# Patient Record
Sex: FEMALE | Race: WHITE | NOT HISPANIC OR LATINO | Employment: OTHER | URBAN - NONMETROPOLITAN AREA
[De-identification: names, ages, dates, MRNs, and addresses within clinical notes are randomized per-mention and may not be internally consistent; named-entity substitution may affect disease eponyms.]

---

## 2017-04-28 ENCOUNTER — TELEPHONE (OUTPATIENT)
Dept: FAMILY MEDICINE | Facility: OTHER | Age: 67
End: 2017-04-28

## 2017-04-28 NOTE — TELEPHONE ENCOUNTER
9:25 AM    Reason for Call: Phone Call    Description: Mony would like the nurse to call her. She has a Medicare question.    Was an appointment offered for this call?  No    Preferred method for responding to this message: [phone]    If we cannot reach you directly, may we leave a detailed response at the number you provided?  Yes        Mony Barneyking

## 2017-05-13 ENCOUNTER — HOSPITAL ENCOUNTER (EMERGENCY)
Facility: HOSPITAL | Age: 67
Discharge: HOME | End: 2017-05-13
Attending: FAMILY MEDICINE | Admitting: FAMILY MEDICINE
Payer: MEDICARE

## 2017-05-13 VITALS
RESPIRATION RATE: 16 BRPM | WEIGHT: 145 LBS | SYSTOLIC BLOOD PRESSURE: 139 MMHG | DIASTOLIC BLOOD PRESSURE: 87 MMHG | BODY MASS INDEX: 25.08 KG/M2 | TEMPERATURE: 97.7 F | OXYGEN SATURATION: 97 % | HEART RATE: 68 BPM

## 2017-05-13 DIAGNOSIS — E86.0 DEHYDRATION: ICD-10-CM

## 2017-05-13 DIAGNOSIS — R55 VASOVAGAL SYNCOPE: ICD-10-CM

## 2017-05-13 LAB
ALBUMIN SERPL-MCNC: 3.9 G/DL (ref 3.4–5)
ALP SERPL-CCNC: 89 U/L (ref 40–150)
ALT SERPL W P-5'-P-CCNC: 25 U/L (ref 0–50)
ANION GAP SERPL CALCULATED.3IONS-SCNC: 14 MMOL/L (ref 3–14)
AST SERPL W P-5'-P-CCNC: 26 U/L (ref 0–45)
BASOPHILS # BLD AUTO: 0 10E9/L (ref 0–0.2)
BASOPHILS NFR BLD AUTO: 0.5 %
BILIRUB SERPL-MCNC: 0.4 MG/DL (ref 0.2–1.3)
BUN SERPL-MCNC: 15 MG/DL (ref 7–30)
CALCIUM SERPL-MCNC: 8.9 MG/DL (ref 8.5–10.1)
CHLORIDE SERPL-SCNC: 111 MMOL/L (ref 94–109)
CO2 SERPL-SCNC: 20 MMOL/L (ref 20–32)
CREAT SERPL-MCNC: 0.7 MG/DL (ref 0.52–1.04)
CRP SERPL-MCNC: <2.9 MG/L (ref 0–8)
DIFFERENTIAL METHOD BLD: NORMAL
EOSINOPHIL # BLD AUTO: 0.1 10E9/L (ref 0–0.7)
EOSINOPHIL NFR BLD AUTO: 1.4 %
ERYTHROCYTE [DISTWIDTH] IN BLOOD BY AUTOMATED COUNT: 11.9 % (ref 10–15)
GFR SERPL CREATININE-BSD FRML MDRD: 85 ML/MIN/1.7M2
GLUCOSE SERPL-MCNC: 83 MG/DL (ref 70–99)
HCT VFR BLD AUTO: 42.3 % (ref 35–47)
HGB BLD-MCNC: 13.9 G/DL (ref 11.7–15.7)
IMM GRANULOCYTES # BLD: 0 10E9/L (ref 0–0.4)
IMM GRANULOCYTES NFR BLD: 0.5 %
LYMPHOCYTES # BLD AUTO: 1.4 10E9/L (ref 0.8–5.3)
LYMPHOCYTES NFR BLD AUTO: 16.5 %
MAGNESIUM SERPL-MCNC: 2.4 MG/DL (ref 1.6–2.3)
MCH RBC QN AUTO: 30.6 PG (ref 26.5–33)
MCHC RBC AUTO-ENTMCNC: 32.9 G/DL (ref 31.5–36.5)
MCV RBC AUTO: 93 FL (ref 78–100)
MONOCYTES # BLD AUTO: 0.6 10E9/L (ref 0–1.3)
MONOCYTES NFR BLD AUTO: 6.4 %
NEUTROPHILS # BLD AUTO: 6.4 10E9/L (ref 1.6–8.3)
NEUTROPHILS NFR BLD AUTO: 74.7 %
NRBC # BLD AUTO: 0 10*3/UL
NRBC BLD AUTO-RTO: 0 /100
PLATELET # BLD AUTO: 251 10E9/L (ref 150–450)
POTASSIUM SERPL-SCNC: 4 MMOL/L (ref 3.4–5.3)
PROT SERPL-MCNC: 7.7 G/DL (ref 6.8–8.8)
RBC # BLD AUTO: 4.54 10E12/L (ref 3.8–5.2)
SODIUM SERPL-SCNC: 145 MMOL/L (ref 133–144)
WBC # BLD AUTO: 8.6 10E9/L (ref 4–11)

## 2017-05-13 PROCEDURE — 83735 ASSAY OF MAGNESIUM: CPT | Performed by: FAMILY MEDICINE

## 2017-05-13 PROCEDURE — 80053 COMPREHEN METABOLIC PANEL: CPT | Performed by: FAMILY MEDICINE

## 2017-05-13 PROCEDURE — 85025 COMPLETE CBC W/AUTO DIFF WBC: CPT | Performed by: FAMILY MEDICINE

## 2017-05-13 PROCEDURE — 99284 EMERGENCY DEPT VISIT MOD MDM: CPT | Performed by: FAMILY MEDICINE

## 2017-05-13 PROCEDURE — 25000128 H RX IP 250 OP 636: Performed by: FAMILY MEDICINE

## 2017-05-13 PROCEDURE — 36416 COLLJ CAPILLARY BLOOD SPEC: CPT | Performed by: FAMILY MEDICINE

## 2017-05-13 PROCEDURE — 96360 HYDRATION IV INFUSION INIT: CPT

## 2017-05-13 PROCEDURE — 99284 EMERGENCY DEPT VISIT MOD MDM: CPT | Mod: 25

## 2017-05-13 PROCEDURE — 86140 C-REACTIVE PROTEIN: CPT | Performed by: FAMILY MEDICINE

## 2017-05-13 RX ORDER — ONDANSETRON 2 MG/ML
4 INJECTION INTRAMUSCULAR; INTRAVENOUS
Status: DISCONTINUED | OUTPATIENT
Start: 2017-05-13 | End: 2017-05-13 | Stop reason: HOSPADM

## 2017-05-13 RX ORDER — SODIUM CHLORIDE 9 MG/ML
1000 INJECTION, SOLUTION INTRAVENOUS CONTINUOUS
Status: DISCONTINUED | OUTPATIENT
Start: 2017-05-13 | End: 2017-05-13 | Stop reason: HOSPADM

## 2017-05-13 RX ORDER — ONDANSETRON 4 MG/1
4 TABLET, ORALLY DISINTEGRATING ORAL EVERY 8 HOURS PRN
Qty: 10 TABLET | Refills: 0 | Status: SHIPPED | OUTPATIENT
Start: 2017-05-13 | End: 2017-05-15

## 2017-05-13 RX ADMIN — SODIUM CHLORIDE 1000 ML: 9 INJECTION, SOLUTION INTRAVENOUS at 09:27

## 2017-05-13 RX ADMIN — SODIUM CHLORIDE 1000 ML: 9 INJECTION, SOLUTION INTRAVENOUS at 09:28

## 2017-05-13 ASSESSMENT — ENCOUNTER SYMPTOMS
CONSTIPATION: 0
NERVOUS/ANXIOUS: 1
DIARRHEA: 1
APPETITE CHANGE: 1
FEVER: 0
LIGHT-HEADEDNESS: 1
VOMITING: 1
CHILLS: 1
NAUSEA: 1
SHORTNESS OF BREATH: 0
ACTIVITY CHANGE: 1
WEAKNESS: 1
MUSCULOSKELETAL NEGATIVE: 1
ABDOMINAL PAIN: 0

## 2017-05-13 NOTE — ED AVS SNAPSHOT
[facility]    [address] [place] [address]    [place] [address]    Phone:  [phone]                                       Mony Szymanski   MRN: [id number]    Department:  [facility]   Date of Visit:  5/13/2017           Patient Information     Date Of Birth          1950        Your diagnoses for this visit were:     Dehydration     Vasovagal syncope        You were seen by Marnie Patton MD.      Follow-up Information     Follow up with Kobe Das MD In 3 days.    Specialty:  Family Practice    Why:  As needed    Contact information:    [facility]  [address] [facility] [place] [address]  [phone]          Discharge Instructions         Dehydration (Adult)  Dehydration occurs when your body loses too much fluid. This may be the result of prolonged vomiting or diarrhea, excessive sweating, or a high fever. It may also happen if you don t drink enough fluid when you re sick or out in the heat. Misuse of diuretics (water pills) can also be a cause.  Symptoms include thirst and decreased urine output. You may also feel dizzy, weak, fatigued, or very drowsy. The diet described below is usually enough to treat dehydration. In some cases, you may need medicine.  Home care    Drink at least 12 8-ounce glasses of fluid every day to resolve the dehydration. Fluid may include water; orange juice; lemonade; apple, grape, or cranberry juice; clear fruit drinks; electrolyte replacement and sports drinks; and teas and coffee without caffeine. If you have been diagnosed with a kidney disease, ask your doctor how much and what types of fluids you should drink to prevent dehydration. If you have kidney disease, fluid can build up in the body. This can be dangerous to your health.     If you have a fever, muscle aches, or a headache as a result of a cold or flu, you may take acetaminophen or ibuprofen, unless another medicine was prescribed. If you have chronic liver or  kidney disease, or have ever had a stomach ulcer or gastrointestinal bleeding, talk with your health care provider before using these medicines. Don't take aspirin if you are younger than 18 and have a fever. Aspirin raises the chance for severe liver injury.  Follow-up care  Follow up with your health care provider, or as advised.  When to seek medical advice  Call your health care provider right away if any of these occur:    Continued vomiting    Frequent diarrhea (more than 5 times a day); blood (red or black color) or mucus in diarrhea    Blood in vomit or stool    Swollen abdomen or increasing abdominal pain    Weakness, dizziness, or fainting    Unusual drowsiness or confusion    Reduced urine output or extreme thirst    Fever of 100.4 F (34 C) or higher     7545-9585 [employer]. [address] [place] [address]. All rights reserved. This information is not intended as a substitute for professional medical care. Always follow your healthcare professional's instructions.          Future Appointments        Provider Department Dept Phone Center    5/15/2017 2:00 PM Kobe Das MD [facility] [phone] [facility]         Review of your medicines      START taking        Dose / Directions Last dose taken    ondansetron 4 MG ODT tab   Commonly known as:  ZOFRAN ODT   Dose:  4 mg   Quantity:  10 tablet        Take 1 tablet (4 mg) by mouth every 8 hours as needed for nausea   Refills:  0          Our records show that you are taking the medicines listed below. If these are incorrect, please call your family doctor or clinic.        Dose / Directions Last dose taken    5-HTP 50 MG Caps        Take by mouth daily.   Refills:  0        fish oil-omega-3 fatty acids 1000 MG capsule   Dose:  2 capsule        Take 2 capsules by mouth daily.   Refills:  0        * fluticasone 50 MCG/ACT spray   Commonly known as:  FLONASE   Quantity:  3 g        INSTILL 2 SPRAYS IN EACH  NOSTRIL EVERY DAY   Refills:  3        * fluticasone 50 MCG/ACT spray   Commonly known as:  FLONASE   Quantity:  48 mL        USE 2 SPRAYS IN EACH NOSTRIL DAILY   Refills:  11        glucosamine 500 MG Caps        Take by mouth daily   Refills:  0        levothyroxine 50 MCG tablet   Commonly known as:  SYNTHROID/LEVOTHROID   Dose:  50 mcg   Quantity:  90 tablet        Take 1 tablet (50 mcg) by mouth daily   Refills:  3        Multiple vitamin Tabs        Take 1 tablet by oral route every day with food   Refills:  0        oxazepam 15 MG capsule   Commonly known as:  SERAX   Quantity:  270 capsule        TAKE ONE CAPSULE BY MOUTH THREE TIMES DAILY   Refills:  3        traZODone 50 MG tablet   Commonly known as:  DESYREL   Quantity:  270 tablet        TAKE 1 TO 3 TABLETS BY MOUTH AT BEDTIME   Refills:  3        TUMS 500 OR        Take by mouth 2 times daily   Refills:  0        venlafaxine 75 MG 24 hr capsule   Commonly known as:  EFFEXOR-XR   Quantity:  90 capsule        TAKE 1 CAPSULE BY MOUTH DAILY WITH FOOD   Refills:  3        vitamin D 1000 UNITS capsule   Dose:  1 capsule        Take 1 capsule by mouth daily.   Refills:  0        * Notice:  This list has 2 medication(s) that are the same as other medications prescribed for you. Read the directions carefully, and ask your doctor or other care provider to review them with you.            Prescriptions were sent or printed at these locations (1 Prescription)                   [facility] [address] - [place], [place] - [address] AT [facility] [address]   [address], [place] [address]    Telephone:  [phone]   Fax:  [phone]   Hours:                  E-Prescribed (1 of 1)         ondansetron (ZOFRAN ODT) 4 MG ODT tab                Procedures and tests performed during your visit     CBC with platelets differential    CRP inflammation    Cardiac Continuous Monitoring    Comprehensive metabolic panel    Magnesium    Orthostatic blood pressure  and pulse    Vital signs      Orders Needing Specimen Collection     Ordered          05/13/17 0924  UA reflex to Microscopic and Culture - STAT, [facility]: STAT, Needs to be Collected     Scheduled Task Status   05/13/17 0924 Collect UA reflex to Microscopic and Culture Open   Order Class:  PCU Collect                  Pending Results     No orders found from 5/11/2017 to 5/14/2017.            Pending Culture Results     No orders found from 5/11/2017 to 5/14/2017.            Thank you for choosing [facility]       Thank you for choosing [facility] for your care. Our goal is always to provide you with excellent care. Hearing back from our patients is one way we can continue to improve our services. Please take a few minutes to complete the written survey that you may receive in the mail after you visit with us. Thank you!        [employer] Information     [employer] gives you secure access to your electronic health record. If you see a primary care provider, you can also send messages to your care team and make appointments. If you have questions, please call your primary care clinic.  If you do not have a primary care provider, please call [phone] and they will assist you.        Care EveryWhere ID     This is your Care EveryWhere ID. This could be used by other organizations to access your [facility] medical records  [id number]        After Visit Summary       This is your record. Keep this with you and show to your community pharmacist(s) and doctor(s) at your next visit.

## 2017-05-13 NOTE — ED NOTES
"Pt arrives to ED via ambulance following an unresponsive episode.  Pt woke this am and was feeling \"normal.\"  Ate breakfast.  Pt has depression and anxiety.  Started using \"Alpha-Stim AID,\" first, yesterday for 20 minutes and then this am for one hour.   and pt report that almost immediately after using the device pt became unresponsive, eyes were open, unable to answer  at all for about 2-3 minutes, and then began vomiting and was incontinent of stool.  Pt is awake, oriented but feeling very weak and dizzy.   states he read on device instructions that prolonged use of Alpha-Stim AID at higher than necessary currents can cause dizziness and nausea that can last for hours to days.   "

## 2017-05-13 NOTE — DISCHARGE INSTRUCTIONS
Dehydration (Adult)  Dehydration occurs when your body loses too much fluid. This may be the result of prolonged vomiting or diarrhea, excessive sweating, or a high fever. It may also happen if you don t drink enough fluid when you re sick or out in the heat. Misuse of diuretics (water pills) can also be a cause.  Symptoms include thirst and decreased urine output. You may also feel dizzy, weak, fatigued, or very drowsy. The diet described below is usually enough to treat dehydration. In some cases, you may need medicine.  Home care    Drink at least 12 8-ounce glasses of fluid every day to resolve the dehydration. Fluid may include water; orange juice; lemonade; apple, grape, or cranberry juice; clear fruit drinks; electrolyte replacement and sports drinks; and teas and coffee without caffeine. If you have been diagnosed with a kidney disease, ask your doctor how much and what types of fluids you should drink to prevent dehydration. If you have kidney disease, fluid can build up in the body. This can be dangerous to your health.     If you have a fever, muscle aches, or a headache as a result of a cold or flu, you may take acetaminophen or ibuprofen, unless another medicine was prescribed. If you have chronic liver or kidney disease, or have ever had a stomach ulcer or gastrointestinal bleeding, talk with your health care provider before using these medicines. Don't take aspirin if you are younger than 18 and have a fever. Aspirin raises the chance for severe liver injury.  Follow-up care  Follow up with your health care provider, or as advised.  When to seek medical advice  Call your health care provider right away if any of these occur:    Continued vomiting    Frequent diarrhea (more than 5 times a day); blood (red or black color) or mucus in diarrhea    Blood in vomit or stool    Swollen abdomen or increasing abdominal pain    Weakness, dizziness, or fainting    Unusual drowsiness or confusion    Reduced  urine output or extreme thirst    Fever of 100.4 F (34 C) or higher     [id number] [employer]. [address] [place] [address], [place] [address]. All rights reserved. This information is not intended as a substitute for professional medical care. Always follow your healthcare professional's instructions.

## 2017-05-13 NOTE — ED NOTES
Assisted pt to sitting, pt felt dizzy and disoriented.  Will wait to do orthostatic blood pressure readings.  Pt does not feel she could stand for them.  IV fluids infusing.  Lab to come draw.  Pt unable to void at this time.

## 2017-05-13 NOTE — ED NOTES
Orthostatic BP readings completed.  Pt was moving slow but states her nausea, dizziness and weakness has improved.  Pt was able to stand with standby assist.

## 2017-05-13 NOTE — ED NOTES
Pt states she feels so much better and feels as though she can go home.  Pt sitting upright in bed, watching tv.   at bedside.

## 2017-05-13 NOTE — ED AVS SNAPSHOT
[facility]    [address] [address]    Phone:  [phone]                                       Mony Szymanski   MRN: [id number]    Department:  [facility]   Date of Visit:  5/13/2017           After Visit Summary Signature Page     I have received my discharge instructions, and my questions have been answered. I have discussed any challenges I see with this plan with the nurse or doctor.    ..........................................................................................................................................  Patient/Patient Representative Signature      ..........................................................................................................................................  Patient Representative Print Name and Relationship to Patient    ..................................................               ................................................  Date                                            Time    ..........................................................................................................................................  Reviewed by Signature/Title    ...................................................              ..............................................  Date                                                            Time

## 2017-05-13 NOTE — ED PROVIDER NOTES
History     Chief Complaint   Patient presents with     Generalized Weakness     Loss of Consciousness     HPI  Mony Szymanski is a 66 year old female who was using an Alpha Stimulator and had just removed it after an hour when she became unresponsive with her eyes wide open and then had vomiting and bowel incontinence.  She just started using the stimulator yesterday to help with anxiety yesterday, used it for 20 minutes then, an hour this morning.  She states she feels very weak and nauseated, like she wants to pass out.  Her blood pressure is normal, she has a normal heart rate and oxygen saturation.  She is extremely chilled at this time, and has difficulty sitting up unassisted.      I have reviewed the Medications, Allergies, Past Medical and Surgical History, and Social History in the Epic system.    Review of Systems   Constitutional: Positive for activity change, appetite change and chills. Negative for fever.   HENT: Negative.    Respiratory: Negative for shortness of breath.    Cardiovascular: Negative for chest pain.   Gastrointestinal: Positive for diarrhea, nausea and vomiting. Negative for abdominal pain and constipation.   Genitourinary: Negative.    Musculoskeletal: Negative.    Neurological: Positive for weakness and light-headedness.   Psychiatric/Behavioral: The patient is nervous/anxious.        Physical Exam   BP: 118/69  Pulse: 68  Resp: 16  Weight: 65.8 kg (145 lb)  SpO2: 100 %  Physical Exam   Constitutional: She is oriented to person, place, and time. She appears well-developed and well-nourished. No distress.   HENT:   Head: Normocephalic and atraumatic.   Neck: Normal range of motion. Neck supple.   Cardiovascular: Normal rate, regular rhythm and normal heart sounds.    No murmur heard.  Pulmonary/Chest: Effort normal and breath sounds normal. No respiratory distress.   Abdominal: Soft. Bowel sounds are normal. She exhibits no distension. There is no tenderness.   Musculoskeletal: Normal  range of motion. She exhibits no edema.   Neurological: She is alert and oriented to person, place, and time.   Skin: Skin is warm and dry.   Psychiatric: Judgment normal. Her mood appears anxious.   Nursing note and vitals reviewed.      ED Course     ED Course     Procedures        Labs Ordered and Resulted from Time of ED Arrival Up to the Time of Departure from the ED   COMPREHENSIVE METABOLIC PANEL - Abnormal; Notable for the following:        Result Value    Sodium 145 (*)     Chloride 111 (*)     All other components within normal limits   MAGNESIUM - Abnormal; Notable for the following:     Magnesium 2.4 (*)     All other components within normal limits   CBC WITH PLATELETS DIFFERENTIAL   CRP INFLAMMATION   UA MACROSCOPIC WITH REFLEX TO MICRO AND CULTURE   VITAL SIGNS   CARDIAC CONTINUOUS MONITORING   ORTHOSTATIC BLOOD PRESSURE AND PULSE       Assessments & Plan (with Medical Decision Making)   Patient much improved after 2 liters of NS, having no further nausea after Zofran.  She has normal labs excepting a minimally high sodium and magnesium.  Advised her to back off to 20 minutes on the Alpha Stimulator, she is inclined to discontinue use altogether.  Follow up with Dr. Das in 3 days if any further issues.  Will send home with Zofran in case nausea recurs.    I have reviewed the nursing notes.    I have reviewed the findings, diagnosis, plan and need for follow up with the patient.    New Prescriptions    ONDANSETRON (ZOFRAN ODT) 4 MG ODT TAB    Take 1 tablet (4 mg) by mouth every 8 hours as needed for nausea       Final diagnoses:   Dehydration   Vasovagal syncope       5/13/2017   [facility]     Marnie Patton MD  05/13/17 0422

## 2017-05-15 ENCOUNTER — OFFICE VISIT (OUTPATIENT)
Dept: FAMILY MEDICINE | Facility: OTHER | Age: 67
End: 2017-05-15
Attending: FAMILY MEDICINE
Payer: COMMERCIAL

## 2017-05-15 VITALS
WEIGHT: 154 LBS | HEART RATE: 66 BPM | TEMPERATURE: 98.1 F | BODY MASS INDEX: 26.29 KG/M2 | SYSTOLIC BLOOD PRESSURE: 110 MMHG | HEIGHT: 64 IN | DIASTOLIC BLOOD PRESSURE: 78 MMHG

## 2017-05-15 DIAGNOSIS — F33.41 MAJOR DEPRESSIVE DISORDER, RECURRENT EPISODE, IN PARTIAL REMISSION (H): ICD-10-CM

## 2017-05-15 DIAGNOSIS — G93.32 CHRONIC FATIGUE SYNDROME: ICD-10-CM

## 2017-05-15 DIAGNOSIS — J30.1 SEASONAL ALLERGIC RHINITIS DUE TO POLLEN: ICD-10-CM

## 2017-05-15 DIAGNOSIS — E03.9 ACQUIRED HYPOTHYROIDISM: ICD-10-CM

## 2017-05-15 DIAGNOSIS — F41.1 GAD (GENERALIZED ANXIETY DISORDER): ICD-10-CM

## 2017-05-15 DIAGNOSIS — Z00.00 ROUTINE GENERAL MEDICAL EXAMINATION AT A HEALTH CARE FACILITY: Primary | ICD-10-CM

## 2017-05-15 DIAGNOSIS — E78.2 MIXED HYPERLIPIDEMIA: ICD-10-CM

## 2017-05-15 DIAGNOSIS — R55 VASO VAGAL EPISODE: ICD-10-CM

## 2017-05-15 LAB — TSH SERPL DL<=0.05 MIU/L-ACNC: 2 MU/L (ref 0.4–4)

## 2017-05-15 PROCEDURE — 99397 PER PM REEVAL EST PAT 65+ YR: CPT | Performed by: FAMILY MEDICINE

## 2017-05-15 PROCEDURE — 84443 ASSAY THYROID STIM HORMONE: CPT | Mod: ZL | Performed by: FAMILY MEDICINE

## 2017-05-15 PROCEDURE — 99213 OFFICE O/P EST LOW 20 MIN: CPT

## 2017-05-15 PROCEDURE — 36415 COLL VENOUS BLD VENIPUNCTURE: CPT | Mod: ZL | Performed by: FAMILY MEDICINE

## 2017-05-15 RX ORDER — VENLAFAXINE HYDROCHLORIDE 75 MG/1
CAPSULE, EXTENDED RELEASE ORAL
Qty: 90 CAPSULE | Refills: 3 | Status: SHIPPED | OUTPATIENT
Start: 2017-05-15 | End: 2018-05-16

## 2017-05-15 RX ORDER — LEVOTHYROXINE SODIUM 50 UG/1
50 TABLET ORAL DAILY
Qty: 90 TABLET | Refills: 3 | Status: SHIPPED | OUTPATIENT
Start: 2017-05-15 | End: 2018-05-16

## 2017-05-15 RX ORDER — FLUTICASONE PROPIONATE 50 MCG
SPRAY, SUSPENSION (ML) NASAL
Qty: 3 G | Refills: 3 | Status: SHIPPED | OUTPATIENT
Start: 2017-05-15 | End: 2018-05-16

## 2017-05-15 RX ORDER — OXAZEPAM 15 MG/1
CAPSULE ORAL
Qty: 270 CAPSULE | Refills: 3 | Status: SHIPPED | OUTPATIENT
Start: 2017-05-15 | End: 2018-05-16

## 2017-05-15 RX ORDER — TRAZODONE HYDROCHLORIDE 50 MG/1
TABLET, FILM COATED ORAL
Qty: 270 TABLET | Refills: 3 | Status: SHIPPED | OUTPATIENT
Start: 2017-05-15 | End: 2018-05-16

## 2017-05-15 ASSESSMENT — ANXIETY QUESTIONNAIRES
5. BEING SO RESTLESS THAT IT IS HARD TO SIT STILL: NEARLY EVERY DAY
6. BECOMING EASILY ANNOYED OR IRRITABLE: SEVERAL DAYS
IF YOU CHECKED OFF ANY PROBLEMS ON THIS QUESTIONNAIRE, HOW DIFFICULT HAVE THESE PROBLEMS MADE IT FOR YOU TO DO YOUR WORK, TAKE CARE OF THINGS AT HOME, OR GET ALONG WITH OTHER PEOPLE: SOMEWHAT DIFFICULT
GAD7 TOTAL SCORE: 9
7. FEELING AFRAID AS IF SOMETHING AWFUL MIGHT HAPPEN: NOT AT ALL
3. WORRYING TOO MUCH ABOUT DIFFERENT THINGS: NOT AT ALL
2. NOT BEING ABLE TO STOP OR CONTROL WORRYING: SEVERAL DAYS
1. FEELING NERVOUS, ANXIOUS, OR ON EDGE: NEARLY EVERY DAY

## 2017-05-15 ASSESSMENT — PATIENT HEALTH QUESTIONNAIRE - PHQ9: 5. POOR APPETITE OR OVEREATING: SEVERAL DAYS

## 2017-05-15 ASSESSMENT — PAIN SCALES - GENERAL: PAINLEVEL: NO PAIN (0)

## 2017-05-15 NOTE — MR AVS SNAPSHOT
After Visit Summary   5/15/2017    Mony Szymanski    MRN: [id number]           Patient Information     Date Of Birth          1950        Visit Information        Provider Department      5/15/2017 2:00 PM Kobe Das MD [facility] [place]        Today's Diagnoses     Routine general medical examination at a health care facility    -  1    Acquired hypothyroidism        Mixed hyperlipidemia        Major depressive disorder, recurrent episode, in partial remission (H)        Chronic fatigue syndrome        Generalized anxiety disorder        ED (generalized anxiety disorder)        Seasonal allergic rhinitis due to pollen        Vaso vagal episode          Care Instructions      Preventive Health Recommendations  Female Ages 65 +    Yearly exam:     See your health care provider every year in order to  o Review health changes.   o Discuss preventive care.    o Review your medicines if your doctor has prescribed any.      You no longer need a yearly Pap test unless you've had an abnormal Pap test in the past 10 years. If you have vaginal symptoms, such as bleeding or discharge, be sure to talk with your provider about a Pap test.      Every 1 to 2 years, have a mammogram.  If you are over 69, talk with your health care provider about whether or not you want to continue having screening mammograms.      Every 10 years, have a colonoscopy. Or, have a yearly FIT test (stool test). These exams will check for colon cancer.       Have a cholesterol test every 5 years, or more often if your doctor advises it.       Have a diabetes test (fasting glucose) every three years. If you are at risk for diabetes, you should have this test more often.       At age 65, have a bone density scan (DEXA) to check for osteoporosis (brittle bone disease).    Shots:    Get a flu shot each year.    Get a tetanus shot every 10 years.    Talk to your doctor about your pneumonia vaccines. There are now two you should  receive - Pneumovax (PPSV 23) and Prevnar (PCV 13).    Talk to your doctor about the shingles vaccine.    Talk to your doctor about the hepatitis B vaccine.    Nutrition:     Eat at least 5 servings of fruits and vegetables each day.      Eat whole-grain bread, whole-wheat pasta and brown rice instead of white grains and rice.      Talk to your provider about Calcium and Vitamin D.     Lifestyle    Exercise at least 150 minutes a week (30 minutes a day, 5 days a week). This will help you control your weight and prevent disease.      Limit alcohol to one drink per day.      No smoking.       Wear sunscreen to prevent skin cancer.       See your dentist twice a year for an exam and cleaning.      See your eye doctor every 1 to 2 years to screen for conditions such as glaucoma, macular degeneration, cataracts, etc     Will call you with thyroid level.             Follow-ups after your visit        Who to contact     If you have questions or need follow up information about today's clinic visit or your schedule please contact [facility] ([facility] directly at [phone].  Normal or non-critical lab and imaging results will be communicated to you by [employer], letter or phone within 4 business days after the clinic has received the results. If you do not hear from us within 7 days, please contact the clinic through [employer] or phone. If you have a critical or abnormal lab result, we will notify you by phone as soon as possible.  Submit refill requests through [employer] or call your pharmacy and they will forward the refill request to us. Please allow 3 business days for your refill to be completed.          Additional Information About Your Visit        [employer] Information     [employer] gives you secure access to your electronic health record. If you see a primary care provider, you can also send messages to your care team and make appointments. If you have questions, please call your primary care clinic.  If you do not  "have a primary care provider, please call [phone] and they will assist you.        Care EveryWhere ID     This is your Care EveryWhere ID. This could be used by other organizations to access your [place] medical records  [id number]        Your Vitals Were     Pulse Temperature Height BMI (Body Mass Index)          66 98.1  F (36.7  C) 5' 4\" (1.626 m) 26.43 kg/m2         Blood Pressure from Last 3 Encounters:   05/15/17 110/78   05/13/17 139/87   05/11/16 100/68    Weight from Last 3 Encounters:   05/15/17 154 lb (69.9 kg)   05/13/17 145 lb (65.8 kg)   05/11/16 145 lb (65.8 kg)              We Performed the Following     TSH          Today's Medication Changes          These changes are accurate as of: 5/15/17  2:42 PM.  If you have any questions, ask your nurse or doctor.               These medicines have changed or have updated prescriptions.        Dose/Directions    fluticasone 50 MCG/ACT spray   Commonly known as:  FLONASE   This may have changed:  Another medication with the same name was removed. Continue taking this medication, and follow the directions you see here.   Used for:  Seasonal allergic rhinitis due to pollen   Changed by:  Kobe Das MD        INSTILL 2 SPRAYS IN EACH NOSTRIL EVERY DAY   Quantity:  3 g   Refills:  3         Stop taking these medicines if you haven't already. Please contact your care team if you have questions.     ondansetron 4 MG ODT tab   Commonly known as:  ZOFRAN ODT   Stopped by:  Kobe Das MD                Where to get your medicines      These medications were sent to [facility], [place] - [address] AT [facility] [address], [place] [address]     Phone:  [phone]     fluticasone 50 MCG/ACT spray    traZODone 50 MG tablet    venlafaxine 75 MG 24 hr capsule         Some of these will need a paper prescription and others can be bought over the counter.  Ask your nurse if you have questions.     Bring a " paper prescription for each of these medications     oxazepam 15 MG capsule                Primary Care Provider Office Phone # Fax #    Kobe Das [phone]       [facility] [address]  [place] [address]        Thank you!     Thank you for choosing [facility]  for your care. Our goal is always to provide you with excellent care. Hearing back from our patients is one way we can continue to improve our services. Please take a few minutes to complete the written survey that you may receive in the mail after your visit with us. Thank you!             Your Updated Medication List - Protect others around you: Learn how to safely use, store and throw away your medicines at www.disposemymeds.org.          This list is accurate as of: 5/15/17  2:42 PM.  Always use your most recent med list.                   Brand Name Dispense Instructions for use    5-HTP 50 MG Caps      Take by mouth daily.       fish oil-omega-3 fatty acids 1000 MG capsule      Take 2 capsules by mouth daily.       fluticasone 50 MCG/ACT spray    FLONASE    3 g    INSTILL 2 SPRAYS IN EACH NOSTRIL EVERY DAY       glucosamine 500 MG Caps      Take by mouth daily       levothyroxine 50 MCG tablet    SYNTHROID/LEVOTHROID    90 tablet    Take 1 tablet (50 mcg) by mouth daily       Multiple vitamin Tabs      Take 1 tablet by oral route every day with food       oxazepam 15 MG capsule    SERAX    270 capsule    TAKE ONE CAPSULE BY MOUTH THREE TIMES DAILY       traZODone 50 MG tablet    DESYREL    270 tablet    TAKE 1 TO 3 TABLETS BY MOUTH AT BEDTIME       TUMS 500 OR      Take by mouth 2 times daily       venlafaxine 75 MG 24 hr capsule    EFFEXOR-XR    90 capsule    TAKE 1 CAPSULE BY MOUTH DAILY WITH FOOD       vitamin D 1000 UNITS capsule      Take 1 capsule by mouth daily.

## 2017-05-15 NOTE — NURSING NOTE
"Chief Complaint   Patient presents with     Physical       Initial /78  Pulse 66  Temp 98.1  F (36.7  C)  Ht 5' 4\" (1.626 m)  Wt 154 lb (69.9 kg)  BMI 26.43 kg/m2 Estimated body mass index is 26.43 kg/(m^2) as calculated from the following:    Height as of this encounter: 5' 4\" (1.626 m).    Weight as of this encounter: 154 lb (69.9 kg).  Medication Reconciliation: complete     Braeden Grajeda      "

## 2017-05-15 NOTE — PROGRESS NOTES
SUBJECTIVE:                                                            Mony Szymanski is a 67 year old female who presents for Preventive Visit.      Are you in the first 12 months of your Medicare Part B coverage?  Yes,  Visual Acuity:  Right Eye: 20   Left Eye: 20  Both Eyes: 20/15    Healthy Habits:    Do you get at least three servings of calcium containing foods daily (dairy, green leafy vegetables, etc.)? yes and no, taking calcium and/or vitamin D supplement: yes - calcium and vit d    Amount of exercise or daily activities, outside of work: 6-7 day(s) per week    Problems taking medications regularly No    Medication side effects: No    Have you had an eye exam in the past two years? yes    Do you see a dentist twice per year? yes    Do you have sleep apnea, excessive snoring or daytime drowsiness?no    COGNITIVE SCREEN  1) Repeat 3 items (Banana, Sunrise, Chair)    2) Clock draw:   3) 3 item recall: Recalls 3 objects  Results: 3 items recalled: COGNITIVE IMPAIRMENT LESS LIKELY    Mini-CogTM Copyright S Óscar. Licensed by the author for use in [facility]; reprinted with permission (reggie@[facility]). All rights reserved.      Here f/u ER visit- reviewed- had vasovagal episode??  Had episode with severe n/v afterwards  No vertigo  No confused afterwards  Did have stool incontinence but was in process of going to [place] at time of this episode. No urinary incontinence  No h/o sz d/o  H/o fainting - multiple times.           Depression and Anxiety Follow-Up    Status since last visit: No change    Other associated symptoms:None    Complicating factors:     Significant life event: No     Current substance abuse: None    PHQ-9 SCORE 8/12/2015 5/11/2016 5/15/2017   Total Score 7 - -   Total Score - 5 5     ED-7 SCORE 5/15/2017   Total Score 9        PHQ-9  English      PHQ-9   Any Language     GAD7       Reviewed and updated as needed this visit by clinical staff  Tobacco  Allergies  Meds  Med Hx  Surg Hx   Fam Hx  Soc Hx        Reviewed and updated as needed this visit by Provider        Social History   Substance Use Topics     Smoking status: Never Smoker     Smokeless tobacco: Not on file     Alcohol use Yes      Comment: Beer & Wine, socially       The patient does not drink >3 drinks per day nor >7 drinks per week.    Today's PHQ-2 Score:   PHQ-2 ( 1999 [employer]) 1/10/2014   Q1: Little interest or pleasure in doing things 1   Q2: Feeling down, depressed or hopeless 1   PHQ-2 Score 2     PHQ-9 SCORE 8/12/2015 5/11/2016 5/15/2017   Total Score 7 - -   Total Score - 5 5     Do you feel safe in your environment - Yes    Do you have a Health Care Directive?: Yes: Advance Directive has been received and scanned.    Current providers sharing in care for this patient include:   Patient Care Team:  Kobe Das MD as PCP - General      Hearing impairment: No    Ability to successfully perform activities of daily living: Yes, no assistance needed     Fall risk:  Fallen 2 or more times in the past year?: No  Any fall with injury in the past year?: No    Home safety:  none identified      The following health maintenance items are reviewed in Epic and correct as of today:  Health Maintenance   Topic Date Due     HEPATITIS C SCREENING  05/15/1970     DEPRESSION SCREENING  02/12/2016     FALL RISK ASSESSMENT  05/15/2017     DEXA SCAN SCREENING (SYSTEM ASSIGNED)  05/15/2017     PNEUMOCOCCAL (1 of 2 - PCV13) 05/15/2017     INFLUENZA VACCINE (SYSTEM ASSIGNED)  09/01/2017     MAMMO Q1 YR NO INBASKET MESSAGE  09/12/2017     PAP SCREENING Q3 YR (SYSTEM ASSIGNED)  05/08/2018     ADVANCE DIRECTIVE PLANNING Q5 YRS (NO INBASKET)  06/30/2020     COLONOSCOPY Q5 YR INBASKET MESSAGE  07/20/2020     LIPID SCREEN Q5 YR FEMALE (SYSTEM ASSIGNED)  05/12/2021     TETANUS IMMUNIZATION (SYSTEM ASSIGNED)  12/27/2021         Mammogram Screening: Patient over age 50, mutual decision to screen reflected in health maintenance.     ROS:  C: NEGATIVE  "for fever, chills, change in weight  I: NEGATIVE for worrisome rashes, moles or lesions  E: NEGATIVE for vision changes or irritation  E/M: NEGATIVE for ear, mouth and throat problems  R: NEGATIVE for significant cough or SOB  B: NEGATIVE for masses, tenderness or discharge  CV: NEGATIVE for chest pain, palpitations or peripheral edema  GI: NEGATIVE for nausea, abdominal pain, heartburn, or change in bowel habits  : NEGATIVE for frequency, dysuria, or hematuria  M: NEGATIVE for significant arthralgias or myalgia  N: NEGATIVE for weakness, dizziness or paresthesias  E: NEGATIVE for temperature intolerance, skin/hair changes  H: NEGATIVE for bleeding problems  P: NEGATIVE for changes in mood or affect    Problem list, Medication list, Allergies, and Medical/Social/Surgical histories reviewed in [facility] and updated as appropriate.  OBJECTIVE:                                                            /78  Pulse 66  Temp 98.1  F (36.7  C)  Ht 5' 4\" (1.626 m)  Wt 154 lb (69.9 kg)  BMI 26.43 kg/m2 Estimated body mass index is 26.43 kg/(m^2) as calculated from the following:    Height as of this encounter: 5' 4\" (1.626 m).    Weight as of this encounter: 154 lb (69.9 kg).  EXAM:   GENERAL: healthy, alert and no distress  EYES: Eyes grossly normal to inspection, PERRL and conjunctivae and sclerae normal  HENT: ear canals and TM's normal, nose and mouth without ulcers or lesions  NECK: no adenopathy, no asymmetry, masses, or scars and thyroid normal to palpation  RESP: lungs clear to auscultation - no rales, rhonchi or wheezes  BREAST: normal without masses, tenderness or nipple discharge and no palpable axillary masses or adenopathy  CV: regular rate and rhythm, normal S1 S2, no S3 or S4, no murmur, click or rub, no peripheral edema and peripheral pulses strong  ABDOMEN: soft, nontender, no hepatosplenomegaly, no masses and bowel sounds normal   (female): normal female external genitalia, normal urethral meatus, " vaginal mucosa pink, moist, well rugated, and normal cervix/adnexa/uterus without masses or discharge  MS: no gross musculoskeletal defects noted, no edema  SKIN: no suspicious lesions or rashes  NEURO: Normal strength and tone, mentation intact and speech normal  PSYCH: mentation appears normal, affect normal/bright  LYMPH: no cervical, supraclavicular, axillary, or inguinal adenopathy    Results for orders placed or performed in visit on 05/15/17 (from the past 24 hour(s))   TSH   Result Value Ref Range    TSH 2.00 0.40 - 4.00 mU/L       ASSESSMENT / PLAN:                                                            1. Routine general medical examination at a health care facility  Overall doing well.     2. Acquired hypothyroidism  TSh ok- RF done   - TSH; Future  - TSH    3. Mixed hyperlipidemia  Mild in past. Will not check this year     4. Major depressive disorder, recurrent episode, in partial remission (H)  Overall stable - wax and weans.  Continue current medications and behavioral changes.     5. Chronic fatigue syndrome  Stable - wax and wean course.  Continue current medications and behavioral changes.     7. ED (generalized anxiety disorder)  Stable on meds- Continue current medications and behavioral changes.   - oxazepam (SERAX) 15 MG capsule; TAKE ONE CAPSULE BY MOUTH THREE TIMES DAILY  Dispense: 270 capsule; Refill: 3  - traZODone (DESYREL) 50 MG tablet; TAKE 1 TO 3 TABLETS BY MOUTH AT BEDTIME  Dispense: 270 tablet; Refill: 3  - venlafaxine (EFFEXOR-XR) 75 MG 24 hr capsule; TAKE 1 CAPSULE BY MOUTH DAILY WITH FOOD  Dispense: 90 capsule; Refill: 3    8. Seasonal allergic rhinitis due to pollen  Stable RF needed   - fluticasone (FLONASE) 50 MCG/ACT spray; INSTILL 2 SPRAYS IN EACH NOSTRIL EVERY DAY  Dispense: 3 g; Refill: 3    9. Vaso vagal episode  Discussed. Probable vasovagal with using Alpha stim/ not sleeping well night before / urge for BM.  Discussed rare possiblity for sz-- offered or discussed  "getting EEG. She deferred for now. No furthre alpha stimulator use discussed and recommended no further use by the pre scriber of this.       End of Life Planning:  Patient currently has an advanced directive: No.  I have verified the patient's ablity to prepare an advanced directive/make health care decisions.  Literature was provided to assist patient in preparing an advanced directive.    COUNSELING:  Reviewed preventive health counseling, as reflected in patient instructions       Regular exercise       Healthy diet/nutrition       Colon cancer screening       Advanced Planning         Estimated body mass index is 26.43 kg/(m^2) as calculated from the following:    Height as of this encounter: 5' 4\" (1.626 m).    Weight as of this encounter: 154 lb (69.9 kg).     reports that she has never smoked. She does not have any smokeless tobacco history on file.      Appropriate preventive services were discussed with this patient, including applicable screening as appropriate for cardiovascular disease, diabetes, osteopenia/osteoporosis, and glaucoma.  As appropriate for age/gender, discussed screening for colorectal cancer, prostate cancer, breast cancer, and cervical cancer. Checklist reviewing preventive services available has been given to the patient.    Reviewed patients plan of care and provided an AVS. The Basic Care Plan (routine screening as documented in Health Maintenance) for Mony meets the Care Plan requirement. This Care Plan has been established and reviewed with the Patient.    Counseling Resources:  ATP IV Guidelines  Pooled Cohorts Equation Calculator  Breast Cancer Risk Calculator  FRAX Risk Assessment  ICSI Preventive Guidelines  Dietary Guidelines for Americans, 2010  USDA's MyPlate  ASA Prophylaxis  Lung CA Screening    Kobe Das MD  [facility] [place]  "

## 2017-05-15 NOTE — PATIENT INSTRUCTIONS

## 2017-05-16 ASSESSMENT — PATIENT HEALTH QUESTIONNAIRE - PHQ9: SUM OF ALL RESPONSES TO PHQ QUESTIONS 1-9: 5

## 2017-05-16 ASSESSMENT — ANXIETY QUESTIONNAIRES: GAD7 TOTAL SCORE: 9

## 2017-07-17 DIAGNOSIS — E03.9 ACQUIRED HYPOTHYROIDISM: ICD-10-CM

## 2017-07-18 RX ORDER — LEVOTHYROXINE SODIUM 50 UG/1
TABLET ORAL
Qty: 90 TABLET | Refills: 2 | Status: SHIPPED | OUTPATIENT
Start: 2017-07-18 | End: 2018-05-16

## 2017-08-23 DIAGNOSIS — Z12.31 VISIT FOR SCREENING MAMMOGRAM: Primary | ICD-10-CM

## 2017-09-18 PROCEDURE — 77063 BREAST TOMOSYNTHESIS BI: CPT | Mod: TC

## 2017-09-18 PROCEDURE — G0202 SCR MAMMO BI INCL CAD: HCPCS | Mod: TC

## 2017-09-26 ENCOUNTER — APPOINTMENT (OUTPATIENT)
Dept: OCCUPATIONAL MEDICINE | Facility: OTHER | Age: 67
End: 2017-09-26

## 2018-05-16 ENCOUNTER — OFFICE VISIT (OUTPATIENT)
Dept: FAMILY MEDICINE | Facility: OTHER | Age: 68
End: 2018-05-16
Attending: FAMILY MEDICINE
Payer: MEDICARE

## 2018-05-16 VITALS
BODY MASS INDEX: 25.61 KG/M2 | HEART RATE: 72 BPM | TEMPERATURE: 97.8 F | HEIGHT: 64 IN | WEIGHT: 150 LBS | SYSTOLIC BLOOD PRESSURE: 120 MMHG | DIASTOLIC BLOOD PRESSURE: 72 MMHG | OXYGEN SATURATION: 99 %

## 2018-05-16 DIAGNOSIS — E78.2 MIXED HYPERLIPIDEMIA: ICD-10-CM

## 2018-05-16 DIAGNOSIS — E03.9 ACQUIRED HYPOTHYROIDISM: ICD-10-CM

## 2018-05-16 DIAGNOSIS — F41.1 GAD (GENERALIZED ANXIETY DISORDER): ICD-10-CM

## 2018-05-16 DIAGNOSIS — F42.9 OBSESSIVE-COMPULSIVE DISORDER, UNSPECIFIED TYPE: ICD-10-CM

## 2018-05-16 DIAGNOSIS — F41.1 GENERALIZED ANXIETY DISORDER: ICD-10-CM

## 2018-05-16 DIAGNOSIS — Z23 NEED FOR VACCINATION: ICD-10-CM

## 2018-05-16 DIAGNOSIS — Z78.0 ASYMPTOMATIC MENOPAUSAL STATE: ICD-10-CM

## 2018-05-16 DIAGNOSIS — J30.1 CHRONIC SEASONAL ALLERGIC RHINITIS DUE TO POLLEN: ICD-10-CM

## 2018-05-16 DIAGNOSIS — F33.41 MAJOR DEPRESSIVE DISORDER, RECURRENT EPISODE, IN PARTIAL REMISSION (H): ICD-10-CM

## 2018-05-16 DIAGNOSIS — Z80.0 FH: COLON CANCER: ICD-10-CM

## 2018-05-16 DIAGNOSIS — Z00.00 ROUTINE GENERAL MEDICAL EXAMINATION AT A HEALTH CARE FACILITY: Primary | ICD-10-CM

## 2018-05-16 LAB
ALBUMIN SERPL-MCNC: 4 G/DL (ref 3.4–5)
ALP SERPL-CCNC: 119 U/L (ref 40–150)
ALT SERPL W P-5'-P-CCNC: 41 U/L (ref 0–50)
ANION GAP SERPL CALCULATED.3IONS-SCNC: 6 MMOL/L (ref 3–14)
AST SERPL W P-5'-P-CCNC: 26 U/L (ref 0–45)
BASOPHILS # BLD AUTO: 0.1 10E9/L (ref 0–0.2)
BASOPHILS NFR BLD AUTO: 1 %
BILIRUB SERPL-MCNC: 0.1 MG/DL (ref 0.2–1.3)
BUN SERPL-MCNC: 16 MG/DL (ref 7–30)
CALCIUM SERPL-MCNC: 8.9 MG/DL (ref 8.5–10.1)
CHLORIDE SERPL-SCNC: 108 MMOL/L (ref 94–109)
CO2 SERPL-SCNC: 28 MMOL/L (ref 20–32)
CREAT SERPL-MCNC: 0.7 MG/DL (ref 0.52–1.04)
DIFFERENTIAL METHOD BLD: NORMAL
EOSINOPHIL # BLD AUTO: 0.3 10E9/L (ref 0–0.7)
EOSINOPHIL NFR BLD AUTO: 5.4 %
ERYTHROCYTE [DISTWIDTH] IN BLOOD BY AUTOMATED COUNT: 12.4 % (ref 10–15)
GFR SERPL CREATININE-BSD FRML MDRD: 84 ML/MIN/1.7M2
GLUCOSE SERPL-MCNC: 93 MG/DL (ref 70–99)
HCT VFR BLD AUTO: 38.2 % (ref 35–47)
HGB BLD-MCNC: 13.1 G/DL (ref 11.7–15.7)
IMM GRANULOCYTES # BLD: 0 10E9/L (ref 0–0.4)
IMM GRANULOCYTES NFR BLD: 0.2 %
LYMPHOCYTES # BLD AUTO: 1.9 10E9/L (ref 0.8–5.3)
LYMPHOCYTES NFR BLD AUTO: 37.3 %
MCH RBC QN AUTO: 31 PG (ref 26.5–33)
MCHC RBC AUTO-ENTMCNC: 34.3 G/DL (ref 31.5–36.5)
MCV RBC AUTO: 91 FL (ref 78–100)
MONOCYTES # BLD AUTO: 0.4 10E9/L (ref 0–1.3)
MONOCYTES NFR BLD AUTO: 7.9 %
NEUTROPHILS # BLD AUTO: 2.5 10E9/L (ref 1.6–8.3)
NEUTROPHILS NFR BLD AUTO: 48.2 %
NRBC # BLD AUTO: 0 10*3/UL
NRBC BLD AUTO-RTO: 0 /100
PLATELET # BLD AUTO: 276 10E9/L (ref 150–450)
POTASSIUM SERPL-SCNC: 3.7 MMOL/L (ref 3.4–5.3)
PROT SERPL-MCNC: 8.1 G/DL (ref 6.8–8.8)
RBC # BLD AUTO: 4.22 10E12/L (ref 3.8–5.2)
SODIUM SERPL-SCNC: 142 MMOL/L (ref 133–144)
TSH SERPL DL<=0.005 MIU/L-ACNC: 0.89 MU/L (ref 0.4–4)
WBC # BLD AUTO: 5.2 10E9/L (ref 4–11)

## 2018-05-16 PROCEDURE — G0009 ADMIN PNEUMOCOCCAL VACCINE: HCPCS | Performed by: FAMILY MEDICINE

## 2018-05-16 PROCEDURE — 90670 PCV13 VACCINE IM: CPT | Performed by: FAMILY MEDICINE

## 2018-05-16 PROCEDURE — 80053 COMPREHEN METABOLIC PANEL: CPT | Mod: ZL | Performed by: FAMILY MEDICINE

## 2018-05-16 PROCEDURE — 36415 COLL VENOUS BLD VENIPUNCTURE: CPT | Mod: ZL | Performed by: FAMILY MEDICINE

## 2018-05-16 PROCEDURE — G0463 HOSPITAL OUTPT CLINIC VISIT: HCPCS | Mod: 25

## 2018-05-16 PROCEDURE — 84443 ASSAY THYROID STIM HORMONE: CPT | Mod: ZL | Performed by: FAMILY MEDICINE

## 2018-05-16 PROCEDURE — 99212 OFFICE O/P EST SF 10 MIN: CPT | Mod: 25 | Performed by: FAMILY MEDICINE

## 2018-05-16 PROCEDURE — 85025 COMPLETE CBC W/AUTO DIFF WBC: CPT | Mod: ZL | Performed by: FAMILY MEDICINE

## 2018-05-16 PROCEDURE — 99397 PER PM REEVAL EST PAT 65+ YR: CPT | Performed by: FAMILY MEDICINE

## 2018-05-16 RX ORDER — VENLAFAXINE HYDROCHLORIDE 75 MG/1
CAPSULE, EXTENDED RELEASE ORAL
Qty: 90 CAPSULE | Refills: 3 | Status: SHIPPED | OUTPATIENT
Start: 2018-05-16 | End: 2019-05-20

## 2018-05-16 RX ORDER — FLUTICASONE PROPIONATE 50 MCG
SPRAY, SUSPENSION (ML) NASAL
Qty: 3 G | Refills: 3 | Status: SHIPPED | OUTPATIENT
Start: 2018-05-16 | End: 2019-05-20

## 2018-05-16 RX ORDER — TRAZODONE HYDROCHLORIDE 50 MG/1
TABLET, FILM COATED ORAL
Qty: 270 TABLET | Refills: 3 | Status: SHIPPED | OUTPATIENT
Start: 2018-05-16 | End: 2019-05-20

## 2018-05-16 RX ORDER — OXAZEPAM 15 MG/1
CAPSULE ORAL
Qty: 270 CAPSULE | Refills: 3 | Status: SHIPPED | OUTPATIENT
Start: 2018-05-16 | End: 2018-12-17

## 2018-05-16 RX ORDER — LEVOTHYROXINE SODIUM 50 UG/1
50 TABLET ORAL DAILY
Qty: 90 TABLET | Refills: 3 | Status: SHIPPED | OUTPATIENT
Start: 2018-05-16 | End: 2019-05-21

## 2018-05-16 ASSESSMENT — ANXIETY QUESTIONNAIRES
3. WORRYING TOO MUCH ABOUT DIFFERENT THINGS: SEVERAL DAYS
5. BEING SO RESTLESS THAT IT IS HARD TO SIT STILL: MORE THAN HALF THE DAYS
6. BECOMING EASILY ANNOYED OR IRRITABLE: MORE THAN HALF THE DAYS
4. TROUBLE RELAXING: NEARLY EVERY DAY
7. FEELING AFRAID AS IF SOMETHING AWFUL MIGHT HAPPEN: MORE THAN HALF THE DAYS
2. NOT BEING ABLE TO STOP OR CONTROL WORRYING: SEVERAL DAYS
1. FEELING NERVOUS, ANXIOUS, OR ON EDGE: NEARLY EVERY DAY
IF YOU CHECKED OFF ANY PROBLEMS ON THIS QUESTIONNAIRE, HOW DIFFICULT HAVE THESE PROBLEMS MADE IT FOR YOU TO DO YOUR WORK, TAKE CARE OF THINGS AT HOME, OR GET ALONG WITH OTHER PEOPLE: SOMEWHAT DIFFICULT
GAD7 TOTAL SCORE: 14

## 2018-05-16 ASSESSMENT — PAIN SCALES - GENERAL: PAINLEVEL: NO PAIN (0)

## 2018-05-16 NOTE — PROGRESS NOTES
SUBJECTIVE:   Mony Szymanski is a 68 year old female who presents for Preventive Visit.      Are you in the first 12 months of your Medicare Part B coverage?  No    Healthy Habits:    Do you get at least three servings of calcium containing foods daily (dairy, green leafy vegetables, etc.)? yes    Amount of exercise or daily activities, outside of work: 6-7 day(s) per week    Problems taking medications regularly No    Medication side effects: No    Have you had an eye exam in the past two years? yes    Do you see a dentist twice per year? yes    Do you have sleep apnea, excessive snoring or daytime drowsiness?no      Ability to successfully perform activities of daily living: Yes, no assistance needed    Home safety:  none identified     Hearing impairment: No    Fall risk:           COGNITIVE SCREEN  1) Repeat 3 items (Banana, Sunrise, Chair)    2) Clock draw: NORMAL  3) 3 item recall: Recalls 3 objects  Results: 3 items recalled: COGNITIVE IMPAIRMENT LESS LIKELY    Mini-CogTM Copyright S Óscar. Licensed by the author for use in [facility]; reprinted with permission (reggie@[email or web address]). All rights reserved.            Anxiety Follow-Up    Status since last visit: better    Other associated symptoms:None    Complicating factors:   Significant life event: No   Current substance abuse: None  Depression symptoms: yes  ED-7 SCORE 5/15/2017   Total Score 9     PHQ-9 SCORE 5/11/2016 5/15/2017 5/16/2018   Total Score - - -   Total Score 5 5 11     ED-7 SCORE 5/15/2017 5/16/2018   Total Score 9 14         ED-7  Hypothyroidism Follow-up      Since last visit, patient describes the following symptoms: Weight stable, no hair loss, no skin changes, no constipation, no loose stools      Reviewed and updated as needed this visit by clinical staff  Tobacco  Allergies  Meds  Med Hx  Surg Hx  Fam Hx  Soc Hx        Reviewed and updated as needed this visit by Provider        Social History   Substance Use Topics      "Smoking status: Never Smoker     Smokeless tobacco: Never Used     Alcohol use Yes      Comment: Beer & Wine, socially       If you drink alcohol do you typically have >3 drinks per day or >7 drinks per week? No                        Today's PHQ-2 Score:   PHQ-2 ( 1999 Pf[employer]) 1/10/2014   Q1: Little interest or pleasure in doing things 1   Q2: Feeling down, depressed or hopeless 1   PHQ-2 Score 2       Do you feel safe in your environment - Yes    Do you have a Health Care Directive?: Yes: Patient states has Advance Directive and will bring in a copy to clinic.    Current providers sharing in care for this patient include:   Patient Care Team:  Kobe Das MD as PCP - General    The following health maintenance items are reviewed in Epic and correct as of today:  Health Maintenance   Topic Date Due     HEPATITIS C SCREENING  05/15/1970     DEPRESSION SCREENING  02/12/2016     DEXA SCAN SCREENING (SYSTEM ASSIGNED)  05/15/2017     PNEUMOCOCCAL (1 of 2 - PCV13) 05/15/2017     FALL RISK ASSESSMENT  05/15/2018     INFLUENZA VACCINE (Season Ended) 09/01/2018     MAMMO SCREEN Q2 YR (SYSTEM ASSIGNED)  09/18/2019     ADVANCE DIRECTIVE PLANNING Q5 YRS  06/30/2020     COLONOSCOPY Q5 YR  07/20/2020     LIPID SCREEN Q5 YR FEMALE (SYSTEM ASSIGNED)  05/12/2021     TETANUS IMMUNIZATION (SYSTEM ASSIGNED)  12/27/2021     Labs reviewed in EPIC    Pneumonia Vaccine:given today  Mammogram Screening: Patient over age 50, mutual decision to screen reflected in health maintenance.    ROS:  Constitutional, HEENT, cardiovascular, pulmonary, GI, , musculoskeletal, neuro, skin, endocrine and psych systems are negative, except as otherwise noted.    OBJECTIVE:   /72  Pulse 72  Temp 97.8  F (36.6  C)  Ht 5' 3.5\" (1.613 m)  Wt 150 lb (68 kg)  SpO2 99%  BMI 26.15 kg/m2 Estimated body mass index is 26.15 kg/(m^2) as calculated from the following:    Height as of this encounter: 5' 3.5\" (1.613 m).    Weight as of this encounter: " 150 lb (68 kg).  EXAM:   GENERAL APPEARANCE: healthy, alert and no distress  EYES: Eyes grossly normal to inspection, PERRL and conjunctivae and sclerae normal  HENT: ear canals and TM's normal, nose and mouth without ulcers or lesions, oropharynx clear and oral mucous membranes moist  NECK: no adenopathy, no asymmetry, masses, or scars and thyroid normal to palpation  RESP: lungs clear to auscultation - no rales, rhonchi or wheezes  BREAST: normal without masses, tenderness or nipple discharge and no palpable axillary masses or adenopathy  CV: regular rate and rhythm, normal S1 S2, no S3 or S4, no murmur, click or rub, no peripheral edema and peripheral pulses strong  ABDOMEN: soft, nontender, no hepatosplenomegaly, no masses and bowel sounds normal   (female): normal female external genitalia, normal urethral meatus, vaginal mucosal atrophy noted, normal cervix, adnexae, and uterus without masses or abnormal discharge  MS: no musculoskeletal defects are noted and gait is age appropriate without ataxia  SKIN: no suspicious lesions or rashes  NEURO: Normal strength and tone, sensory exam grossly normal, mentation intact and speech normal  PSYCH: mentation appears normal and affect normal/bright  Results for orders placed or performed in visit on 05/16/18 (from the past 24 hour(s))   Comprehensive metabolic panel   Result Value Ref Range    Sodium 142 133 - 144 mmol/L    Potassium 3.7 3.4 - 5.3 mmol/L    Chloride 108 94 - 109 mmol/L    Carbon Dioxide 28 20 - 32 mmol/L    Anion Gap 6 3 - 14 mmol/L    Glucose 93 70 - 99 mg/dL    Urea Nitrogen 16 7 - 30 mg/dL    Creatinine 0.70 0.52 - 1.04 mg/dL    GFR Estimate 84 >60 mL/min/1.7m2    GFR Estimate If Black >90 >60 mL/min/1.7m2    Calcium 8.9 8.5 - 10.1 mg/dL    Bilirubin Total 0.1 (L) 0.2 - 1.3 mg/dL    Albumin 4.0 3.4 - 5.0 g/dL    Protein Total 8.1 6.8 - 8.8 g/dL    Alkaline Phosphatase 119 40 - 150 U/L    ALT 41 0 - 50 U/L    AST 26 0 - 45 U/L   CBC with platelets  differential   Result Value Ref Range    WBC 5.2 4.0 - 11.0 10e9/L    RBC Count 4.22 3.8 - 5.2 10e12/L    Hemoglobin 13.1 11.7 - 15.7 g/dL    Hematocrit 38.2 35.0 - 47.0 %    MCV 91 78 - 100 fl    MCH 31.0 26.5 - 33.0 pg    MCHC 34.3 31.5 - 36.5 g/dL    RDW 12.4 10.0 - 15.0 %    Platelet Count 276 150 - 450 10e9/L    Diff Method Automated Method     % Neutrophils 48.2 %    % Lymphocytes 37.3 %    % Monocytes 7.9 %    % Eosinophils 5.4 %    % Basophils 1.0 %    % Immature Granulocytes 0.2 %    Nucleated RBCs 0 0 /100    Absolute Neutrophil 2.5 1.6 - 8.3 10e9/L    Absolute Lymphocytes 1.9 0.8 - 5.3 10e9/L    Absolute Monocytes 0.4 0.0 - 1.3 10e9/L    Absolute Eosinophils 0.3 0.0 - 0.7 10e9/L    Absolute Basophils 0.1 0.0 - 0.2 10e9/L    Abs Immature Granulocytes 0.0 0 - 0.4 10e9/L    Absolute Nucleated RBC 0.0    TSH   Result Value Ref Range    TSH 0.89 0.40 - 4.00 mU/L       ASSESSMENT / PLAN:   1. Routine general medical examination at a health care facility  Doing great. Very active and mental/physically healthy  - Comprehensive metabolic panel; Future  - CBC with platelets differential; Future  - TSH; Future  - Comprehensive metabolic panel  - CBC with platelets differential  - TSH    2. Mixed hyperlipidemia  Mild in past - deferred checking today   - Comprehensive metabolic panel; Future  - CBC with platelets differential; Future  - TSH; Future  - Comprehensive metabolic panel  - CBC with platelets differential  - TSH    3. Acquired hypothyroidism  Stable on meds. Continue current medications and behavioral changes.   - Comprehensive metabolic panel; Future  - CBC with platelets differential; Future  - TSH; Future  - Comprehensive metabolic panel  - CBC with platelets differential  - TSH  - levothyroxine (SYNTHROID/LEVOTHROID) 50 MCG tablet; Take 1 tablet (50 mcg) by mouth daily  Dispense: 90 tablet; Refill: 3    4. FH: colon cancer  UTD on colonoscopy   - Comprehensive metabolic panel; Future  - CBC with  platelets differential; Future  - TSH; Future  - Comprehensive metabolic panel  - CBC with platelets differential  - TSH    5. Generalized anxiety disorder  Doing great. Continue current medications and behavioral changes. 'needs long term meds   - Comprehensive metabolic panel; Future  - CBC with platelets differential; Future  - TSH; Future  - Comprehensive metabolic panel  - CBC with platelets differential  - TSH    6. Obsessive-compulsive disorder, unspecified type  Stable - Continue current medications and behavioral changes.   Needs long term meds   - Comprehensive metabolic panel; Future  - CBC with platelets differential; Future  - TSH; Future  - Comprehensive metabolic panel  - CBC with platelets differential  - TSH    7. Major depressive disorder, recurrent episode, in partial remission (H)  Stable - needs long term meds   - Comprehensive metabolic panel; Future  - CBC with platelets differential; Future  - TSH; Future  - Comprehensive metabolic panel  - CBC with platelets differential  - TSH    8. Asymptomatic menopausal state   DEXA ordered   - DX Hip/Pelvis/Spine; Future    9. Chronic seasonal allergic rhinitis due to pollen  RF done. Stable overall.   - fluticasone (FLONASE) 50 MCG/ACT spray; INSTILL 2 SPRAYS IN EACH NOSTRIL EVERY DAY  Dispense: 3 g; Refill: 3    10. ED (generalized anxiety disorder)  As above. RF done.   - oxazepam (SERAX) 15 MG capsule; TAKE ONE CAPSULE BY MOUTH THREE TIMES DAILY  Dispense: 270 capsule; Refill: 3  - traZODone (DESYREL) 50 MG tablet; TAKE 1 TO 3 TABLETS BY MOUTH AT BEDTIME  Dispense: 270 tablet; Refill: 3  - venlafaxine (EFFEXOR-XR) 75 MG 24 hr capsule; TAKE 1 CAPSULE BY MOUTH DAILY WITH FOOD  Dispense: 90 capsule; Refill: 3    End of Life Planning:  Patient currently has an advanced directive: No.  I have verified the patient's ablity to prepare an advanced directive/make health care decisions.  Literature was provided to assist patient in preparing an advanced  "directive.    COUNSELING:  Reviewed preventive health counseling, as reflected in patient instructions       Regular exercise       Healthy diet/nutrition        Estimated body mass index is 26.15 kg/(m^2) as calculated from the following:    Height as of this encounter: 5' 3.5\" (1.613 m).    Weight as of this encounter: 150 lb (68 kg).       reports that she has never smoked. She has never used smokeless tobacco.      Appropriate preventive services were discussed with this patient, including applicable screening as appropriate for cardiovascular disease, diabetes, osteopenia/osteoporosis, and glaucoma.  As appropriate for age/gender, discussed screening for colorectal cancer, prostate cancer, breast cancer, and cervical cancer. Checklist reviewing preventive services available has been given to the patient.    Reviewed patients plan of care and provided an AVS. The Basic Care Plan (routine screening as documented in Health Maintenance) for Mony meets the Care Plan requirement. This Care Plan has been established and reviewed with the Patient.    Counseling Resources:  ATP IV Guidelines  Pooled Cohorts Equation Calculator  Breast Cancer Risk Calculator  FRAX Risk Assessment  ICSI Preventive Guidelines  Dietary Guidelines for Americans, 2010  USDA's MyPlate  ASA Prophylaxis  Lung CA Screening    Kobe Das MD  [facility] ([facility] [place]  "

## 2018-05-16 NOTE — PATIENT INSTRUCTIONS
Preventive Health Recommendations  Female Ages 65 +    Yearly exam:     See your health care provider every year in order to  o Review health changes.   o Discuss preventive care.    o Review your medicines if your doctor has prescribed any.      You no longer need a yearly Pap test unless you've had an abnormal Pap test in the past 10 years. If you have vaginal symptoms, such as bleeding or discharge, be sure to talk with your provider about a Pap test.      Every 1 to 2 years, have a mammogram.  If you are over 69, talk with your health care provider about whether or not you want to continue having screening mammograms.      Every 10 years, have a colonoscopy. Or, have a yearly FIT test (stool test). These exams will check for colon cancer.       Have a cholesterol test every 5 years, or more often if your doctor advises it.       Have a diabetes test (fasting glucose) every three years. If you are at risk for diabetes, you should have this test more often.       At age 65, have a bone density scan (DEXA) to check for osteoporosis (brittle bone disease).    Shots:    Get a flu shot each year.    Get a tetanus shot every 10 years.    Talk to your doctor about your pneumonia vaccines. There are now two you should receive - Pneumovax (PPSV 23) and Prevnar (PCV 13).    Talk to your doctor about the shingles vaccine.    Talk to your doctor about the hepatitis B vaccine.    Nutrition:     Eat at least 5 servings of fruits and vegetables each day.      Eat whole-grain bread, whole-wheat pasta and brown rice instead of white grains and rice.      Talk to your provider about Calcium and Vitamin D.     Lifestyle    Exercise at least 150 minutes a week (30 minutes a day, 5 days a week). This will help you control your weight and prevent disease.      Limit alcohol to one drink per day.      No smoking.       Wear sunscreen to prevent skin cancer.       See your dentist twice a year for an exam and cleaning.      See your  eye doctor every 1 to 2 years to screen for conditions such as glaucoma, macular degeneration, cataracts, etc   Results for orders placed or performed in visit on 05/16/18 (from the past 24 hour(s))   Comprehensive metabolic panel   Result Value Ref Range    Sodium 142 133 - 144 mmol/L    Potassium 3.7 3.4 - 5.3 mmol/L    Chloride 108 94 - 109 mmol/L    Carbon Dioxide 28 20 - 32 mmol/L    Anion Gap 6 3 - 14 mmol/L    Glucose 93 70 - 99 mg/dL    Urea Nitrogen 16 7 - 30 mg/dL    Creatinine 0.70 0.52 - 1.04 mg/dL    GFR Estimate 84 >60 mL/min/1.7m2    GFR Estimate If Black >90 >60 mL/min/1.7m2    Calcium 8.9 8.5 - 10.1 mg/dL    Bilirubin Total 0.1 (L) 0.2 - 1.3 mg/dL    Albumin 4.0 3.4 - 5.0 g/dL    Protein Total 8.1 6.8 - 8.8 g/dL    Alkaline Phosphatase 119 40 - 150 U/L    ALT 41 0 - 50 U/L    AST 26 0 - 45 U/L   CBC with platelets differential   Result Value Ref Range    WBC 5.2 4.0 - 11.0 10e9/L    RBC Count 4.22 3.8 - 5.2 10e12/L    Hemoglobin 13.1 11.7 - 15.7 g/dL    Hematocrit 38.2 35.0 - 47.0 %    MCV 91 78 - 100 fl    MCH 31.0 26.5 - 33.0 pg    MCHC 34.3 31.5 - 36.5 g/dL    RDW 12.4 10.0 - 15.0 %    Platelet Count 276 150 - 450 10e9/L    Diff Method Automated Method     % Neutrophils 48.2 %    % Lymphocytes 37.3 %    % Monocytes 7.9 %    % Eosinophils 5.4 %    % Basophils 1.0 %    % Immature Granulocytes 0.2 %    Nucleated RBCs 0 0 /100    Absolute Neutrophil 2.5 1.6 - 8.3 10e9/L    Absolute Lymphocytes 1.9 0.8 - 5.3 10e9/L    Absolute Monocytes 0.4 0.0 - 1.3 10e9/L    Absolute Eosinophils 0.3 0.0 - 0.7 10e9/L    Absolute Basophils 0.1 0.0 - 0.2 10e9/L    Abs Immature Granulocytes 0.0 0 - 0.4 10e9/L    Absolute Nucleated RBC 0.0    TSH   Result Value Ref Range    TSH 0.89 0.40 - 4.00 mU/L

## 2018-05-16 NOTE — MR AVS SNAPSHOT
After Visit Summary   5/16/2018    Mony Szymanski    MRN: [id number]           Patient Information     Date Of Birth          1950        Visit Information        Provider Department      5/16/2018 2:20 PM Kobe Das MD [facility] [place]        Today's Diagnoses     Routine general medical examination at a health care facility    -  1    Mixed hyperlipidemia        Acquired hypothyroidism        FH: colon cancer        Generalized anxiety disorder        Obsessive-compulsive disorder, unspecified type        Major depressive disorder, recurrent episode, in partial remission (H)        Asymptomatic menopausal state         Chronic seasonal allergic rhinitis due to pollen        ED (generalized anxiety disorder)          Care Instructions      Preventive Health Recommendations  Female Ages 65 +    Yearly exam:     See your health care provider every year in order to  o Review health changes.   o Discuss preventive care.    o Review your medicines if your doctor has prescribed any.      You no longer need a yearly Pap test unless you've had an abnormal Pap test in the past 10 years. If you have vaginal symptoms, such as bleeding or discharge, be sure to talk with your provider about a Pap test.      Every 1 to 2 years, have a mammogram.  If you are over 69, talk with your health care provider about whether or not you want to continue having screening mammograms.      Every 10 years, have a colonoscopy. Or, have a yearly FIT test (stool test). These exams will check for colon cancer.       Have a cholesterol test every 5 years, or more often if your doctor advises it.       Have a diabetes test (fasting glucose) every three years. If you are at risk for diabetes, you should have this test more often.       At age 65, have a bone density scan (DEXA) to check for osteoporosis (brittle bone disease).    Shots:    Get a flu shot each year.    Get a tetanus shot every 10 years.    Talk to your  doctor about your pneumonia vaccines. There are now two you should receive - Pneumovax (PPSV 23) and Prevnar (PCV 13).    Talk to your doctor about the shingles vaccine.    Talk to your doctor about the hepatitis B vaccine.    Nutrition:     Eat at least 5 servings of fruits and vegetables each day.      Eat whole-grain bread, whole-wheat pasta and brown rice instead of white grains and rice.      Talk to your provider about Calcium and Vitamin D.     Lifestyle    Exercise at least 150 minutes a week (30 minutes a day, 5 days a week). This will help you control your weight and prevent disease.      Limit alcohol to one drink per day.      No smoking.       Wear sunscreen to prevent skin cancer.       See your dentist twice a year for an exam and cleaning.      See your eye doctor every 1 to 2 years to screen for conditions such as glaucoma, macular degeneration, cataracts, etc   Results for orders placed or performed in visit on 05/16/18 (from the past 24 hour(s))   Comprehensive metabolic panel   Result Value Ref Range    Sodium 142 133 - 144 mmol/L    Potassium 3.7 3.4 - 5.3 mmol/L    Chloride 108 94 - 109 mmol/L    Carbon Dioxide 28 20 - 32 mmol/L    Anion Gap 6 3 - 14 mmol/L    Glucose 93 70 - 99 mg/dL    Urea Nitrogen 16 7 - 30 mg/dL    Creatinine 0.70 0.52 - 1.04 mg/dL    GFR Estimate 84 >60 mL/min/1.7m2    GFR Estimate If Black >90 >60 mL/min/1.7m2    Calcium 8.9 8.5 - 10.1 mg/dL    Bilirubin Total 0.1 (L) 0.2 - 1.3 mg/dL    Albumin 4.0 3.4 - 5.0 g/dL    Protein Total 8.1 6.8 - 8.8 g/dL    Alkaline Phosphatase 119 40 - 150 U/L    ALT 41 0 - 50 U/L    AST 26 0 - 45 U/L   CBC with platelets differential   Result Value Ref Range    WBC 5.2 4.0 - 11.0 10e9/L    RBC Count 4.22 3.8 - 5.2 10e12/L    Hemoglobin 13.1 11.7 - 15.7 g/dL    Hematocrit 38.2 35.0 - 47.0 %    MCV 91 78 - 100 fl    MCH 31.0 26.5 - 33.0 pg    MCHC 34.3 31.5 - 36.5 g/dL    RDW 12.4 10.0 - 15.0 %    Platelet Count 276 150 - 450 10e9/L    Diff  Method Automated Method     % Neutrophils 48.2 %    % Lymphocytes 37.3 %    % Monocytes 7.9 %    % Eosinophils 5.4 %    % Basophils 1.0 %    % Immature Granulocytes 0.2 %    Nucleated RBCs 0 0 /100    Absolute Neutrophil 2.5 1.6 - 8.3 10e9/L    Absolute Lymphocytes 1.9 0.8 - 5.3 10e9/L    Absolute Monocytes 0.4 0.0 - 1.3 10e9/L    Absolute Eosinophils 0.3 0.0 - 0.7 10e9/L    Absolute Basophils 0.1 0.0 - 0.2 10e9/L    Abs Immature Granulocytes 0.0 0 - 0.4 10e9/L    Absolute Nucleated RBC 0.0    TSH   Result Value Ref Range    TSH 0.89 0.40 - 4.00 mU/L                   Follow-ups after your visit        Future tests that were ordered for you today     Open Future Orders        Priority Expected Expires Ordered    DX Hip/Pelvis/Spine Routine 6/15/2018 10/16/2018 5/16/2018            Who to contact     If you have questions or need follow up information about today's clinic visit or your schedule please contact [facility] directly at [phone].  Normal or non-critical lab and imaging results will be communicated to you by [employer], letter or phone within 4 business days after the clinic has received the results. If you do not hear from us within 7 days, please contact the clinic through [employer] or phone. If you have a critical or abnormal lab result, we will notify you by phone as soon as possible.  Submit refill requests through [employer] or call your pharmacy and they will forward the refill request to us. Please allow 3 business days for your refill to be completed.          Additional Information About Your Visit        [employer] Information     [employer] gives you secure access to your electronic health record. If you see a primary care provider, you can also send messages to your care team and make appointments. If you have questions, please call your primary care clinic.  If you do not have a primary care provider, please call [phone] and they will assist you.        Care EveryWhere ID     This is  "your Care EveryWhere ID. This could be used by other organizations to access your [place] medical records  [id number]        Your Vitals Were     Pulse Temperature Height Pulse Oximetry BMI (Body Mass Index)       72 97.8  F (36.6  C) 5' 3.5\" (1.613 m) 99% 26.15 kg/m2        Blood Pressure from Last 3 Encounters:   05/16/18 120/72   05/15/17 110/78   05/13/17 139/87    Weight from Last 3 Encounters:   05/16/18 150 lb (68 kg)   05/15/17 154 lb (69.9 kg)   05/13/17 145 lb (65.8 kg)              We Performed the Following     CBC with platelets differential     Comprehensive metabolic panel     TSH          Today's Medication Changes          These changes are accurate as of 5/16/18  3:15 PM.  If you have any questions, ask your nurse or doctor.               These medicines have changed or have updated prescriptions.        Dose/Directions    levothyroxine 50 MCG tablet   Commonly known as:  SYNTHROID/LEVOTHROID   This may have changed:  Another medication with the same name was removed. Continue taking this medication, and follow the directions you see here.   Used for:  Acquired hypothyroidism   Changed by:  Kobe Das MD        Dose:  50 mcg   Take 1 tablet (50 mcg) by mouth daily   Quantity:  90 tablet   Refills:  3            Where to get your medicines      These medications were sent to [facility] [id number], [place] [address]     Phone:  [phone]     fluticasone 50 MCG/ACT spray    levothyroxine 50 MCG tablet    traZODone 50 MG tablet    venlafaxine 75 MG 24 hr capsule         Some of these will need a paper prescription and others can be bought over the counter.  Ask your nurse if you have questions.     Bring a paper prescription for each of these medications     oxazepam 15 MG capsule                Primary Care Provider Office Phone # Fax #    Kobe Das [phone] [address]  [place] [address]        Equal Access " to Services     ALBERTO HANNA : Lars Meeks, walachelle che, qaguille orozco. So [facility] [phone].    ATENCIÓN: Si habla jose miguel, tiene a martin disposición servicios gratuitos de asistencia lingüística. Llame al [phone].    We comply with applicable federal civil rights laws and [place] laws. We do not discriminate on the basis of race, color, national origin, age, disability, sex, sexual orientation, or gender identity.            Thank you!     Thank you for choosing [facility]  for your care. Our goal is always to provide you with excellent care. Hearing back from our patients is one way we can continue to improve our services. Please take a few minutes to complete the written survey that you may receive in the mail after your visit with us. Thank you!             Your Updated Medication List - Protect others around you: Learn how to safely use, store and throw away your medicines at www.disposemymeds.org.          This list is accurate as of 5/16/18  3:15 PM.  Always use your most recent med list.                   Brand Name Dispense Instructions for use Diagnosis    5-HTP 50 MG Caps      Take by mouth daily.        fish oil-omega-3 fatty acids 1000 MG capsule      Take 2 capsules by mouth daily.        fluticasone 50 MCG/ACT spray    FLONASE    3 g    INSTILL 2 SPRAYS IN EACH NOSTRIL EVERY DAY    Chronic seasonal allergic rhinitis due to pollen       glucosamine 500 MG Caps      Take by mouth daily        levothyroxine 50 MCG tablet    SYNTHROID/LEVOTHROID    90 tablet    Take 1 tablet (50 mcg) by mouth daily    Acquired hypothyroidism       MAGNESIUM OXIDE PO      Take 200 mg by mouth daily        Multiple vitamin Tabs      Take 1 tablet by oral route every day with food        oxazepam 15 MG capsule    SERAX    270 capsule    TAKE ONE CAPSULE BY MOUTH THREE TIMES DAILY    ED (generalized anxiety disorder)        traZODone 50 MG tablet    DESYREL    270 tablet    TAKE 1 TO 3 TABLETS BY MOUTH AT BEDTIME    ED (generalized anxiety disorder)       TUMS 500 OR      Take by mouth 2 times daily        venlafaxine 75 MG 24 hr capsule    EFFEXOR-XR    90 capsule    TAKE 1 CAPSULE BY MOUTH DAILY WITH FOOD    ED (generalized anxiety disorder)       vitamin D 1000 units capsule      Take 1 capsule by mouth daily.

## 2018-05-16 NOTE — NURSING NOTE
"Chief Complaint   Patient presents with     Physical       Initial /72  Pulse 72  Temp 97.8  F (36.6  C)  Ht 5' 3.5\" (1.613 m)  Wt 150 lb (68 kg)  SpO2 99%  BMI 26.15 kg/m2 Estimated body mass index is 26.15 kg/(m^2) as calculated from the following:    Height as of this encounter: 5' 3.5\" (1.613 m).    Weight as of this encounter: 150 lb (68 kg).  Medication Reconciliation: complete    Braeden Grajeda LPN    "

## 2018-05-17 ASSESSMENT — ANXIETY QUESTIONNAIRES: GAD7 TOTAL SCORE: 14

## 2018-05-17 ASSESSMENT — PATIENT HEALTH QUESTIONNAIRE - PHQ9: SUM OF ALL RESPONSES TO PHQ QUESTIONS 1-9: 11

## 2018-05-18 ENCOUNTER — HOSPITAL ENCOUNTER (OUTPATIENT)
Dept: BONE DENSITY | Facility: HOSPITAL | Age: 68
Discharge: HOME | End: 2018-05-18
Attending: FAMILY MEDICINE | Admitting: FAMILY MEDICINE
Payer: MEDICARE

## 2018-05-18 DIAGNOSIS — Z78.0 ASYMPTOMATIC MENOPAUSAL STATE: ICD-10-CM

## 2018-05-18 PROCEDURE — 77080 DXA BONE DENSITY AXIAL: CPT | Mod: TC

## 2018-05-21 ENCOUNTER — TELEPHONE (OUTPATIENT)
Dept: FAMILY MEDICINE | Facility: OTHER | Age: 68
End: 2018-05-21

## 2018-05-21 NOTE — TELEPHONE ENCOUNTER
APPROVAL - 05/21/2018 - Received PA request from [facility] for oxazepam.  Called [facility] at [phone] and completed PA over telephone with Micheline.  Approval dates:  02/20/2018 thru 05/21/2019.  Case ID#:  [id number].  Pharmacy advised.  Documentation will be scanned to Epic.  Ioana Becerra, [facility] Specialist.

## 2018-05-21 NOTE — TELEPHONE ENCOUNTER
8:08 AM    Reason for Call: Phone Call    Description: Pt called and and states that the pharmacy told her that she needs to get a PA for her medication oxazepam (SERAX) 15 MG capsule sent to the pharmacy [facility] pharmacy for her medicare insurence.     Was an appointment offered for this call? No  If yes : Appointment type              Date    Preferred method for responding to this message: Telephone Call  What is your phone number ?[phone]    If we cannot reach you directly, may we leave a detailed response at the number you provided? Yes    Can this message wait until your PCP/provider returns, if available today? Not applicable, PCP is in     Shilpa Stanley

## 2018-08-21 ENCOUNTER — APPOINTMENT (OUTPATIENT)
Dept: CT IMAGING | Facility: HOSPITAL | Age: 68
DRG: 390 | End: 2018-08-21
Attending: EMERGENCY MEDICINE
Payer: MEDICARE

## 2018-08-21 ENCOUNTER — APPOINTMENT (OUTPATIENT)
Dept: ULTRASOUND IMAGING | Facility: HOSPITAL | Age: 68
DRG: 390 | End: 2018-08-21
Attending: EMERGENCY MEDICINE
Payer: MEDICARE

## 2018-08-21 ENCOUNTER — HOSPITAL ENCOUNTER (INPATIENT)
Facility: HOSPITAL | Age: 68
LOS: 3 days | Discharge: HOME | DRG: 390 | End: 2018-08-24
Attending: EMERGENCY MEDICINE | Admitting: SURGERY
Payer: MEDICARE

## 2018-08-21 DIAGNOSIS — R10.84 ABDOMINAL PAIN, GENERALIZED: ICD-10-CM

## 2018-08-21 DIAGNOSIS — K56.609 SMALL BOWEL OBSTRUCTION (H): ICD-10-CM

## 2018-08-21 PROBLEM — K56.50 SMALL BOWEL OBSTRUCTION DUE TO ADHESIONS (H): Status: ACTIVE | Noted: 2018-08-21

## 2018-08-21 LAB
ABO + RH BLD: NORMAL
ABO + RH BLD: NORMAL
ALBUMIN SERPL-MCNC: 4.6 G/DL (ref 3.4–5)
ALBUMIN UR-MCNC: 10 MG/DL
ALP SERPL-CCNC: 85 U/L (ref 40–150)
ALT SERPL W P-5'-P-CCNC: 32 U/L (ref 0–50)
AMORPH CRY #/AREA URNS HPF: ABNORMAL /HPF
AMYLASE SERPL-CCNC: 53 U/L (ref 30–110)
ANION GAP SERPL CALCULATED.3IONS-SCNC: 10 MMOL/L (ref 3–14)
APPEARANCE UR: ABNORMAL
AST SERPL W P-5'-P-CCNC: 19 U/L (ref 0–45)
BACTERIA #/AREA URNS HPF: ABNORMAL /HPF
BASOPHILS # BLD AUTO: 0 10E9/L (ref 0–0.2)
BASOPHILS NFR BLD AUTO: 0.3 %
BILIRUB SERPL-MCNC: 0.3 MG/DL (ref 0.2–1.3)
BILIRUB UR QL STRIP: NEGATIVE
BLD GP AB SCN SERPL QL: NORMAL
BLOOD BANK CMNT PATIENT-IMP: NORMAL
BLOOD BANK CMNT PATIENT-IMP: NORMAL
BUN SERPL-MCNC: 17 MG/DL (ref 7–30)
CALCIUM SERPL-MCNC: 9.4 MG/DL (ref 8.5–10.1)
CHLORIDE SERPL-SCNC: 105 MMOL/L (ref 94–109)
CO2 SERPL-SCNC: 24 MMOL/L (ref 20–32)
COLOR UR AUTO: YELLOW
CREAT SERPL-MCNC: 0.7 MG/DL (ref 0.52–1.04)
CRP SERPL-MCNC: <2.9 MG/L (ref 0–8)
DIFFERENTIAL METHOD BLD: ABNORMAL
EOSINOPHIL # BLD AUTO: 0 10E9/L (ref 0–0.7)
EOSINOPHIL NFR BLD AUTO: 0.1 %
ERYTHROCYTE [DISTWIDTH] IN BLOOD BY AUTOMATED COUNT: 12.3 % (ref 10–15)
ERYTHROCYTE [SEDIMENTATION RATE] IN BLOOD BY WESTERGREN METHOD: 28 MM/H (ref 0–30)
GFR SERPL CREATININE-BSD FRML MDRD: 84 ML/MIN/1.7M2
GLUCOSE SERPL-MCNC: 129 MG/DL (ref 70–99)
GLUCOSE UR STRIP-MCNC: NEGATIVE MG/DL
HCT VFR BLD AUTO: 40 % (ref 35–47)
HGB BLD-MCNC: 14.1 G/DL (ref 11.7–15.7)
HGB UR QL STRIP: NEGATIVE
IMM GRANULOCYTES # BLD: 0 10E9/L (ref 0–0.4)
IMM GRANULOCYTES NFR BLD: 0.3 %
KETONES UR STRIP-MCNC: 40 MG/DL
LACTATE SERPL-SCNC: 2.5 MMOL/L (ref 0.4–2)
LEUKOCYTE ESTERASE UR QL STRIP: NEGATIVE
LIPASE SERPL-CCNC: 116 U/L (ref 73–393)
LYMPHOCYTES # BLD AUTO: 0.8 10E9/L (ref 0.8–5.3)
LYMPHOCYTES NFR BLD AUTO: 6.8 %
MAGNESIUM SERPL-MCNC: 2.4 MG/DL (ref 1.6–2.3)
MCH RBC QN AUTO: 31.1 PG (ref 26.5–33)
MCHC RBC AUTO-ENTMCNC: 35.3 G/DL (ref 31.5–36.5)
MCV RBC AUTO: 88 FL (ref 78–100)
MONOCYTES # BLD AUTO: 0.2 10E9/L (ref 0–1.3)
MONOCYTES NFR BLD AUTO: 1.7 %
MUCOUS THREADS #/AREA URNS LPF: PRESENT /LPF
NEUTROPHILS # BLD AUTO: 10.2 10E9/L (ref 1.6–8.3)
NEUTROPHILS NFR BLD AUTO: 90.8 %
NITRATE UR QL: NEGATIVE
NRBC # BLD AUTO: 0 10*3/UL
NRBC BLD AUTO-RTO: 0 /100
PH UR STRIP: 7.5 PH (ref 4.7–8)
PLATELET # BLD AUTO: 294 10E9/L (ref 150–450)
POTASSIUM SERPL-SCNC: 4 MMOL/L (ref 3.4–5.3)
PROT SERPL-MCNC: 8.8 G/DL (ref 6.8–8.8)
RBC # BLD AUTO: 4.53 10E12/L (ref 3.8–5.2)
RBC #/AREA URNS AUTO: 0 /HPF (ref 0–2)
SODIUM SERPL-SCNC: 139 MMOL/L (ref 133–144)
SOURCE: ABNORMAL
SP GR UR STRIP: 1.01 (ref 1–1.03)
SPECIMEN EXP DATE BLD: NORMAL
TROPONIN I SERPL-MCNC: <0.015 UG/L (ref 0–0.04)
UROBILINOGEN UR STRIP-MCNC: NORMAL MG/DL (ref 0–2)
WBC # BLD AUTO: 11.3 10E9/L (ref 4–11)
WBC #/AREA URNS AUTO: 0 /HPF (ref 0–5)

## 2018-08-21 PROCEDURE — 25000128 H RX IP 250 OP 636: Performed by: RADIOLOGY

## 2018-08-21 PROCEDURE — 96374 THER/PROPH/DIAG INJ IV PUSH: CPT

## 2018-08-21 PROCEDURE — 83735 ASSAY OF MAGNESIUM: CPT | Performed by: EMERGENCY MEDICINE

## 2018-08-21 PROCEDURE — 25000128 H RX IP 250 OP 636

## 2018-08-21 PROCEDURE — 99285 EMERGENCY DEPT VISIT HI MDM: CPT | Mod: 25

## 2018-08-21 PROCEDURE — 12000000 ZZH R&B MED SURG/OB

## 2018-08-21 PROCEDURE — 25000128 H RX IP 250 OP 636: Performed by: SURGERY

## 2018-08-21 PROCEDURE — 96361 HYDRATE IV INFUSION ADD-ON: CPT

## 2018-08-21 PROCEDURE — 40000786 ZZHCL STATISTIC ACTIVE MRSA SURVEILLANCE CULTURE: Performed by: SURGERY

## 2018-08-21 PROCEDURE — 85025 COMPLETE CBC W/AUTO DIFF WBC: CPT | Performed by: EMERGENCY MEDICINE

## 2018-08-21 PROCEDURE — 99222 1ST HOSP IP/OBS MODERATE 55: CPT | Mod: AI | Performed by: SURGERY

## 2018-08-21 PROCEDURE — 25000128 H RX IP 250 OP 636: Performed by: EMERGENCY MEDICINE

## 2018-08-21 PROCEDURE — 25000125 ZZHC RX 250: Performed by: SURGERY

## 2018-08-21 PROCEDURE — 74177 CT ABD & PELVIS W/CONTRAST: CPT | Mod: TC

## 2018-08-21 PROCEDURE — 25000125 ZZHC RX 250: Performed by: EMERGENCY MEDICINE

## 2018-08-21 PROCEDURE — 76705 ECHO EXAM OF ABDOMEN: CPT | Mod: TC

## 2018-08-21 PROCEDURE — 83690 ASSAY OF LIPASE: CPT | Performed by: EMERGENCY MEDICINE

## 2018-08-21 PROCEDURE — 86140 C-REACTIVE PROTEIN: CPT | Performed by: EMERGENCY MEDICINE

## 2018-08-21 PROCEDURE — 82150 ASSAY OF AMYLASE: CPT | Performed by: EMERGENCY MEDICINE

## 2018-08-21 PROCEDURE — 96376 TX/PRO/DX INJ SAME DRUG ADON: CPT

## 2018-08-21 PROCEDURE — 86850 RBC ANTIBODY SCREEN: CPT | Performed by: EMERGENCY MEDICINE

## 2018-08-21 PROCEDURE — 86900 BLOOD TYPING SEROLOGIC ABO: CPT | Performed by: EMERGENCY MEDICINE

## 2018-08-21 PROCEDURE — 25000132 ZZH RX MED GY IP 250 OP 250 PS 637: Mod: GY | Performed by: SURGERY

## 2018-08-21 PROCEDURE — 85652 RBC SED RATE AUTOMATED: CPT | Performed by: EMERGENCY MEDICINE

## 2018-08-21 PROCEDURE — 81001 URINALYSIS AUTO W/SCOPE: CPT | Performed by: EMERGENCY MEDICINE

## 2018-08-21 PROCEDURE — 83605 ASSAY OF LACTIC ACID: CPT | Performed by: EMERGENCY MEDICINE

## 2018-08-21 PROCEDURE — A9270 NON-COVERED ITEM OR SERVICE: HCPCS | Mod: GY | Performed by: SURGERY

## 2018-08-21 PROCEDURE — 86901 BLOOD TYPING SEROLOGIC RH(D): CPT | Performed by: EMERGENCY MEDICINE

## 2018-08-21 PROCEDURE — 96375 TX/PRO/DX INJ NEW DRUG ADDON: CPT

## 2018-08-21 PROCEDURE — 80053 COMPREHEN METABOLIC PANEL: CPT | Performed by: EMERGENCY MEDICINE

## 2018-08-21 PROCEDURE — 99285 EMERGENCY DEPT VISIT HI MDM: CPT | Performed by: EMERGENCY MEDICINE

## 2018-08-21 PROCEDURE — 84484 ASSAY OF TROPONIN QUANT: CPT | Performed by: EMERGENCY MEDICINE

## 2018-08-21 RX ORDER — OXYCODONE HYDROCHLORIDE 5 MG/1
5 TABLET ORAL EVERY 6 HOURS PRN
Status: DISCONTINUED | OUTPATIENT
Start: 2018-08-21 | End: 2018-08-24 | Stop reason: HOSPADM

## 2018-08-21 RX ORDER — SODIUM CHLORIDE 9 MG/ML
1000 INJECTION, SOLUTION INTRAVENOUS CONTINUOUS
Status: DISCONTINUED | OUTPATIENT
Start: 2018-08-21 | End: 2018-08-22

## 2018-08-21 RX ORDER — ACETAMINOPHEN 325 MG/1
650 TABLET ORAL EVERY 4 HOURS PRN
Status: DISCONTINUED | OUTPATIENT
Start: 2018-08-21 | End: 2018-08-24 | Stop reason: HOSPADM

## 2018-08-21 RX ORDER — KETOROLAC TROMETHAMINE 15 MG/ML
15 INJECTION, SOLUTION INTRAMUSCULAR; INTRAVENOUS ONCE
Status: COMPLETED | OUTPATIENT
Start: 2018-08-21 | End: 2018-08-21

## 2018-08-21 RX ORDER — LORAZEPAM 1 MG/1
1 TABLET ORAL 3 TIMES DAILY PRN
Status: DISCONTINUED | OUTPATIENT
Start: 2018-08-21 | End: 2018-08-24 | Stop reason: HOSPADM

## 2018-08-21 RX ORDER — HYDROMORPHONE HYDROCHLORIDE 1 MG/ML
0.5 INJECTION, SOLUTION INTRAMUSCULAR; INTRAVENOUS; SUBCUTANEOUS ONCE
Status: COMPLETED | OUTPATIENT
Start: 2018-08-21 | End: 2018-08-21

## 2018-08-21 RX ORDER — METOCLOPRAMIDE HYDROCHLORIDE 5 MG/ML
5 INJECTION INTRAMUSCULAR; INTRAVENOUS ONCE
Status: COMPLETED | OUTPATIENT
Start: 2018-08-21 | End: 2018-08-21

## 2018-08-21 RX ORDER — ONDANSETRON 2 MG/ML
4 INJECTION INTRAMUSCULAR; INTRAVENOUS EVERY 6 HOURS PRN
Status: DISCONTINUED | OUTPATIENT
Start: 2018-08-21 | End: 2018-08-24 | Stop reason: HOSPADM

## 2018-08-21 RX ORDER — LIDOCAINE 40 MG/G
CREAM TOPICAL
Status: DISCONTINUED | OUTPATIENT
Start: 2018-08-21 | End: 2018-08-21

## 2018-08-21 RX ORDER — NALOXONE HYDROCHLORIDE 0.4 MG/ML
.1-.4 INJECTION, SOLUTION INTRAMUSCULAR; INTRAVENOUS; SUBCUTANEOUS
Status: DISCONTINUED | OUTPATIENT
Start: 2018-08-21 | End: 2018-08-24 | Stop reason: HOSPADM

## 2018-08-21 RX ORDER — LEVOTHYROXINE SODIUM 50 UG/1
50 TABLET ORAL DAILY
Status: DISCONTINUED | OUTPATIENT
Start: 2018-08-21 | End: 2018-08-24 | Stop reason: HOSPADM

## 2018-08-21 RX ORDER — HYDROMORPHONE HYDROCHLORIDE 1 MG/ML
0.5 INJECTION, SOLUTION INTRAMUSCULAR; INTRAVENOUS; SUBCUTANEOUS
Status: COMPLETED | OUTPATIENT
Start: 2018-08-21 | End: 2018-08-21

## 2018-08-21 RX ORDER — VENLAFAXINE HYDROCHLORIDE 75 MG/1
75 CAPSULE, EXTENDED RELEASE ORAL
Status: DISCONTINUED | OUTPATIENT
Start: 2018-08-22 | End: 2018-08-24 | Stop reason: HOSPADM

## 2018-08-21 RX ORDER — IOPAMIDOL 612 MG/ML
100 INJECTION, SOLUTION INTRAVASCULAR ONCE
Status: COMPLETED | OUTPATIENT
Start: 2018-08-21 | End: 2018-08-21

## 2018-08-21 RX ORDER — MELATONIN 3 MG
TABLET ORAL DAILY
Status: DISCONTINUED | OUTPATIENT
Start: 2018-08-21 | End: 2018-08-22 | Stop reason: CLARIF

## 2018-08-21 RX ORDER — DIPHENHYDRAMINE HYDROCHLORIDE 50 MG/ML
25 INJECTION INTRAMUSCULAR; INTRAVENOUS ONCE
Status: COMPLETED | OUTPATIENT
Start: 2018-08-21 | End: 2018-08-21

## 2018-08-21 RX ORDER — TRAZODONE HYDROCHLORIDE 50 MG/1
50 TABLET, FILM COATED ORAL AT BEDTIME
Status: DISCONTINUED | OUTPATIENT
Start: 2018-08-21 | End: 2018-08-22

## 2018-08-21 RX ORDER — FLUTICASONE PROPIONATE 50 MCG
1 SPRAY, SUSPENSION (ML) NASAL DAILY
Status: DISCONTINUED | OUTPATIENT
Start: 2018-08-21 | End: 2018-08-24 | Stop reason: HOSPADM

## 2018-08-21 RX ORDER — ONDANSETRON 4 MG/1
4 TABLET, ORALLY DISINTEGRATING ORAL EVERY 6 HOURS PRN
Status: DISCONTINUED | OUTPATIENT
Start: 2018-08-21 | End: 2018-08-24 | Stop reason: HOSPADM

## 2018-08-21 RX ADMIN — SODIUM CHLORIDE 1000 ML: 9 INJECTION, SOLUTION INTRAVENOUS at 10:40

## 2018-08-21 RX ADMIN — HYDROMORPHONE HYDROCHLORIDE 0.5 MG: 1 INJECTION, SOLUTION INTRAMUSCULAR; INTRAVENOUS; SUBCUTANEOUS at 17:08

## 2018-08-21 RX ADMIN — TRAZODONE HYDROCHLORIDE 50 MG: 50 TABLET ORAL at 21:54

## 2018-08-21 RX ADMIN — OXYCODONE HYDROCHLORIDE 5 MG: 5 TABLET ORAL at 21:55

## 2018-08-21 RX ADMIN — DIPHENHYDRAMINE HYDROCHLORIDE 25 MG: 50 INJECTION, SOLUTION INTRAMUSCULAR; INTRAVENOUS at 10:41

## 2018-08-21 RX ADMIN — HYDROMORPHONE HYDROCHLORIDE 0.5 MG: 1 INJECTION, SOLUTION INTRAMUSCULAR; INTRAVENOUS; SUBCUTANEOUS at 14:48

## 2018-08-21 RX ADMIN — SODIUM CHLORIDE 1000 ML: 9 INJECTION, SOLUTION INTRAVENOUS at 23:38

## 2018-08-21 RX ADMIN — DIATRIZOATE MEGLUMINE AND DIATRIZOATE SODIUM 30 ML: 660; 100 SOLUTION ORAL; RECTAL at 12:52

## 2018-08-21 RX ADMIN — FAMOTIDINE 20 MG: 10 INJECTION, SOLUTION INTRAVENOUS at 10:45

## 2018-08-21 RX ADMIN — METOCLOPRAMIDE 5 MG: 5 INJECTION, SOLUTION INTRAMUSCULAR; INTRAVENOUS at 10:43

## 2018-08-21 RX ADMIN — SODIUM CHLORIDE 1000 ML: 9 INJECTION, SOLUTION INTRAVENOUS at 17:02

## 2018-08-21 RX ADMIN — HYDROMORPHONE HYDROCHLORIDE 0.5 MG: 1 INJECTION, SOLUTION INTRAMUSCULAR; INTRAVENOUS; SUBCUTANEOUS at 11:22

## 2018-08-21 RX ADMIN — HYDROMORPHONE HYDROCHLORIDE 0.5 MG: 1 INJECTION, SOLUTION INTRAMUSCULAR; INTRAVENOUS; SUBCUTANEOUS at 19:11

## 2018-08-21 RX ADMIN — LEVOTHYROXINE SODIUM 50 MCG: 50 TABLET ORAL at 16:03

## 2018-08-21 RX ADMIN — SODIUM CHLORIDE 1000 ML: 9 INJECTION, SOLUTION INTRAVENOUS at 11:22

## 2018-08-21 RX ADMIN — LORAZEPAM 1 MG: 1 TABLET ORAL at 19:12

## 2018-08-21 RX ADMIN — KETOROLAC TROMETHAMINE 15 MG: 15 INJECTION, SOLUTION INTRAMUSCULAR; INTRAVENOUS at 10:42

## 2018-08-21 RX ADMIN — FLUTICASONE PROPIONATE 1 SPRAY: 50 SPRAY, METERED NASAL at 17:08

## 2018-08-21 RX ADMIN — ACETAMINOPHEN 650 MG: 325 TABLET, FILM COATED ORAL at 21:54

## 2018-08-21 RX ADMIN — Medication 1 MG: at 21:54

## 2018-08-21 RX ADMIN — HYDROMORPHONE HYDROCHLORIDE 0.5 MG: 1 INJECTION, SOLUTION INTRAMUSCULAR; INTRAVENOUS; SUBCUTANEOUS at 18:11

## 2018-08-21 RX ADMIN — IOPAMIDOL 100 ML: 612 INJECTION, SOLUTION INTRAVENOUS at 12:54

## 2018-08-21 RX ADMIN — ONDANSETRON 4 MG: 4 TABLET, ORALLY DISINTEGRATING ORAL at 17:08

## 2018-08-21 ASSESSMENT — ACTIVITIES OF DAILY LIVING (ADL)
TOILETING: 0-->INDEPENDENT
ADLS_ACUITY_SCORE: 10
DRESS: 0-->INDEPENDENT
RETIRED_EATING: 0-->INDEPENDENT
SWALLOWING: 0-->SWALLOWS FOODS/LIQUIDS WITHOUT DIFFICULTY
BATHING: 0-->INDEPENDENT
COGNITION: 0 - NO COGNITION ISSUES REPORTED
AMBULATION: 0-->INDEPENDENT
RETIRED_COMMUNICATION: 0-->UNDERSTANDS/COMMUNICATES WITHOUT DIFFICULTY
FALL_HISTORY_WITHIN_LAST_SIX_MONTHS: NO
TRANSFERRING: 0-->INDEPENDENT
ADLS_ACUITY_SCORE: 10

## 2018-08-21 ASSESSMENT — ENCOUNTER SYMPTOMS
NERVOUS/ANXIOUS: 1
NAUSEA: 1
ABDOMINAL PAIN: 1
WEAKNESS: 1
VOMITING: 1
APPETITE CHANGE: 1
MYALGIAS: 1
LIGHT-HEADEDNESS: 1
ACTIVITY CHANGE: 1

## 2018-08-21 ASSESSMENT — PAIN DESCRIPTION - DESCRIPTORS
DESCRIPTORS: PRESSURE

## 2018-08-21 NOTE — ED NOTES
Presents with 8/10 mid abd pain, starting this am approx 0300, has vomited on and off since abd pain started. Accompanied by . Call light within reach.

## 2018-08-21 NOTE — IP AVS SNAPSHOT
MRN:[id number]                      After Visit Summary   8/21/2018    Mony Szymanski    MRN: [id number]           Thank you!     Thank you for choosing [facility] for your care. Our goal is always to provide you with excellent care. Hearing back from our patients is one way we can continue to improve our services. Please take a few minutes to complete the written survey that you may receive in the mail after you visit with us. Thank you!        Patient Information     Date Of Birth          1950        Designated Caregiver       Most Recent Value    Caregiver    Will someone help with your care after discharge? yes    Name of designated caregiver thai    Phone number of caregiver     Caregiver address [place]      About your hospital stay     You were admitted on:  August 21, 2018 You last received care in the:  [facility]    You were discharged on:  August 24, 2018        Reason for your hospital stay       You had a small bowel obstruction                  Who to Call     For medical emergencies, please call 911.  For non-urgent questions about your medical care, please call your primary care provider or clinic, [phone]          Attending Provider     Provider Specialty    West Sherman MD Emergency Medicine    [facility]       Primary Care Provider Office Phone # Fax #    Kobe Das [phone]       When to contact your care team       Intractable nausea or vomiting                  After Care Instructions     Activity       Your activity upon discharge: activity as tolerated            Diet       Follow this diet upon discharge: Orders Placed This Encounter      Regular Diet Adult                  Follow-up Appointments     Follow-up and recommended labs and tests        Follow up with primary care provider, Kobe Das, within 7 days for hospital follow- up.  No follow up labs or test are needed.                  Your  next 10 appointments already scheduled     Aug 30, 2018 10:45 AM CDT   (Arrive by 10:30 AM)   SHORT with Kobe Das MD   [facility] ([facility] )    [address] [place] [address]   [phone]              Further instructions from your care team       What to expect when you have contrast    During your exam, we will inject  contrast  into your vein or artery. (Contrast is a clear liquid with iodine in it. It shows up on X-rays.)    You may feel warm or hot. You may have a metal taste in your mouth and a slight upset stomach. You may also feel pressure near the kidneys and bladder. These effects will last about 1 to 3 minutes.    Please tell us if you have:    Sneezing     Itching    Hives     Swelling in the face    A hoarse voice    Breathing problems    Other new symptoms    Serious problems are rare.  They may include:    Irregular heartbeat     Seizures    Kidney failure              Tissue damage    Shock      Death    If you have any problems during the exam, we  will treat them right away.    When you get home    Call your hospital if you have any new symptoms in the next 2 days, like hives or swelling. (Phone numbers are at the bottom of this page.) Or call your family doctor.     If you have wheezing or trouble breathing, call 911.    Self-care  -Drink at least 4 extra glasses of water today.   This reduces the stress on your kidneys.  -Keep taking your regular medicines.    The contrast will pass out of your body in your  Urine(pee). This will happen in the next 24 hours. You  will not feel this. Your urine will not  change color.    If you have kidney problems or take metformin    Drink 4 to 8 large glasses of water for the next  2 days, if you are not on a fluid restriction.    ?If you take metformin (Glucophage or Glucovance) for diabetes, keep taking it.      ?Your kidney function tests are abnormal.  If you take Metformin, do not take it for 48 hours.  "Please go to your clinic for a blood test within 3 days after your exam before the restarting this medicine.     (Note to provider:please give patient prescription for lab tests.)    ?Special instructions: -    I have read and understand the above information.    Patient Sign Here:______________________________________Date:________Time:______    Staff Sign Here:________________________________________Date:_______Time:______      Radiology Departments:     ?[facility]: [phone] ?[facility]: [phone]     ?[facility]: [phone] ?[facility]:[phone]      ?[facility]: [phone]  ?[facility]: [phone]  ?[facility]:[phone]    ?[facility]:[phone]  ?[facility]:[phone]    Pending Results     No orders found from 8/19/2018 to 8/22/2018.            Statement of Approval     Ordered          08/24/18 1506  I have reviewed and agree with all the recommendations and orders detailed in this document.  EFFECTIVE NOW     Approved and electronically signed by:  Rakan Robles MD             Admission Information     Date & Time Provider Department Dept. Phone    8/21/2018 Ryan Colon MD [facility] [phone]      Your Vitals Were     Blood Pressure Pulse Temperature Respirations Height Weight    146/84 (BP Location: Right arm) 70 98.3  F (36.8  C) (Tympanic) 18 1.626 m (5' 4\") 69.9 kg (154 lb 1.6 oz)    Pulse Oximetry BMI (Body Mass Index)                98% 26.45 kg/m2          MyChart Information     "[employer] gives you secure access to your electronic health record. If you see a primary care provider, you can also send messages to your care team and make appointments. If you have questions, please call your primary care clinic.  If you do not have a primary care provider, please call [phone] and they will assist you.        Care EveryWhere ID     This is your Care EveryWhere ID. This could be used by other organizations to access your Central medical records  [id number]      "   Equal Access to Services     [facility]: Hadii vel Meeks, waaxda luqadaha, qaybta kaalmadamion scnheider, guille thompson. So [facility] [phone].    ATENCIÓN: Si habla español, tiene a martin disposición servicios gratuitos de asistencia lingüística. Llame al [phone].    We comply with applicable federal civil rights laws and [place] laws. We do not discriminate on the basis of race, color, national origin, age, disability, sex, sexual orientation, or gender identity.               Review of your medicines      CONTINUE these medicines which have NOT CHANGED        Dose / Directions    5-HTP 50 MG Caps        Take by mouth daily.   Refills:  0       fish oil-omega-3 fatty acids 1000 MG capsule        Dose:  2 capsule   Take 2 capsules by mouth daily.   Refills:  0       fluticasone 50 MCG/ACT spray   Commonly known as:  FLONASE   Used for:  Chronic seasonal allergic rhinitis due to pollen        INSTILL 2 SPRAYS IN EACH NOSTRIL EVERY DAY   Quantity:  3 g   Refills:  3       glucosamine 500 MG Caps        Take by mouth daily   Refills:  0       levothyroxine 50 MCG tablet   Commonly known as:  SYNTHROID/LEVOTHROID   Used for:  Acquired hypothyroidism        Dose:  50 mcg   Take 1 tablet (50 mcg) by mouth daily   Quantity:  90 tablet   Refills:  3       MAGNESIUM OXIDE PO        Dose:  200 mg   Take 200 mg by mouth daily   Refills:  0       Multiple vitamin Tabs        Take 1 tablet by oral route every day with food   Refills:  0       oxazepam 15 MG capsule   Commonly known as:  SERAX   Used for:  ED (generalized anxiety disorder)        TAKE ONE CAPSULE BY MOUTH THREE TIMES DAILY   Quantity:  270 capsule   Refills:  3       traZODone 50 MG tablet   Commonly known as:  DESYREL   Used for:  ED (generalized anxiety disorder)        TAKE 1 TO 3 TABLETS BY MOUTH AT BEDTIME   Quantity:  270 tablet   Refills:  3       TUMS 500 OR        Take by mouth 2 times daily   Refills:  0        venlafaxine 75 MG 24 hr capsule   Commonly known as:  EFFEXOR-XR   Used for:  ED (generalized anxiety disorder)        TAKE 1 CAPSULE BY MOUTH DAILY WITH FOOD   Quantity:  90 capsule   Refills:  3       vitamin D 1000 units capsule        Dose:  1 capsule   Take 1 capsule by mouth daily.   Refills:  0                Protect others around you: Learn how to safely use, store and throw away your medicines at www.disposemymeds.org.             Medication List: This is a list of all your medications and when to take them. Check marks below indicate your daily home schedule. Keep this list as a reference.      Medications           Morning Afternoon Evening Bedtime As Needed    5-HTP 50 MG Caps   Take by mouth daily.                                fish oil-omega-3 fatty acids 1000 MG capsule   Take 2 capsules by mouth daily.                                fluticasone 50 MCG/ACT spray   Commonly known as:  FLONASE   INSTILL 2 SPRAYS IN EACH NOSTRIL EVERY DAY   Last time this was given:  1 spray on 8/24/2018  8:17 AM                                glucosamine 500 MG Caps   Take by mouth daily                                levothyroxine 50 MCG tablet   Commonly known as:  SYNTHROID/LEVOTHROID   Take 1 tablet (50 mcg) by mouth daily   Last time this was given:  50 mcg on 8/24/2018  8:15 AM                                MAGNESIUM OXIDE PO   Take 200 mg by mouth daily                                Multiple vitamin Tabs   Take 1 tablet by oral route every day with food                                oxazepam 15 MG capsule   Commonly known as:  SERAX   TAKE ONE CAPSULE BY MOUTH THREE TIMES DAILY                                traZODone 50 MG tablet   Commonly known as:  DESYREL   TAKE 1 TO 3 TABLETS BY MOUTH AT BEDTIME   Last time this was given:  100 mg on 8/23/2018  9:56 PM                                TUMS 500 OR   Take by mouth 2 times daily                                venlafaxine 75 MG 24 hr capsule   Commonly  known as:  EFFEXOR-XR   TAKE 1 CAPSULE BY MOUTH DAILY WITH FOOD   Last time this was given:  75 mg on 8/24/2018  8:15 AM                                vitamin D 1000 units capsule   Take 1 capsule by mouth daily.                                          More Information        Small Bowel Obstruction     Small bowel obstruction can lead to tissue damage and even tissue death.   A small bowel obstruction occurs when part or all of the small intestine (bowel) is blocked. As a result, digestive contents can t move through the bowel properly and out of the body. Treatment is needed right away to remove the blockage. This can ease painful symptoms. It can also prevent serious problems, such as tissue death or bursting (rupture) of the small bowel. Without treatment, a small bowel obstruction can be fatal.  Causes of small bowel obstruction  A small bowel obstruction can be caused by:    Scar tissue (adhesions). These may form after belly (abdominal) surgery or an infection.    Hernia. A hernia is when an organ pushes through a weak spot or tear in the abdomen wall. Part of the small bowel can push out and be seen as a bulge under the belly. Hernias can also occur internally.    Certain health problems. These include when part of the bowel slides inside another part (intussusception). Other causes include irritable bowel disease such as Crohn s disease, and inflammation and sores in the intestine (ulcerative colitis).    Abnormal tissue growths (tumors). These can form on the inside or outside of the small bowel. They are usually due to cancer.  Symptoms of small bowel obstruction  Common symptoms include:    Belly cramping and pain    Belly swelling and bloating    Upset stomach (nausea) and vomiting    Can't  pass gas    Can't pass stool (constipation)    Diarrhea  Diagnosing small bowel obstruction  Your provider will ask about your symptoms and health history. You ll also have a physical exam. Tests may also be done  to confirm the problem. These can include:    Imaging tests. These provide pictures of the small bowel. Common tests include X-rays and a CT scan.    Blood tests. These check for infection and other problems, such as excess fluid loss (dehydration).    Upper GI (gastrointestinal) series with a small bowel follow-through. This test takes X-rays of the upper digestive tract from the mouth through the small bowel. An X-ray dye (contrast fluid) is used. The dye coats the inside of your upper digestive tract so it will show up clearly on X-rays.  Treating small bowel obstruction  Treatment takes place in a hospital. As part of your care, the following may be done:    No food or drink is given by mouth. This allows your bowels to rest.    An IV (intravenous) line is placed in a vein in your arm or hand. The IV line is used to give fluids. It may also be used to give medicines. These may be needed to ease pain, nausea, and other symptoms. They may also be needed to treat or prevent infections.    A soft, thin, flexible tube (nasogastric tube) is inserted through your nose and into your stomach. The tube is used to remove extra gas and fluid in your stomach and bowels. This helps to ease symptoms such as pain and swelling.    In severe cases, surgery is done. This may be needed if the small bowel is almost or totally blocked, or there is a hole in the bowel (bowel perforation). During surgery, the blockage is removed. Parts of the bowel may also be removed if there is tissue death. Other repair may be done as well, depending on what caused the blockage. Your healthcare provider will give you more information about surgery, if needed.    You ll be watched closely in the hospital until your symptoms improve. Your provider will tell you when you can go home.  Long-term concerns   After treatment, most people recover with no lasting effects. If a long part of the bowel is removed, there is a greater chance for lifelong  digestive problems. Bowel movements may become irregular. Work with your provider to learn the best ways to manage any symptoms you may have, and to protect your health.  When to call your healthcare provider  Call your provider right away if you have any of the following:    Severe pain (call 911)    Belly swelling or cramping that won t go away    Can t pass stool or gas    Nausea or vomiting (especially if the vomit looks or smells like stool)   Date Last Reviewed: 7/1/2016 2000-2017 [employer]). [address] [place] [address]. All rights reserved. This information is not intended as a substitute for professional medical care. Always follow your healthcare professional's instructions.                Enoxaparin injection  Brand Name: Lovenox  What is this medicine?  ENOXAPARIN (ee nox a PA rin) is used after knee, hip, or abdominal surgeries to prevent blood clotting. It is also used to treat existing blood clots in the lungs or in the veins.  How should I use this medicine?  This medicine is for injection under the skin. It is usually given by a health-care professional. You or a family member may be trained on how to give the injections. If you are to give yourself injections, make sure you understand how to use the syringe, measure the dose if necessary, and give the injection. To avoid bruising, do not rub the site where this medicine has been injected. Do not take your medicine more often than directed. Do not stop taking except on the advice of your doctor or health care professional.  Make sure you receive a puncture-resistant container to dispose of the needles and syringes once you have finished with them. Do not reuse these items. Return the container to your doctor or health care professional for proper disposal.  Talk to your pediatrician regarding the use of this medicine in children. Special care may be needed.  What side effects may I notice from receiving this  medicine?  Side effects that you should report to your doctor or health care professional as soon as possible:    allergic reactions like skin rash, itching or hives, swelling of the face, lips, or tongue    feeling faint or lightheaded, falls    signs and symptoms of bleeding such as bloody or black, tarry stools; red or dark-brown urine; spitting up blood or brown material that looks like coffee grounds; red spots on the skin; unusual bruising or bleeding from the eye, gums, or nose  Side effects that usually do not require medical attention (report to your doctor or health care professional if they continue or are bothersome):    pain, redness, or irritation at site where injected  What may interact with this medicine?      aspirin and aspirin-like medicines    certain medicines that treat or prevent blood clots    dipyridamole    NSAIDs, medicines for pain and inflammation, like ibuprofen or naproxen  What if I miss a dose?  If you miss a dose, take it as soon as you can. If it is almost time for your next dose, take only that dose. Do not take double or extra doses.  Where should I keep my medicine?  Keep out of the reach of children.  Store at room temperature between 15 and 30 degrees C (59 and 86 degrees F). Do not freeze. If your injections have been specially prepared, you may need to store them in the refrigerator. Ask your pharmacist. Throw away any unused medicine after the expiration date.  What should I tell my health care provider before I take this medicine?  They need to know if you have any of these conditions:    bleeding disorders, hemorrhage, or hemophilia    infection of the heart or heart valves    kidney or liver disease    previous stroke    prosthetic heart valve    recent surgery or delivery of a baby    ulcer in the stomach or intestine, diverticulitis, or other bowel disease    an unusual or allergic reaction to enoxaparin, heparin, pork or pork products, other medicines, foods, dyes,  or preservatives    pregnant or trying to get pregnant    breast-feeding  What should I watch for while using this medicine?  Visit your doctor or health care professional for regular checks on your progress. Your condition will be monitored carefully while you are receiving this medicine.  Notify your doctor or health care professional and seek emergency treatment if you develop breathing problems; changes in vision; chest pain; severe, sudden headache; pain, swelling, warmth in the leg; trouble speaking; sudden numbness or weakness of the face, arm, or leg. These can be signs that your condition has gotten worse.  If you are going to have surgery, tell your doctor or health care professional that you are taking this medicine.  Do not stop taking this medicine without first talking to your doctor. Be sure to refill your prescription before you run out of medicine.  Avoid sports and activities that might cause injury while you are using this medicine. Severe falls or injuries can cause unseen bleeding. Be careful when using sharp tools or knives. Consider using an electric razor. Take special care brushing or flossing your teeth. Report any injuries, bruising, or red spots on the skin to your doctor or health care professional.  NOTE:This sheet is a summary. It may not cover all possible information. If you have questions about this medicine, talk to your doctor, pharmacist, or health care provider. Copyright  2018 [employer]

## 2018-08-21 NOTE — DISCHARGE INSTRUCTIONS
What to expect when you have contrast    During your exam, we will inject  contrast  into your vein or artery. (Contrast is a clear liquid with iodine in it. It shows up on X-rays.)    You may feel warm or hot. You may have a metal taste in your mouth and a slight upset stomach. You may also feel pressure near the kidneys and bladder. These effects will last about 1 to 3 minutes.    Please tell us if you have:    Sneezing     Itching    Hives     Swelling in the face    A hoarse voice    Breathing problems    Other new symptoms    Serious problems are rare.  They may include:    Irregular heartbeat     Seizures    Kidney failure              Tissue damage    Shock      Death    If you have any problems during the exam, we  will treat them right away.    When you get home    Call your hospital if you have any new symptoms in the next 2 days, like hives or swelling. (Phone numbers are at the bottom of this page.) Or call your family doctor.     If you have wheezing or trouble breathing, call 911.    Self-care  -Drink at least 4 extra glasses of water today.   This reduces the stress on your kidneys.  -Keep taking your regular medicines.    The contrast will pass out of your body in your  Urine(pee). This will happen in the next 24 hours. You  will not feel this. Your urine will not  change color.    If you have kidney problems or take metformin    Drink 4 to 8 large glasses of water for the next  2 days, if you are not on a fluid restriction.    ?If you take metformin (Glucophage or Glucovance) for diabetes, keep taking it.      ?Your kidney function tests are abnormal.  If you take Metformin, do not take it for 48 hours. Please go to your clinic for a blood test within 3 days after your exam before the restarting this medicine.     (Note to provider:please give patient prescription for lab tests.)    ?Special instructions: -    I have read and understand the above information.    Patient Sign  Here:______________________________________Date:________Time:______    Staff Sign Here:________________________________________Date:_______Time:______      Radiology Departments:     ?[facility]: [phone] ?[facility]: [phone]     ?[facility]: [phone] ?[facility]:[phone]      ?[facility]: [phone]  ?[facility]: [phone]  ?[place]:[phone]    ?[facility]:[phone]  ?[facility]:[phone]

## 2018-08-21 NOTE — PLAN OF CARE
[facility] Inpatient Admission Note:    Patient admitted to 3214/3214-1 at approximately 1530 via cart accompanied by spouse from emergency room . Report received from keyla crespo  in SBAR format at 1515 via telephone. Patient ambulated to bed via self.. Patient is alert and oriented X 3, denies pain; rates at 0 on 0-10 scale.  Patient oriented to room, unit, hourly rounding, and plan of care. Explained admission packet and patient handbook with patient bill of rights brochure. Will continue to monitor and document as needed.     Inpatient Nursing criteria listed below was met:    Health care directives status obtained and documented: Yes    Care Everywhere authorization obtained No    MRSA swab completed for patient 65 years and older: Yes    Patient identifies a surrogate decision maker: Yes If yes, who:  Contact Information:see facesheet       Core Measure diagnosis present:No. If yes, state diagnosis:    If initial lactic acid >2.0, repeat lactic acid drawn within one hour of arrival to unit: No. If no, state reason: forgot     Vaccination assessment and education completed: Yes   Vaccinations received prior to admission: Pneumovax yes  Influenza(seasonal)  N/A   Vaccination(s) ordered: up to date     Clergy visit ordered if patient requests: N/A    Skin issues/needs documented: No and N/A    Isolation Patient: no Education given, correct sign in place and documentation row added to PCS:  No    Fall Prevention Yes: Care plan updated, education given and documented, sticker and magnet in place: Yes    Care Plan initiated: Yes    Education Documented (including assessment): Yes    Patient has discharge needs : no  If yes, please explain

## 2018-08-21 NOTE — IP AVS SNAPSHOT
[facility]    [address] [place] [id number]    Phone:  [phone]    Fax:  [phone]                                       After Visit Summary   8/21/2018    Mony Szymanski    MRN: [id number]           After Visit Summary Signature Page     I have received my discharge instructions, and my questions have been answered. I have discussed any challenges I see with this plan with the nurse or doctor.    ..........................................................................................................................................  Patient/Patient Representative Signature      ..........................................................................................................................................  Patient Representative Print Name and Relationship to Patient    ..................................................               ................................................  Date                                            Time    ..........................................................................................................................................  Reviewed by Signature/Title    ...................................................              ..............................................  Date                                                            Time          22EPIC Rev 08/18

## 2018-08-21 NOTE — PLAN OF CARE
"Problem: Patient Care Overview  Goal: Plan of Care/Patient Progress Review  Outcome: No Change   08/21/18 1722   OTHER   Plan Of Care Reviewed With patient   Plan of Care Review   Progress progress toward functional goals is gradual   Pt admitted with SBO. States she did have H/O x 1 and it resolved on her own. Current BS x 4 active.stool sample yet to be collected  Pt has been pleasant since arrival. Alert, orientated x 4, sba, Alarms on.   Wanted to walk in halls and did-felt good walking but states nausea and increasing pain shortly after walk. Nausea   \"mostly\" subsides with rest but encouraged PRNs for pain and nausea control to \"keep on top\".  Using dilaudid and zofran Does get relief with PRNs.   Ice chips/meds only.  IV       Problem: Bowel Obstruction (Adult)  Goal: Signs and Symptoms of Listed Potential Problems Will be Absent, Minimized or Managed (Bowel Obstruction)  Signs and symptoms of listed potential problems will be absent, minimized or managed by discharge/transition of care (reference Bowel Obstruction (Adult) CPG).   Outcome: No Change   08/21/18 1722   Bowel Obstruction   Problems Assessed (Bowel Obstruction) all   Problems Present (Bowel Obstruction) nausea and vomiting;pain         "

## 2018-08-21 NOTE — CONSULTS
Consult Date:  08/21/2018      CONSULTATION AND ADMISSION HISTORY AND PHYSICAL      HISTORY OF PRESENT ILLNESS:  Dr. Sherman in the emergency room asked me to see Ms. Szymanski, who is a pleasant 68-year-old female who presented to the emergency room complaining of abdominal pain.  She states she had a normal day yesterday.  Ate normally and was fine when she went to bed.  At approximately 3:00 in the morning, she was awakened by crampy abdominal pain.  This was across her upper abdomen.  She felt as if she needed to pass gas.  The pain was quite intense.  She did end up becoming nauseated, vomited a number of times and also had a number of small bowel movements.  As the pain was persisting, she did present to the emergency room.  In the emergency room, she has been given pain medication and has been feeling somewhat better, but still complained of some mild abdominal pain.  She has not had a bowel movement or passed gas since approximately 10:00 this morning.  She has not done any further vomiting.  She has not had symptoms similar to this in the past.  She has felt somewhat bloated.      Normally, she has a good appetite.  Her weight has been stable.  She tends to have 1 bowel movement a day.  There has been no blood with her bowel movements and no black bowel movements.  The patient does have a family history of colon cancer.  She does have regular colonoscopies.  Her last colonoscopy was 3 or 4 years ago.  She thinks she did have a polyp removed.      She is otherwise healthy.  She does have difficulties with anxiety, which has been a lifetime issue, and appears to have it under reasonable control with medication.  She denies any heart troubles.  She has never had a heart attack or irregular heartbeat.  He is not a cigarette smoker.  She denies shortness of breath or asthma.  Not diabetic.  She denies any chronic renal troubles.  There has never had a stroke or a threatened stroke.  She does have a history of  hypothyroidism.  There is no history of leg swelling or deep venous thrombosis.      REVIEW OF SYSTEMS:  Otherwise normal.      HOME MEDICATIONS:  Include 5-hydroxytryptophan, calcium carbonate, vitamin D, omega-3 fatty acids, Flonase, glucosamine, levothyroxine, magnesium oxide, multivitamins, Serax, Desyrel and Effexor.      ALLERGIES:  SHE DOES HAVE INTOLERANCE TO CODEINE, WHICH CAUSES NAUSEA AND VOMITING.      PAST SURGICAL HISTORY:  She has had a previous appendectomy, which was done as an infant.  She has also had wisdom teeth extracted, as well as sinus surgery.      PHYSICAL EXAMINATION:   GENERAL:  Today, she is a pleasant 68-year-old female who did not appear to be in significant distress.   VITAL SIGNS:  Afebrile.  Blood pressure is 145/88 with a heart rate of 72.  O2 sats are 99% on room air.   HEAD AND NECK:  Unremarkable.  I could not feel any masses.  There is no thyroid enlargement.  There is no scleral icterus.   CHEST:  She has good air entry bilaterally.  There are no wheezes or creps.   HEART:  Sounds are normal with normal S1 and S2.  There are no murmurs.  She is in sinus rhythm.   ABDOMEN:  She has the scar from her appendectomy.  She does appear a bit bloated.  The abdomen was soft.  She had some minimal tenderness in the right lower quadrant and up into the right upper quadrant.  The rest of the abdomen was soft and nontender.  There were no palpable masses.  There is no hepatosplenomegaly.  There is no costovertebral angle tenderness.  I could not feel any groin hernias.  She has strong femoral pulses bilaterally.   EXTREMITIES:  There is no peripheral edema.  She is moving all limbs well.      LABORATORY DATA:  Her blood work reveals a hemoglobin of 14.1 with a white count of 11.3.  Her electrolytes are normal, as are her BUN and creatinine.  Lactic acid was slightly elevated at 2.5.      She did have a CT scan of the abdomen and pelvis done, which I did review.  This is suggestive of a  small bowel obstruction with a transition point in the right lower quadrant.  No masses were seen.  Gas was seen in the colon.      ASSESSMENT:  I explained to Ms. Tyler that she does have what appears to be a small bowel obstruction.  I suspect this is due to adhesions from her appendectomy done a number of years ago.  At the moment, I do not see anything to suggest that the bowel is getting into trouble as far as its blood supply is concerned.  I did explain that the treatment is to admit her into hospital with IV fluids and nothing by mouth.  I explained that the vast majority of these will settle without surgical intervention, but some patients do go on to require surgery if the obstruction does not settle or if she is getting sicker, suggesting the bowel is getting into trouble.  I did answer all of her questions.  I stated I was uncertain how long she would be in hospital and it would depend on how quickly her bowels open up.  We will keep her on her antianxiety meds. We will make certain that she has something for pain.  We will repeat her blood work and plain x-rays tomorrow.         MARGARET LAMB MD             D: 2018   T: 2018   MT: LEXA      Name:     HARIKA TYLER   MRN:      [id number]        Account:       [id number]   :      1950           Consult Date:  2018      Document: [id number]       cc: West Sherman MD

## 2018-08-22 ENCOUNTER — APPOINTMENT (OUTPATIENT)
Dept: GENERAL RADIOLOGY | Facility: HOSPITAL | Age: 68
DRG: 390 | End: 2018-08-22
Attending: SURGERY
Payer: MEDICARE

## 2018-08-22 LAB
ANION GAP SERPL CALCULATED.3IONS-SCNC: 8 MMOL/L (ref 3–14)
BUN SERPL-MCNC: 18 MG/DL (ref 7–30)
CALCIUM SERPL-MCNC: 7.9 MG/DL (ref 8.5–10.1)
CHLORIDE SERPL-SCNC: 110 MMOL/L (ref 94–109)
CO2 SERPL-SCNC: 22 MMOL/L (ref 20–32)
CREAT SERPL-MCNC: 0.63 MG/DL (ref 0.52–1.04)
ERYTHROCYTE [DISTWIDTH] IN BLOOD BY AUTOMATED COUNT: 12.7 % (ref 10–15)
GFR SERPL CREATININE-BSD FRML MDRD: >90 ML/MIN/1.7M2
GLUCOSE SERPL-MCNC: 115 MG/DL (ref 70–99)
HCT VFR BLD AUTO: 37.5 % (ref 35–47)
HGB BLD-MCNC: 12.9 G/DL (ref 11.7–15.7)
MCH RBC QN AUTO: 30.6 PG (ref 26.5–33)
MCHC RBC AUTO-ENTMCNC: 34.4 G/DL (ref 31.5–36.5)
MCV RBC AUTO: 89 FL (ref 78–100)
PLATELET # BLD AUTO: 256 10E9/L (ref 150–450)
POTASSIUM SERPL-SCNC: 3.9 MMOL/L (ref 3.4–5.3)
RBC # BLD AUTO: 4.22 10E12/L (ref 3.8–5.2)
SODIUM SERPL-SCNC: 140 MMOL/L (ref 133–144)
WBC # BLD AUTO: 10.1 10E9/L (ref 4–11)

## 2018-08-22 PROCEDURE — 25000128 H RX IP 250 OP 636: Performed by: SURGERY

## 2018-08-22 PROCEDURE — 85027 COMPLETE CBC AUTOMATED: CPT | Performed by: SURGERY

## 2018-08-22 PROCEDURE — A9270 NON-COVERED ITEM OR SERVICE: HCPCS | Mod: GY | Performed by: SURGERY

## 2018-08-22 PROCEDURE — 74019 RADEX ABDOMEN 2 VIEWS: CPT | Mod: TC

## 2018-08-22 PROCEDURE — 25000128 H RX IP 250 OP 636: Performed by: EMERGENCY MEDICINE

## 2018-08-22 PROCEDURE — 80048 BASIC METABOLIC PNL TOTAL CA: CPT | Performed by: SURGERY

## 2018-08-22 PROCEDURE — 25000132 ZZH RX MED GY IP 250 OP 250 PS 637: Mod: GY | Performed by: SURGERY

## 2018-08-22 PROCEDURE — 12000000 ZZH R&B MED SURG/OB

## 2018-08-22 PROCEDURE — 99231 SBSQ HOSP IP/OBS SF/LOW 25: CPT | Performed by: SURGERY

## 2018-08-22 PROCEDURE — 36415 COLL VENOUS BLD VENIPUNCTURE: CPT | Performed by: SURGERY

## 2018-08-22 RX ORDER — SODIUM CHLORIDE 9 MG/ML
1000 INJECTION, SOLUTION INTRAVENOUS CONTINUOUS
Status: DISCONTINUED | OUTPATIENT
Start: 2018-08-22 | End: 2018-08-23

## 2018-08-22 RX ORDER — TRAZODONE HYDROCHLORIDE 50 MG/1
50-150 TABLET, FILM COATED ORAL AT BEDTIME
Status: DISCONTINUED | OUTPATIENT
Start: 2018-08-22 | End: 2018-08-24 | Stop reason: HOSPADM

## 2018-08-22 RX ADMIN — VENLAFAXINE HYDROCHLORIDE 75 MG: 75 CAPSULE, EXTENDED RELEASE ORAL at 08:14

## 2018-08-22 RX ADMIN — FLUTICASONE PROPIONATE 1 SPRAY: 50 SPRAY, METERED NASAL at 08:14

## 2018-08-22 RX ADMIN — HYDROMORPHONE HYDROCHLORIDE 0.5 MG: 1 INJECTION, SOLUTION INTRAMUSCULAR; INTRAVENOUS; SUBCUTANEOUS at 00:01

## 2018-08-22 RX ADMIN — LEVOTHYROXINE SODIUM 50 MCG: 50 TABLET ORAL at 08:14

## 2018-08-22 RX ADMIN — ONDANSETRON 4 MG: 2 INJECTION, SOLUTION INTRAMUSCULAR; INTRAVENOUS at 03:14

## 2018-08-22 RX ADMIN — SODIUM CHLORIDE 1000 ML: 9 INJECTION, SOLUTION INTRAVENOUS at 14:08

## 2018-08-22 RX ADMIN — SODIUM CHLORIDE 1000 ML: 9 INJECTION, SOLUTION INTRAVENOUS at 16:38

## 2018-08-22 RX ADMIN — SODIUM CHLORIDE 1000 ML: 9 INJECTION, SOLUTION INTRAVENOUS at 07:29

## 2018-08-22 RX ADMIN — SODIUM CHLORIDE 1000 ML: 9 INJECTION, SOLUTION INTRAVENOUS at 23:51

## 2018-08-22 RX ADMIN — TRAZODONE HYDROCHLORIDE 100 MG: 50 TABLET ORAL at 22:06

## 2018-08-22 RX ADMIN — HYDROMORPHONE HYDROCHLORIDE 0.5 MG: 1 INJECTION, SOLUTION INTRAMUSCULAR; INTRAVENOUS; SUBCUTANEOUS at 03:10

## 2018-08-22 ASSESSMENT — ACTIVITIES OF DAILY LIVING (ADL)
ADLS_ACUITY_SCORE: 10

## 2018-08-22 NOTE — PLAN OF CARE
Face to face report given with opportunity to observe patient.    Report given to Alessio Billy   8/21/2018  7:12 PM

## 2018-08-22 NOTE — ED PROVIDER NOTES
History     Chief Complaint   Patient presents with     Abdominal Pain     abd pain, N/V sudden onset at 0300     HPI  Mony Szymanski is a 68 year old female with the above symptoms.  Presents after several episodes of emesis but no diarrhea.  Only previous abdominal surgery was childhood appx.  She has never had this kind of sudden episode.  Denies fever, chills, hematemesis, or exposure to bad food or drink.  Her medical problems are noted below.     Problem List:    Patient Active Problem List    Diagnosis Date Noted     Small bowel obstruction due to adhesions 08/21/2018     Priority: Medium     Vaso vagal episode 05/15/2017     Priority: Medium     Mixed hyperlipidemia 05/11/2016     Priority: Medium     Acquired hypothyroidism 05/11/2016     Priority: Medium     Major depressive disorder, recurrent episode, in partial remission (H) 05/11/2016     Priority: Medium     ACP (advance care planning) 06/30/2015     Priority: Medium     Advance Care Planning 6/30/2015: Receipt of ACP document:  Received: Health Care Directive which was witnessed or notarized on 5-14-15.  Document previously scanned on 6-9-15.  Validation form completed and sent to be scanned.  Code Status needs to be updated to reflect choices in most recent ACP document. Confirmed/documented designated decision maker(s).  Added by Yamilet Neal RN Advance Care Planning Liaison with [employer]           FH: colon cancer      Priority: Medium     Generalized anxiety disorder 12/22/2010     Priority: Medium     Chronic fatigue syndrome 12/22/2010     Priority: Medium     Attention deficit disorder 12/22/2010     Priority: Medium     Problem list name updated by automated process. Provider to review       Obsessive-compulsive disorder 12/22/2010     Priority: Medium     Problem list name updated by automated process. Provider to review          Past Medical History:    Past Medical History:   Diagnosis Date     Attention deficit disorder of  childhood without me 12/22/2010     Chronic fatigue syndrome 12/22/2010     FH: colon cancer      Generalized anxiety disorder 12/22/2010     Hyperlipidemia 12/27/2011     Hypothyroidism 12/27/2011     Major depressive disorder, recurrent episode, unspecified 4/27/2011     Obsessive-compulsive disorders 12/22/2010       Past Surgical History:    Past Surgical History:   Procedure Laterality Date     Appendicitis  1963    appendectomy     breast calcification      biopsy; RT     colonoscopy  2004    repeat 2014     COLONOSCOPY  6-3-2010    Repeat 2014-15     COLONOSCOPY N/A 7/20/2015    repeat in 2020//Procedure: COLONOSCOPY;  Surgeon: Cassie Snell MD;  Location: [facility]     lazy eye  1956    surgically repaired; LT     NASAL/SINUS POLYPECTOMY       wisdom teeth extracted  1988       Family History:    Family History   Problem Relation Age of Onset     Cancer Maternal Grandmother      Colon     Cancer Paternal Grandmother      Lung     Diabetes Paternal Grandfather      Type 2     Other - See Comments Father      TIA's       Social History:  Marital Status:   [2]  Social History   Substance Use Topics     Smoking status: Never Smoker     Smokeless tobacco: Never Used     Alcohol use Yes      Comment: Beer & Wine, socially        Medications:      No current outpatient prescriptions on file.      Review of Systems   Constitutional: Positive for activity change and appetite change.   Gastrointestinal: Positive for abdominal pain, nausea and vomiting.        Deep epigastric and upper quadrant tenderness without rebound or guarding.    Genitourinary: Positive for decreased urine volume.   Musculoskeletal: Positive for myalgias.   Neurological: Positive for weakness and light-headedness.   Psychiatric/Behavioral: The patient is nervous/anxious.    All other systems reviewed and are negative.      Physical Exam   BP: 126/69  Pulse: 70  Heart Rate: 69  Temp: 97.6  F (36.4  C)  Resp: 18  Height: 165.1 cm (5'  "5\")  Weight: 65.8 kg (145 lb) (stated)  SpO2: 100 %    Physical Exam   Constitutional: She is oriented to person, place, and time. She appears well-developed and well-nourished. She appears distressed.   Acutely distressed and frustrated female appearing of stated age.    HENT:   Head: Normocephalic and atraumatic.   Right Ear: External ear normal.   Left Ear: External ear normal.   Eyes: Conjunctivae and EOM are normal. Pupils are equal, round, and reactive to light.   Neck: Normal range of motion. Neck supple. No JVD present. No tracheal deviation present. No thyromegaly present.   Cardiovascular: Normal rate, regular rhythm and intact distal pulses.    No murmur heard.  Pulmonary/Chest: Effort normal and breath sounds normal. No respiratory distress. She has no wheezes. She has no rales.   Abdominal: Soft. Bowel sounds are normal. She exhibits distension. She exhibits no mass. There is tenderness. There is guarding. There is no rebound.   Deep tenderness with 1+ distention in the abdomen.  Guarding but no obvious rebound.   Musculoskeletal: Normal range of motion. She exhibits no edema, tenderness or deformity.   Neurological: She is alert and oriented to person, place, and time. No cranial nerve deficit. Coordination normal.   Skin: Skin is warm and dry. She is not diaphoretic.     ED Course     ED Course     Procedures  Critical Care time:  none      Results for orders placed or performed during the hospital encounter of 08/21/18 (from the past 24 hour(s))   CBC with platelets differential   Result Value Ref Range    WBC 11.3 (H) 4.0 - 11.0 10e9/L    RBC Count 4.53 3.8 - 5.2 10e12/L    Hemoglobin 14.1 11.7 - 15.7 g/dL    Hematocrit 40.0 35.0 - 47.0 %    MCV 88 78 - 100 fl    MCH 31.1 26.5 - 33.0 pg    MCHC 35.3 31.5 - 36.5 g/dL    RDW 12.3 10.0 - 15.0 %    Platelet Count 294 150 - 450 10e9/L    Diff Method Automated Method     % Neutrophils 90.8 %    % Lymphocytes 6.8 %    % Monocytes 1.7 %    % Eosinophils 0.1 " %    % Basophils 0.3 %    % Immature Granulocytes 0.3 %    Nucleated RBCs 0 0 /100    Absolute Neutrophil 10.2 (H) 1.6 - 8.3 10e9/L    Absolute Lymphocytes 0.8 0.8 - 5.3 10e9/L    Absolute Monocytes 0.2 0.0 - 1.3 10e9/L    Absolute Eosinophils 0.0 0.0 - 0.7 10e9/L    Absolute Basophils 0.0 0.0 - 0.2 10e9/L    Abs Immature Granulocytes 0.0 0 - 0.4 10e9/L    Absolute Nucleated RBC 0.0    Erythrocyte sedimentation rate auto   Result Value Ref Range    Sed Rate 28 0 - 30 mm/h   CRP inflammation   Result Value Ref Range    CRP Inflammation <2.9 0.0 - 8.0 mg/L   Comprehensive metabolic panel   Result Value Ref Range    Sodium 139 133 - 144 mmol/L    Potassium 4.0 3.4 - 5.3 mmol/L    Chloride 105 94 - 109 mmol/L    Carbon Dioxide 24 20 - 32 mmol/L    Anion Gap 10 3 - 14 mmol/L    Glucose 129 (H) 70 - 99 mg/dL    Urea Nitrogen 17 7 - 30 mg/dL    Creatinine 0.70 0.52 - 1.04 mg/dL    GFR Estimate 84 >60 mL/min/1.7m2    GFR Estimate If Black >90 >60 mL/min/1.7m2    Calcium 9.4 8.5 - 10.1 mg/dL    Bilirubin Total 0.3 0.2 - 1.3 mg/dL    Albumin 4.6 3.4 - 5.0 g/dL    Protein Total 8.8 6.8 - 8.8 g/dL    Alkaline Phosphatase 85 40 - 150 U/L    ALT 32 0 - 50 U/L    AST 19 0 - 45 U/L   Magnesium   Result Value Ref Range    Magnesium 2.4 (H) 1.6 - 2.3 mg/dL   Amylase   Result Value Ref Range    Amylase 53 30 - 110 U/L   Lipase   Result Value Ref Range    Lipase 116 73 - 393 U/L   Lactic acid   Result Value Ref Range    Lactic Acid 2.5 (H) 0.4 - 2.0 mmol/L   Troponin I   Result Value Ref Range    Troponin I ES <0.015 0.000 - 0.045 ug/L   ABO/Rh type and screen   Result Value Ref Range    ABO O     RH(D) Pos     Antibody Screen Neg     Test Valid Only At [facility]        Specimen Expires 08/24/2018     Blood Bank Comment       Patient ID verified using a positive patient identification system or by two individuals   at time of collection.     Abdomen US, limited (RUQ only)    Narrative    PROCEDURES: US ABDOMEN  LIMITED    HISTORY: acute epigastric pain, ruq and epigasgtric pain;     TECHNIQUE: Grayscale ultrasound of the upper abdomen was performed.    COMPARISON: none     FINDINGS:    MEASUREMENTS:   Liver length:  12.5 cm.   Common duct diameter:  0.5 cm.    LIVER: The liver is free of masses or biliary ductal enlargement    GALLBLADDER: No gallstones are seen.    ABDOMINAL AORTA AND IVC: The abdominal aorta is normally tapering the  inferior vena cava is patent    PANCREAS: Pancreas was partially obscured by bowel gas. The visualized  portions of the head and body appear normal.    Right KIDNEY: The right kidney is normal in size and is free of masses  or hydronephrosis.        Impression    IMPRESSION:     No gallstone seen    MAGDI KEN MD   UA with Microscopic reflex to Culture   Result Value Ref Range    Color Urine Yellow     Appearance Urine Cloudy     Glucose Urine Negative NEG^Negative mg/dL    Bilirubin Urine Negative NEG^Negative    Ketones Urine 40 (A) NEG^Negative mg/dL    Specific Gravity Urine 1.015 1.003 - 1.035    Blood Urine Negative NEG^Negative    pH Urine 7.5 4.7 - 8.0 pH    Protein Albumin Urine 10 (A) NEG^Negative mg/dL    Urobilinogen mg/dL Normal 0.0 - 2.0 mg/dL    Nitrite Urine Negative NEG^Negative    Leukocyte Esterase Urine Negative NEG^Negative    Source Midstream Urine     WBC Urine 0 0 - 5 /HPF    RBC Urine 0 0 - 2 /HPF    Bacteria Urine None (A) NEG^Negative /HPF    Mucous Urine Present (A) NEG^Negative /LPF    Amorphous Crystals Moderate (A) NEG^Negative /HPF   CT Abdomen Pelvis w Contrast    Narrative    EXAMINATION: CT ABDOMEN PELVIS W CONTRAST, 8/21/2018 1:05 PM    TECHNIQUE:  Helical CT images from the lung bases through the  symphysis pubis were obtained  with IV contrast. Contrast dose: ISOVUE  300  100ML    COMPARISON: none    HISTORY: acute mid abdominal pain with vomiting;     FINDINGS:    There is dependent atelectasis at the lung bases.    The liver is free of masses or  biliary ductal enlargement. No  calcified gallstones are seen.    The the spleen and pancreas appear normal.    The adrenal glands are normal.    The right and left kidneys are free of masses or hydronephrosis.    The periaortic lymph nodes are normal in caliber.    The sixth proximal jejunum is normal in caliber. There are markedly  dilated loops of ileum in the right side of the abdomen consistent  with a small bowel obstruction. Gas is seen in nondilated colonic  loops.    In the pelvis the bladder and rectum appear normal. Is free fluid seen  in the pelvic cul-de-sac    Degenerative changes are present in the lumbar spine      Impression    IMPRESSION: Small bowel obstruction in the right side of the abdomen  involving the ileum     MAGDI KEN MD       Assessments & Plan (with Medical Decision Making)   Mony had sudden onset of abd pain with associated vomiting ~6hours prior to coming in.  IV was placed for 2L NS bolus to compensate for vomiting/dehydration.  Toradol 15mg IV + regllan/benadryl for nausea, pepcid for acid control, and ultimately dilaudid 0.5mg IV x 2 for pain.  WBC of 11,300, unremarkable urinalysis.  Elevated lactate of 2.5 noted but does not appear septic.  US and double contrast abdpelvic CT were obtained and as noted above. Consulted with Catarino Colon, surgeon on call, who accepts for admission.    I have reviewed the nursing notes.    I have reviewed the findings, diagnosis, plan and need for follow up with the patient.    Final diagnoses:   Small bowel obstruction   Abdominal pain, generalized       8/21/2018   [facility]     West Sherman MD  08/21/18 [id number]

## 2018-08-22 NOTE — PROGRESS NOTES
Assessment completed see flowsheet.    LOC: alert ,conversational   Others present: Patient     Dx: SBO    Lives with: spouse Tarik  Living at:  Home  Transportation: YES  Mony and Tarik both drive.     Equipment used: None  Support System: Description of Support System: Supportive, Involved  Homecare//County Services:   None  : NO      [facility] Referral line called: NA    Primary PCP:Thiago Freire  Health Care Directive: YES on file in epic   Primary agent Tarik, second daughter Kizzy, third daughter Natasha    Pharmacy: [facility]  Meds and appointments management: YES No concerns with meds    Adequate Resources for needs (housing, utilities, food/med): YES  Household chores: Independent  Work/community/social activity: NO Mony stated she has never worked much. Independent with her social activities.    ADLs: Independent  Ambulation:Independent  Falls: No    Mental health: On medication for depression/anxiety . Mony indicates stable at this time.   Substance abuse: Non smoker, rarely uses alcohol, no illicit drug use.   Stressors: Denies    Able to Return to Prior Living Arrangements: YES      Goals: Return home    Barriers: None assessed    DOV: Low    Medicare IM letter reviewed with Mony on 8/22    Met with Mony in her room. Indicated she plans to discharge home. No needs expressed or assessed. Family to transport  on discharge. CTS will remain available if needed.

## 2018-08-22 NOTE — PROGRESS NOTES
States she had a good night, very few mild cramps, passed small amount of flatus, no stool, able to get some sleep  Temp 37.7, HR 75, /68  Good U/O  Abdomen - soft, minimally distended, mild tenderness RLQ, no masses  Lab - Hgb 12.9, WBC 10.1, lytes OK  X-ray - small amount of contrast in colon, still dilated loops of small bowel with a lot of contrast in stomach  A - small bowel obstruction with minimal improvement        Nothing to suggest that the bowel is in trouble  P - continue NPO and IV fluid        Hopefully will improve without surgery        Blood work and x-rays tomorrow    1559 Hrs  Has started having diarrhea, 3 to 4 episodes, no cramps, still feels a little bloated  Abdomen - soft, non-tender  P - start clear liquids

## 2018-08-22 NOTE — PLAN OF CARE
Problem: Bowel Obstruction (Adult)  Goal: Signs and Symptoms of Listed Potential Problems Will be Absent, Minimized or Managed (Bowel Obstruction)  Signs and symptoms of listed potential problems will be absent, minimized or managed by discharge/transition of care (reference Bowel Obstruction (Adult) CPG).   Outcome: No Change   08/22/18 0439   Bowel Obstruction   Problems Assessed (Bowel Obstruction) fluid volume deficit/hypovolemia;nausea and vomiting;situational response     Patient alert/oriented, c/o abdominal pain, prn IV dilaudid along with oral oxy/tylenol given with effect. T max 99.9 this shift. Having nausea prn IV zofran given with effect. IFV infusing. PRN ativan given for anxiety. Makes needs known pleasant and cooperative. Bowel sounds active passing flatus.

## 2018-08-22 NOTE — PLAN OF CARE
Face to face report given with opportunity to observe patient.  Report given to PREETI Chapman.    Alessio Smith  8/22/2018, 7:11 AM

## 2018-08-22 NOTE — PLAN OF CARE
"Problem: Patient Care Overview  Goal: Plan of Care/Patient Progress Review  Outcome: No Change  Vitals: VSS. /68 (BP Location: Right arm)  Pulse 70  Temp 99.9  F (37.7  C) (Tympanic)  Resp 14  Ht 1.626 m (5' 4\")  Wt 69.9 kg (154 lb 1.6 oz)  SpO2 97%  BMI 26.45 kg/m2    Pain: Denied    Orientation: A&O.    Cardiac: Reg    Lungs: Clear    ABD: LUQ active. Other 3 quadrants hypoactive. Has been having gas this shift. One watery stool. Denies N/V.      Urinating: WNL    Skin: WNL    IV fluids: NS at 125.     Antibiotics: None.     Mobility: Independent in room, steady on feet.    Safety: Call light in reach. Rounding done.    Comment:  in room. 2 walks this shift.     Face to face report given with opportunity to observe patient.    Report given to Socrates Fry   8/22/2018  3:55 PM                  "

## 2018-08-23 ENCOUNTER — APPOINTMENT (OUTPATIENT)
Dept: GENERAL RADIOLOGY | Facility: HOSPITAL | Age: 68
DRG: 390 | End: 2018-08-23
Attending: SURGERY
Payer: MEDICARE

## 2018-08-23 LAB
ANION GAP SERPL CALCULATED.3IONS-SCNC: 9 MMOL/L (ref 3–14)
BACTERIA SPEC CULT: NORMAL
BUN SERPL-MCNC: 10 MG/DL (ref 7–30)
CALCIUM SERPL-MCNC: 7.8 MG/DL (ref 8.5–10.1)
CHLORIDE SERPL-SCNC: 106 MMOL/L (ref 94–109)
CO2 SERPL-SCNC: 25 MMOL/L (ref 20–32)
CREAT SERPL-MCNC: 0.56 MG/DL (ref 0.52–1.04)
ERYTHROCYTE [DISTWIDTH] IN BLOOD BY AUTOMATED COUNT: 12.6 % (ref 10–15)
GFR SERPL CREATININE-BSD FRML MDRD: >90 ML/MIN/1.7M2
GLUCOSE SERPL-MCNC: 99 MG/DL (ref 70–99)
HCT VFR BLD AUTO: 34.9 % (ref 35–47)
HGB BLD-MCNC: 12.5 G/DL (ref 11.7–15.7)
MCH RBC QN AUTO: 31.6 PG (ref 26.5–33)
MCHC RBC AUTO-ENTMCNC: 35.8 G/DL (ref 31.5–36.5)
MCV RBC AUTO: 88 FL (ref 78–100)
PLATELET # BLD AUTO: 248 10E9/L (ref 150–450)
POTASSIUM SERPL-SCNC: 3.4 MMOL/L (ref 3.4–5.3)
RBC # BLD AUTO: 3.95 10E12/L (ref 3.8–5.2)
SODIUM SERPL-SCNC: 140 MMOL/L (ref 133–144)
SPECIMEN SOURCE: NORMAL
WBC # BLD AUTO: 10.6 10E9/L (ref 4–11)

## 2018-08-23 PROCEDURE — 36415 COLL VENOUS BLD VENIPUNCTURE: CPT | Performed by: SURGERY

## 2018-08-23 PROCEDURE — 12000000 ZZH R&B MED SURG/OB

## 2018-08-23 PROCEDURE — 74019 RADEX ABDOMEN 2 VIEWS: CPT | Mod: TC

## 2018-08-23 PROCEDURE — 80048 BASIC METABOLIC PNL TOTAL CA: CPT | Performed by: SURGERY

## 2018-08-23 PROCEDURE — 85027 COMPLETE CBC AUTOMATED: CPT | Performed by: SURGERY

## 2018-08-23 PROCEDURE — 25000128 H RX IP 250 OP 636: Performed by: SURGERY

## 2018-08-23 PROCEDURE — A9270 NON-COVERED ITEM OR SERVICE: HCPCS | Mod: GY | Performed by: SURGERY

## 2018-08-23 PROCEDURE — 99231 SBSQ HOSP IP/OBS SF/LOW 25: CPT | Performed by: SURGERY

## 2018-08-23 PROCEDURE — 25000132 ZZH RX MED GY IP 250 OP 250 PS 637: Mod: GY | Performed by: SURGERY

## 2018-08-23 RX ORDER — SODIUM CHLORIDE 9 MG/ML
1000 INJECTION, SOLUTION INTRAVENOUS CONTINUOUS
Status: DISCONTINUED | OUTPATIENT
Start: 2018-08-23 | End: 2018-08-24 | Stop reason: HOSPADM

## 2018-08-23 RX ADMIN — LEVOTHYROXINE SODIUM 50 MCG: 50 TABLET ORAL at 07:53

## 2018-08-23 RX ADMIN — SODIUM CHLORIDE, PRESERVATIVE FREE 1000 ML: 5 INJECTION INTRAVENOUS at 19:29

## 2018-08-23 RX ADMIN — FLUTICASONE PROPIONATE 1 SPRAY: 50 SPRAY, METERED NASAL at 07:53

## 2018-08-23 RX ADMIN — TRAZODONE HYDROCHLORIDE 100 MG: 50 TABLET ORAL at 21:56

## 2018-08-23 RX ADMIN — VENLAFAXINE HYDROCHLORIDE 75 MG: 75 CAPSULE, EXTENDED RELEASE ORAL at 07:53

## 2018-08-23 ASSESSMENT — ACTIVITIES OF DAILY LIVING (ADL)
ADLS_ACUITY_SCORE: 10

## 2018-08-23 ASSESSMENT — PAIN DESCRIPTION - DESCRIPTORS: DESCRIPTORS: PRESSURE

## 2018-08-23 NOTE — PLAN OF CARE
Problem: Patient Care Overview  Goal: Plan of Care/Patient Progress Review  Outcome: Improving  A&O. VSS T 99.5  RA 97% Denies pain/nausea. Advanced to clear liquids, tolerates well but taking sparingly. 1 small stool partially loose but with some soft mushy content. Ambulates liberally with spouse.     Face to face report given with opportunity to observe patient.    Report given to Kiera Reynolds   8/22/2018  11:21 PM

## 2018-08-23 NOTE — PLAN OF CARE
Problem: Bowel Obstruction (Adult)  Goal: Signs and Symptoms of Listed Potential Problems Will be Absent, Minimized or Managed (Bowel Obstruction)  Signs and symptoms of listed potential problems will be absent, minimized or managed by discharge/transition of care (reference Bowel Obstruction (Adult) CPG).   Outcome: No Change  Alert and oriented. VSS. Call light use appropriate. Small formed, soft brown stools initially on this shift. Around 1000- stool has become more soft, occasionally watery and maroon in color. From [id number] pt had 2 incontinent stool and 3 medium stool into the commode. /67 HR 79. Denies nausea. C/o abdominal discomfort. Denies pain medications. IV- NS at 75ml/hr. Call light use appropriate. Independent in room with use of commode and bathroom.     Face to face report given with opportunity to observe patient.    Report given to PREETI Messina   8/23/2018  3:21 PM

## 2018-08-23 NOTE — PROGRESS NOTES
Checked in with Mony.  Denies questions or concerns at this time.  CTS will continue to remain available for support and resources.

## 2018-08-23 NOTE — PROGRESS NOTES
Continues to have BM's, now more formed, still feels bloated, no nausea but does feel heavy after taking fluids  Temp 37.9, HR 82, /68  Abdomen - still slight tenderness RLQ, bloated, no masses  Lab - Hgb 12.5, WBC 10.6, Lytes OK  X-ray - not done yet  A - seems better but still bloated  P - increase diet if x-rays OK    1000 Hrs  X-rays show distended loops of small bowel but contrast has moved into colon with none in small bowel  Continues to have small BM's  Will leave on clear liquids for now.  Hopefully will continue to improve.

## 2018-08-23 NOTE — PLAN OF CARE
Problem: Patient Care Overview  Goal: Plan of Care/Patient Progress Review  Outcome: No Change  Alert and oriented. VSS. Denies pain. No stool this shift. Pt unsure if passing flatus while sleeping. Bowels- hypoactive. Lungs clear. Independent in room. No fluids consumed on nights per cups in room. IV- NS at 75ml/hr. Call light use appropriate.     Problem: Bowel Obstruction (Adult)  Goal: Signs and Symptoms of Listed Potential Problems Will be Absent, Minimized or Managed (Bowel Obstruction)  Signs and symptoms of listed potential problems will be absent, minimized or managed by discharge/transition of care (reference Bowel Obstruction (Adult) CPG).   Outcome: No Change   08/23/18 0000   Bowel Obstruction   Problems Assessed (Bowel Obstruction) all   Problems Present (Bowel Obstruction) none

## 2018-08-24 ENCOUNTER — APPOINTMENT (OUTPATIENT)
Dept: GENERAL RADIOLOGY | Facility: HOSPITAL | Age: 68
DRG: 390 | End: 2018-08-24
Attending: SURGERY
Payer: MEDICARE

## 2018-08-24 VITALS
RESPIRATION RATE: 18 BRPM | SYSTOLIC BLOOD PRESSURE: 146 MMHG | DIASTOLIC BLOOD PRESSURE: 84 MMHG | OXYGEN SATURATION: 98 % | HEART RATE: 70 BPM | HEIGHT: 64 IN | TEMPERATURE: 98.3 F | WEIGHT: 154.1 LBS | BODY MASS INDEX: 26.31 KG/M2

## 2018-08-24 LAB
ANION GAP SERPL CALCULATED.3IONS-SCNC: 8 MMOL/L (ref 3–14)
BUN SERPL-MCNC: 8 MG/DL (ref 7–30)
CALCIUM SERPL-MCNC: 7.8 MG/DL (ref 8.5–10.1)
CHLORIDE SERPL-SCNC: 109 MMOL/L (ref 94–109)
CO2 SERPL-SCNC: 25 MMOL/L (ref 20–32)
CREAT SERPL-MCNC: 0.65 MG/DL (ref 0.52–1.04)
ERYTHROCYTE [DISTWIDTH] IN BLOOD BY AUTOMATED COUNT: 12.5 % (ref 10–15)
GFR SERPL CREATININE-BSD FRML MDRD: >90 ML/MIN/1.7M2
GLUCOSE SERPL-MCNC: 91 MG/DL (ref 70–99)
HCT VFR BLD AUTO: 32.1 % (ref 35–47)
HGB BLD-MCNC: 11.2 G/DL (ref 11.7–15.7)
MCH RBC QN AUTO: 31.1 PG (ref 26.5–33)
MCHC RBC AUTO-ENTMCNC: 34.9 G/DL (ref 31.5–36.5)
MCV RBC AUTO: 89 FL (ref 78–100)
PLATELET # BLD AUTO: 202 10E9/L (ref 150–450)
POTASSIUM SERPL-SCNC: 3.1 MMOL/L (ref 3.4–5.3)
RBC # BLD AUTO: 3.6 10E12/L (ref 3.8–5.2)
SODIUM SERPL-SCNC: 142 MMOL/L (ref 133–144)
WBC # BLD AUTO: 6 10E9/L (ref 4–11)

## 2018-08-24 PROCEDURE — 74019 RADEX ABDOMEN 2 VIEWS: CPT | Mod: TC

## 2018-08-24 PROCEDURE — 80048 BASIC METABOLIC PNL TOTAL CA: CPT | Performed by: SURGERY

## 2018-08-24 PROCEDURE — 36415 COLL VENOUS BLD VENIPUNCTURE: CPT | Performed by: SURGERY

## 2018-08-24 PROCEDURE — 99238 HOSP IP/OBS DSCHRG MGMT 30/<: CPT | Performed by: SURGERY

## 2018-08-24 PROCEDURE — 85027 COMPLETE CBC AUTOMATED: CPT | Performed by: SURGERY

## 2018-08-24 PROCEDURE — A9270 NON-COVERED ITEM OR SERVICE: HCPCS | Mod: GY | Performed by: SURGERY

## 2018-08-24 PROCEDURE — 25000128 H RX IP 250 OP 636: Performed by: SURGERY

## 2018-08-24 PROCEDURE — 25000132 ZZH RX MED GY IP 250 OP 250 PS 637: Mod: GY | Performed by: SURGERY

## 2018-08-24 RX ADMIN — FLUTICASONE PROPIONATE 1 SPRAY: 50 SPRAY, METERED NASAL at 08:17

## 2018-08-24 RX ADMIN — SODIUM CHLORIDE, PRESERVATIVE FREE 1000 ML: 5 INJECTION INTRAVENOUS at 05:34

## 2018-08-24 RX ADMIN — LEVOTHYROXINE SODIUM 50 MCG: 50 TABLET ORAL at 08:15

## 2018-08-24 RX ADMIN — VENLAFAXINE HYDROCHLORIDE 75 MG: 75 CAPSULE, EXTENDED RELEASE ORAL at 08:15

## 2018-08-24 ASSESSMENT — ACTIVITIES OF DAILY LIVING (ADL)
ADLS_ACUITY_SCORE: 10

## 2018-08-24 NOTE — PROGRESS NOTES
HARIKA TYLER  Patient visit during  rounds.  Patient ready to be discharged.  No spiritual care requests.

## 2018-08-24 NOTE — PLAN OF CARE
Face to face report given with opportunity to observe patient.    Report given to Clarissa Thomas RN  8/24/2018  3:10 PM

## 2018-08-24 NOTE — DISCHARGE SUMMARY
[facility]    Discharge Summary  General Surgery    Date of Admission:  8/21/2018  Date of Discharge:  8/24/2018  Discharging Provider: Rakan Robles  Date of Service (when I saw the patient): 08/24/18    Discharge Diagnoses   Active Problems:    Small bowel obstruction due to adhesions      Procedure/Surgery Information   Procedure: * No surgery found *   Surgeon(s): * Surgery not found *   Specimens: * Cannot find log *   Non-operative procedures None performed     History of Present Illness   Mony Szymanski is a 68 year old female who presented with nausea vomiting and abdominal pain.     Hospital Course   Mony Szymanski was admitted on 8/21/2018.  The following problems were addressed during her hospitalization:  Active Problems:    Small bowel obstruction due to adhesions  She underwent gastrograffin challenge with movement into colon and resumption of bowel function. Though xray on day of discharge did show some dilated loops of small bowel. Though she tolerated a regular diet all day without nausea vomiting or abdominal pain. I Discussed this with the patient and made it clear that if she has trouble when she goes home will need to come back and will likely need some sort of operative intervention. She is in agreement with this plan.     # Discharge Pain Plan:   - Patient currently has NO PAIN and is not being prescribed pain medications on discharge.        Medications discontinued or adjusted during this hospitalization: No change     Antibiotics prescribed at discharge: None prescribed     Imaging study follow up needs:   -None performed        Rakan Robles    Discharge Disposition   Discharged to home   Condition at discharge: Stable    Pending Results   Final pathology results: No pathology submitted  These results will be followed up by   Unresulted Labs Ordered in the Past 30 Days of this Admission     No orders found from 6/22/2018 to 8/22/2018.          Primary Care Physician   Kobe RENTERIA  Thiago        Consultations This Hospital Stay   None    Time Spent on this Encounter   I have spent less than 30 minutes on this discharge.    Discharge Orders     Reason for your hospital stay   You had a small bowel obstruction     Follow-up and recommended labs and tests    Follow up with primary care provider, Kobe Das, within 7 days for hospital follow- up.  No follow up labs or test are needed.     Activity   Your activity upon discharge: activity as tolerated     When to contact your care team   Intractable nausea or vomiting     Diet   Follow this diet upon discharge: Orders Placed This Encounter     Regular Diet Adult       Discharge Medications   Current Discharge Medication List      CONTINUE these medications which have NOT CHANGED    Details   5-Hydroxytryptophan (5-HTP) 50 MG CAPS Take by mouth daily.       Calcium Carbonate (TUMS 500 OR) Take by mouth 2 times daily      Cholecalciferol (VITAMIN D) 1000 UNITS capsule Take 1 capsule by mouth daily.      fish oil-omega-3 fatty acids (FISH OIL) 1000 MG capsule Take 2 capsules by mouth daily.      fluticasone (FLONASE) 50 MCG/ACT spray INSTILL 2 SPRAYS IN EACH NOSTRIL EVERY DAY  Qty: 3 g, Refills: 3    Associated Diagnoses: Chronic seasonal allergic rhinitis due to pollen      glucosamine 500 MG CAPS Take by mouth daily       levothyroxine (SYNTHROID/LEVOTHROID) 50 MCG tablet Take 1 tablet (50 mcg) by mouth daily  Qty: 90 tablet, Refills: 3    Associated Diagnoses: Acquired hypothyroidism      MAGNESIUM OXIDE PO Take 200 mg by mouth daily      Multiple vitamin TABS Take 1 tablet by oral route every day with food      oxazepam (SERAX) 15 MG capsule TAKE ONE CAPSULE BY MOUTH THREE TIMES DAILY  Qty: 270 capsule, Refills: 3    Associated Diagnoses: ED (generalized anxiety disorder)      traZODone (DESYREL) 50 MG tablet TAKE 1 TO 3 TABLETS BY MOUTH AT BEDTIME  Qty: 270 tablet, Refills: 3    Associated Diagnoses: ED (generalized anxiety disorder)       venlafaxine (EFFEXOR-XR) 75 MG 24 hr capsule TAKE 1 CAPSULE BY MOUTH DAILY WITH FOOD  Qty: 90 capsule, Refills: 3    Associated Diagnoses: ED (generalized anxiety disorder)           Allergies   Allergies   Allergen Reactions     Codeine Nausea and Vomiting     Data   Most Recent 3 CBC's:  Recent Labs   Lab Test  08/24/18   0509  08/23/18   0512  08/22/18   0512   WBC  6.0  10.6  10.1   HGB  11.2*  12.5  12.9   MCV  89  88  89   PLT  202  248  256      Most Recent 3 BMP's:  Recent Labs   Lab Test  08/24/18   0509  08/23/18   0512  08/22/18   0512   NA  142  140  140   POTASSIUM  3.1*  3.4  3.9   CHLORIDE  109  106  110*   CO2  25  25  22   BUN  8  10  18   CR  0.65  0.56  0.63   ANIONGAP  8  9  8   AHMET  7.8*  7.8*  7.9*   GLC  91  99  115*     Most Recent 2 LFT's:  Recent Labs   Lab Test  08/21/18   1002  05/16/18   1417   AST  19  26   ALT  32  41   ALKPHOS  85  119   BILITOTAL  0.3  0.1*     Most Recent INR's and Anticoagulation Dosing History:  Anticoagulation Dose History     There is no flowsheet data to display.        Most Recent 3 Troponin's:  Recent Labs   Lab Test  08/21/18   1002   TROPI  <0.015     Most Recent Cholesterol Panel:  Recent Labs   Lab Test  05/12/16   0736   CHOL  229*   LDL  141*   HDL  61   TRIG  134     Most Recent 6 Bacteria Isolates From Any Culture (See EPIC Reports for Culture Details):  Recent Labs   Lab Test  08/21/18   1516   CULT  No MRSA isolated     Most Recent TSH, T4 and A1c Labs:  Recent Labs   Lab Test  05/16/18   1417   TSH  0.89

## 2018-08-24 NOTE — PROGRESS NOTES
Name: Mony Szymanski    MRN#: [id number]    Reason for Hospitalization: Abdominal pain, generalized [R10.84]  Small bowel obstruction [K56.609]    DOV: low    Discharge Date: 8/24/2018    Medicare IM letter reviewed with her last on 8/24    Follow-Up Appt: Future Appointments  Date Time Provider Department Center   8/30/2018 10:45 AM Kobe Das MD [facility] [place]       Other Providers (Care Coordinator, County Services, PCA services etc): No    Discharge Disposition: home       Briseyda Son RN

## 2018-08-24 NOTE — PLAN OF CARE
Problem: Patient Care Overview  Goal: Plan of Care/Patient Progress Review  Outcome: Improving  Patient has been able to tolerate regular diet, patient denies cramping, pain nausea or vomiting, patient is alert making her needs known adequately, patient ambulated to the shower and showered independently, patient has not had bloody stools, bowel sounds are audible, patient anticipates discharge this afternoon.

## 2018-08-24 NOTE — PROGRESS NOTES
Met briefly with Mony. States she is feeling better and will DC today if she tolerates her lunch. No questions or concerns.

## 2018-08-24 NOTE — PROGRESS NOTES
Feels better this morning, tolerating full liquids, diarrhea has settled, small BM this morning, feels less bloated.  Afebrile, Vitals stable  Abdomen - soft, less tender and bloated  Lab - Hgb 11.2, WBC 6.0  X-ray - still dilated loops of small bowel, contrast in colon  A - still worrisome for SBO  P - try regular diet, if tolerates home anf if not will likely need surgery        Dr Robles to follow

## 2018-08-24 NOTE — PLAN OF CARE
"Problem: Patient Care Overview  Goal: Plan of Care/Patient Progress Review  Outcome: No Change  Reason for hospital stay:  SBO    Most recent vitals: /66 (BP Location: Right arm)  Pulse 70  Temp 99.1  F (37.3  C) (Tympanic)  Resp 18  Ht 1.626 m (5' 4\")  Wt 69.9 kg (154 lb 1.6 oz)  SpO2 97%  BMI 26.45 kg/m2  Pain Management:  Denies pain  Orientation:  A&O, slept most of night  Cardiac:  HR reg  Respiratory:  LS clear. Denies sob. NO use of O2  GI:  BS active. Denies nausea. Incontinent x1 of brown soft smear of BM.   :  Voids without difficulty  Diet: Full liquid. Tolerating well. Reports feeling hungry this am  Skin Issues:  Intact  IVF:  NS at 50 mL/hr  ABX:  None  Mobility:  Ind in room, steady on feet  Safety:  Call light within reach.    Comments: Morning Potassium 3.1    8/24/2018  5:37 AM  Marcia Zurita    Face to face report given with opportunity to observe patient.    Report given to PREETI eLe   8/24/2018  7:34 AM      "

## 2018-08-24 NOTE — PLAN OF CARE
Patient discharged at 3:42 PM via ambulation accompanied by spouse and staff. Prescriptions - None ordered for discharge. All belongings sent with patient.     Discharge instructions reviewed with patient. Listed belongings gathered and returned to patient. Clothing, cell phone.     Patient discharged to home.   Report called to n/a    Core Measures and Vaccines  Core Measures applicable during stay: No. If yes, state diagnosis: n/a  Pneumonia and Influenza given prior to discharge, if indicated: N/A    Surgical Patient   Surgical Procedures during stay: none  Did patient receive discharge instruction on wound care and recognition of infection symptoms? N/A    MISC  Follow up appointment made:  Yes  Home and hospital aquired medications returned to patient: N/A  Patient reports pain was well managed at discharge: Yes

## 2018-08-24 NOTE — PLAN OF CARE
Problem: Patient Care Overview  Goal: Plan of Care/Patient Progress Review  Outcome: Improving  A&O VSS Temp 99.6 RA 97%. Denies pain/nausea. Advanced to full liquids; tolerating well. One loose BM this shift. Up ad scott in room & ambulating. Trazadone for HS.     Problem: Bowel Obstruction (Adult)  Goal: Signs and Symptoms of Listed Potential Problems Will be Absent, Minimized or Managed (Bowel Obstruction)  Signs and symptoms of listed potential problems will be absent, minimized or managed by discharge/transition of care (reference Bowel Obstruction (Adult) CPG).   Outcome: Improving   08/23/18 2115   Bowel Obstruction   Problems Assessed (Bowel Obstruction) all   Problems Present (Bowel Obstruction) none     Face to face report given with opportunity to observe patient.    Report given to Marcia Reynolds   8/23/2018  11:35 PM

## 2018-08-30 ENCOUNTER — OFFICE VISIT (OUTPATIENT)
Dept: FAMILY MEDICINE | Facility: OTHER | Age: 68
End: 2018-08-30
Attending: FAMILY MEDICINE
Payer: MEDICARE

## 2018-08-30 VITALS
HEART RATE: 85 BPM | HEIGHT: 64 IN | DIASTOLIC BLOOD PRESSURE: 70 MMHG | TEMPERATURE: 99 F | BODY MASS INDEX: 24.75 KG/M2 | SYSTOLIC BLOOD PRESSURE: 110 MMHG | WEIGHT: 145 LBS | OXYGEN SATURATION: 97 %

## 2018-08-30 DIAGNOSIS — D64.9 ANEMIA, UNSPECIFIED TYPE: ICD-10-CM

## 2018-08-30 DIAGNOSIS — E87.6 HYPOKALEMIA: ICD-10-CM

## 2018-08-30 DIAGNOSIS — K56.50 SMALL BOWEL OBSTRUCTION DUE TO ADHESIONS (H): Primary | ICD-10-CM

## 2018-08-30 LAB
ANION GAP SERPL CALCULATED.3IONS-SCNC: 7 MMOL/L (ref 3–14)
BASOPHILS # BLD AUTO: 0 10E9/L (ref 0–0.2)
BASOPHILS NFR BLD AUTO: 0.6 %
BUN SERPL-MCNC: 17 MG/DL (ref 7–30)
CALCIUM SERPL-MCNC: 9.3 MG/DL (ref 8.5–10.1)
CHLORIDE SERPL-SCNC: 103 MMOL/L (ref 94–109)
CO2 SERPL-SCNC: 30 MMOL/L (ref 20–32)
CREAT SERPL-MCNC: 0.78 MG/DL (ref 0.52–1.04)
DIFFERENTIAL METHOD BLD: NORMAL
EOSINOPHIL # BLD AUTO: 0.2 10E9/L (ref 0–0.7)
EOSINOPHIL NFR BLD AUTO: 3.8 %
ERYTHROCYTE [DISTWIDTH] IN BLOOD BY AUTOMATED COUNT: 12.3 % (ref 10–15)
GFR SERPL CREATININE-BSD FRML MDRD: 74 ML/MIN/1.7M2
GLUCOSE SERPL-MCNC: 67 MG/DL (ref 70–99)
HCT VFR BLD AUTO: 38.5 % (ref 35–47)
HGB BLD-MCNC: 13.1 G/DL (ref 11.7–15.7)
IMM GRANULOCYTES # BLD: 0 10E9/L (ref 0–0.4)
IMM GRANULOCYTES NFR BLD: 0.2 %
LYMPHOCYTES # BLD AUTO: 1.8 10E9/L (ref 0.8–5.3)
LYMPHOCYTES NFR BLD AUTO: 35.3 %
MCH RBC QN AUTO: 30.3 PG (ref 26.5–33)
MCHC RBC AUTO-ENTMCNC: 34 G/DL (ref 31.5–36.5)
MCV RBC AUTO: 89 FL (ref 78–100)
MONOCYTES # BLD AUTO: 0.4 10E9/L (ref 0–1.3)
MONOCYTES NFR BLD AUTO: 7.8 %
NEUTROPHILS # BLD AUTO: 2.6 10E9/L (ref 1.6–8.3)
NEUTROPHILS NFR BLD AUTO: 52.3 %
NRBC # BLD AUTO: 0 10*3/UL
NRBC BLD AUTO-RTO: 0 /100
PLATELET # BLD AUTO: 361 10E9/L (ref 150–450)
POTASSIUM SERPL-SCNC: 3.8 MMOL/L (ref 3.4–5.3)
RBC # BLD AUTO: 4.33 10E12/L (ref 3.8–5.2)
SODIUM SERPL-SCNC: 140 MMOL/L (ref 133–144)
WBC # BLD AUTO: 5 10E9/L (ref 4–11)

## 2018-08-30 PROCEDURE — 36415 COLL VENOUS BLD VENIPUNCTURE: CPT | Mod: ZL | Performed by: FAMILY MEDICINE

## 2018-08-30 PROCEDURE — 99213 OFFICE O/P EST LOW 20 MIN: CPT | Performed by: FAMILY MEDICINE

## 2018-08-30 PROCEDURE — 85025 COMPLETE CBC W/AUTO DIFF WBC: CPT | Mod: ZL | Performed by: FAMILY MEDICINE

## 2018-08-30 PROCEDURE — G0463 HOSPITAL OUTPT CLINIC VISIT: HCPCS

## 2018-08-30 PROCEDURE — 80048 BASIC METABOLIC PNL TOTAL CA: CPT | Mod: ZL | Performed by: FAMILY MEDICINE

## 2018-08-30 ASSESSMENT — PAIN SCALES - GENERAL: PAINLEVEL: NO PAIN (0)

## 2018-08-30 NOTE — MR AVS SNAPSHOT
"              After Visit Summary   8/30/2018    Mony Szymanski    MRN: [id number]           Patient Information     Date Of Birth          1950        Visit Information        Provider Department      8/30/2018 1:30 PM Kobe Das MD [facility]        Today's Diagnoses     Small bowel obstruction due to adhesions    -  1    Hypokalemia        Anemia, unspecified type           Follow-ups after your visit        Who to contact     If you have questions or need follow up information about today's clinic visit or your schedule please contact [facility] directly at [phone].  Normal or non-critical lab and imaging results will be communicated to you by [facility], letter or phone within 4 business days after the clinic has received the results. If you do not hear from us within 7 days, please contact the clinic through [employer] [facility] [employer] or phone. If you have a critical or abnormal lab result, we will notify you by phone as soon as possible.  Submit refill requests through [employer] or call your pharmacy and they will forward the refill request to us. Please allow 3 business days for your refill to be completed.          Additional Information About Your Visit        MyChart Information     [employer] gives you secure access to your electronic health record. If you see a primary care provider, you can also send messages to your care team and make appointments. If you have questions, please call your primary care clinic.  If you do not have a primary care provider, please call [phone] and they will assist you.        Care EveryWhere ID     This is your Care EveryWhere ID. This could be used by other organizations to access your [facility] medical records  [id number]        Your Vitals Were     Pulse Temperature Height Pulse Oximetry BMI (Body Mass Index)       85 99  F (37.2  C) 5' 4\" (1.626 m) 97% 24.89 kg/m2        Blood Pressure from Last 3 Encounters:   08/30/18 110/70   08/24/18 146/84 "   05/16/18 120/72    Weight from Last 3 Encounters:   08/30/18 145 lb (65.8 kg)   08/21/18 154 lb 1.6 oz (69.9 kg)   05/16/18 150 lb (68 kg)              We Performed the Following     Basic metabolic panel     CBC with platelets differential        Primary Care Provider Office Phone # Fax #    Kobe Das [phone] [address] [phone]        Equal Access to Services     PARKER HANNA : Hadii aad ku hadasho Soomaali, waaxda luqadaha, qaybta kaalmada adeegyada, waxay idiin hayaan adeeg ciprianoaragael lanacho . So [facility] [phone].    ATENCIÓN: Si habla español, tiene a martin disposición servicios gratuitos de asistencia lingüística. [facility] [phone].    We comply with applicable federal civil rights laws and [place] laws. We do not discriminate on the basis of race, color, national origin, age, disability, sex, sexual orientation, or gender identity.            Thank you!     Thank you for choosing [facility]  for your care. Our goal is always to provide you with excellent care. Hearing back from our patients is one way we can continue to improve our services. Please take a few minutes to complete the written survey that you may receive in the mail after your visit with us. Thank you!             Your Updated Medication List - Protect others around you: Learn how to safely use, store and throw away your medicines at www.disposemymeds.org.          This list is accurate as of 8/30/18  2:16 PM.  Always use your most recent med list.                   Brand Name Dispense Instructions for use Diagnosis    5-HTP 50 MG Caps      Take by mouth daily.        fish oil-omega-3 fatty acids 1000 MG capsule      Take 2 capsules by mouth daily.        fluticasone 50 MCG/ACT spray    FLONASE    3 g    INSTILL 2 SPRAYS IN EACH NOSTRIL EVERY DAY    Chronic seasonal allergic rhinitis due to pollen       glucosamine 500 MG Caps      Take by mouth daily        levothyroxine 50  MCG tablet    SYNTHROID/LEVOTHROID    90 tablet    Take 1 tablet (50 mcg) by mouth daily    Acquired hypothyroidism       MAGNESIUM OXIDE PO      Take 200 mg by mouth daily        Multiple vitamin Tabs      Take 1 tablet by oral route every day with food        oxazepam 15 MG capsule    SERAX    270 capsule    TAKE ONE CAPSULE BY MOUTH THREE TIMES DAILY    ED (generalized anxiety disorder)       traZODone 50 MG tablet    DESYREL    270 tablet    TAKE 1 TO 3 TABLETS BY MOUTH AT BEDTIME    ED (generalized anxiety disorder)       TUMS 500 OR      Take by mouth 2 times daily        venlafaxine 75 MG 24 hr capsule    EFFEXOR-XR    90 capsule    TAKE 1 CAPSULE BY MOUTH DAILY WITH FOOD    ED (generalized anxiety disorder)       vitamin D 1000 units capsule      Take 1 capsule by mouth daily.

## 2018-08-30 NOTE — PROGRESS NOTES
"  SUBJECTIVE:   Mony Szymanski is a 68 year old female who presents to clinic today for the following health issues:          Hospital Follow-up Visit:    Hospital/Nursing Home/IP Rehab Facility: [facility]  Date of Admission: 8/21/18  Date of Discharge: 8/24/18  Reason(s) for Admission: small bowel obstruction            Problems taking medications regularly:  None       Medication changes since discharge: None       Problems adhering to non-medication therapy:  None    Summary of hospitalization:  [facility] discharge summary reviewed  Diagnostic Tests/Treatments reviewed.  Follow up needed: none  Other Healthcare Providers Involved in Patient s Care:         None  Update since discharge: improved.     Post Discharge Medication Reconciliation: discharge medications reconciled, continue medications without change.  Plan of care communicated with patient     Coding guidelines for this visit:  Type of Medical   Decision Making Face-to-Face Visit       within 7 Days of discharge Face-to-Face Visit        within 14 days of discharge   Moderate Complexity [id number]   High Complexity [id number]                  Problem list and histories reviewed & adjusted, as indicated.  Additional history: as documented    Labs reviewed in EPIC    Reviewed and updated as needed this visit by clinical staff       Reviewed and updated as needed this visit by Provider         ROS:  CONSTITUTIONAL: NEGATIVE for fever, chills, change in weight  ENT/MOUTH: NEGATIVE for ear, mouth and throat problems  RESP: NEGATIVE for significant cough or SOB  CV: NEGATIVE for chest pain, palpitations or peripheral edema  ROS otherwise negative    OBJECTIVE:                                                    /70  Pulse 85  Temp 99  F (37.2  C)  Ht 5' 4\" (1.626 m)  Wt 145 lb (65.8 kg)  SpO2 97%  BMI 24.89 kg/m2  Body mass index is 24.89 kg/(m^2).   GENERAL: healthy, alert, well nourished, well hydrated, no distress  NECK: no " tenderness, no adenopathy, no asymmetry, no masses, no stiffness; thyroid- normal to palpation  RESP: lungs clear to auscultation - no rales, no rhonchi, no wheezes  CV: regular rates and rhythm, normal S1 S2, no S3 or S4 and no murmur, no click or rub -  ABDOMEN: soft, no tenderness, no  hepatosplenomegaly, no masses, normal bowel sounds    Results for orders placed or performed in visit on 08/30/18 (from the past 24 hour(s))   CBC with platelets differential   Result Value Ref Range    WBC 5.0 4.0 - 11.0 10e9/L    RBC Count 4.33 3.8 - 5.2 10e12/L    Hemoglobin 13.1 11.7 - 15.7 g/dL    Hematocrit 38.5 35.0 - 47.0 %    MCV 89 78 - 100 fl    MCH 30.3 26.5 - 33.0 pg    MCHC 34.0 31.5 - 36.5 g/dL    RDW 12.3 10.0 - 15.0 %    Platelet Count 361 150 - 450 10e9/L    Diff Method Automated Method     % Neutrophils 52.3 %    % Lymphocytes 35.3 %    % Monocytes 7.8 %    % Eosinophils 3.8 %    % Basophils 0.6 %    % Immature Granulocytes 0.2 %    Nucleated RBCs 0 0 /100    Absolute Neutrophil 2.6 1.6 - 8.3 10e9/L    Absolute Lymphocytes 1.8 0.8 - 5.3 10e9/L    Absolute Monocytes 0.4 0.0 - 1.3 10e9/L    Absolute Eosinophils 0.2 0.0 - 0.7 10e9/L    Absolute Basophils 0.0 0.0 - 0.2 10e9/L    Abs Immature Granulocytes 0.0 0 - 0.4 10e9/L    Absolute Nucleated RBC 0.0    Basic metabolic panel   Result Value Ref Range    Sodium 140 133 - 144 mmol/L    Potassium 3.8 3.4 - 5.3 mmol/L    Chloride 103 94 - 109 mmol/L    Carbon Dioxide 30 20 - 32 mmol/L    Anion Gap 7 3 - 14 mmol/L    Glucose 67 (L) 70 - 99 mg/dL    Urea Nitrogen 17 7 - 30 mg/dL    Creatinine 0.78 0.52 - 1.04 mg/dL    GFR Estimate 74 >60 mL/min/1.7m2    GFR Estimate If Black 89 >60 mL/min/1.7m2    Calcium 9.3 8.5 - 10.1 mg/dL          ASSESSMENT/PLAN:                                                    1. Small bowel obstruction due to adhesions  Resolved - some upset stomach still. Discussed - Symptomatic treatment was discussed along when patient should call and/or come  back into the clinic or go to ER/Urgent care. All questions answered.   - CBC with platelets differential; Future  - Basic metabolic panel; Future  - CBC with platelets differential  - Basic metabolic panel    2. Hypokalemia  Resolved   - CBC with platelets differential; Future  - Basic metabolic panel; Future  - CBC with platelets differential  - Basic metabolic panel    3. Anemia, unspecified type  Resolved.   - CBC with platelets differential; Future  - Basic metabolic panel; Future  - CBC with platelets differential  - Basic metabolic panel          Kobe Das MD  [facility]

## 2018-08-30 NOTE — NURSING NOTE
"Chief Complaint   Patient presents with     Hospital F/U       Initial /70  Pulse 85  Temp 99  F (37.2  C)  Ht 5' 4\" (1.626 m)  Wt 145 lb (65.8 kg)  SpO2 97%  BMI 24.89 kg/m2 Estimated body mass index is 24.89 kg/(m^2) as calculated from the following:    Height as of this encounter: 5' 4\" (1.626 m).    Weight as of this encounter: 145 lb (65.8 kg).  Medication Reconciliation: complete    Braeden Grajeda LPN  "

## 2018-08-31 ENCOUNTER — TELEPHONE (OUTPATIENT)
Dept: CASE MANAGEMENT | Facility: HOSPITAL | Age: 68
End: 2018-08-31

## 2018-08-31 NOTE — TELEPHONE ENCOUNTER
Mony Szymanski, was discharged to home on 8/24/18   from [facility]. I spoke today with her regarding her discharge.   She indicates she has receive(d) sufficient information upon discharge. Medications were reviewed in full on discharge, including:  medications to be continued from preadmission and any side effects. Prescriptions  - None received at discharge.  Medications are being taken as prescribed.   She indicates she saw her PCP for follow up yesterday 8/30.  She did not have any questions regarding discharge instructions or condition.  Per their request, the following employee (s) can be recognized for their outstanding services delivered:  Everyone did a good job with her care.  Suggestions to improve service: Nothing  indicated.  She was informed she  may receive a survey in the mail from the hospital. Asked if she  would kindly complete the survey in order for [facility] to know if services fully met patient needs.

## 2018-10-02 ENCOUNTER — RADIANT APPOINTMENT (OUTPATIENT)
Dept: MAMMOGRAPHY | Facility: OTHER | Age: 68
End: 2018-10-02
Attending: FAMILY MEDICINE
Payer: MEDICARE

## 2018-10-02 DIAGNOSIS — Z12.31 VISIT FOR SCREENING MAMMOGRAM: ICD-10-CM

## 2018-10-02 PROCEDURE — 77063 BREAST TOMOSYNTHESIS BI: CPT | Mod: TC

## 2018-12-17 DIAGNOSIS — F41.1 GAD (GENERALIZED ANXIETY DISORDER): ICD-10-CM

## 2018-12-17 RX ORDER — OXAZEPAM 15 MG/1
CAPSULE ORAL
Qty: 270 CAPSULE | Refills: 1 | Status: SHIPPED | OUTPATIENT
Start: 2018-12-17 | End: 2019-05-20

## 2018-12-17 NOTE — TELEPHONE ENCOUNTER
"Pt came into clinic reported oxazepam will not be filled by pharmacy, pt reports, \"this script has always given me problems regarding getting script filled\". Writer spoke to pharmacist at [facility] per pharmacist this script should be written out as 6 months at at time not 1 year. Pharmacy stated the pt's next fill is pended in processing and will be filled.    Writer will route new script as a six month fill to pcp.     LOV 8/30/18     Pt updated to call refill directly after last fill. About 6 months.    Thank you Dr. Das  "

## 2019-05-13 NOTE — PROGRESS NOTES
"SUBJECTIVE:   Mony Szymanski is a 68 year old female who presents for Preventive Visit.      Are you in the first 12 months of your Medicare Part B coverage?  No    Physical Health:    In general, how would you rate your overall physical health? good    Outside of work, how many days during the week do you exercise? 6-7 days/week    Outside of work, approximately how many minutes a day do you exercise?45-60 minutes    If you drink alcohol do you typically have >3 drinks per day or >7 drinks per week? No    Do you usually eat at least 4 servings of fruit and vegetables a day, include whole grains & fiber and avoid regularly eating high fat or \"junk\" foods? Yes    Do you have any problems taking medications regularly?  No    Do you have any side effects from medications? none    Needs assistance for the following daily activities: no assistance needed    Which of the following safety concerns are present in your home?  none identified     Hearing impairment: No    In the past 6 months, have you been bothered by leaking of urine? no    Mental Health:    In general, how would you rate your overall mental or emotional health? fair  PHQ-2 Score:    PHQ-9 SCORE 5/15/2017 2018 2019   PHQ-9 Total Score - - -   PHQ-9 Total Score 5 11 10     ED-7 SCORE 5/15/2017 2018 2019   Total Score 9 14 15           Do you feel safe in your environment? Yes    Do you have a Health Care Directive? No: Advance care planning was reviewed with patient; patient declined at this time.    Additional concerns to address?  No    Fall risk:     click delete button to remove this line now  Cognitive Screenin) Repeat 3 items (Leader, Season, Table)      2) Clock draw: NORMAL  3) 3 item recall: Recalls 3 objects  Results: 3 items recalled: COGNITIVE IMPAIRMENT LESS LIKELY    Mini-CogTM Copyright EFE Cantrell. Licensed by the author for use in [facility]; reprinted with permission (soob@.edu). All rights reserved.  "     Do you have sleep apnea, excessive snoring or daytime drowsiness?: no        Hyperlipidemia Follow-Up      Rate your low fat/cholesterol diet?: good    Taking statin?  No    Other lipid medications/supplements?:  none    Depression Followup    Status since last visit: Stable     See PHQ-9 for current symptoms.  Other associated symptoms: None    Complicating factors:   Significant life event:  No   Current substance abuse:  None  Anxiety or Panic symptoms:  Yes-  Used to them and can deal with them    PHQ 5/11/2016 5/15/2017 5/16/2018   PHQ-9 Total Score 5 5 11   Q9: Thoughts of better off dead/self-harm past 2 weeks Several days Not at all Several days   F/U: Thoughts of suicide or self-harm - - Yes   F/U: Self harm-plan - - No   F/U: Self-harm action - - No   F/U: Safety concerns - - No       PHQ-9  English  PHQ-9   Any Language  Suicide Assessment Five-step Evaluation and Treatment (SAFE-T)  Hypothyroidism Follow-up      Since last visit, patient describes the following symptoms: Weight stable, no hair loss, no skin changes, no constipation, no loose stools      Reviewed and updated as needed this visit by clinical staff         Reviewed and updated as needed this visit by Provider        Social History     Tobacco Use     Smoking status: Never Smoker     Smokeless tobacco: Never Used   Substance Use Topics     Alcohol use: Yes     Comment: Beer & Wine, socially                           Current providers sharing in care for this patient include:   Patient Care Team:  Kobe Das MD as PCP - General  Kobe Das MD as Assigned PCP    The following health maintenance items are reviewed in Epic and correct as of today:  Health Maintenance   Topic Date Due     HEPATITIS C SCREENING  05/15/1970     ZOSTER IMMUNIZATION (2 of 3) 03/08/2014     ED QUESTIONNAIRE 1 MONTH  06/16/2018     PHQ-9 Q1 MONTH  06/16/2018     MEDICARE ANNUAL WELLNESS VISIT  05/16/2019     PNEUMOCOCCAL IMMUNIZATION 65+ LOW/MEDIUM  "RISK (2 of 2 - PPSV23) 05/16/2019     FALL RISK ASSESSMENT  08/30/2019     COLONOSCOPY Q5 YR  07/20/2020     MAMMO SCREEN Q2 YR (SYSTEM ASSIGNED)  10/02/2020     LIPID SCREEN Q5 YR FEMALE (SYSTEM ASSIGNED)  05/12/2021     DTAP/TDAP/TD IMMUNIZATION (2 - Td) 12/27/2021     ADVANCE DIRECTIVE PLANNING Q5 YRS  08/22/2023     DEXA SCAN SCREENING (SYSTEM ASSIGNED)  Completed     INFLUENZA VACCINE  Completed     IPV IMMUNIZATION  Aged Out     MENINGITIS IMMUNIZATION  Aged Out     Labs reviewed in EPIC      ROS:  Constitutional, HEENT, cardiovascular, pulmonary, GI, , musculoskeletal, neuro, skin, endocrine and psych systems are negative, except as otherwise noted.    OBJECTIVE:   /64   Pulse 78   Temp 99.1  F (37.3  C)   Ht 1.613 m (5' 3.5\")   Wt 69.9 kg (154 lb)   SpO2 97%   BMI 26.85 kg/m   Estimated body mass index is 24.89 kg/m  as calculated from the following:    Height as of 8/30/18: 1.626 m (5' 4\").    Weight as of 8/30/18: 65.8 kg (145 lb).  EXAM:   GENERAL: healthy, alert and no distress  EYES: Eyes grossly normal to inspection, PERRL and conjunctivae and sclerae normal  HENT: ear canals and TM's normal, nose and mouth without ulcers or lesions  NECK: no adenopathy, no asymmetry, masses, or scars and thyroid normal to palpation  RESP: lungs clear to auscultation - no rales, rhonchi or wheezes  BREAST: normal without masses, tenderness or nipple discharge and no palpable axillary masses or adenopathy  CV: regular rate and rhythm, normal S1 S2, no S3 or S4, no murmur, click or rub, no peripheral edema and peripheral pulses strong  ABDOMEN: soft, nontender, no hepatosplenomegaly, no masses and bowel sounds normal   (female): normal female external genitalia, normal urethral meatus, vaginal mucosa pink, moist, well rugated, and normal cervix/adnexa/uterus without masses or discharge  MS: no gross musculoskeletal defects noted, no edema  SKIN: no suspicious lesions or rashes  NEURO: Normal strength and " tone, mentation intact and speech normal  PSYCH: mentation appears normal, affect normal/bright    Will come for fasting labs     ASSESSMENT / PLAN:   1. Acquired hypothyroidism  Needs lab and if ok - will refill meds   - CBC with platelets differential; Future  - Comprehensive metabolic panel; Future  - TSH; Future    2. Chronic fatigue syndrome  Wax and wean. Overall at baseline   - CBC with platelets differential; Future  - Comprehensive metabolic panel; Future    3. Mixed hyperlipidemia  Mild in past. Will recheck this year again - pt to come fasting   - CBC with platelets differential; Future  - Comprehensive metabolic panel; Future  - Lipid Profile; Future    4. Need for vaccination  Shot given   - Pneumococcal vaccine 23 valent PPSV23  (Pneumovax) [30641]  - ADMIN: Vaccine, Initial (71870)    5. ED (generalized anxiety disorder)  Up and down. No change in meds wanted. Has been to counseling   - venlafaxine (EFFEXOR-XR) 75 MG 24 hr capsule; TAKE 1 CAPSULE BY MOUTH DAILY WITH FOOD  Dispense: 90 capsule; Refill: 3  - oxazepam (SERAX) 15 MG capsule; TAKE ONE CAPSULE BY MOUTH THREE TIMES DAILY  Dispense: 270 capsule; Refill: 3  - traZODone (DESYREL) 50 MG tablet; TAKE 1 TO 3 TABLETS BY MOUTH AT BEDTIME  Dispense: 270 tablet; Refill: 3    6. Chronic seasonal allergic rhinitis due to pollen  RF done. Continue current medications and behavioral changes.   - fluticasone (FLONASE) 50 MCG/ACT nasal spray; INSTILL 2 SPRAYS IN EACH NOSTRIL EVERY DAY  Dispense: 3 g; Refill: 3    7. Well woman exam without gynecological exam  Overall doing well. Mammogram in fall needed.       End of Life Planning:  Patient currently has an advanced directive: No.  I have verified the patient's ablity to prepare an advanced directive/make health care decisions.  Literature was provided to assist patient in preparing an advanced directive.    COUNSELING:  Reviewed preventive health counseling, as reflected in patient instructions        "Regular exercise       Healthy diet/nutrition    Estimated body mass index is 24.89 kg/m  as calculated from the following:    Height as of 8/30/18: 1.626 m (5' 4\").    Weight as of 8/30/18: 65.8 kg (145 lb).         reports that she has never smoked. She has never used smokeless tobacco.      Appropriate preventive services were discussed with this patient, including applicable screening as appropriate for cardiovascular disease, diabetes, osteopenia/osteoporosis, and glaucoma.  As appropriate for age/gender, discussed screening for colorectal cancer, prostate cancer, breast cancer, and cervical cancer. Checklist reviewing preventive services available has been given to the patient.    Reviewed patients plan of care and provided an AVS. The Basic Care Plan (routine screening as documented in Health Maintenance) for Mony meets the Care Plan requirement. This Care Plan has been established and reviewed with the Patient.    Counseling Resources:  ATP IV Guidelines  Pooled Cohorts Equation Calculator  Breast Cancer Risk Calculator  FRAX Risk Assessment  ICSI Preventive Guidelines  Dietary Guidelines for Americans, 2010  USDA's MyPlate  ASA Prophylaxis  Lung CA Screening    Kobe Das MD  [facility] [place]  "

## 2019-05-13 NOTE — PATIENT INSTRUCTIONS
Patient Education   Personalized Prevention Plan  You are due for the preventive services outlined below.  Your care team is available to assist you in scheduling these services.  If you have already completed any of these items, please share that information with your care team to update in your medical record.  Health Maintenance Due   Topic Date Due     Hepatitis C Screening  05/15/1970     Zoster (Shingles) Vaccine (2 of 3) 03/08/2014     ED QUESTIONNAIRE 1 MONTH  06/16/2018     Depression Assessment Monthly  06/16/2018     Annual Wellness Visit  05/16/2019     Pneumococcal Vaccine (2 of 2 - PPSV23) 05/16/2019

## 2019-05-20 ENCOUNTER — OFFICE VISIT (OUTPATIENT)
Dept: FAMILY MEDICINE | Facility: OTHER | Age: 69
End: 2019-05-20
Attending: FAMILY MEDICINE
Payer: COMMERCIAL

## 2019-05-20 VITALS
SYSTOLIC BLOOD PRESSURE: 102 MMHG | HEART RATE: 78 BPM | BODY MASS INDEX: 26.29 KG/M2 | WEIGHT: 154 LBS | HEIGHT: 64 IN | DIASTOLIC BLOOD PRESSURE: 64 MMHG | TEMPERATURE: 99.1 F | OXYGEN SATURATION: 97 %

## 2019-05-20 DIAGNOSIS — E03.9 ACQUIRED HYPOTHYROIDISM: Primary | ICD-10-CM

## 2019-05-20 DIAGNOSIS — E78.2 MIXED HYPERLIPIDEMIA: ICD-10-CM

## 2019-05-20 DIAGNOSIS — G93.32 CHRONIC FATIGUE SYNDROME: ICD-10-CM

## 2019-05-20 DIAGNOSIS — J30.1 CHRONIC SEASONAL ALLERGIC RHINITIS DUE TO POLLEN: ICD-10-CM

## 2019-05-20 DIAGNOSIS — F41.1 GAD (GENERALIZED ANXIETY DISORDER): ICD-10-CM

## 2019-05-20 DIAGNOSIS — Z23 NEED FOR VACCINATION: ICD-10-CM

## 2019-05-20 DIAGNOSIS — Z00.00 WELL WOMAN EXAM WITHOUT GYNECOLOGICAL EXAM: ICD-10-CM

## 2019-05-20 PROCEDURE — 99212 OFFICE O/P EST SF 10 MIN: CPT | Mod: 25 | Performed by: FAMILY MEDICINE

## 2019-05-20 PROCEDURE — G0009 ADMIN PNEUMOCOCCAL VACCINE: HCPCS | Performed by: FAMILY MEDICINE

## 2019-05-20 PROCEDURE — 99397 PER PM REEVAL EST PAT 65+ YR: CPT | Performed by: FAMILY MEDICINE

## 2019-05-20 PROCEDURE — G0463 HOSPITAL OUTPT CLINIC VISIT: HCPCS | Mod: 25

## 2019-05-20 PROCEDURE — 90732 PPSV23 VACC 2 YRS+ SUBQ/IM: CPT

## 2019-05-20 RX ORDER — VENLAFAXINE HYDROCHLORIDE 75 MG/1
CAPSULE, EXTENDED RELEASE ORAL
Qty: 90 CAPSULE | Refills: 3 | Status: SHIPPED | OUTPATIENT
Start: 2019-05-20 | End: 2020-05-04

## 2019-05-20 RX ORDER — OXAZEPAM 15 MG/1
CAPSULE ORAL
Qty: 270 CAPSULE | Refills: 3 | Status: SHIPPED | OUTPATIENT
Start: 2019-05-20 | End: 2020-05-05

## 2019-05-20 RX ORDER — FLUTICASONE PROPIONATE 50 MCG
SPRAY, SUSPENSION (ML) NASAL
Qty: 3 G | Refills: 3 | Status: SHIPPED | OUTPATIENT
Start: 2019-05-20 | End: 2020-02-12

## 2019-05-20 RX ORDER — TRAZODONE HYDROCHLORIDE 50 MG/1
TABLET, FILM COATED ORAL
Qty: 270 TABLET | Refills: 3 | Status: SHIPPED | OUTPATIENT
Start: 2019-05-20 | End: 2020-07-24

## 2019-05-20 ASSESSMENT — ANXIETY QUESTIONNAIRES
5. BEING SO RESTLESS THAT IT IS HARD TO SIT STILL: MORE THAN HALF THE DAYS
GAD7 TOTAL SCORE: 15
IF YOU CHECKED OFF ANY PROBLEMS ON THIS QUESTIONNAIRE, HOW DIFFICULT HAVE THESE PROBLEMS MADE IT FOR YOU TO DO YOUR WORK, TAKE CARE OF THINGS AT HOME, OR GET ALONG WITH OTHER PEOPLE: SOMEWHAT DIFFICULT
3. WORRYING TOO MUCH ABOUT DIFFERENT THINGS: MORE THAN HALF THE DAYS
6. BECOMING EASILY ANNOYED OR IRRITABLE: MORE THAN HALF THE DAYS
1. FEELING NERVOUS, ANXIOUS, OR ON EDGE: NEARLY EVERY DAY
7. FEELING AFRAID AS IF SOMETHING AWFUL MIGHT HAPPEN: MORE THAN HALF THE DAYS
4. TROUBLE RELAXING: MORE THAN HALF THE DAYS
2. NOT BEING ABLE TO STOP OR CONTROL WORRYING: MORE THAN HALF THE DAYS

## 2019-05-20 ASSESSMENT — PAIN SCALES - GENERAL: PAINLEVEL: NO PAIN (0)

## 2019-05-20 ASSESSMENT — PATIENT HEALTH QUESTIONNAIRE - PHQ9: SUM OF ALL RESPONSES TO PHQ QUESTIONS 1-9: 10

## 2019-05-20 ASSESSMENT — MIFFLIN-ST. JEOR: SCORE: 1210.6

## 2019-05-20 NOTE — NURSING NOTE
"Chief Complaint   Patient presents with     Physical       Initial /64   Pulse 78   Temp 99.1  F (37.3  C)   Ht 1.613 m (5' 3.5\")   Wt 69.9 kg (154 lb)   SpO2 97%   BMI 26.85 kg/m   Estimated body mass index is 26.85 kg/m  as calculated from the following:    Height as of this encounter: 1.613 m (5' 3.5\").    Weight as of this encounter: 69.9 kg (154 lb).  Medication Reconciliation: complete    Braeden Grajeda LPN  "

## 2019-05-20 NOTE — NURSING NOTE
Prior to injection, verified patient identity using patient's name and date of birth.  Braeden Grajeda

## 2019-05-20 NOTE — LETTER
My Depression Action Plan  Name: Mony Szymanski   Date of Birth 1950  Date: 5/20/2019    My doctor: Kobe Das   My clinic: [facility] [place]  [address]  [place] [address]  [phone]          GREEN    ZONE   Good Control    What it looks like:     Things are going generally well. You have normal up s and down s. You may even feel depressed from time to time, but bad moods usually last less than a day.   What you need to do:  1. Continue to care for yourself (see self care plan)  2. Check your depression survival kit and update it as needed  3. Follow your physician s recommendations including any medication.  4. Do not stop taking medication unless you consult with your physician first.           YELLOW         ZONE Getting Worse    What it looks like:     Depression is starting to interfere with your life.     It may be hard to get out of bed; you may be starting to isolate yourself from others.    Symptoms of depression are starting to last most all day and this has happened for several days.     You may have suicidal thoughts but they are not constant.   What you need to do:     1. Call your care team, your response to treatment will improve if you keep your care team informed of your progress. Yellow periods are signs an adjustment may need to be made.     2. Continue your self-care, even if you have to fake it!    3. Talk to someone in your support network    4. Open up your depression survival kit           RED    ZONE Medical Alert - Get Help    What it looks like:     Depression is seriously interfering with your life.     You may experience these or other symptoms: You can t get out of bed most days, can t work or engage in other necessary activities, you have trouble taking care of basic hygiene, or basic responsibilities, thoughts of suicide or death that will not go away, self-injurious behavior.     What you need to do:  1. Call your care team and request a  same-day appointment. If they are not available (weekends or after hours) call your local crisis line, emergency room or 911.            Depression Self Care Plan / Survival Kit    Self-Care for Depression  Here s the deal. Your body and mind are really not as separate as most people think.  What you do and think affects how you feel and how you feel influences what you do and think. This means if you do things that people who feel good do, it will help you feel better.  Sometimes this is all it takes.  There is also a place for medication and therapy depending on how severe your depression is, so be sure to consult with your medical provider and/ or Behavioral Health Consultant if your symptoms are worsening or not improving.     In order to better manage my stress, I will:    Exercise  Get some form of exercise, every day. This will help reduce pain and release endorphins, the  feel good  chemicals in your brain. This is almost as good as taking antidepressants!  This is not the same as joining a gym and then never going! (they count on that by the way ) It can be as simple as just going for a walk or doing some gardening, anything that will get you moving.      Hygiene   Maintain good hygiene (Get out of bed in the morning, Make your bed, Brush your teeth, Take a shower, and Get dressed like you were going to work, even if you are unemployed).  If your clothes don't fit try to get ones that do.    Diet  I will strive to eat foods that are good for me, drink plenty of water, and avoid excessive sugar, caffeine, alcohol, and other mood-altering substances.  Some foods that are helpful in depression are: complex carbohydrates, B vitamins, flaxseed, fish or fish oil, fresh fruits and vegetables.    Psychotherapy  I agree to participate in Individual Therapy (if recommended).    Medication  If prescribed medications, I agree to take them.  Missing doses can result in serious side effects.  I understand that drinking  alcohol, or other illicit drug use, may cause potential side effects.  I will not stop my medication abruptly without first discussing it with my provider.    Staying Connected With Others  I will stay in touch with my friends, family members, and my primary care provider/team.    Use your imagination  Be creative.  We all have a creative side; it doesn t matter if it s oil painting, sand castles, or mud pies! This will also kick up the endorphins.    Witness Beauty  (AKA stop and smell the roses) Take a look outside, even in mid-winter. Notice colors, textures. Watch the squirrels and birds.     Service to others  Be of service to others.  There is always someone else in need.  By helping others we can  get out of ourselves  and remember the really important things.  This also provides opportunities for practicing all the other parts of the program.    Humor  Laugh and be silly!  Adjust your TV habits for less news and crime-drama and more comedy.    Control your stress  Try breathing deep, massage therapy, biofeedback, and meditation. Find time to relax each day.     My support system    Clinic Contact:  Phone number:    Contact 1:  Phone number:    Contact 2:  Phone number:    Sikh/:  Phone number:    Therapist:  Phone number:    Local crisis center:    Phone number:    Other community support:  Phone number:

## 2019-05-21 DIAGNOSIS — G93.32 CHRONIC FATIGUE SYNDROME: ICD-10-CM

## 2019-05-21 DIAGNOSIS — E03.9 ACQUIRED HYPOTHYROIDISM: ICD-10-CM

## 2019-05-21 DIAGNOSIS — E78.2 MIXED HYPERLIPIDEMIA: ICD-10-CM

## 2019-05-21 LAB
ALBUMIN SERPL-MCNC: 4.2 G/DL (ref 3.4–5)
ALP SERPL-CCNC: 85 U/L (ref 40–150)
ALT SERPL W P-5'-P-CCNC: 25 U/L (ref 0–50)
ANION GAP SERPL CALCULATED.3IONS-SCNC: 6 MMOL/L (ref 3–14)
AST SERPL W P-5'-P-CCNC: 13 U/L (ref 0–45)
BASOPHILS # BLD AUTO: 0 10E9/L (ref 0–0.2)
BASOPHILS NFR BLD AUTO: 0.8 %
BILIRUB SERPL-MCNC: 0.4 MG/DL (ref 0.2–1.3)
BUN SERPL-MCNC: 18 MG/DL (ref 7–30)
CALCIUM SERPL-MCNC: 9.3 MG/DL (ref 8.5–10.1)
CHLORIDE SERPL-SCNC: 104 MMOL/L (ref 94–109)
CHOLEST SERPL-MCNC: 245 MG/DL
CO2 SERPL-SCNC: 28 MMOL/L (ref 20–32)
CREAT SERPL-MCNC: 0.85 MG/DL (ref 0.52–1.04)
DIFFERENTIAL METHOD BLD: NORMAL
EOSINOPHIL # BLD AUTO: 0.2 10E9/L (ref 0–0.7)
EOSINOPHIL NFR BLD AUTO: 4.5 %
ERYTHROCYTE [DISTWIDTH] IN BLOOD BY AUTOMATED COUNT: 12.7 % (ref 10–15)
GFR SERPL CREATININE-BSD FRML MDRD: 71 ML/MIN/{1.73_M2}
GLUCOSE SERPL-MCNC: 92 MG/DL (ref 70–99)
HCT VFR BLD AUTO: 40.5 % (ref 35–47)
HDLC SERPL-MCNC: 67 MG/DL
HGB BLD-MCNC: 13.7 G/DL (ref 11.7–15.7)
IMM GRANULOCYTES # BLD: 0 10E9/L (ref 0–0.4)
IMM GRANULOCYTES NFR BLD: 0.2 %
LDLC SERPL CALC-MCNC: 147 MG/DL
LYMPHOCYTES # BLD AUTO: 1.8 10E9/L (ref 0.8–5.3)
LYMPHOCYTES NFR BLD AUTO: 33.5 %
MCH RBC QN AUTO: 30.2 PG (ref 26.5–33)
MCHC RBC AUTO-ENTMCNC: 33.8 G/DL (ref 31.5–36.5)
MCV RBC AUTO: 89 FL (ref 78–100)
MONOCYTES # BLD AUTO: 0.3 10E9/L (ref 0–1.3)
MONOCYTES NFR BLD AUTO: 6.3 %
NEUTROPHILS # BLD AUTO: 2.9 10E9/L (ref 1.6–8.3)
NEUTROPHILS NFR BLD AUTO: 54.7 %
NONHDLC SERPL-MCNC: 178 MG/DL
NRBC # BLD AUTO: 0 10*3/UL
NRBC BLD AUTO-RTO: 0 /100
PLATELET # BLD AUTO: 308 10E9/L (ref 150–450)
POTASSIUM SERPL-SCNC: 3.8 MMOL/L (ref 3.4–5.3)
PROT SERPL-MCNC: 8.1 G/DL (ref 6.8–8.8)
RBC # BLD AUTO: 4.54 10E12/L (ref 3.8–5.2)
SODIUM SERPL-SCNC: 138 MMOL/L (ref 133–144)
TRIGL SERPL-MCNC: 156 MG/DL
TSH SERPL DL<=0.005 MIU/L-ACNC: 0.85 MU/L (ref 0.4–4)
WBC # BLD AUTO: 5.3 10E9/L (ref 4–11)

## 2019-05-21 PROCEDURE — 80053 COMPREHEN METABOLIC PANEL: CPT | Mod: ZL | Performed by: FAMILY MEDICINE

## 2019-05-21 PROCEDURE — 85025 COMPLETE CBC W/AUTO DIFF WBC: CPT | Mod: ZL | Performed by: FAMILY MEDICINE

## 2019-05-21 PROCEDURE — 80061 LIPID PANEL: CPT | Mod: ZL | Performed by: FAMILY MEDICINE

## 2019-05-21 PROCEDURE — 84443 ASSAY THYROID STIM HORMONE: CPT | Mod: ZL | Performed by: FAMILY MEDICINE

## 2019-05-21 PROCEDURE — 36415 COLL VENOUS BLD VENIPUNCTURE: CPT | Mod: ZL | Performed by: FAMILY MEDICINE

## 2019-05-21 RX ORDER — LEVOTHYROXINE SODIUM 50 UG/1
50 TABLET ORAL DAILY
Qty: 90 TABLET | Refills: 3 | Status: SHIPPED | OUTPATIENT
Start: 2019-05-21 | End: 2020-07-24

## 2019-05-21 ASSESSMENT — ANXIETY QUESTIONNAIRES: GAD7 TOTAL SCORE: 15

## 2019-09-03 ENCOUNTER — TELEPHONE (OUTPATIENT)
Dept: FAMILY MEDICINE | Facility: OTHER | Age: 69
End: 2019-09-03

## 2019-09-03 NOTE — TELEPHONE ENCOUNTER
APPROVAL received from [employer] for Oxazepam capsules. Approval dates 6/5/19 through 9/2/20. Pharmacy advised. Forms scanned to [employer].

## 2019-09-03 NOTE — TELEPHONE ENCOUNTER
Received PA request from [facility] for Oxazepam. Submitted as urgent request through [facility]. Waiting for response.

## 2019-09-17 ENCOUNTER — OFFICE VISIT (OUTPATIENT)
Dept: DERMATOLOGY | Facility: OTHER | Age: 69
End: 2019-09-17
Attending: FAMILY MEDICINE
Payer: MEDICARE

## 2019-09-17 VITALS
SYSTOLIC BLOOD PRESSURE: 130 MMHG | DIASTOLIC BLOOD PRESSURE: 70 MMHG | HEART RATE: 83 BPM | OXYGEN SATURATION: 97 % | HEIGHT: 64 IN | TEMPERATURE: 98.5 F | WEIGHT: 145 LBS | BODY MASS INDEX: 24.75 KG/M2

## 2019-09-17 DIAGNOSIS — D22.9 MULTIPLE BENIGN NEVI: Primary | ICD-10-CM

## 2019-09-17 DIAGNOSIS — Z12.83 SCREENING FOR SKIN CANCER: ICD-10-CM

## 2019-09-17 PROCEDURE — G0463 HOSPITAL OUTPT CLINIC VISIT: HCPCS | Mod: 25

## 2019-09-17 PROCEDURE — 17110 DESTRUCTION B9 LES UP TO 14: CPT

## 2019-09-17 PROCEDURE — 17110 DESTRUCTION B9 LES UP TO 14: CPT | Performed by: DERMATOLOGY

## 2019-09-17 PROCEDURE — 99201 ZZC OFFICE/OUTPT VISIT, NEW, LEVEL I: CPT | Mod: 25 | Performed by: DERMATOLOGY

## 2019-09-17 ASSESSMENT — MIFFLIN-ST. JEOR: SCORE: 1177.72

## 2019-09-17 ASSESSMENT — PAIN SCALES - GENERAL: PAINLEVEL: NO PAIN (0)

## 2019-09-17 NOTE — LETTER
"2019       RE: Harika Tyler  [address]   [place] [address]     Dear Colleague,    Thank you for referring your patient, Harika Tyler, to the [facility]. Please see a copy of my visit note below.    Visit Date:   2019      SUBJECTIVE:  Harika comes in because of \"aging skin.\"  She has multiple concerns.      OBJECTIVE: ASSESSMENT: One is on the right palm of the hand and she thought this possibly could be a wart.  This 1-2 mm yellowish firm lesion appears to be more of a callus or corn.  I froze this with nitrogen.  Advised that this might help, but it may not as well.  I did not feel it was a wart.      On her chest and back are numerous pigmented macules and papules.  She has numerous seborrheic keratoses, many lentigines.      The lesion of greatest concern was on the left cheek, a lesion that has been there for many years, she states.  I did photograph this and entered it into her record.  It is very dark brown and has a black streak within it.  I believe this is a benign lentigo at this point, but I do recommend that it be excised because   I think has malignant potential.      Since her cheeks , where the lesion is present, are rather lax , this  should allow a plastic surgeon to do a good job, leaving her with a minimal scar, I would favor that over simply leaving it be and running the risk of this developing melanoma or other skin cancer.      Overall, her skin looks healthy with many benign lesions, but again I am concerned.  I did recommend that she touch base with Dr. Tidwell to see if she would agree that this lesion could be excised .     Meds and allergies reviewed.         DEANN PATRICK MD             D: 2019   T: 2019   MT: JAQUELIN      Name:     HARIKA TYLER   MRN:      [id number]        Account:      [id number]   :      1950           Visit Date:   2019      Document: [id number]  "       Again, thank you for allowing me to participate in the care of your patient.      Sincerely,    DEANN Leyva MD

## 2019-09-17 NOTE — PROGRESS NOTES
"Visit Date:   2019      SUBJECTIVE:  Harika comes in because of \"aging skin.\"  She has multiple concerns.      OBJECTIVE: ASSESSMENT: One is on the right palm of the hand and she thought this possibly could be a wart.  This 1-2 mm yellowish firm lesion appears to be more of a callus or corn.  I froze this with nitrogen.  Advised that this might help, but it may not as well.  I did not feel it was a wart.      On her chest and back are numerous pigmented macules and papules.  She has numerous seborrheic keratoses, many lentigines.      The lesion of greatest concern was on the left cheek, a lesion that has been there for many years, she states.  I did photograph this and entered it into her record.  It is very dark brown and has a black streak within it.  I believe this is a benign lentigo at this point, but I do recommend that it be excised because   I think has malignant potential.      Since her cheeks , where the lesion is present, are rather lax , this  should allow a plastic surgeon to do a good job, leaving her with a minimal scar, I would favor that over simply leaving it be and running the risk of this developing melanoma or other skin cancer.      Overall, her skin looks healthy with many benign lesions, but again I am concerned.  I did recommend that she touch base with Dr. Tidwell to see if she would agree that this lesion could be excised .     Meds and allergies reviewed.         DEANN PATRICK MD             D: 2019   T: 2019   MT: JAQUELIN      Name:     HARIKA TYLER   MRN:      [id number]        Account:      [id number]   :      1950           Visit Date:   2019      Document: [id number]      "

## 2019-09-17 NOTE — NURSING NOTE
"Chief Complaint   Patient presents with     Derm Problem     aging skin       Initial /70 (BP Location: Left arm, Patient Position: Chair, Cuff Size: Adult Regular)   Pulse 83   Temp 98.5  F (36.9  C) (Tympanic)   Ht 1.626 m (5' 4\")   Wt 65.8 kg (145 lb)   SpO2 97%   BMI 24.89 kg/m   Estimated body mass index is 24.89 kg/m  as calculated from the following:    Height as of this encounter: 1.626 m (5' 4\").    Weight as of this encounter: 65.8 kg (145 lb).  Medication Reconciliation: complete  CONG SALAZAR LPN    "

## 2019-10-03 ENCOUNTER — OFFICE VISIT (OUTPATIENT)
Dept: OTOLARYNGOLOGY | Facility: OTHER | Age: 69
End: 2019-10-03
Attending: OTOLARYNGOLOGY
Payer: MEDICARE

## 2019-10-03 VITALS
OXYGEN SATURATION: 99 % | HEART RATE: 89 BPM | WEIGHT: 145 LBS | SYSTOLIC BLOOD PRESSURE: 110 MMHG | TEMPERATURE: 98.3 F | BODY MASS INDEX: 24.75 KG/M2 | HEIGHT: 64 IN | DIASTOLIC BLOOD PRESSURE: 82 MMHG

## 2019-10-03 DIAGNOSIS — L98.9 SKIN LESION: Primary | ICD-10-CM

## 2019-10-03 PROCEDURE — 11444 EXC FACE-MM B9+MARG 3.1-4 CM: CPT | Performed by: OTOLARYNGOLOGY

## 2019-10-03 PROCEDURE — G0463 HOSPITAL OUTPT CLINIC VISIT: HCPCS | Mod: 25

## 2019-10-03 PROCEDURE — 88305 TISSUE EXAM BY PATHOLOGIST: CPT | Mod: TC | Performed by: OTOLARYNGOLOGY

## 2019-10-03 PROCEDURE — 99202 OFFICE O/P NEW SF 15 MIN: CPT | Mod: 25 | Performed by: OTOLARYNGOLOGY

## 2019-10-03 ASSESSMENT — MIFFLIN-ST. JEOR: SCORE: 1177.72

## 2019-10-03 ASSESSMENT — PAIN SCALES - GENERAL: PAINLEVEL: NO PAIN (0)

## 2019-10-03 NOTE — LETTER
10/3/2019         RE: Mony Szymanski  [address]    [place] [address]        Dear Colleague,    Thank you for referring your patient, Mony Szymanski, to the [facility]. Please see a copy of my visit note below.    Otolaryngology Consultation    Patient: Mony Szymanski  : 1950    Patient presents with:  Referral: Dr Leyva Referral Right Cheek Lesion & Right Upper Arm      HPI:  Mony Szymanski is a 69 year old female seen today for a left facial skin lesion    She has been seen at [facility] dermatology for laser treatment with some slight improvement in coloration but lesion has persisted.  She has had this left facial lesion for over 30 years and it has perhaps become somewhat more darker.  There is not been any any ulceration  No known history of carcinoma skin or melanoma.  Some excess sun exposure with sun burns throughout life. No immunodeficiency.  There is no history of solid organ transplant.   No tobacco use.     Dr. Leyva was concerned because of the black center pigmentation.  He did lean toward a benign lentigo but recommended surgical excision    We have had to reschedule her twice due to emergency surgeries that have had to add on to my clinic days.      Current Outpatient Rx   Medication Sig Dispense Refill     5-Hydroxytryptophan (5-HTP) 50 MG CAPS Take by mouth daily.        Calcium Carbonate (TUMS 500 OR) Take by mouth 2 times daily       Cholecalciferol (VITAMIN D) 1000 UNITS capsule Take 1 capsule by mouth daily.       fish oil-omega-3 fatty acids (FISH OIL) 1000 MG capsule Take 2 capsules by mouth daily.       fluticasone (FLONASE) 50 MCG/ACT nasal spray INSTILL 2 SPRAYS IN EACH NOSTRIL EVERY DAY 3 g 3     glucosamine 500 MG CAPS Take by mouth daily        levothyroxine (SYNTHROID/LEVOTHROID) 50 MCG tablet Take 1 tablet (50 mcg) by mouth daily 90 tablet 3     MAGNESIUM OXIDE PO Take 200 mg by mouth daily       Multiple vitamin TABS Take 1 tablet by oral route every day  "with food       oxazepam (SERAX) 15 MG capsule TAKE ONE CAPSULE BY MOUTH THREE TIMES DAILY 270 capsule 3     traZODone (DESYREL) 50 MG tablet TAKE 1 TO 3 TABLETS BY MOUTH AT BEDTIME 270 tablet 3     venlafaxine (EFFEXOR-XR) 75 MG 24 hr capsule TAKE 1 CAPSULE BY MOUTH DAILY WITH FOOD 90 capsule 3       Allergies: Codeine     Past Medical History:   Diagnosis Date     Attention deficit disorder of childhood without me 12/22/2010     Chronic fatigue syndrome 12/22/2010     FH: colon cancer      Generalized anxiety disorder 12/22/2010     Hx SBO      Hyperlipidemia 12/27/2011     Hypothyroidism 12/27/2011     Major depressive disorder, recurrent episode, unspecified 4/27/2011     Obsessive-compulsive disorders 12/22/2010     Small bowel obstruction due to adhesions (H) 8/21/2018       Past Surgical History:   Procedure Laterality Date     Appendicitis  1963    appendectomy     breast calcification      biopsy; RT     colonoscopy  2004    repeat 2014     COLONOSCOPY  6-3-2010    Repeat 2014-15     COLONOSCOPY N/A 7/20/2015    repeat in 2020//Procedure: COLONOSCOPY;  Surgeon: Cassie Snell MD;  Location: [facility]     lazy eye  1956    surgically repaired; LT     NASAL/SINUS POLYPECTOMY       wisdom teeth extracted  1988       ENT family history reviewed    Social History     Tobacco Use     Smoking status: Never Smoker     Smokeless tobacco: Never Used   Substance Use Topics     Alcohol use: Yes     Comment: Beer & Wine, socially     Drug use: None       Review of Systems  ROS: 10 point ROS neg other than the symptoms noted above in the HPI and occasional seasonal allergic rhinitis, new moles, depression    Physical Exam  /82   Pulse 89   Temp 98.3  F (36.8  C) (Tympanic)   Ht 1.626 m (5' 4\")   Wt 65.8 kg (145 lb)   SpO2 99%   BMI 24.89 kg/m     General - The patient is well nourished and well developed, and appears to have good nutritional status.  Alert and oriented to person and place, answers questions " and cooperates with examination appropriately.   Head and Face - Normocephalic and atraumatic, with no gross asymmetry noted.  The facial nerve is intact, with strong symmetric movements.  There is a 2.5 x 2 cm irregular dark macule on her left mid cheek with a black streak in the center no ulceration  No palpable parotid masses  Neck - No palpable enlarged fixed cervical lymph nodes.  No neck cysts or unusual tenderness to palpation.   No palpable fixed thyroid nodules or concerning goiter.  The trachea is grossly midline.     Office Procedure:   I discussed the risks and complications of skin lesion excision, including local anesthesia, bleeding, infection, injury to the facial nerve with possible but rare permenant facial weakness, scar formation, hypertrophic healing or keloid, numbness to area, recurrence of lesion, benign versus malignant pathology and possible need for further surgery.  I did recommend partial excision for improved long-term cosmesis but I do want to obtain pathologic diagnosis so we proceeded in the office today.  Further she states she is leaving for [place] in 3 weeks and wants this done now .  Mony is aware that additional surgery may be necessary.   All questions were answered.    After informed consent was discussed and a time- out taken, I proceeded to cleanse the skin around the lesion with alcohol.  I then demarcated the lesion parallel to relaxed skin tension lines in a natural crease.  The area was prepped and draped in the normal sterile fashion.  I then infiltrated the skin with 1% lidocaine with 1:200,000 epinephrine.  I  used a 15-blade to create an elliptical incision around the lesion, with margins of 1 mm of grossly normal skin at the inferior margin.    I demarcate about two thirds of the lesion excision because the entire excision site would have precluded direct closure in the clinic.   I then elevated the skin ellipse and a thin cuff of underlying subdermal fat with  curved iris scissors.  I dissected down through normal subcutaneous tissue for complete removal of the demarcated porion of the lesion, including the central dark pigmentation.  After the lesion was completely removed, I then placed it in formalin for permanent section.  Hemostasis was achieved with direct pressure and hand held cautery. I then irrigated the wound and gently suctioned the area.  The defect measured 3.1 x 1.8 cm  I advanced the adjacent skin for tension free closure using tenotomy scissors.  The total length of the incision was  3.2 cm.  I then proceeded to close the incision by using simple interrupted buried knot sutures, using 5-0 Vicryl.  The skin edges were then reapproximated using 5-0 nylon, simple interrupted sutures.  Antibiotic ointment was then applied and the wound was dressed.  The patient tolerated the procedure without any difficulty.    Impression/Plan      ICD-10-CM    1. Skin lesion L98.9 Surgical pathology exam     EXC BENIGN SKIN LESION FACE/EARS 3.1-4.0 CM       Additional surgery may need to be scheduled based on final pathology.  This was discussed today.    Further procedures may include skin excision with frozens and flap repair.    The risks of surgery were discussed, if further surgery is necessary.  These include but are not limited to anesthesia, scar or keloid formation, infection, flap failure, change in appearance of surrounding facial structures,  numbness to the skin, injury to the facial nerve with permanent facial weakness which is exceedingly rare but possible.  Temporary weakness to the face may occur with the use of local anesthesia.    The patient expressed understanding.  All questions were answered.  Photos were taken.      I have instructed the patient on wound care and signs of infection.  Written instructions provided.  We will contact the patient with pathology results.    Sunscreen use and skin cancer preventive measures were reinforced      Sandy MARTINEZ  FAWN Tidwell.  Otolaryngology/Head and Neck Surgery  Allergy      Again, thank you for allowing me to participate in the care of your patient.        Sincerely,        Sandy Tidwell MD

## 2019-10-03 NOTE — NURSING NOTE
"Chief Complaint   Patient presents with     Referral     Dr Leyva Referral Right Cheek Lesion & Right Upper Arm       Initial /82   Pulse 89   Temp 98.3  F (36.8  C) (Tympanic)   Ht 1.626 m (5' 4\")   Wt 65.8 kg (145 lb)   SpO2 99%   BMI 24.89 kg/m   Estimated body mass index is 24.89 kg/m  as calculated from the following:    Height as of this encounter: 1.626 m (5' 4\").    Weight as of this encounter: 65.8 kg (145 lb).  Medication Reconciliation: complete  Catina Weeks LPN  "

## 2019-10-03 NOTE — PATIENT INSTRUCTIONS
Thank you for allowing Dr. Tidwell and our ENT team to participate in your care.  If your medications are too expensive, please give the nurse a call.  We can possibly change this medication.  If you have a scheduling or an appointment question please contact our Health Unit Coordinator at their direct line [phone].   ALL nursing questions or concerns can be directed to your ENT nurse at: [phone] - Hannah      POST PROCEDURE INSTRUCTIONS      Remove your dressing in 24 hours (If you have one)    Wash incision with a mixture of half water and half hydrogen peroxide 3 times daily.    Apply Aquaphor Healing Ointment to the wound 3 times daily. (Unless specified Bacitracin)    Cover with a clean dressing if in a dirty shania environment or when wet/soiled    Keep incision clean and dry   Do NOT soak in water such as a tub bath or swimming   Do NOT put make-up, powders, hairspray, lotions, etc on the incision       You can apply ice to the surgical area to help reduce swelling. (no longer than 20 minutes at a time)      You can use acetaminophen(Tylenol) or the prescription you received for pain.       If you have any bleeding, cover the wound with clean gauze and hold pressure for 10 Minutes. If the bleeding does not stop or is heavy and profuse, call the clinic or go to the Urgent Care/Emergency Department.    SIGNS OF INFECTION ARE:    Redness, swelling, red streaks, pus, drainage, warmth, fever, increased pain, foul smell.     Contact your primary health care provider if you notice any of the warning signs.     FOLLOW - UP    Follow-up in clinic for a nurse only visit in 7 days for suture removal.     Pathology results will be called to you when they are back. Usually 7-10 days.      6 WEEKS POST PROCEDURE      Apply ANY type of lotion to the suture site(Example - Vaseline Intensive Care or Vitamin E)    Massage the surgical area 1-2 times daily in a circular motion for 5 minutes, for a period of 2  months. This will help the scar heal better.

## 2019-10-03 NOTE — PROGRESS NOTES
Otolaryngology Consultation    Patient: Mony Szymanski  : 1950    Patient presents with:  Referral: Dr Leyva Referral Right Cheek Lesion & Right Upper Arm      HPI:  Mony Szymanski is a 69 year old female seen today for a left facial skin lesion    She has been seen at [facility] dermatology for laser treatment with some slight improvement in coloration but lesion has persisted.  She has had this left facial lesion for over 30 years and it has perhaps become somewhat more darker.  There is not been any any ulceration  No known history of carcinoma skin or melanoma.  Some excess sun exposure with sun burns throughout life. No immunodeficiency.  There is no history of solid organ transplant.   No tobacco use.     Dr. Leyva was concerned because of the black center pigmentation.  He did lean toward a benign lentigo but recommended surgical excision    We have had to reschedule her twice due to emergency surgeries that have had to add on to my clinic days.      Current Outpatient Rx   Medication Sig Dispense Refill     5-Hydroxytryptophan (5-HTP) 50 MG CAPS Take by mouth daily.        Calcium Carbonate (TUMS 500 OR) Take by mouth 2 times daily       Cholecalciferol (VITAMIN D) 1000 UNITS capsule Take 1 capsule by mouth daily.       fish oil-omega-3 fatty acids (FISH OIL) 1000 MG capsule Take 2 capsules by mouth daily.       fluticasone (FLONASE) 50 MCG/ACT nasal spray INSTILL 2 SPRAYS IN EACH NOSTRIL EVERY DAY 3 g 3     glucosamine 500 MG CAPS Take by mouth daily        levothyroxine (SYNTHROID/LEVOTHROID) 50 MCG tablet Take 1 tablet (50 mcg) by mouth daily 90 tablet 3     MAGNESIUM OXIDE PO Take 200 mg by mouth daily       Multiple vitamin TABS Take 1 tablet by oral route every day with food       oxazepam (SERAX) 15 MG capsule TAKE ONE CAPSULE BY MOUTH THREE TIMES DAILY 270 capsule 3     traZODone (DESYREL) 50 MG tablet TAKE 1 TO 3 TABLETS BY MOUTH AT BEDTIME 270 tablet 3     venlafaxine (EFFEXOR-XR) 75 MG 24 hr  "capsule TAKE 1 CAPSULE BY MOUTH DAILY WITH FOOD 90 capsule 3       Allergies: Codeine     Past Medical History:   Diagnosis Date     Attention deficit disorder of childhood without me 12/22/2010     Chronic fatigue syndrome 12/22/2010     FH: colon cancer      Generalized anxiety disorder 12/22/2010     Hx SBO      Hyperlipidemia 12/27/2011     Hypothyroidism 12/27/2011     Major depressive disorder, recurrent episode, unspecified 4/27/2011     Obsessive-compulsive disorders 12/22/2010     Small bowel obstruction due to adhesions (H) 8/21/2018       Past Surgical History:   Procedure Laterality Date     Appendicitis  1963    appendectomy     breast calcification      biopsy; RT     colonoscopy  2004    repeat 2014     COLONOSCOPY  6-3-2010    Repeat 2014-15     COLONOSCOPY N/A 7/20/2015    repeat in 2020//Procedure: COLONOSCOPY;  Surgeon: Cassie Snell MD;  Location: [facility]     lazy eye  1956    surgically repaired; LT     NASAL/SINUS POLYPECTOMY       wisdom teeth extracted  1988       ENT family history reviewed    Social History     Tobacco Use     Smoking status: Never Smoker     Smokeless tobacco: Never Used   Substance Use Topics     Alcohol use: Yes     Comment: Beer & Wine, socially     Drug use: None       Review of Systems  ROS: 10 point ROS neg other than the symptoms noted above in the HPI and occasional seasonal allergic rhinitis, new moles, depression    Physical Exam  /82   Pulse 89   Temp 98.3  F (36.8  C) (Tympanic)   Ht 1.626 m (5' 4\")   Wt 65.8 kg (145 lb)   SpO2 99%   BMI 24.89 kg/m    General - The patient is well nourished and well developed, and appears to have good nutritional status.  Alert and oriented to person and place, answers questions and cooperates with examination appropriately.   Head and Face - Normocephalic and atraumatic, with no gross asymmetry noted.  The facial nerve is intact, with strong symmetric movements.  There is a 2.5 x 2 cm irregular dark macule on her " left mid cheek with a black streak in the center no ulceration  No palpable parotid masses  Neck - No palpable enlarged fixed cervical lymph nodes.  No neck cysts or unusual tenderness to palpation.   No palpable fixed thyroid nodules or concerning goiter.  The trachea is grossly midline.     Office Procedure:   I discussed the risks and complications of skin lesion excision, including local anesthesia, bleeding, infection, injury to the facial nerve with possible but rare permenant facial weakness, scar formation, hypertrophic healing or keloid, numbness to area, recurrence of lesion, benign versus malignant pathology and possible need for further surgery.  I did recommend partial excision for improved long-term cosmesis but I do want to obtain pathologic diagnosis so we proceeded in the office today.  Further she states she is leaving for [place] in 3 weeks and wants this done now .  Mony is aware that additional surgery may be necessary.   All questions were answered.    After informed consent was discussed and a time- out taken, I proceeded to cleanse the skin around the lesion with alcohol.  I then demarcated the lesion parallel to relaxed skin tension lines in a natural crease.  The area was prepped and draped in the normal sterile fashion.  I then infiltrated the skin with 1% lidocaine with 1:200,000 epinephrine.  I  used a 15-blade to create an elliptical incision around the lesion, with margins of 1 mm of grossly normal skin at the inferior margin.    I demarcate about two thirds of the lesion excision because the entire excision site would have precluded direct closure in the clinic.   I then elevated the skin ellipse and a thin cuff of underlying subdermal fat with curved iris scissors.  I dissected down through normal subcutaneous tissue for complete removal of the demarcated porion of the lesion, including the central dark pigmentation.  After the lesion was completely removed, I then placed it in  formalin for permanent section.  Hemostasis was achieved with direct pressure and hand held cautery. I then irrigated the wound and gently suctioned the area.  The defect measured 3.1 x 1.8 cm  I advanced the adjacent skin for tension free closure using tenotomy scissors.  The total length of the incision was  3.2 cm.  I then proceeded to close the incision by using simple interrupted buried knot sutures, using 5-0 Vicryl.  The skin edges were then reapproximated using 5-0 nylon, simple interrupted sutures.  Antibiotic ointment was then applied and the wound was dressed.  The patient tolerated the procedure without any difficulty.    Impression/Plan      ICD-10-CM    1. Skin lesion L98.9 Surgical pathology exam     EXC BENIGN SKIN LESION FACE/EARS 3.1-4.0 CM       Additional surgery may need to be scheduled based on final pathology.  This was discussed today.    Further procedures may include skin excision with frozens and flap repair.    The risks of surgery were discussed, if further surgery is necessary.  These include but are not limited to anesthesia, scar or keloid formation, infection, flap failure, change in appearance of surrounding facial structures,  numbness to the skin, injury to the facial nerve with permanent facial weakness which is exceedingly rare but possible.  Temporary weakness to the face may occur with the use of local anesthesia.    The patient expressed understanding.  All questions were answered.  Photos were taken.      I have instructed the patient on wound care and signs of infection.  Written instructions provided.  We will contact the patient with pathology results.    Sunscreen use and skin cancer preventive measures were reinforced      Sandy Tidwell D.O.  Otolaryngology/Head and Neck Surgery  Allergy

## 2019-10-04 ENCOUNTER — ANCILLARY PROCEDURE (OUTPATIENT)
Dept: MAMMOGRAPHY | Facility: OTHER | Age: 69
End: 2019-10-04
Attending: FAMILY MEDICINE
Payer: MEDICARE

## 2019-10-04 DIAGNOSIS — Z12.31 VISIT FOR SCREENING MAMMOGRAM: ICD-10-CM

## 2019-10-04 PROCEDURE — 77063 BREAST TOMOSYNTHESIS BI: CPT | Mod: TC

## 2019-10-05 ENCOUNTER — HEALTH MAINTENANCE LETTER (OUTPATIENT)
Age: 69
End: 2019-10-05

## 2019-10-08 ENCOUNTER — OFFICE VISIT (OUTPATIENT)
Dept: FAMILY MEDICINE | Facility: OTHER | Age: 69
End: 2019-10-08
Attending: FAMILY MEDICINE
Payer: COMMERCIAL

## 2019-10-08 VITALS
HEIGHT: 64 IN | WEIGHT: 152 LBS | TEMPERATURE: 97.9 F | DIASTOLIC BLOOD PRESSURE: 72 MMHG | SYSTOLIC BLOOD PRESSURE: 122 MMHG | OXYGEN SATURATION: 99 % | HEART RATE: 76 BPM | BODY MASS INDEX: 25.95 KG/M2

## 2019-10-08 DIAGNOSIS — L98.9 SKIN LESION: Primary | ICD-10-CM

## 2019-10-08 PROCEDURE — 99213 OFFICE O/P EST LOW 20 MIN: CPT | Performed by: FAMILY MEDICINE

## 2019-10-08 PROCEDURE — G0463 HOSPITAL OUTPT CLINIC VISIT: HCPCS

## 2019-10-08 ASSESSMENT — PAIN SCALES - GENERAL: PAINLEVEL: NO PAIN (0)

## 2019-10-08 ASSESSMENT — MIFFLIN-ST. JEOR: SCORE: 1209.47

## 2019-10-08 NOTE — NURSING NOTE
"Chief Complaint   Patient presents with     Mole       Initial /72   Pulse 76   Temp 97.9  F (36.6  C)   Ht 1.626 m (5' 4\")   Wt 68.9 kg (152 lb)   SpO2 99%   BMI 26.09 kg/m   Estimated body mass index is 26.09 kg/m  as calculated from the following:    Height as of this encounter: 1.626 m (5' 4\").    Weight as of this encounter: 68.9 kg (152 lb).  Medication Reconciliation: complete  Braeden Grajeda LPN  "

## 2019-10-08 NOTE — PROGRESS NOTES
Subjective     Mony Szymanski is a 69 year old female who presents to clinic today for the following health issues:    HPI   mole      Duration: years    Description (location/character/radiation): upper right arm    Intensity:  moderate    Accompanying signs and symptoms: has grown and changed    History (similar episodes/previous evaluation): dermatology    Precipitating or alleviating factors: None    Therapies tried and outcome: None    H/o lots of sun damage.    Lesion growing and more dark    No bleeding     Just had facial lesion done and seen DR Leyva in past.     Leaving for [place] in 2 weeks. Would like removed - Worried about it.              Current Outpatient Medications   Medication Sig Dispense Refill     5-Hydroxytryptophan (5-HTP) 50 MG CAPS Take by mouth daily.        Calcium Carbonate (TUMS 500 OR) Take by mouth 2 times daily       Cholecalciferol (VITAMIN D) 1000 UNITS capsule Take 1 capsule by mouth daily.       fish oil-omega-3 fatty acids (FISH OIL) 1000 MG capsule Take 2 capsules by mouth daily.       fluticasone (FLONASE) 50 MCG/ACT nasal spray INSTILL 2 SPRAYS IN EACH NOSTRIL EVERY DAY 3 g 3     glucosamine 500 MG CAPS Take by mouth daily        levothyroxine (SYNTHROID/LEVOTHROID) 50 MCG tablet Take 1 tablet (50 mcg) by mouth daily 90 tablet 3     MAGNESIUM OXIDE PO Take 200 mg by mouth daily       Multiple vitamin TABS Take 1 tablet by oral route every day with food       oxazepam (SERAX) 15 MG capsule TAKE ONE CAPSULE BY MOUTH THREE TIMES DAILY 270 capsule 3     traZODone (DESYREL) 50 MG tablet TAKE 1 TO 3 TABLETS BY MOUTH AT BEDTIME 270 tablet 3     venlafaxine (EFFEXOR-XR) 75 MG 24 hr capsule TAKE 1 CAPSULE BY MOUTH DAILY WITH FOOD 90 capsule 3     Allergies   Allergen Reactions     Codeine Nausea and Vomiting         Reviewed and updated as needed this visit by Provider         Review of Systems   ROS COMP: CONSTITUTIONAL: NEGATIVE for fever, chills, change in weight  CV: NEGATIVE for chest  "pain, palpitations or peripheral edema      Objective    /72   Pulse 76   Temp 97.9  F (36.6  C)   Ht 1.626 m (5' 4\")   Wt 68.9 kg (152 lb)   SpO2 99%   BMI 26.09 kg/m    Body mass index is 26.09 kg/m .  Physical Exam   GENERAL: healthy, alert and no distress  SKIN:   1cm firm cystic, darker  lesion on right deltoid region .    Assessment & Plan       ICD-10-CM    1. Skin lesion L98.9    h/o skin damage and skin cancer. Will remove and bx though most likely benign and possible dermatofibroma.  Will arrange for removal this week. Pt agrees with plan.       BMI:   Estimated body mass index is 26.09 kg/m  as calculated from the following:    Height as of this encounter: 1.626 m (5' 4\").    Weight as of this encounter: 68.9 kg (152 lb).               No follow-ups on file.    Kobe Das MD  [facility] [place]      "

## 2019-10-09 ENCOUNTER — HOSPITAL PATHOLOGY (OUTPATIENT)
Dept: OTHER | Facility: CLINIC | Age: 69
End: 2019-10-09

## 2019-10-10 ENCOUNTER — OFFICE VISIT (OUTPATIENT)
Dept: OTOLARYNGOLOGY | Facility: OTHER | Age: 69
End: 2019-10-10
Attending: OTOLARYNGOLOGY
Payer: MEDICARE

## 2019-10-10 DIAGNOSIS — Z48.02 ENCOUNTER FOR REMOVAL OF SUTURES: Primary | ICD-10-CM

## 2019-10-10 LAB
COPATH REPORT: NORMAL
COPATH REPORT: NORMAL

## 2019-10-10 NOTE — PROGRESS NOTES
The patient presents to the clinic today for a suture removal. The area appears clean and intact. All sutures were removed without difficulty. The area was cleansed after removal. The pathology is still in process and we will notify her once these become available. She is happy with this plan.

## 2019-10-10 NOTE — LETTER
10/10/2019         RE: Mony Szymanski  [address] [place] [address]        Dear Colleague,    Thank you for referring your patient, Mony Szymanski, to the [facility]. Please see a copy of my visit note below.    The patient presents to the clinic today for a suture removal. The area appears clean and intact. All sutures were removed without difficulty. The area was cleansed after removal. The pathology is still in process and we will notify her once these become available. She is happy with this plan.     Again, thank you for allowing me to participate in the care of your patient.        Sincerely,        [facility] ENT NURSE

## 2019-10-11 ENCOUNTER — PREP FOR PROCEDURE (OUTPATIENT)
Dept: OTOLARYNGOLOGY | Facility: CLINIC | Age: 69
End: 2019-10-11

## 2019-10-11 ENCOUNTER — OFFICE VISIT (OUTPATIENT)
Dept: FAMILY MEDICINE | Facility: OTHER | Age: 69
End: 2019-10-11
Attending: FAMILY MEDICINE
Payer: MEDICARE

## 2019-10-11 ENCOUNTER — DOCUMENTATION ONLY (OUTPATIENT)
Dept: OTOLARYNGOLOGY | Facility: CLINIC | Age: 69
End: 2019-10-11

## 2019-10-11 ENCOUNTER — TELEPHONE (OUTPATIENT)
Dept: OTOLARYNGOLOGY | Facility: OTHER | Age: 69
End: 2019-10-11

## 2019-10-11 VITALS
HEIGHT: 64 IN | DIASTOLIC BLOOD PRESSURE: 80 MMHG | TEMPERATURE: 99.2 F | WEIGHT: 152 LBS | BODY MASS INDEX: 25.95 KG/M2 | OXYGEN SATURATION: 97 % | SYSTOLIC BLOOD PRESSURE: 104 MMHG | HEART RATE: 89 BPM

## 2019-10-11 DIAGNOSIS — C43.39 MELANOMA OF CHEEK (H): Primary | ICD-10-CM

## 2019-10-11 DIAGNOSIS — C44.320 SCC (SQUAMOUS CELL CARCINOMA), FACE: Primary | ICD-10-CM

## 2019-10-11 DIAGNOSIS — L98.9 SKIN LESION: Primary | ICD-10-CM

## 2019-10-11 PROCEDURE — 11403 EXC TR-EXT B9+MARG 2.1-3CM: CPT | Performed by: FAMILY MEDICINE

## 2019-10-11 PROCEDURE — G0463 HOSPITAL OUTPT CLINIC VISIT: HCPCS

## 2019-10-11 PROCEDURE — 11401 EXC TR-EXT B9+MARG 0.6-1 CM: CPT | Performed by: FAMILY MEDICINE

## 2019-10-11 PROCEDURE — 88304 TISSUE EXAM BY PATHOLOGIST: CPT | Mod: TC | Performed by: FAMILY MEDICINE

## 2019-10-11 ASSESSMENT — MIFFLIN-ST. JEOR: SCORE: 1209.47

## 2019-10-11 ASSESSMENT — PAIN SCALES - GENERAL: PAINLEVEL: NO PAIN (0)

## 2019-10-11 NOTE — PROGRESS NOTES
"Subjective     Mony Szymanski is a 69 year old female who presents to clinic today for the following health issues:    HPI   Lesion removal    mildDuration: years    Description (location/character/radiation): right arm    Intensity:  mild    Accompanying signs and symptoms: change in color and growth    History (similar episodes/previous evaluation): office viist    Precipitating or alleviating factors: None    Therapies tried and outcome: None    Has been changing- little darker and bigger  Just found to have melanoma insitu on left cheek               Reviewed and updated as needed this visit by Provider         Review of Systems         Objective    /80   Pulse 89   Temp 99.2  F (37.3  C)   Ht 1.626 m (5' 4\")   Wt 68.9 kg (152 lb)   SpO2 97%   BMI 26.09 kg/m    Body mass index is 26.09 kg/m .  Physical Exam   GENERAL: healthy, alert and no distress  SKIN: right upper arm 1 x 1.5 cm firm  nodular lesion with dark deep center             See procedure     Subjective: Mony Szymanski a 69 year old female who presents today for lesion removal. The lesion(s) is/are located on the arms, number 1 and measures 1cm She The patient reports the lesion is growing  and denies other significant symptoms on ROS. Medications reviewed.    Pause for the cause has been completed prior to the prceedure.   1. Mony was identified by both name and date of birth   2. The correct site was identified   3. Site was marked by provider    4. Written informed consent correct and signed or verbal authorization  to proceed was obtained   5. Verifed necessary supplies, equipment, and diagnostics are available    6. Time out was performed immediately prior to procedure    Objective: The lesion(s) is/are of the above mentioned size and location and is/are typical. The area was prepped and appropriately anesthetized. Using the usual technique, full thickness elliptical excision in total and with layered closure was performed. An appropriate " dressing was applied. The procedure was well tolerated and without complications.     Assessment: intradermal nevus, rule out melanoma    Plan: Follow up: Return for suture removal in 10 days.. Wound care instructions provided.  Patient was instructed to be alert for any signs of cutaneous infection.

## 2019-10-11 NOTE — TELEPHONE ENCOUNTER
Pt called and is wondering who you would recommend for a head/neck/facial surgeon?  She states that she can delay her trip if she can be seen within a month.  Please advise.

## 2019-10-11 NOTE — PROGRESS NOTES
Alejandro Portillo put on the OR schedule in Monday 10/14 with Dr. Gomez.     He is planning on seeing her in clinic in the AM and bringing her back to the OR in the afternoon.     Pre-op H&P completed 10/8.   Post op is TBD.     She will call the patient for the information regarding surgery being schedled for Monday.

## 2019-10-11 NOTE — NURSING NOTE
"Chief Complaint   Patient presents with     Lesion Removal       Initial /80   Pulse 89   Temp 99.2  F (37.3  C)   Ht 1.626 m (5' 4\")   Wt 68.9 kg (152 lb)   SpO2 97%   BMI 26.09 kg/m   Estimated body mass index is 26.09 kg/m  as calculated from the following:    Height as of this encounter: 1.626 m (5' 4\").    Weight as of this encounter: 68.9 kg (152 lb).  Medication Reconciliation: complete  Braeden Grajeda LPN  "

## 2019-10-14 ENCOUNTER — ANESTHESIA EVENT (OUTPATIENT)
Dept: SURGERY | Facility: AMBULATORY SURGERY CENTER | Age: 69
End: 2019-10-14

## 2019-10-14 ENCOUNTER — OFFICE VISIT (OUTPATIENT)
Dept: OTOLARYNGOLOGY | Facility: CLINIC | Age: 69
End: 2019-10-14
Payer: COMMERCIAL

## 2019-10-14 ENCOUNTER — ANESTHESIA (OUTPATIENT)
Dept: SURGERY | Facility: AMBULATORY SURGERY CENTER | Age: 69
End: 2019-10-14

## 2019-10-14 ENCOUNTER — PRE VISIT (OUTPATIENT)
Dept: OTOLARYNGOLOGY | Facility: CLINIC | Age: 69
End: 2019-10-14

## 2019-10-14 ENCOUNTER — HOSPITAL ENCOUNTER (OUTPATIENT)
Facility: AMBULATORY SURGERY CENTER | Age: 69
End: 2019-10-14
Attending: OTOLARYNGOLOGY
Payer: COMMERCIAL

## 2019-10-14 VITALS
SYSTOLIC BLOOD PRESSURE: 135 MMHG | DIASTOLIC BLOOD PRESSURE: 80 MMHG | TEMPERATURE: 97.5 F | BODY MASS INDEX: 24.75 KG/M2 | OXYGEN SATURATION: 99 % | HEART RATE: 83 BPM | HEIGHT: 64 IN | WEIGHT: 145 LBS | RESPIRATION RATE: 14 BRPM

## 2019-10-14 VITALS
HEART RATE: 90 BPM | WEIGHT: 155.7 LBS | SYSTOLIC BLOOD PRESSURE: 151 MMHG | DIASTOLIC BLOOD PRESSURE: 99 MMHG | OXYGEN SATURATION: 98 % | RESPIRATION RATE: 16 BRPM | HEIGHT: 64 IN | BODY MASS INDEX: 26.58 KG/M2

## 2019-10-14 DIAGNOSIS — C43.39 MELANOMA OF CHEEK (H): ICD-10-CM

## 2019-10-14 DIAGNOSIS — C43.39 MELANOMA OF CHEEK (H): Primary | ICD-10-CM

## 2019-10-14 RX ORDER — ONDANSETRON 2 MG/ML
4 INJECTION INTRAMUSCULAR; INTRAVENOUS EVERY 30 MIN PRN
Status: DISCONTINUED | OUTPATIENT
Start: 2019-10-14 | End: 2019-10-15 | Stop reason: HOSPADM

## 2019-10-14 RX ORDER — ONDANSETRON 4 MG/1
4 TABLET, ORALLY DISINTEGRATING ORAL EVERY 30 MIN PRN
Status: DISCONTINUED | OUTPATIENT
Start: 2019-10-14 | End: 2019-10-15 | Stop reason: HOSPADM

## 2019-10-14 RX ORDER — SODIUM CHLORIDE, SODIUM LACTATE, POTASSIUM CHLORIDE, CALCIUM CHLORIDE 600; 310; 30; 20 MG/100ML; MG/100ML; MG/100ML; MG/100ML
INJECTION, SOLUTION INTRAVENOUS CONTINUOUS PRN
Status: DISCONTINUED | OUTPATIENT
Start: 2019-10-14 | End: 2019-10-14

## 2019-10-14 RX ORDER — HYDRALAZINE HYDROCHLORIDE 20 MG/ML
2.5-5 INJECTION INTRAMUSCULAR; INTRAVENOUS EVERY 10 MIN PRN
Status: DISCONTINUED | OUTPATIENT
Start: 2019-10-14 | End: 2019-10-14 | Stop reason: HOSPADM

## 2019-10-14 RX ORDER — SODIUM CHLORIDE, SODIUM LACTATE, POTASSIUM CHLORIDE, CALCIUM CHLORIDE 600; 310; 30; 20 MG/100ML; MG/100ML; MG/100ML; MG/100ML
INJECTION, SOLUTION INTRAVENOUS CONTINUOUS
Status: DISCONTINUED | OUTPATIENT
Start: 2019-10-14 | End: 2019-10-14 | Stop reason: HOSPADM

## 2019-10-14 RX ORDER — ACETAMINOPHEN 325 MG/1
975 TABLET ORAL ONCE
Status: COMPLETED | OUTPATIENT
Start: 2019-10-14 | End: 2019-10-14

## 2019-10-14 RX ORDER — CEFAZOLIN SODIUM 2 G/50ML
2 SOLUTION INTRAVENOUS
Status: CANCELLED | OUTPATIENT
Start: 2019-10-14

## 2019-10-14 RX ORDER — MEPERIDINE HYDROCHLORIDE 25 MG/ML
12.5 INJECTION INTRAMUSCULAR; INTRAVENOUS; SUBCUTANEOUS
Status: DISCONTINUED | OUTPATIENT
Start: 2019-10-14 | End: 2019-10-15 | Stop reason: HOSPADM

## 2019-10-14 RX ORDER — PROPOFOL 10 MG/ML
INJECTION, EMULSION INTRAVENOUS CONTINUOUS PRN
Status: DISCONTINUED | OUTPATIENT
Start: 2019-10-14 | End: 2019-10-14

## 2019-10-14 RX ORDER — CEFAZOLIN SODIUM 1 G/50ML
1 INJECTION, SOLUTION INTRAVENOUS SEE ADMIN INSTRUCTIONS
Status: CANCELLED | OUTPATIENT
Start: 2019-10-14

## 2019-10-14 RX ORDER — MINERAL OIL/HYDROPHIL PETROLAT
OINTMENT (GRAM) TOPICAL PRN
Qty: 1 G | Refills: 0 | Status: SHIPPED | OUTPATIENT
Start: 2019-10-14 | End: 2020-08-16

## 2019-10-14 RX ORDER — HYDROMORPHONE HYDROCHLORIDE 1 MG/ML
.3-.5 INJECTION, SOLUTION INTRAMUSCULAR; INTRAVENOUS; SUBCUTANEOUS EVERY 10 MIN PRN
Status: DISCONTINUED | OUTPATIENT
Start: 2019-10-14 | End: 2019-10-15 | Stop reason: HOSPADM

## 2019-10-14 RX ORDER — ONDANSETRON 2 MG/ML
INJECTION INTRAMUSCULAR; INTRAVENOUS PRN
Status: DISCONTINUED | OUTPATIENT
Start: 2019-10-14 | End: 2019-10-14

## 2019-10-14 RX ORDER — CELECOXIB 200 MG/1
200 CAPSULE ORAL ONCE
Status: COMPLETED | OUTPATIENT
Start: 2019-10-14 | End: 2019-10-14

## 2019-10-14 RX ORDER — LIDOCAINE HYDROCHLORIDE 20 MG/ML
INJECTION, SOLUTION INFILTRATION; PERINEURAL PRN
Status: DISCONTINUED | OUTPATIENT
Start: 2019-10-14 | End: 2019-10-14

## 2019-10-14 RX ORDER — ALBUTEROL SULFATE 0.83 MG/ML
2.5 SOLUTION RESPIRATORY (INHALATION) EVERY 4 HOURS PRN
Status: DISCONTINUED | OUTPATIENT
Start: 2019-10-14 | End: 2019-10-14 | Stop reason: HOSPADM

## 2019-10-14 RX ORDER — NALOXONE HYDROCHLORIDE 0.4 MG/ML
.1-.4 INJECTION, SOLUTION INTRAMUSCULAR; INTRAVENOUS; SUBCUTANEOUS
Status: DISCONTINUED | OUTPATIENT
Start: 2019-10-14 | End: 2019-10-15 | Stop reason: HOSPADM

## 2019-10-14 RX ORDER — FENTANYL CITRATE 50 UG/ML
25-50 INJECTION, SOLUTION INTRAMUSCULAR; INTRAVENOUS
Status: DISCONTINUED | OUTPATIENT
Start: 2019-10-14 | End: 2019-10-14 | Stop reason: HOSPADM

## 2019-10-14 RX ORDER — ACETAMINOPHEN 325 MG/1
325-650 TABLET ORAL EVERY 4 HOURS PRN
Qty: 30 TABLET | Refills: 0 | Status: SHIPPED | OUTPATIENT
Start: 2019-10-14 | End: 2020-08-16

## 2019-10-14 RX ORDER — DEXAMETHASONE SODIUM PHOSPHATE 4 MG/ML
INJECTION, SOLUTION INTRA-ARTICULAR; INTRALESIONAL; INTRAMUSCULAR; INTRAVENOUS; SOFT TISSUE PRN
Status: DISCONTINUED | OUTPATIENT
Start: 2019-10-14 | End: 2019-10-14

## 2019-10-14 RX ORDER — FENTANYL CITRATE 50 UG/ML
INJECTION, SOLUTION INTRAMUSCULAR; INTRAVENOUS PRN
Status: DISCONTINUED | OUTPATIENT
Start: 2019-10-14 | End: 2019-10-14

## 2019-10-14 RX ORDER — METOPROLOL TARTRATE 1 MG/ML
1-2 INJECTION, SOLUTION INTRAVENOUS EVERY 5 MIN PRN
Status: DISCONTINUED | OUTPATIENT
Start: 2019-10-14 | End: 2019-10-14 | Stop reason: HOSPADM

## 2019-10-14 RX ORDER — CEFAZOLIN SODIUM 1 G/50ML
1 SOLUTION INTRAVENOUS SEE ADMIN INSTRUCTIONS
Status: DISCONTINUED | OUTPATIENT
Start: 2019-10-14 | End: 2019-10-14 | Stop reason: HOSPADM

## 2019-10-14 RX ORDER — OXYCODONE HYDROCHLORIDE 5 MG/1
5-10 TABLET ORAL EVERY 4 HOURS PRN
Qty: 6 TABLET | Refills: 0 | Status: SHIPPED | OUTPATIENT
Start: 2019-10-14 | End: 2019-10-21

## 2019-10-14 RX ORDER — SODIUM CHLORIDE, SODIUM LACTATE, POTASSIUM CHLORIDE, CALCIUM CHLORIDE 600; 310; 30; 20 MG/100ML; MG/100ML; MG/100ML; MG/100ML
INJECTION, SOLUTION INTRAVENOUS CONTINUOUS
Status: DISCONTINUED | OUTPATIENT
Start: 2019-10-14 | End: 2019-10-15 | Stop reason: HOSPADM

## 2019-10-14 RX ORDER — OXYCODONE HYDROCHLORIDE 5 MG/1
5 TABLET ORAL EVERY 4 HOURS PRN
Status: DISCONTINUED | OUTPATIENT
Start: 2019-10-14 | End: 2019-10-15 | Stop reason: HOSPADM

## 2019-10-14 RX ORDER — CEFAZOLIN SODIUM 2 G/50ML
2 SOLUTION INTRAVENOUS
Status: COMPLETED | OUTPATIENT
Start: 2019-10-14 | End: 2019-10-14

## 2019-10-14 RX ORDER — AMOXICILLIN 250 MG
1-2 CAPSULE ORAL 2 TIMES DAILY PRN
Qty: 20 TABLET | Refills: 0 | Status: SHIPPED | OUTPATIENT
Start: 2019-10-14 | End: 2020-07-24

## 2019-10-14 RX ORDER — PROPOFOL 10 MG/ML
INJECTION, EMULSION INTRAVENOUS PRN
Status: DISCONTINUED | OUTPATIENT
Start: 2019-10-14 | End: 2019-10-14

## 2019-10-14 RX ORDER — FENTANYL CITRATE 50 UG/ML
25-50 INJECTION, SOLUTION INTRAMUSCULAR; INTRAVENOUS
Status: DISCONTINUED | OUTPATIENT
Start: 2019-10-14 | End: 2019-10-15 | Stop reason: HOSPADM

## 2019-10-14 RX ORDER — LIDOCAINE HYDROCHLORIDE AND EPINEPHRINE 10; 10 MG/ML; UG/ML
INJECTION, SOLUTION INFILTRATION; PERINEURAL PRN
Status: DISCONTINUED | OUTPATIENT
Start: 2019-10-14 | End: 2019-10-14 | Stop reason: HOSPADM

## 2019-10-14 RX ADMIN — CELECOXIB 200 MG: 200 CAPSULE ORAL at 12:45

## 2019-10-14 RX ADMIN — ONDANSETRON 4 MG: 2 INJECTION INTRAMUSCULAR; INTRAVENOUS at 14:05

## 2019-10-14 RX ADMIN — CEFAZOLIN SODIUM 2 G: 2 SOLUTION INTRAVENOUS at 14:14

## 2019-10-14 RX ADMIN — DEXAMETHASONE SODIUM PHOSPHATE 4 MG: 4 INJECTION, SOLUTION INTRA-ARTICULAR; INTRALESIONAL; INTRAMUSCULAR; INTRAVENOUS; SOFT TISSUE at 14:05

## 2019-10-14 RX ADMIN — FENTANYL CITRATE 50 MCG: 50 INJECTION, SOLUTION INTRAMUSCULAR; INTRAVENOUS at 14:17

## 2019-10-14 RX ADMIN — LIDOCAINE HYDROCHLORIDE 90 MG: 20 INJECTION, SOLUTION INFILTRATION; PERINEURAL at 14:04

## 2019-10-14 RX ADMIN — PROPOFOL 150 MCG/KG/MIN: 10 INJECTION, EMULSION INTRAVENOUS at 14:05

## 2019-10-14 RX ADMIN — ACETAMINOPHEN 975 MG: 325 TABLET ORAL at 12:45

## 2019-10-14 RX ADMIN — PROPOFOL 200 MG: 10 INJECTION, EMULSION INTRAVENOUS at 14:04

## 2019-10-14 RX ADMIN — SODIUM CHLORIDE, SODIUM LACTATE, POTASSIUM CHLORIDE, CALCIUM CHLORIDE: 600; 310; 30; 20 INJECTION, SOLUTION INTRAVENOUS at 13:49

## 2019-10-14 ASSESSMENT — MIFFLIN-ST. JEOR
SCORE: 1177.72
SCORE: 1226.25

## 2019-10-14 ASSESSMENT — ENCOUNTER SYMPTOMS: DYSRHYTHMIAS: 0

## 2019-10-14 ASSESSMENT — PAIN SCALES - GENERAL: PAINLEVEL: NO PAIN (0)

## 2019-10-14 NOTE — NURSING NOTE
"Chief Complaint   Patient presents with     Consult     left cheek melanoma      BP (!) 151/99 (BP Location: Right arm, Patient Position: Sitting, Cuff Size: Adult Regular)   Pulse 90   Resp 16   Ht 1.626 m (5' 4\")   Wt 70.6 kg (155 lb 11.2 oz)   SpO2 98%   BMI 26.73 kg/m      Karina Joaquin CMA    "

## 2019-10-14 NOTE — DISCHARGE INSTRUCTIONS
"[facility]  Home Care Following Anesthesia  For 24 hours after surgery:  1. Get plenty of rest.  A responsible adult must stay with you for at least 24 hours after you leave the surgery center.  2. Do not drive or use heavy equipment.  If you have weakness or tingling, don't drive or use heavy equipment until this feeling goes away.   3. Do not drink alcohol.   4. Avoid strenuous or risky activities.  Ask for help when climbing stairs.  5. You may feel lightheaded.  IF so, sit for a few minutes before standing.  Have someone help you get up.   6. If you have nausea (feel sick to your stomach): Drink only clear liquids such as apple juice, ginger ale, broth or 7-Up.  Rest may also help.  Be sure to drink enough fluids.  Move to a regular diet as you feel able.   7. You may have a slight fever.  Call the doctor if your fever is over 100 F (37.7 C) (taken under the tongue) or lasts longer than 24 hours.  8. You may have a dry mouth, a sore throat, muscle aches or trouble sleeping. These should go away after 24 hours.  9. Do not make important or legal decisions.        Today you received a Marcaine or bupivacaine block to numb the nerves near your surgery site.  This is a block using local anesthetic or \"numbing\" medication injected around the nerves to anesthetize or \"numb\" the area supplied by those nerves.  This block is injected into the muscle layer near your surgical site.  The medication may numb the location where you had surgery for 6-18 hours, but may last up to 24 hours.  If your surgical site is an arm or leg you should be careful with your affected limb, since it is possible to injure your limb without being aware of it due to the numbing.  Until full feeling returns, you should guard against bumping or hitting your limb, and avoid extreme hot or cold temperatures on the skin.  As the block wears off, the feeling will return as a tingling or prickly sensation near your " surgical site.  You will experience more discomfort from your incision as the feeling returns.  You may want to take a pain pill (a narcotic or Tylenol if this was prescribed by your surgeon) when you start to experience mild pain before the pain beccomes more severe.  If your pain medications do not control your pain you should notifiy your surgeon.    Tips for taking pain medications  To get the best pain relief possible, remember these points:    Take pain medications as directed, before pain becomes severe.    Pain medication can upset your stomach: taking it with food may help.    Constipation is a common side effect of pain medication. Drink plenty of  fluids.    Eat foods high in fiber. Take a stool softener if recommended by your doctor or pharmacist.    Do not drink alcohol, drive or operate machinery while taking pain medications.    Ask about other ways to control pain, such as with heat, ice or relaxation.    Tylenol/Acetaminophen Consumption  To help encourage the safe use of acetaminophen, the makers of TYLENOL  have lowered the maximum daily dose for single-ingredient Extra Strength TYLENOL  (acetaminophen) products sold in the [place] from 8 pills per day (4,000 mg) to 6 pills per day (3,000 mg). The dosing interval has also changed from 2 pills every 4-6 hours to 2 pills every 6 hours.    If you feel your pain relief is insufficient, you may take Tylenol/Acetaminophen in addition to your narcotic pain medication.     Be careful not to exceed 3,000 mg of Tylenol/Acetaminophen in a 24 hour period from all sources.    If you are taking extra strength Tylenol/acetaminophen (500 mg), the maximum dose is 6 tablets in 24 hours.    If you are taking regular strength acetaminophen (325 mg), the maximum dose is 9 tablets in 24 hours.    Call a doctor for any of the followin. Signs of infection (fever, growing tenderness at the surgery site, a large amount of drainage or bleeding, severe pain, foul-smelling  drainage, redness, swelling).  2. It has been over 8 to 10 hours since surgery and you are still not able to urinate (pass water).  3. Headache for over 24 hours.  4. Numbness, tingling or weakness the day after surgery (if you had spinal anesthesia).  Your doctor is:  Dr. Kai Gomez, ENT Otolaryngology: [phone]                    Or dial [phone] and ask for the resident on call for:  ENT Otolaryngology  For emergency care, call the:  [facility]:  [phone] (TTY for hearing impaired: [phone])

## 2019-10-14 NOTE — PATIENT INSTRUCTIONS
1. Proceed with procedure today.   2. You are scheduled to see Dr. West in clinic on Wednesday.   3. Please call the ENT clinic with any questions,concerns, new or worsening symptoms.    -Clinic number is [phone]   - Lindsey's direct line (Dr. Gomez's nurse) [phone]

## 2019-10-14 NOTE — ANESTHESIA CARE TRANSFER NOTE
Patient: Mony Szymanski    Procedure(s):  wide local excision of left check melanoma    Diagnosis: Melanoma of cheek (H) [C43.39]  Diagnosis Additional Information: No value filed.    Anesthesia Type:   General     Note:  Airway :Face Mask  Patient transferred to:PACU  Comments: VSS and WNL, comfortable, no PONV, report to Madison ÁLVAREZHandoff Report: Identifed the Patient, Identified the Reponsible Provider, Reviewed the pertinent medical history, Discussed the surgical course, Reviewed Intra-OP anesthesia mangement and issues during anesthesia, Set expectations for post-procedure period and Allowed opportunity for questions and acknowledgement of understanding      Vitals: (Last set prior to Anesthesia Care Transfer)    CRNA VITALS  10/14/2019 1422 - 10/14/2019 1457      10/14/2019             Pulse:  98    SpO2:  99 %    Resp Rate (observed):  (!) 5                Electronically Signed By: ISIAH Campos CRNA  October 14, 2019  2:57 PM

## 2019-10-14 NOTE — BRIEF OP NOTE
[facility]    Brief Operative Note    Pre-operative diagnosis: Melanoma of cheek (H) [C43.39]  Post-operative diagnosis Same as pre-operative diagnosis    Procedure: Procedure(s):  wide local excision of left check melanoma  Surgeon: Surgeon(s) and Role:     * Kai Gomez MD - Primary  Anesthesia: General   Estimated blood loss: Less than 10 ml  Drains: None  Specimens:   ID Type Source Tests Collected by Time Destination   A : wide local excision left cheek melanoma Tissue Cheek SURGICAL PATHOLOGY EXAM Kai Gomez MD 10/14/2019  2:26 PM      Findings:   Wide local excision of superficial melanoma. 1 cm margins.   Complications: None.  Implants: * No implants in log *

## 2019-10-14 NOTE — LETTER
10/14/2019       RE: Mony Szymanski  [address] [facility] [address]     Dear Colleague,    Thank you for referring your patient, Mony Szymanski, to the [facility] at [facility]. Please see a copy of my visit note below.      October 14, 2019         Kobe Das MD   [address]   [address]      Dear Dr. Das:      Thank you for requesting consultation on Mony Szymanski.      HISTORY OF PRESENT ILLNESS:  As you know, she is a 69-year-old woman who recently had a pigmented lesion on her cheek that was biopsied and came back as melanoma in situ.  They did call the 3 o'clock margin positive.  This was an incisional biopsy.  The patient has never had melanoma in the past but has had other skin lesions that have been removed, and some may have been cancerous but the is unsure.  The patient has not noticed any lumps or bumps in her cheek.  She does report significant sun exposure as a child without sunscreen.           Past Medical History:   Diagnosis Date     Attention deficit disorder of childhood without me 12/22/2010     Chronic fatigue syndrome 12/22/2010     FH: colon cancer      Generalized anxiety disorder 12/22/2010     Hx SBO      Hyperlipidemia 12/27/2011     Hypothyroidism 12/27/2011     Major depressive disorder, recurrent episode, unspecified 4/27/2011     Obsessive-compulsive disorders 12/22/2010     Small bowel obstruction due to adhesions (H) 8/21/2018     Past Surgical History:   Procedure Laterality Date     APPENDECTOMY       Appendicitis  1963    appendectomy     breast calcification      biopsy; RT     colonoscopy  2004    repeat 2014     COLONOSCOPY  6-3-2010    Repeat 2014-15     COLONOSCOPY N/A 7/20/2015    repeat in 2020//Procedure: COLONOSCOPY;  Surgeon: Cassie Snell MD;  Location: [facility]     EYE SURGERY       lazy eye  1956    surgically repaired; LT     NASAL/SINUS POLYPECTOMY       wisdom teeth  extracted 1988       Current Outpatient Medications:      5-Hydroxytryptophan (5-HTP) 50 MG CAPS, Take by mouth daily. , Disp: , Rfl:      Calcium Carbonate (TUMS 500 OR), Take by mouth 2 times daily, Disp: , Rfl:      Cholecalciferol (VITAMIN D) 1000 UNITS capsule, Take 1 capsule by mouth daily., Disp: , Rfl:      fish oil-omega-3 fatty acids (FISH OIL) 1000 MG capsule, Take 2 capsules by mouth daily., Disp: , Rfl:      fluticasone (FLONASE) 50 MCG/ACT nasal spray, INSTILL 2 SPRAYS IN EACH NOSTRIL EVERY DAY, Disp: 3 g, Rfl: 3     glucosamine 500 MG CAPS, Take by mouth daily , Disp: , Rfl:      levothyroxine (SYNTHROID/LEVOTHROID) 50 MCG tablet, Take 1 tablet (50 mcg) by mouth daily, Disp: 90 tablet, Rfl: 3     MAGNESIUM OXIDE PO, Take 200 mg by mouth daily, Disp: , Rfl:      Multiple vitamin TABS, Take 1 tablet by oral route every day with food, Disp: , Rfl:      oxazepam (SERAX) 15 MG capsule, TAKE ONE CAPSULE BY MOUTH THREE TIMES DAILY, Disp: 270 capsule, Rfl: 3     traZODone (DESYREL) 50 MG tablet, TAKE 1 TO 3 TABLETS BY MOUTH AT BEDTIME, Disp: 270 tablet, Rfl: 3     venlafaxine (EFFEXOR-XR) 75 MG 24 hr capsule, TAKE 1 CAPSULE BY MOUTH DAILY WITH FOOD, Disp: 90 capsule, Rfl: 3  Allergies   Allergen Reactions     Codeine Nausea and Vomiting     Social History     Tobacco Use     Smoking status: Never Smoker     Smokeless tobacco: Never Used   Substance Use Topics     Alcohol use: Yes     Comment: Beer & Wine, socially     Review Of Systems  Skin: negative  Eyes: negative  Ears/Nose/Throat: negative  Respiratory: No shortness of breath, dyspnea on exertion, cough, or hemoptysis  Cardiovascular: negative  Gastrointestinal: negative  Genitourinary: negative  Musculoskeletal: negative  Neurologic: negative  Psychiatric: negative  Hematologic/Lymphatic/Immunologic: negative  Endocrine: negative    PHYSICAL EXAMINATION:  She is well appearing, in no distress.  Examination of the oral cavity reveals normal mucosa of  the tongue, floor of mouth, hard and soft palate, gingival and buccal mucosa, retromolar trigone and posterior pharyngeal wall.  The floor of mouth, tongue and base of tongue are negative.  She cannot tolerate mirror exam.  Examination of the neck reveals no mass or lymphadenopathy.  Examination of the face reveals a vertical scar with some pigmented area lateral to it.  I do not palpate any nodes in the parotid basin.      IMPRESSION:  At least melanoma in situ of the left cheek.      PLAN:  We will plan for wide local excision of this and will leave the area open.  She is scheduled to get reconstructed by Dr. West next week.       Again, thank you for allowing me to participate in the care of your patient.      Sincerely,    Kai Gomez MD       cc: Jadyn West MD          [facility] 396

## 2019-10-14 NOTE — PROGRESS NOTES
October 14, 2019         [place] [address]      Dear Dr. Das:      Thank you for requesting consultation on Mony Szymanski.      HISTORY OF PRESENT ILLNESS:  As you know, she is a 69-year-old woman who recently had a pigmented lesion on her cheek that was biopsied and came back as melanoma in situ.  They did call the 3 o'clock margin positive.  This was an incisional biopsy.  The patient has never had melanoma in the past but has had other skin lesions that have been removed, and some may have been cancerous but the is unsure.  The patient has not noticed any lumps or bumps in her cheek.  She does report significant sun exposure as a child without sunscreen.           Past Medical History:   Diagnosis Date     Attention deficit disorder of childhood without me 12/22/2010     Chronic fatigue syndrome 12/22/2010     FH: colon cancer      Generalized anxiety disorder 12/22/2010     Hx SBO      Hyperlipidemia 12/27/2011     Hypothyroidism 12/27/2011     Major depressive disorder, recurrent episode, unspecified 4/27/2011     Obsessive-compulsive disorders 12/22/2010     Small bowel obstruction due to adhesions (H) 8/21/2018     Past Surgical History:   Procedure Laterality Date     APPENDECTOMY       Appendicitis  1963    appendectomy     breast calcification      biopsy; RT     colonoscopy  2004    repeat 2014     COLONOSCOPY  6-3-2010    Repeat 2014-15     COLONOSCOPY N/A 7/20/2015    repeat in 2020//Procedure: COLONOSCOPY;  Surgeon: Cassie Snell MD;  Location: [facility]     EYE SURGERY       lazy eye  1956    surgically repaired; LT     NASAL/SINUS POLYPECTOMY       wisdom teeth extracted  1988       Current Outpatient Medications:      5-Hydroxytryptophan (5-HTP) 50 MG CAPS, Take by mouth daily. , Disp: , Rfl:      Calcium Carbonate (TUMS 500 OR), Take by mouth 2 times daily, Disp: , Rfl:      Cholecalciferol (VITAMIN D) 1000 UNITS capsule, Take 1 capsule  by mouth daily., Disp: , Rfl:      fish oil-omega-3 fatty acids (FISH OIL) 1000 MG capsule, Take 2 capsules by mouth daily., Disp: , Rfl:      fluticasone (FLONASE) 50 MCG/ACT nasal spray, INSTILL 2 SPRAYS IN EACH NOSTRIL EVERY DAY, Disp: 3 g, Rfl: 3     glucosamine 500 MG CAPS, Take by mouth daily , Disp: , Rfl:      levothyroxine (SYNTHROID/LEVOTHROID) 50 MCG tablet, Take 1 tablet (50 mcg) by mouth daily, Disp: 90 tablet, Rfl: 3     MAGNESIUM OXIDE PO, Take 200 mg by mouth daily, Disp: , Rfl:      Multiple vitamin TABS, Take 1 tablet by oral route every day with food, Disp: , Rfl:      oxazepam (SERAX) 15 MG capsule, TAKE ONE CAPSULE BY MOUTH THREE TIMES DAILY, Disp: 270 capsule, Rfl: 3     traZODone (DESYREL) 50 MG tablet, TAKE 1 TO 3 TABLETS BY MOUTH AT BEDTIME, Disp: 270 tablet, Rfl: 3     venlafaxine (EFFEXOR-XR) 75 MG 24 hr capsule, TAKE 1 CAPSULE BY MOUTH DAILY WITH FOOD, Disp: 90 capsule, Rfl: 3  Allergies   Allergen Reactions     Codeine Nausea and Vomiting     Social History     Tobacco Use     Smoking status: Never Smoker     Smokeless tobacco: Never Used   Substance Use Topics     Alcohol use: Yes     Comment: Beer & Wine, socially     Review Of Systems  Skin: negative  Eyes: negative  Ears/Nose/Throat: negative  Respiratory: No shortness of breath, dyspnea on exertion, cough, or hemoptysis  Cardiovascular: negative  Gastrointestinal: negative  Genitourinary: negative  Musculoskeletal: negative  Neurologic: negative  Psychiatric: negative  Hematologic/Lymphatic/Immunologic: negative  Endocrine: negative    PHYSICAL EXAMINATION:  She is well appearing, in no distress.  Examination of the oral cavity reveals normal mucosa of the tongue, floor of mouth, hard and soft palate, gingival and buccal mucosa, retromolar trigone and posterior pharyngeal wall.  The floor of mouth, tongue and base of tongue are negative.  She cannot tolerate mirror exam.  Examination of the neck reveals no mass or lymphadenopathy.   Examination of the face reveals a vertical scar with some pigmented area lateral to it.  I do not palpate any nodes in the parotid basin.      IMPRESSION:  At least melanoma in situ of the left cheek.      PLAN:  We will plan for wide local excision of this and will leave the area open.  She is scheduled to get reconstructed by Dr. West next week.      cc: Jadyn West MD          [facility] 396

## 2019-10-14 NOTE — ANESTHESIA POSTPROCEDURE EVALUATION
Anesthesia POST Procedure Evaluation    Patient: Mony Szymanski   MRN:     [id number] Gender:   female   Age:    69 year old :      1950        Preoperative Diagnosis: Melanoma of cheek (H) [C43.39]   Procedure(s):  wide local excision of left check melanoma   Postop Comments: No value filed.       Anesthesia Type:  Not documented  General    Reportable Event: NO     PAIN: Uncomplicated   Sign Out status: Comfortable, Well controlled pain     PONV: No PONV   Sign Out status:  No Nausea or Vomiting     Neuro/Psych: Uneventful perioperative course   Sign Out Status: Preoperative baseline; Age appropriate mentation     Airway/Resp.: Uneventful perioperative course   Sign Out Status: Non labored breathing, age appropriate RR; Resp. Status within EXPECTED Parameters     CV: Uneventful perioperative course   Sign Out status: Appropriate BP and perfusion indices; Appropriate HR/Rhythm     Disposition:   Sign Out in:  PACU  Disposition:  Phase II; Home  Recovery Course: Uneventful  Follow-Up: Not required           Last Anesthesia Record Vitals:  CRNA VITALS  10/14/2019 1422 - 10/14/2019 1522      10/14/2019             Pulse:  98    SpO2:  99 %    Resp Rate (observed):  (!) 5          Last PACU Vitals:  Vitals Value Taken Time   /71 10/14/2019  3:00 PM   Temp 36.3  C (97.3  F) 10/14/2019  2:59 PM   Pulse     Resp 14 10/14/2019  3:00 PM   SpO2 100 % 10/14/2019  3:00 PM   Temp src     NIBP     Pulse     SpO2     Resp     Temp     Ht Rate     Temp 2           Electronically Signed By: Polo Sanders MD, MD, 2019, 3:51 PM

## 2019-10-14 NOTE — ANESTHESIA PREPROCEDURE EVALUATION
Anesthesia Pre-Procedure Evaluation    Patient: Mony Szymanski   MRN:     [id number] Gender:   female   Age:    69 year old :      1950        Preoperative Diagnosis: Melanoma of cheek (H) [C43.39]   Procedure(s):  wide local excision of left check melanoma     Past Medical History:   Diagnosis Date     Attention deficit disorder of childhood without me 2010     Chronic fatigue syndrome 2010     FH: colon cancer      Generalized anxiety disorder 2010     Hx SBO      Hyperlipidemia 2011     Hypothyroidism 2011     Major depressive disorder, recurrent episode, unspecified 2011     Obsessive-compulsive disorders 2010     Small bowel obstruction due to adhesions (H) 2018      Past Surgical History:   Procedure Laterality Date     APPENDECTOMY       Appendicitis  1963    appendectomy     breast calcification      biopsy; RT     colonoscopy      repeat      COLONOSCOPY  6-3-2010    Repeat -15     COLONOSCOPY N/A 2015    repeat in Procedure: COLONOSCOPY;  Surgeon: Cassie Snell MD;  Location: [facility]     EYE SURGERY       lazy eye      surgically repaired; LT     NASAL/SINUS POLYPECTOMY       wisdom teeth extracted            Anesthesia Evaluation     .             ROS/MED HX    ENT/Pulmonary:  - neg pulmonary ROS     Neurologic:  - neg neurologic ROS     Cardiovascular:     (+) Dyslipidemia, ----. : . . . :. .      (-) CAD, arrhythmias and irregular heartbeat/palpitations   METS/Exercise Tolerance:  >4 METS   Hematologic:         Musculoskeletal:  - neg musculoskeletal ROS       GI/Hepatic:  - neg GI/hepatic ROS       Renal/Genitourinary:         Endo:     (+) thyroid problem hypothyroidism, .      Psychiatric:     (+) psychiatric history anxiety, depression and other (comment) (OCD, ADHD, Chronic fatigue syndrome)      Infectious Disease:         Malignancy:   (+) Malignancy History of Skin  Skin CA Active status post,         Other:      "                    PHYSICAL EXAM:   Mental Status/Neuro: A/A/O   Airway: Facies: Feasible  Mallampati: II  Mouth/Opening: Full  TM distance: > 6 cm  Neck ROM: Full   Respiratory: Auscultation: CTAB     Resp. Rate: Normal     Resp. Effort: Normal      CV: Rhythm: Regular  Rate: Age appropriate  Heart: Normal Sounds  Edema: None   Comments:                      LABS:  CBC:   Lab Results   Component Value Date    WBC 5.3 05/21/2019    WBC 5.0 08/30/2018    HGB 13.7 05/21/2019    HGB 13.1 08/30/2018    HCT 40.5 05/21/2019    HCT 38.5 08/30/2018     05/21/2019     08/30/2018     BMP:   Lab Results   Component Value Date     05/21/2019     08/30/2018    POTASSIUM 3.8 05/21/2019    POTASSIUM 3.8 08/30/2018    CHLORIDE 104 05/21/2019    CHLORIDE 103 08/30/2018    CO2 28 05/21/2019    CO2 30 08/30/2018    BUN 18 05/21/2019    BUN 17 08/30/2018    CR 0.85 05/21/2019    CR 0.78 08/30/2018    GLC 92 05/21/2019    GLC 67 (L) 08/30/2018     COAGS: No results found for: PTT, INR, FIBR  POC: No results found for: BGM, HCG, HCGS  OTHER:   Lab Results   Component Value Date    LACT 2.5 (H) 08/21/2018    AHMET 9.3 05/21/2019    MAG 2.4 (H) 08/21/2018    ALBUMIN 4.2 05/21/2019    PROTTOTAL 8.1 05/21/2019    ALT 25 05/21/2019    AST 13 05/21/2019    ALKPHOS 85 05/21/2019    BILITOTAL 0.4 05/21/2019    LIPASE 116 08/21/2018    AMYLASE 53 08/21/2018    TSH 0.85 05/21/2019    CRP <2.9 08/21/2018    SED 28 08/21/2018        Preop Vitals    BP Readings from Last 3 Encounters:   10/11/19 104/80   10/08/19 122/72   10/03/19 110/82    Pulse Readings from Last 3 Encounters:   10/11/19 89   10/08/19 76   10/03/19 89      Resp Readings from Last 3 Encounters:   08/24/18 18   05/13/17 16   05/11/16 16    SpO2 Readings from Last 3 Encounters:   10/11/19 97%   10/08/19 99%   10/03/19 99%      Temp Readings from Last 1 Encounters:   10/11/19 37.3  C (99.2  F)    Ht Readings from Last 1 Encounters:   10/11/19 1.626 m (5' 4\")    " "  Wt Readings from Last 1 Encounters:   10/11/19 68.9 kg (152 lb)    Estimated body mass index is 26.09 kg/m  as calculated from the following:    Height as of 10/11/19: 1.626 m (5' 4\").    Weight as of 10/11/19: 68.9 kg (152 lb).     LDA:  Peripheral IV 08/23/18 Left Hand (Active)   Number of days: 417        Assessment:   ASA SCORE: 2    H&P: History and physical reviewed and following examination; no interval change.   Smoking Status:  Non-Smoker/Unknown   NPO Status: NPO Appropriate     Plan:   Anes. Type:  General   Pre-Medication: None   Induction:  IV (Standard)   Airway: LMA   Access/Monitoring: PIV   Maintenance: Balanced     Postop Plan:   Postop Pain: Opioids  Postop Sedation/Airway: Not planned     PONV Management:   Adult Risk Factors: Female, Non-Smoker, Postop Opioids   Prevention: Ondansetron, Dexamethasone, Propofol     CONSENT: Direct conversation   Plan and risks discussed with: Patient   Blood Products: Consent Deferred (Minimal Blood Loss)       Comments for Plan/Consent:  Plan:  GA with routine monitors    MD Vadim Zhou MD  "

## 2019-10-15 ENCOUNTER — TELEPHONE (OUTPATIENT)
Dept: PLASTIC SURGERY | Facility: CLINIC | Age: 69
End: 2019-10-15

## 2019-10-15 LAB — COPATH REPORT: NORMAL

## 2019-10-15 NOTE — OP NOTE
10/14/2019    Pre-op Diagnosis:  Lentigo maligna melanoma in situ  Post-op Diagnosis:  Lentigo maligna melanoma in situ    Procedure:   Wide local excision cutaneous melanoma of the left cheek    Surgeons:  Kai Gomez MD  Resident:  Jarrod Tillman MD    Anesthesia: General, LMA    EBL:  10 cc  Drains: None      Complications: None   Specimens:   Left cheek facial melanoma, surgical defect 4 cm by 5 cm, total 20 square cm    Findings:  Superficial lesion excised 1.0 cm margins     Indications:  Ms. Szymanski is a 69 year old female who presented to ENT with a diagnosis of lentigo maligna melanoma in situ. After a detailed discussion of the risks and benefits of surgical removal, elective surgical consent was obtained for a wide local excision of the remaining lesion.       Procedure:  After consent, the patient was brought to the operating room and placed in the supine position.  Following induction, an LMA was placed. Ventilation resumed without complication. The skin was locally prepped, and 2 channel facial nerve monitoring was utilized monitoring the midface branches of the facial nerve. Protective Tegaderms were placed on the face. Ophthalmic betadine was used for the surgical prep and the face was sterilely draped. A facility protocol timeout was performed. All parties were in agreement. Sterile saline was injected into the subcutaneous tissues surrounding the lesion for purposes of hydrodissection. Marking stitches were placed circumferentially. 12 o clock was labeled as the 2 Ethilon sutures. 1 short black anterior. The lesion was excised using a 15 blade. Hemostasis was achieved with a biploar. The lesion was passed off the field for permanent pathology. Xeroform bolster was applied using 4-0 nylon suture. Counts were correct. Dr Gomez was present for the entire procedure. Patient returned to the care of anesthesia and extubated without complication.     Jarrod Tillman MD  ENT resident     I was present  for the entire procedure  Kai Gomez M.D.

## 2019-10-15 NOTE — TELEPHONE ENCOUNTER
Patient is scheduled for surgery with Dr. West       Spoke or left message with: I spoke to the patient    Date of Surgery: 10/21/19    Location [facility]    H&P: Scheduled with Already done    Post op: NA patient leaving for [place] on 10/28/19    Additional imaging/appointments: [facility]    Surgery packet sent: 10/15/19     Additional comments:   We went over the patient needing a  and someone to stay with them for 24 hours after the surgery. The patient was given my direct number for any questions [phone]

## 2019-10-16 ENCOUNTER — OFFICE VISIT (OUTPATIENT)
Dept: OTOLARYNGOLOGY | Facility: CLINIC | Age: 69
End: 2019-10-16
Payer: COMMERCIAL

## 2019-10-16 VITALS
BODY MASS INDEX: 26.78 KG/M2 | SYSTOLIC BLOOD PRESSURE: 134 MMHG | WEIGHT: 156 LBS | DIASTOLIC BLOOD PRESSURE: 88 MMHG | HEART RATE: 92 BPM

## 2019-10-16 DIAGNOSIS — C43.39 MELANOMA OF CHEEK (H): Primary | ICD-10-CM

## 2019-10-16 DIAGNOSIS — M95.2 ACQUIRED FACIAL DEFORMITY: ICD-10-CM

## 2019-10-16 ASSESSMENT — PAIN SCALES - GENERAL: PAINLEVEL: NO PAIN (0)

## 2019-10-16 NOTE — NURSING NOTE
Chief Complaint   Patient presents with     Consult     Cheek melenoma     Blood pressure 134/88, pulse 92, weight 70.8 kg (156 lb).    Deborah Neal EMT

## 2019-10-16 NOTE — LETTER
10/16/2019       RE: Mony Szymanski  [address] [facility] [address]     Dear Colleague,    Thank you for referring your patient, Mony Szymanski, to the [facility] at [facility]. Please see a copy of my visit note below.    Facial Plastic and Reconstructive Surgery Consultation  10/16/2019    Referring Provider:  Kai Gomez MD      HPI:   I had the pleasure of seeing Mony Szymanski today in clinic for consultation for reconstructive consideration after wide local excision of a facial lentigo maligna.    Ms. Sykes is a 69 year old woman with a history of hypothyroidism, hyperlipidemia, anxiety/depression who underwent a wide local excision of a Left facial lentigo maligna melanoma on 10/14/2019. Xerform bolster is in place. She denies any purulent drainage, fevers, nightsweats or sign of infection since her resection.    Of note, [place]. We will need to see her closely during the postoperative period thus we recommended that she postpone her trip until her sutures have been removed and the viability of the flap proves stable.    Review Of Systems  ROS: 10 point ROS neg other than the symptoms noted above in the HPI.    Patient Active Problem List   Diagnosis     Generalized anxiety disorder     Chronic fatigue syndrome     Attention deficit disorder     Obsessive-compulsive disorder     FH: colon cancer     Mixed hyperlipidemia     Acquired hypothyroidism     Major depressive disorder, recurrent episode, in partial remission (H)     Vaso vagal episode     Small bowel obstruction due to adhesions (H)     Melanoma of cheek (H)     Past Surgical History:   Procedure Laterality Date     APPENDECTOMY       Appendicitis  1963    appendectomy     breast calcification      biopsy; RT     colonoscopy  2004    repeat 2014     COLONOSCOPY  6-3-2010    Repeat 2014-15     COLONOSCOPY N/A 7/20/2015    repeat in  2020//Procedure: COLONOSCOPY;  Surgeon: Cassie Snell MD;  Location: [facility]     EXCISE LESION CHEEK Left 10/14/2019    Procedure: wide local excision of left check melanoma;  Surgeon: Kai Gomez MD;  [facility]     EYE SURGERY       lazy eye  1956    surgically repaired; LT     NASAL/SINUS POLYPECTOMY       wisdom teeth extracted  1988     Current Outpatient Medications   Medication Sig Dispense Refill     5-Hydroxytryptophan (5-HTP) 50 MG CAPS Take by mouth daily.        acetaminophen (TYLENOL) 325 MG tablet Take 1-2 tablets (325-650 mg) by mouth every 4 hours as needed for mild pain 30 tablet 0     Calcium Carbonate (TUMS 500 OR) Take by mouth 2 times daily       Cholecalciferol (VITAMIN D) 1000 UNITS capsule Take 1 capsule by mouth daily.       fish oil-omega-3 fatty acids (FISH OIL) 1000 MG capsule Take 2 capsules by mouth daily.       fluticasone (FLONASE) 50 MCG/ACT nasal spray INSTILL 2 SPRAYS IN EACH NOSTRIL EVERY DAY 3 g 3     glucosamine 500 MG CAPS Take by mouth daily        levothyroxine (SYNTHROID/LEVOTHROID) 50 MCG tablet Take 1 tablet (50 mcg) by mouth daily 90 tablet 3     MAGNESIUM OXIDE PO Take 200 mg by mouth daily       mineral oil-hydrophilic petrolatum (AQUAPHOR) external ointment Apply topically as needed for other (incision/surgical bolster) 1 g 0     Multiple vitamin TABS Take 1 tablet by oral route every day with food       oxazepam (SERAX) 15 MG capsule TAKE ONE CAPSULE BY MOUTH THREE TIMES DAILY 270 capsule 3     oxyCODONE (ROXICODONE) 5 MG tablet Take 1-2 tablets (5-10 mg) by mouth every 4 hours as needed for moderate to severe pain 6 tablet 0     senna-docusate (SENOKOT-S/PERICOLACE) 8.6-50 MG tablet Take 1-2 tablets by mouth 2 times daily as needed for constipation (While on oral opioids.) 20 tablet 0     traZODone (DESYREL) 50 MG tablet TAKE 1 TO 3 TABLETS BY MOUTH AT BEDTIME 270 tablet 3     venlafaxine (EFFEXOR-XR) 75 MG 24 hr capsule TAKE 1 CAPSULE BY MOUTH DAILY  WITH FOOD 90 capsule 3     Codeine  Social History     Socioeconomic History     Marital status:      Spouse name: Not on file     Number of children: Not on file     Years of education: Not on file     Highest education level: Not on file   Occupational History     Not on file   Social Needs     Financial resource strain: Not on file     Food insecurity:     Worry: Not on file     Inability: Not on file     Transportation needs:     Medical: Not on file     Non-medical: Not on file   Tobacco Use     Smoking status: Never Smoker     Smokeless tobacco: Never Used   Substance and Sexual Activity     Alcohol use: Yes     Comment: Beer & Wine, socially     Drug use: Never     Sexual activity: Not on file   Lifestyle     Physical activity:     Days per week: Not on file     Minutes per session: Not on file     Stress: Not on file   Relationships     Social connections:     Talks on phone: Not on file     Gets together: Not on file     Attends Gnosticism service: Not on file     Active member of club or organization: Not on file     Attends meetings of clubs or organizations: Not on file     Relationship status: Not on file     Intimate partner violence:     Fear of current or ex partner: Not on file     Emotionally abused: Not on file     Physically abused: Not on file     Forced sexual activity: Not on file   Other Topics Concern      Service Not Asked     Blood Transfusions Yes     Caffeine Concern Yes     Comment: Chocolate     Occupational Exposure Not Asked     Hobby Hazards Not Asked     Sleep Concern Not Asked     Stress Concern Not Asked     Weight Concern Not Asked     Special Diet Not Asked     Back Care Not Asked     Exercise Yes     Comment: Walking, daily     Bike Helmet Not Asked     Seat Belt Not Asked     Self-Exams Not Asked     Parent/sibling w/ CABG, MI or angioplasty before 65F 55M? Not Asked   Social History Narrative     Not on file     Family History   Problem Relation Age of Onset      Cancer Maternal Grandmother         Colon     Cancer Paternal Grandmother         Lung     Diabetes Paternal Grandfather         Type 2     Other - See Comments Father         TIA's       PE:  Alert and Oriented, Answering Questions Appropriately  Normocephalic, Face Symmetric  Left cheek bolster in place, 1.5 cm from lateral canthus, lower lid subunit not involved  Skin: Weston 4, thick sebaceous skin   Facial Nerve Intact and facial movement symmetric  EOM's, PEERL  Nasal Exam: No external Deformity  Chin: Normal   Lips/Teeth/Toungue/Gums: Lips intact, Normal Dentition, Occlusion intact, Oral mucosa intact, no lesions/ulcerations/masses, Tongue mobile  OP: Palate elevates with speech, Uvula midline  Neck: No lymphadenopathy, no thyromegaly, trachea midline  Chest: No wheezing, cyanosis, or stridor  Neuro/Psych: CN's 2-12 intact, Moves all extremities, ambulation in intact, positive affect, no notable muscle weakness          IMPRESSION:  - Left facial defect measuring 5 x 6cm after wide local excision of lentigo maligna melanoma.    PLAN:  -- We plan to proceed with a Left cervicofacial advancement flap on 10/21/2019  I discussed the flap and the extent of surgery. She will likely have drains in place  -- She understands reconstruction is dependent on pathology results.       This patient was seen and plan discussed with staff surgeon, Dr. West.    Anastasia Moser MD  [facility] PGY4    Photodocumentation was obtained.     I spent a total of 30 minutes face-to-face with Mony Szymanski during today's office visit.  Over 50% of this time was spent counseling the patient and/or coordinating care regarding left facial melanoma.  See note for details.        Again, thank you for allowing me to participate in the care of your patient.      Sincerely,    Breanna West MD

## 2019-10-16 NOTE — NURSING NOTE
Photodocumentation obtained.    Pt. Scheduled to have Cervicofacial Flap for Closure of L. Cheek Defect on Monday, 10/21/19.    Soap given.  Pt. Denied the need for any further pre-op teaching, seeing as how she just had surgery with Dr. Gomez on 10/14/19.     Ivory Andujar RN  10/16/2019 12:56 PM

## 2019-10-16 NOTE — PROGRESS NOTES
Facial Plastic and Reconstructive Surgery Consultation  10/16/2019    Referring Provider:  Kai Gomez MD      HPI:   I had the pleasure of seeing Mony Szymanski today in clinic for consultation for reconstructive consideration after wide local excision of a facial lentigo maligna.    Ms. Sykes is a 69 year old woman with a history of hypothyroidism, hyperlipidemia, anxiety/depression who underwent a wide local excision of a Left facial lentigo maligna melanoma on 10/14/2019. Xerform bolster is in place. She denies any purulent drainage, fevers, nightsweats or sign of infection since her resection.    Of note, [place] the next couple weeks. We will need to see her closely during the postoperative period thus we recommended that she postpone her trip until her sutures have been removed and the viability of the flap proves stable.    Review Of Systems  ROS: 10 point ROS neg other than the symptoms noted above in the HPI.    Patient Active Problem List   Diagnosis     Generalized anxiety disorder     Chronic fatigue syndrome     Attention deficit disorder     Obsessive-compulsive disorder     FH: colon cancer     Mixed hyperlipidemia     Acquired hypothyroidism     Major depressive disorder, recurrent episode, in partial remission (H)     Vaso vagal episode     Small bowel obstruction due to adhesions (H)     Melanoma of cheek (H)     Past Surgical History:   Procedure Laterality Date     APPENDECTOMY       Appendicitis  1963    appendectomy     breast calcification      biopsy; RT     colonoscopy  2004    repeat 2014     COLONOSCOPY  6-3-2010    Repeat 2014-15     COLONOSCOPY N/A 7/20/2015    repeat in 2020//Procedure: COLONOSCOPY;  Surgeon: Cassie Snell MD;  Location: [facility]     EXCISE LESION CHEEK Left 10/14/2019    Procedure: wide local excision of left check melanoma;  Surgeon: Kai Gomez MD;  [facility]     EYE SURGERY       lazy eye  1956    surgically  repaired; LT     NASAL/SINUS POLYPECTOMY       wisdom teeth extracted  1988     Current Outpatient Medications   Medication Sig Dispense Refill     5-Hydroxytryptophan (5-HTP) 50 MG CAPS Take by mouth daily.        acetaminophen (TYLENOL) 325 MG tablet Take 1-2 tablets (325-650 mg) by mouth every 4 hours as needed for mild pain 30 tablet 0     Calcium Carbonate (TUMS 500 OR) Take by mouth 2 times daily       Cholecalciferol (VITAMIN D) 1000 UNITS capsule Take 1 capsule by mouth daily.       fish oil-omega-3 fatty acids (FISH OIL) 1000 MG capsule Take 2 capsules by mouth daily.       fluticasone (FLONASE) 50 MCG/ACT nasal spray INSTILL 2 SPRAYS IN EACH NOSTRIL EVERY DAY 3 g 3     glucosamine 500 MG CAPS Take by mouth daily        levothyroxine (SYNTHROID/LEVOTHROID) 50 MCG tablet Take 1 tablet (50 mcg) by mouth daily 90 tablet 3     MAGNESIUM OXIDE PO Take 200 mg by mouth daily       mineral oil-hydrophilic petrolatum (AQUAPHOR) external ointment Apply topically as needed for other (incision/surgical bolster) 1 g 0     Multiple vitamin TABS Take 1 tablet by oral route every day with food       oxazepam (SERAX) 15 MG capsule TAKE ONE CAPSULE BY MOUTH THREE TIMES DAILY 270 capsule 3     oxyCODONE (ROXICODONE) 5 MG tablet Take 1-2 tablets (5-10 mg) by mouth every 4 hours as needed for moderate to severe pain 6 tablet 0     senna-docusate (SENOKOT-S/PERICOLACE) 8.6-50 MG tablet Take 1-2 tablets by mouth 2 times daily as needed for constipation (While on oral opioids.) 20 tablet 0     traZODone (DESYREL) 50 MG tablet TAKE 1 TO 3 TABLETS BY MOUTH AT BEDTIME 270 tablet 3     venlafaxine (EFFEXOR-XR) 75 MG 24 hr capsule TAKE 1 CAPSULE BY MOUTH DAILY WITH FOOD 90 capsule 3     Codeine  Social History     Socioeconomic History     Marital status:      Spouse name: Not on file     Number of children: Not on file     Years of education: Not on file     Highest education level: Not on file   Occupational History     Not on  file   Social Needs     Financial resource strain: Not on file     Food insecurity:     Worry: Not on file     Inability: Not on file     Transportation needs:     Medical: Not on file     Non-medical: Not on file   Tobacco Use     Smoking status: Never Smoker     Smokeless tobacco: Never Used   Substance and Sexual Activity     Alcohol use: Yes     Comment: Beer & Wine, socially     Drug use: Never     Sexual activity: Not on file   Lifestyle     Physical activity:     Days per week: Not on file     Minutes per session: Not on file     Stress: Not on file   Relationships     Social connections:     Talks on phone: Not on file     Gets together: Not on file     Attends Oriental orthodox service: Not on file     Active member of club or organization: Not on file     Attends meetings of clubs or organizations: Not on file     Relationship status: Not on file     Intimate partner violence:     Fear of current or ex partner: Not on file     Emotionally abused: Not on file     Physically abused: Not on file     Forced sexual activity: Not on file   Other Topics Concern      Service Not Asked     Blood Transfusions Yes     Caffeine Concern Yes     Comment: Chocolate     Occupational Exposure Not Asked     Hobby Hazards Not Asked     Sleep Concern Not Asked     Stress Concern Not Asked     Weight Concern Not Asked     Special Diet Not Asked     Back Care Not Asked     Exercise Yes     Comment: Walking, daily     Bike Helmet Not Asked     Seat Belt Not Asked     Self-Exams Not Asked     Parent/sibling w/ CABG, MI or angioplasty before 65F 55M? Not Asked   Social History Narrative     Not on file     Family History   Problem Relation Age of Onset     Cancer Maternal Grandmother         Colon     Cancer Paternal Grandmother         Lung     Diabetes Paternal Grandfather         Type 2     Other - See Comments Father         TIA's       PE:  Alert and Oriented, Answering Questions Appropriately  Normocephalic, Face  Symmetric  Left cheek bolster in place, 1.5 cm from lateral canthus, lower lid subunit not involved  Skin: Weston 4, thick sebaceous skin   Facial Nerve Intact and facial movement symmetric  EOM's, PEERL  Nasal Exam: No external Deformity  Chin: Normal   Lips/Teeth/Toungue/Gums: Lips intact, Normal Dentition, Occlusion intact, Oral mucosa intact, no lesions/ulcerations/masses, Tongue mobile  OP: Palate elevates with speech, Uvula midline  Neck: No lymphadenopathy, no thyromegaly, trachea midline  Chest: No wheezing, cyanosis, or stridor  Neuro/Psych: CN's 2-12 intact, Moves all extremities, ambulation in intact, positive affect, no notable muscle weakness          IMPRESSION:  - Left facial defect measuring 5 x 6cm after wide local excision of lentigo maligna melanoma.    PLAN:  -- We plan to proceed with a Left cervicofacial advancement flap on 10/21/2019  I discussed the flap and the extent of surgery. She will likely have drains in place  -- She understands reconstruction is dependent on pathology results.       This patient was seen and plan discussed with staff surgeon, Dr. West.    Anastasia Moser MD  [facility] PGY4    Photodocumentation was obtained.     I spent a total of 30 minutes face-to-face with Mony Szymanski during today's office visit.  Over 50% of this time was spent counseling the patient and/or coordinating care regarding left facial melanoma.  See note for details.

## 2019-10-18 ENCOUNTER — ANESTHESIA EVENT (OUTPATIENT)
Dept: SURGERY | Facility: AMBULATORY SURGERY CENTER | Age: 69
End: 2019-10-18

## 2019-10-21 ENCOUNTER — HOSPITAL ENCOUNTER (OUTPATIENT)
Facility: AMBULATORY SURGERY CENTER | Age: 69
End: 2019-10-21
Attending: OTOLARYNGOLOGY
Payer: COMMERCIAL

## 2019-10-21 ENCOUNTER — ANESTHESIA (OUTPATIENT)
Dept: SURGERY | Facility: AMBULATORY SURGERY CENTER | Age: 69
End: 2019-10-21

## 2019-10-21 VITALS
OXYGEN SATURATION: 95 % | RESPIRATION RATE: 16 BRPM | HEART RATE: 88 BPM | BODY MASS INDEX: 24.75 KG/M2 | SYSTOLIC BLOOD PRESSURE: 132 MMHG | TEMPERATURE: 100 F | WEIGHT: 145 LBS | DIASTOLIC BLOOD PRESSURE: 76 MMHG | HEIGHT: 64 IN

## 2019-10-21 DIAGNOSIS — M95.2 ACQUIRED FACIAL DEFORMITY: Primary | ICD-10-CM

## 2019-10-21 DIAGNOSIS — C43.39 MELANOMA OF CHEEK (H): ICD-10-CM

## 2019-10-21 LAB — COPATH REPORT: NORMAL

## 2019-10-21 RX ORDER — ONDANSETRON 4 MG/1
4 TABLET, ORALLY DISINTEGRATING ORAL EVERY 30 MIN PRN
Status: DISCONTINUED | OUTPATIENT
Start: 2019-10-21 | End: 2019-10-22 | Stop reason: HOSPADM

## 2019-10-21 RX ORDER — ONDANSETRON 2 MG/ML
4 INJECTION INTRAMUSCULAR; INTRAVENOUS EVERY 30 MIN PRN
Status: DISCONTINUED | OUTPATIENT
Start: 2019-10-21 | End: 2019-10-22 | Stop reason: HOSPADM

## 2019-10-21 RX ORDER — PROPOFOL 10 MG/ML
INJECTION, EMULSION INTRAVENOUS CONTINUOUS PRN
Status: DISCONTINUED | OUTPATIENT
Start: 2019-10-21 | End: 2019-10-21

## 2019-10-21 RX ORDER — FENTANYL CITRATE 50 UG/ML
25-50 INJECTION, SOLUTION INTRAMUSCULAR; INTRAVENOUS
Status: DISCONTINUED | OUTPATIENT
Start: 2019-10-21 | End: 2019-10-22 | Stop reason: HOSPADM

## 2019-10-21 RX ORDER — FENTANYL CITRATE 50 UG/ML
INJECTION, SOLUTION INTRAMUSCULAR; INTRAVENOUS PRN
Status: DISCONTINUED | OUTPATIENT
Start: 2019-10-21 | End: 2019-10-21

## 2019-10-21 RX ORDER — AMOXICILLIN 250 MG
1-2 CAPSULE ORAL 2 TIMES DAILY
Qty: 30 TABLET | Refills: 0 | Status: SHIPPED | OUTPATIENT
Start: 2019-10-21 | End: 2020-07-24

## 2019-10-21 RX ORDER — DEXAMETHASONE SODIUM PHOSPHATE 4 MG/ML
INJECTION, SOLUTION INTRA-ARTICULAR; INTRALESIONAL; INTRAMUSCULAR; INTRAVENOUS; SOFT TISSUE PRN
Status: DISCONTINUED | OUTPATIENT
Start: 2019-10-21 | End: 2019-10-21

## 2019-10-21 RX ORDER — SODIUM CHLORIDE, SODIUM LACTATE, POTASSIUM CHLORIDE, CALCIUM CHLORIDE 600; 310; 30; 20 MG/100ML; MG/100ML; MG/100ML; MG/100ML
INJECTION, SOLUTION INTRAVENOUS CONTINUOUS
Status: DISCONTINUED | OUTPATIENT
Start: 2019-10-21 | End: 2019-10-22 | Stop reason: HOSPADM

## 2019-10-21 RX ORDER — ACETAMINOPHEN 325 MG/1
975 TABLET ORAL ONCE
Status: COMPLETED | OUTPATIENT
Start: 2019-10-21 | End: 2019-10-21

## 2019-10-21 RX ORDER — LABETALOL 20 MG/4 ML (5 MG/ML) INTRAVENOUS SYRINGE
PRN
Status: DISCONTINUED | OUTPATIENT
Start: 2019-10-21 | End: 2019-10-21

## 2019-10-21 RX ORDER — CEFAZOLIN SODIUM 1 G/50ML
1 SOLUTION INTRAVENOUS SEE ADMIN INSTRUCTIONS
Status: DISCONTINUED | OUTPATIENT
Start: 2019-10-21 | End: 2019-10-22 | Stop reason: HOSPADM

## 2019-10-21 RX ORDER — MEPERIDINE HYDROCHLORIDE 25 MG/ML
12.5 INJECTION INTRAMUSCULAR; INTRAVENOUS; SUBCUTANEOUS
Status: DISCONTINUED | OUTPATIENT
Start: 2019-10-21 | End: 2019-10-22 | Stop reason: HOSPADM

## 2019-10-21 RX ORDER — OXYCODONE HYDROCHLORIDE 5 MG/1
5 TABLET ORAL EVERY 4 HOURS PRN
Status: DISCONTINUED | OUTPATIENT
Start: 2019-10-21 | End: 2019-10-22 | Stop reason: HOSPADM

## 2019-10-21 RX ORDER — GLYCOPYRROLATE 0.2 MG/ML
INJECTION, SOLUTION INTRAMUSCULAR; INTRAVENOUS PRN
Status: DISCONTINUED | OUTPATIENT
Start: 2019-10-21 | End: 2019-10-21

## 2019-10-21 RX ORDER — LIDOCAINE 40 MG/G
CREAM TOPICAL
Status: DISCONTINUED | OUTPATIENT
Start: 2019-10-21 | End: 2019-10-22 | Stop reason: HOSPADM

## 2019-10-21 RX ORDER — ONDANSETRON 2 MG/ML
INJECTION INTRAMUSCULAR; INTRAVENOUS PRN
Status: DISCONTINUED | OUTPATIENT
Start: 2019-10-21 | End: 2019-10-21

## 2019-10-21 RX ORDER — CEFAZOLIN SODIUM 2 G/50ML
2 SOLUTION INTRAVENOUS
Status: DISCONTINUED | OUTPATIENT
Start: 2019-10-21 | End: 2019-10-22 | Stop reason: HOSPADM

## 2019-10-21 RX ORDER — ONDANSETRON 4 MG/1
4 TABLET, ORALLY DISINTEGRATING ORAL
Status: CANCELLED | OUTPATIENT
Start: 2019-10-21

## 2019-10-21 RX ORDER — ACETAMINOPHEN 325 MG/1
650 TABLET ORAL
Status: CANCELLED | OUTPATIENT
Start: 2019-10-21

## 2019-10-21 RX ORDER — CEFAZOLIN SODIUM 1 G/3ML
INJECTION, POWDER, FOR SOLUTION INTRAMUSCULAR; INTRAVENOUS PRN
Status: DISCONTINUED | OUTPATIENT
Start: 2019-10-21 | End: 2019-10-21

## 2019-10-21 RX ORDER — LIDOCAINE HYDROCHLORIDE 20 MG/ML
INJECTION, SOLUTION INFILTRATION; PERINEURAL PRN
Status: DISCONTINUED | OUTPATIENT
Start: 2019-10-21 | End: 2019-10-21

## 2019-10-21 RX ORDER — OXYCODONE HYDROCHLORIDE 5 MG/1
5 TABLET ORAL
Status: CANCELLED | OUTPATIENT
Start: 2019-10-21

## 2019-10-21 RX ORDER — LIDOCAINE HYDROCHLORIDE AND EPINEPHRINE 10; 10 MG/ML; UG/ML
INJECTION, SOLUTION INFILTRATION; PERINEURAL PRN
Status: DISCONTINUED | OUTPATIENT
Start: 2019-10-21 | End: 2019-10-21 | Stop reason: HOSPADM

## 2019-10-21 RX ORDER — ONDANSETRON 4 MG/1
4-8 TABLET, ORALLY DISINTEGRATING ORAL EVERY 8 HOURS PRN
Qty: 4 TABLET | Refills: 0 | Status: SHIPPED | OUTPATIENT
Start: 2019-10-21 | End: 2020-07-24

## 2019-10-21 RX ORDER — OXYCODONE HYDROCHLORIDE 5 MG/1
5-10 TABLET ORAL EVERY 4 HOURS PRN
Qty: 10 TABLET | Refills: 0 | Status: SHIPPED | OUTPATIENT
Start: 2019-10-21 | End: 2020-07-24

## 2019-10-21 RX ORDER — NALOXONE HYDROCHLORIDE 0.4 MG/ML
.1-.4 INJECTION, SOLUTION INTRAMUSCULAR; INTRAVENOUS; SUBCUTANEOUS
Status: DISCONTINUED | OUTPATIENT
Start: 2019-10-21 | End: 2019-10-22 | Stop reason: HOSPADM

## 2019-10-21 RX ORDER — MUPIROCIN 20 MG/G
OINTMENT TOPICAL 3 TIMES DAILY
Qty: 30 G | Refills: 1 | Status: SHIPPED | OUTPATIENT
Start: 2019-10-21 | End: 2020-08-16

## 2019-10-21 RX ORDER — EPHEDRINE SULFATE 50 MG/ML
INJECTION, SOLUTION INTRAMUSCULAR; INTRAVENOUS; SUBCUTANEOUS PRN
Status: DISCONTINUED | OUTPATIENT
Start: 2019-10-21 | End: 2019-10-21

## 2019-10-21 RX ORDER — PROPOFOL 10 MG/ML
INJECTION, EMULSION INTRAVENOUS PRN
Status: DISCONTINUED | OUTPATIENT
Start: 2019-10-21 | End: 2019-10-21

## 2019-10-21 RX ADMIN — SODIUM CHLORIDE, SODIUM LACTATE, POTASSIUM CHLORIDE, CALCIUM CHLORIDE: 600; 310; 30; 20 INJECTION, SOLUTION INTRAVENOUS at 15:35

## 2019-10-21 RX ADMIN — Medication 200 MCG: at 14:42

## 2019-10-21 RX ADMIN — Medication 200 MCG: at 13:32

## 2019-10-21 RX ADMIN — EPHEDRINE SULFATE 10 MG: 50 INJECTION, SOLUTION INTRAMUSCULAR; INTRAVENOUS; SUBCUTANEOUS at 13:51

## 2019-10-21 RX ADMIN — FENTANYL CITRATE 50 MCG: 50 INJECTION, SOLUTION INTRAMUSCULAR; INTRAVENOUS at 15:53

## 2019-10-21 RX ADMIN — ONDANSETRON 4 MG: 2 INJECTION INTRAMUSCULAR; INTRAVENOUS at 14:29

## 2019-10-21 RX ADMIN — DEXAMETHASONE SODIUM PHOSPHATE 4 MG: 4 INJECTION, SOLUTION INTRA-ARTICULAR; INTRALESIONAL; INTRAMUSCULAR; INTRAVENOUS; SOFT TISSUE at 13:18

## 2019-10-21 RX ADMIN — FENTANYL CITRATE 50 MCG: 50 INJECTION, SOLUTION INTRAMUSCULAR; INTRAVENOUS at 13:05

## 2019-10-21 RX ADMIN — Medication 200 MCG: at 13:46

## 2019-10-21 RX ADMIN — ONDANSETRON 4 MG: 2 INJECTION INTRAMUSCULAR; INTRAVENOUS at 13:18

## 2019-10-21 RX ADMIN — LABETALOL 20 MG/4 ML (5 MG/ML) INTRAVENOUS SYRINGE 5 MG: at 16:01

## 2019-10-21 RX ADMIN — PROPOFOL 200 MG: 10 INJECTION, EMULSION INTRAVENOUS at 13:18

## 2019-10-21 RX ADMIN — Medication 0.5 MG: at 15:22

## 2019-10-21 RX ADMIN — CEFAZOLIN SODIUM 1 G: 1 INJECTION, POWDER, FOR SOLUTION INTRAMUSCULAR; INTRAVENOUS at 15:35

## 2019-10-21 RX ADMIN — ACETAMINOPHEN 975 MG: 325 TABLET ORAL at 10:44

## 2019-10-21 RX ADMIN — CEFAZOLIN SODIUM 2 G: 1 INJECTION, POWDER, FOR SOLUTION INTRAMUSCULAR; INTRAVENOUS at 13:35

## 2019-10-21 RX ADMIN — LIDOCAINE HYDROCHLORIDE 100 MG: 20 INJECTION, SOLUTION INFILTRATION; PERINEURAL at 13:18

## 2019-10-21 RX ADMIN — FENTANYL CITRATE 50 MCG: 50 INJECTION, SOLUTION INTRAMUSCULAR; INTRAVENOUS at 13:09

## 2019-10-21 RX ADMIN — Medication 100 MCG: at 14:32

## 2019-10-21 RX ADMIN — Medication 200 MCG: at 13:57

## 2019-10-21 RX ADMIN — Medication 100 MCG: at 14:10

## 2019-10-21 RX ADMIN — GLYCOPYRROLATE 0.2 MG: 0.2 INJECTION, SOLUTION INTRAMUSCULAR; INTRAVENOUS at 13:05

## 2019-10-21 RX ADMIN — PROPOFOL 50 MG: 10 INJECTION, EMULSION INTRAVENOUS at 13:26

## 2019-10-21 RX ADMIN — SODIUM CHLORIDE, SODIUM LACTATE, POTASSIUM CHLORIDE, CALCIUM CHLORIDE: 600; 310; 30; 20 INJECTION, SOLUTION INTRAVENOUS at 13:02

## 2019-10-21 RX ADMIN — PROPOFOL 50 MCG/KG/MIN: 10 INJECTION, EMULSION INTRAVENOUS at 13:15

## 2019-10-21 RX ADMIN — EPHEDRINE SULFATE 5 MG: 50 INJECTION, SOLUTION INTRAMUSCULAR; INTRAVENOUS; SUBCUTANEOUS at 14:42

## 2019-10-21 RX ADMIN — EPHEDRINE SULFATE 5 MG: 50 INJECTION, SOLUTION INTRAMUSCULAR; INTRAVENOUS; SUBCUTANEOUS at 14:10

## 2019-10-21 RX ADMIN — EPHEDRINE SULFATE 5 MG: 50 INJECTION, SOLUTION INTRAMUSCULAR; INTRAVENOUS; SUBCUTANEOUS at 14:32

## 2019-10-21 ASSESSMENT — ENCOUNTER SYMPTOMS: DYSRHYTHMIAS: 0

## 2019-10-21 ASSESSMENT — MIFFLIN-ST. JEOR: SCORE: 1177.72

## 2019-10-21 NOTE — ANESTHESIA CARE TRANSFER NOTE
Patient: Mony Szymanski    Procedure(s):  Cervicofacial Flap for Closure of Left Cheek Defect    Diagnosis: Melanoma of cheek (H) [C43.39]  Diagnosis Additional Information: No value filed.    Anesthesia Type:   General     Note:  Airway :Room Air  Patient transferred to:PACU  Handoff Report: Identifed the Patient, Identified the Reponsible Provider, Reviewed the pertinent medical history, Discussed the surgical course, Reviewed Intra-OP anesthesia mangement and issues during anesthesia, Set expectations for post-procedure period and Allowed opportunity for questions and acknowledgement of understanding      Vitals: (Last set prior to Anesthesia Care Transfer)    CRNA VITALS  10/21/2019 1542 - 10/21/2019 1620      10/21/2019             Pulse:  121    SpO2:  96 %    Resp Rate (set):  10                Electronically Signed By: ISIAH Cheney CRNA  October 21, 2019  4:20 PM

## 2019-10-21 NOTE — ANESTHESIA PREPROCEDURE EVALUATION
Anesthesia Pre-Procedure Evaluation    Patient: Mony Szymanski   MRN:     [id number] Gender:   female   Age:    69 year old :      1950        Preoperative Diagnosis: Melanoma of cheek (H) [C43.39]   Procedure(s):  Cervicofacial Flap for Closure of Left Cheek Defect     Past Medical History:   Diagnosis Date     Attention deficit disorder of childhood without me 2010     Chronic fatigue syndrome 2010     FH: colon cancer      Generalized anxiety disorder 2010     Hx SBO      Hyperlipidemia 2011     Hypothyroidism 2011     Major depressive disorder, recurrent episode, unspecified 2011     Obsessive-compulsive disorders 2010     Small bowel obstruction due to adhesions (H) 2018      Past Surgical History:   Procedure Laterality Date     APPENDECTOMY       Appendicitis  1963    appendectomy     breast calcification      biopsy; RT     colonoscopy      repeat      COLONOSCOPY  6-3-2010    Repeat -15     COLONOSCOPY N/A 2015    repeat in Procedure: COLONOSCOPY;  Surgeon: Cassie Snell MD;  Location: [facility]     EXCISE LESION CHEEK Left 10/14/2019    Procedure: wide local excision of left check melanoma;  Surgeon: Kai Gomez MD;  Location: [facility]     EYE SURGERY       lazy eye      surgically repaired; LT     NASAL/SINUS POLYPECTOMY       wisdom teeth extracted            Anesthesia Evaluation     .             ROS/MED HX    ENT/Pulmonary:  - neg pulmonary ROS     Neurologic:  - neg neurologic ROS     Cardiovascular:     (+) Dyslipidemia, ----. : . . . :. .      (-) CAD, arrhythmias and irregular heartbeat/palpitations   METS/Exercise Tolerance:  >4 METS   Hematologic:         Musculoskeletal:  - neg musculoskeletal ROS       GI/Hepatic:  - neg GI/hepatic ROS       Renal/Genitourinary:         Endo:     (+) thyroid problem hypothyroidism, .      Psychiatric:     (+) psychiatric history anxiety, depression and other (comment) (OCD,  ADHD, Chronic fatigue syndrome)      Infectious Disease:         Malignancy:   (+) Malignancy History of Skin  Skin CA Active status post,         Other:                         PHYSICAL EXAM:   Mental Status/Neuro: A/A/O   Airway: Facies: Feasible  Mallampati: I  Mouth/Opening: Full  TM distance: > 6 cm  Neck ROM: Full   Respiratory: Auscultation: CTAB     Resp. Rate: Normal     Resp. Effort: Normal      CV: Rhythm: Regular  Rate: Age appropriate  Heart: Normal Sounds  Edema: None   Comments:      Dental: Normal Dentition                LABS:  CBC:   Lab Results   Component Value Date    WBC 5.3 05/21/2019    WBC 5.0 08/30/2018    HGB 13.7 05/21/2019    HGB 13.1 08/30/2018    HCT 40.5 05/21/2019    HCT 38.5 08/30/2018     05/21/2019     08/30/2018     BMP:   Lab Results   Component Value Date     05/21/2019     08/30/2018    POTASSIUM 3.8 05/21/2019    POTASSIUM 3.8 08/30/2018    CHLORIDE 104 05/21/2019    CHLORIDE 103 08/30/2018    CO2 28 05/21/2019    CO2 30 08/30/2018    BUN 18 05/21/2019    BUN 17 08/30/2018    CR 0.85 05/21/2019    CR 0.78 08/30/2018    GLC 92 05/21/2019    GLC 67 (L) 08/30/2018     COAGS: No results found for: PTT, INR, FIBR  POC: No results found for: BGM, HCG, HCGS  OTHER:   Lab Results   Component Value Date    LACT 2.5 (H) 08/21/2018    AHMET 9.3 05/21/2019    MAG 2.4 (H) 08/21/2018    ALBUMIN 4.2 05/21/2019    PROTTOTAL 8.1 05/21/2019    ALT 25 05/21/2019    AST 13 05/21/2019    ALKPHOS 85 05/21/2019    BILITOTAL 0.4 05/21/2019    LIPASE 116 08/21/2018    AMYLASE 53 08/21/2018    TSH 0.85 05/21/2019    CRP <2.9 08/21/2018    SED 28 08/21/2018        Preop Vitals    BP Readings from Last 3 Encounters:   10/21/19 (!) 141/99   10/16/19 134/88   10/14/19 135/80    Pulse Readings from Last 3 Encounters:   10/21/19 88   10/16/19 92   10/14/19 83      Resp Readings from Last 3 Encounters:   10/21/19 16   10/14/19 14   10/14/19 16    SpO2 Readings from Last 3 Encounters:  "  10/21/19 96%   10/14/19 99%   10/14/19 98%      Temp Readings from Last 1 Encounters:   10/21/19 36.6  C (97.9  F) (Oral)    Ht Readings from Last 1 Encounters:   10/21/19 1.626 m (5' 4\")      Wt Readings from Last 1 Encounters:   10/21/19 65.8 kg (145 lb)    Estimated body mass index is 24.89 kg/m  as calculated from the following:    Height as of this encounter: 1.626 m (5' 4\").    Weight as of this encounter: 65.8 kg (145 lb).     LDA:  Peripheral IV 08/23/18 Left Hand (Active)   Number of days: 424        Assessment:   ASA SCORE: 3    H&P: History and physical reviewed and following examination; no interval change.   Smoking Status:  Non-Smoker/Unknown   NPO Status: NPO Appropriate     Plan:   Anes. Type:  General   Pre-Medication: None   Induction:  IV (Standard)   Airway: LMA   Access/Monitoring: PIV   Maintenance: Balanced     Postop Plan:   Postop Pain: Opioids  Postop Sedation/Airway: Not planned  Disposition: Outpatient     PONV Management:   Adult Risk Factors: Female, Non-Smoker, Postop Opioids   Prevention: Ondansetron     CONSENT: Direct conversation   Plan and risks discussed with: Patient                      Madi Wood MD  "

## 2019-10-21 NOTE — DISCHARGE INSTRUCTIONS
[facility]  Home Care Following Anesthesia  For 24 hours after surgery:  1. Get plenty of rest.  A responsible adult must stay with you for at least 24 hours after you leave the surgery center.  2. Do not drive or use heavy equipment.  If you have weakness or tingling, don't drive or use heavy equipment until this feeling goes away.   3. Do not drink alcohol.   4. Avoid strenuous or risky activities.  Ask for help when climbing stairs.  5. You may feel lightheaded.  IF so, sit for a few minutes before standing.  Have someone help you get up.   6. If you have nausea (feel sick to your stomach): Drink only clear liquids such as apple juice, ginger ale, broth or 7-Up.  Rest may also help.  Be sure to drink enough fluids.  Move to a regular diet as you feel able.   7. You may have a slight fever.  Call the doctor if your fever is over 100 F (37.7 C) (taken under the tongue) or lasts longer than 24 hours.  8. You may have a dry mouth, a sore throat, muscle aches or trouble sleeping. These should go away after 24 hours.  9. Do not make important or legal decisions.               Tips for taking pain medications  To get the best pain relief possible, remember these points:    Take pain medications as directed, before pain becomes severe.    Pain medication can upset your stomach: taking it with food may help.    Constipation is a common side effect of pain medication. Drink plenty of  fluids.    Eat foods high in fiber. Take a stool softener if recommended by your doctor or pharmacist.    Do not drink alcohol, drive or operate machinery while taking pain medications.    Ask about other ways to control pain, such as with heat, ice or relaxation.    Tylenol/Acetaminophen Consumption  To help encourage the safe use of acetaminophen, the makers of TYLENOL  have lowered the maximum daily dose for single-ingredient Extra Strength TYLENOL  (acetaminophen) products sold in the [place] from 8  pills per day (4,000 mg) to 6 pills per day (3,000 mg). The dosing interval has also changed from 2 pills every 4-6 hours to 2 pills every 6 hours.    If you feel your pain relief is insufficient, you may take Tylenol/Acetaminophen in addition to your narcotic pain medication.     Be careful not to exceed 3,000 mg of Tylenol/Acetaminophen in a 24 hour period from all sources.    If you are taking extra strength Tylenol/acetaminophen (500 mg), the maximum dose is 6 tablets in 24 hours.    If you are taking regular strength acetaminophen (325 mg), the maximum dose is 9 tablets in 24 hours.    Call a doctor for any of the followin. Signs of infection (fever, growing tenderness at the surgery site, a large amount of drainage or bleeding, severe pain, foul-smelling drainage, redness, swelling).  2. It has been over 8 to 10 hours since surgery and you are still not able to urinate (pass water).  3. Headache for over 24 hours.  4. Numbness, tingling or weakness the day after surgery (if you had spinal anesthesia).  Your doctor is:  Dr. Breanna West, Otolaryngology: [phone]                    Or dial [phone] and ask for the resident on call for:  ENT Otolaryngology  For emergency care, call the:  [facility]:  [phone] (TTY for hearing impaired: [phone])  Caring for Your Dandy-Ye Drain    You have been discharged with a Dandy-Ye drainage tube. This tube drains fluid from your incision, helping prevent swelling and reducing the risk for infection. The tube is held in place by a few stitches. The drain will be removed when your doctor determines you no longer need it and when the amount of drainage decreases. The color and amount of fluid varies. Right after surgery, the fluid may be bright red and may become clearer over time.   Dressing:    Keep the skin around the tube dry.    If you have a dressing, change it every day.   o Wash your hands.  o Remove the old  bandage. Do not use scissors-you may accidentally cut the tube.  o Check for any redness, swelling, drainage, or broken stitches. (Call your doctor with any of these findings).  o Wash your hands again.  o Wet a cotton swab (Q-tip) and clean around the incision and the tube site. Use normal saline solution (salt and water) or soap and water. Start at the tube site and move outward in a circular motion.   o Pat dry.  o Put a new bandage on the incision and tube site. Make the bandage large enough to cover the whole incision area.   o Tape the bandage in place.  o Throw out old materials and wash your hands.   Home Care:    Tape the tube to the skin below the bandage. Make sure to keep some slack in the tube to keep it from pulling out.     DO NOT sleep on the same side as the tube.    Secure the tube and bulb inside your clothing with a safety pin. This helps keep the tube from being pulled out.     Keep the bulb compressed at all times, except when you empty it.    Empty your drain at least twice a day. Empty it more often if the drain is full.   o Lift the opening of the drain.  o Drain the fluid into a measuring cup.  o Record the amount of fluid each time you empty. Share the information with your doctor at your follow-up visit.   o Squeeze the bulb with your hands until you hear air coming out of the bulb.  o Close the opening.     Tape plastic wrap over the bandage and tube site when you shower.      Stripping  the tube helps keep blood clots from blocking the tube.                         ONLY DO THIS IF YOUR DOCTOR INSTRUCTED YOU TO DO SO!  o Hold the tubing where it leaves the skin with one hand. This keeps it from pulling on the skin.  o Pinch the tubing with the thumb and first finger of your other hand.   o Slowly and firmly pull your thumb and first finger down the tube (squeezing the tube between your fingers). Keep squeezing the tube as you run your fingers towards the bulb. If the pulling hurts or  feels like it is coming out of the skin, STOP. Begin again more gently.  o Let go of the tubing with both hands. If the tube is still blocked, repeat these steps three or four times. Make sure that the bulb is compressed so it creates suction.  When to call your doctor:    New or increased pain around the tube    Redness, warmth, or swelling around the incision or tube    Drainage that is foul smelling    Vomiting    Fever over 101 F degrees    Fluid leaking around the tube    Incision seems not to be healing    Stitches become loose    The tube falls out    Drainage that changes from light pick to dark red    A sudden increase or decrease in the amount of drainage (over 30 ml).  Your drainage record:  Date Time Bulb 1: Amount of drainage (ml or cc) Bulb 2: Amount of drainage (ml or cc) Notes

## 2019-10-21 NOTE — ANESTHESIA POSTPROCEDURE EVALUATION
Anesthesia POST Procedure Evaluation    Patient: Mony Szymanski   MRN:     [id number] Gender:   female   Age:    69 year old :      1950        Preoperative Diagnosis: Melanoma of cheek (H) [C43.39]   Procedure(s):  Cervicofacial Flap for Closure of Left Cheek Defect   Postop Comments: No value filed.       Anesthesia Type:  Not documented  General    Reportable Event: NO     PAIN: Uncomplicated   Sign Out status: Comfortable, Well controlled pain     PONV: No PONV   Sign Out status:  No Nausea or Vomiting     Neuro/Psych: Uneventful perioperative course   Sign Out Status: Preoperative baseline; Age appropriate mentation     Airway/Resp.: Uneventful perioperative course   Sign Out Status: Non labored breathing, age appropriate RR; Resp. Status within EXPECTED Parameters     CV: Uneventful perioperative course   Sign Out status: Appropriate BP and perfusion indices; Appropriate HR/Rhythm     Disposition:   Sign Out in:  PACU  Disposition:  Phase II; Home  Recovery Course: Uneventful  Follow-Up: Not required           Last Anesthesia Record Vitals:  CRNA VITALS  10/21/2019 1542 - 10/21/2019 1638      10/21/2019             Pulse:  121    SpO2:  96 %    Resp Rate (set):  10          Last PACU Vitals:  Vitals Value Taken Time   /76 10/21/2019  4:18 PM   Temp 39.1  C (102.4  F) 10/21/2019  4:18 PM   Pulse     Resp 16 10/21/2019  4:18 PM   SpO2 95 % 10/21/2019  4:18 PM   Temp src     NIBP     Pulse     SpO2     Resp     Temp     Ht Rate     Temp 2           Electronically Signed By: Vadim Das MD, 2019, 4:38 PM

## 2019-10-21 NOTE — BRIEF OP NOTE
[facility]    Brief Operative Note    Pre-operative diagnosis: Melanoma of cheek (H) [C43.39]  Post-operative diagnosis Same as pre-operative diagnosis    Procedure: Procedure(s):  Cervicofacial Flap for Closure of Left Cheek Defect  Surgeon: Surgeon(s) and Role:     * Breanna West MD - Primary     * Newton Dickinson MD - Fellow - Assisting  Anesthesia: General   Estimated blood loss: Less than 10 ml  Drains: Dandy-Ye  Specimens: * No specimens in log *  Findings:   Defect: 4 x 5 cm. Flap: 9 x 10 cm  Complications: None.  Implants: * No implants in log *

## 2019-10-23 ENCOUNTER — ALLIED HEALTH/NURSE VISIT (OUTPATIENT)
Dept: OTOLARYNGOLOGY | Facility: CLINIC | Age: 69
End: 2019-10-23
Payer: COMMERCIAL

## 2019-10-23 DIAGNOSIS — Z48.03 CHANGE OR REMOVAL OF DRAINS: Primary | ICD-10-CM

## 2019-10-23 NOTE — PROGRESS NOTES
L. Posterior scalp LAURA removed.    L. Cheek incision looks good and is slightly bruised.  Incision lines cleaned with H2O2 and Aquaphor applied.  Bolster behind Left ear is c/d/i.    Pt. Will return to clinic next week for suture removal.    Ivory Andujar RN  10/23/2019 4:17 PM

## 2019-10-30 ENCOUNTER — ALLIED HEALTH/NURSE VISIT (OUTPATIENT)
Dept: OTOLARYNGOLOGY | Facility: CLINIC | Age: 69
End: 2019-10-30
Payer: COMMERCIAL

## 2019-10-30 DIAGNOSIS — Z98.890 POSTOPERATIVE STATE: Primary | ICD-10-CM

## 2019-10-30 NOTE — PROGRESS NOTES
L. Cheek sutures removed. Post-auricular bolster and sutures also removed.    Incision line is well approximated and healing nicely.    Aquaphor applied to incision lines.  Pt. Told to keep a sheen of Aquaphor on at all times.  Biocorneum use suggested.  Pt. Made aware that she can purchase this product at the [facility].      Pt. Will winter in [place] and follow up with Dr. West in the spring.    Ivory Andujar RN  10/30/2019 2:50 PM

## 2019-10-31 NOTE — OP NOTE
Operative Report     DOS: 10/21/2019  SURGEON:  Breanna West MD   ASSISTANT SURGEON: Alison Dickinson     TITLE OF OPERATION:  Reconstruction of facial defect from excision of cutanenous malignancy,cheek. Surgical preparation of the wound bed                       INDICATIONS: Mony Szymanski underwent a surgical excision of a cutaneous malignancy that resulted in a full thickness defect of the face. The patient had a previous pigmented lesion biopsied that on further pathologic evaluation after completion of excision was found to be melanoma. She thus underwent wide local excision that resulted in a 4 cm by 5 cm defect. The patient presents today for reconstruction.      The risks and benefits of the procedure were discussed which primarily include damage to surrounding tissues or structures, facial weakness, numbness, hematoma, unfavorable scar and need for revision surgery.     The patient understood and signed informed consent prior to the procedure. Negative final margin pathology was confirmed prior to the reconstruction.      PREOPERATIVE DIAGNOSES: Full thickness cutaneous defect of the right cheek, size 4 cm by 5cm due to melanoma        POSTOPERATIVE DIAGNOSES: Same     ANESTHESIA:  GETA     IMPLANT DEVICES: None     SPECIMENS:  None.      COMPLICATIONS:  None.      BLOOD LOSS: 30 mls     SURGEON'S NARRATIVE:       FINDINGS:  full thickness right cheek defect measuring 4cm x 5cm  Cervicofacial advancement flap measuring 10cm x 9cm.      DESCRIPTION OF PROCEDURE:       The patient was brought back to the operating room by the Anesthesiology staff. They were laid supine on the operating room table and induced into general anesthesia with the endotracheal tube secured at the midline of the chin.  The head of the bed was then turned 90 degrees towards the surgical team.  Arms were tucked at the side with all pressure points appropriately padded.  A time-out procedure was performed with all members of the  operating room staff in agreement of the site of surgery, the patient identification and surgery to be performed.      A 15 blade scalpel was used to sharply excise the rim of cauterized and granulated tissue at the wound edges. An incision was then carried out from the defect laterally along the infraorbital rim and just under the temporal tuft. The incision was then carried down in the preauricular area, traveling retrotragal, then around the lobules and along the post-auricular hairline. Subsequently the skin around the defect was undermined in the subcutaneous plane. Wide undermining was preformed to raise a 10cm x 9cm cervicofacial adavancemnt flap. The external jugular vein and greater auricular nerve were identified and preserved. This allowed for advancement of the cheek flap to be able to reach the edges of the defect. The resulting standing cutaneous cone and the inferior aspect of the defect was then designed for reconstruction and a 15 blade scalpel was used to excise the excess skin.      The dermal edges were then approximated with interrupted 4.0 monocryl and the skin was closed with a combination of 4-0 and 5-0 nylon sutures. A 10 Fr. Flat drain was placed into the wound site and sutured to the posterior scalp. There was a small area of residual wound in the post-auricular area. Using the excess skin that was previously trimmed, this was fashioned into a full thickness skin graft and used to reconstruct the residual defect in the post-auricular area. A bolster was then secured over the top of the skin graft.      The patient tolerated the procedure well.  There were no complications. The wound was dressed in standard fashion. The patient was extubated and taken to recovery following commands and breathing spontaneously.      Dr. West was present and scrubbed for the entire procedure.      Alison Dickinson MD  Fellow  Facial Plastic & Reconstructive Surgery    I was present, scrubbed for the  entire procedure and performed key aspects. I agree with the note.     YESY WADDELL MD

## 2020-02-10 DIAGNOSIS — J30.1 CHRONIC SEASONAL ALLERGIC RHINITIS DUE TO POLLEN: ICD-10-CM

## 2020-02-12 RX ORDER — FLUTICASONE PROPIONATE 50 MCG
SPRAY, SUSPENSION (ML) NASAL
Qty: 48 G | Refills: 0 | Status: SHIPPED | OUTPATIENT
Start: 2020-02-12 | End: 2020-05-05

## 2020-02-12 NOTE — TELEPHONE ENCOUNTER
HARIKA      Last Written Prescription Date:  5-  Last Fill Quantity: 3,   # refills: 3  Last Office Visit: 10-  Future Office visit:       Routing refill request to provider for review/approval because:

## 2020-05-04 DIAGNOSIS — F41.1 GAD (GENERALIZED ANXIETY DISORDER): ICD-10-CM

## 2020-05-05 DIAGNOSIS — J30.1 CHRONIC SEASONAL ALLERGIC RHINITIS DUE TO POLLEN: ICD-10-CM

## 2020-05-05 RX ORDER — OXAZEPAM 15 MG/1
CAPSULE ORAL
Qty: 270 CAPSULE | Refills: 1 | Status: SHIPPED | OUTPATIENT
Start: 2020-05-05 | End: 2020-07-24

## 2020-05-05 RX ORDER — FLUTICASONE PROPIONATE 50 MCG
SPRAY, SUSPENSION (ML) NASAL
Qty: 48 G | Refills: 0 | Status: SHIPPED | OUTPATIENT
Start: 2020-05-05 | End: 2020-07-24

## 2020-05-05 NOTE — TELEPHONE ENCOUNTER
fluticasone (FLONASE) 50 MCG/ACT nasal spray       Last Written Prescription Date:  2/12/20  Last Fill Quantity: 48g,   # refills: 0  Last Office Visit: 10/11/19  Future Office visit:    Next 5 appointments (look out 90 days)    Jul 24, 2020  2:00 PM CDT  (Arrive by 1:45 PM)  PHYSICAL with Kobe Das MD  [facility] [address] [place] [address] AVE  [place] [address]  [phone]           Routing refill request to provider for review/approval because:  Drug not on the [facility], [facility] or  Health refill protocol or controlled substance

## 2020-05-05 NOTE — TELEPHONE ENCOUNTER
oxazepam      Last Written Prescription Date:  5/20/2019  Last Fill Quantity: 270,   # refills: 3  Last Office Visit: 10/11/2019  Future Office visit:    Next 5 appointments (look out 90 days)    Jul 24, 2020  2:00 PM CDT  (Arrive by 1:45 PM)  PHYSICAL with Kobe Das MD  [facility] ([facility] ) [address]  [place] [address]  [phone]           Routing refill request to provider for review/approval because:  Drug not on the [facility], [facility] or [facility] refill protocol or controlled substance

## 2020-07-15 NOTE — PROGRESS NOTES
"SUBJECTIVE:   Mony Szymanski is a 70 year old female who presents for Preventive Visit.    Are you in the first 12 months of your Medicare Part B coverage?  No    Physical Health:    In general, how would you rate your overall physical health? good    Outside of work, how many days during the week do you exercise? 6-7 days/week    Outside of work, approximately how many minutes a day do you exercise?45-60 minutes    If you drink alcohol do you typically have >3 drinks per day or >7 drinks per week? No    Do you usually eat at least 4 servings of fruit and vegetables a day, include whole grains & fiber and avoid regularly eating high fat or \"junk\" foods? Yes    Do you have any problems taking medications regularly?  No    Do you have any side effects from medications? none    Needs assistance for the following daily activities: no assistance needed    Which of the following safety concerns are present in your home?  none identified     Hearing impairment: No    In the past 6 months, have you been bothered by leaking of urine? no    Mental Health:    In general, how would you rate your overall mental or emotional health? good  PHQ-2 Score:      Do you feel safe in your environment? Yes    Have you ever done Advance Care Planning? (For example, a Health Directive, POLST, or a discussion with a medical provider or your loved ones about your wishes): No, advance care planning information given to patient to review.  Patient declined advance care planning discussion at this time.    Additional concerns to address?  No    Fall risk:         Do you have sleep apnea, excessive snoring or daytime drowsiness?: no        Depression and Anxiety Follow-Up    How are you doing with your depression since your last visit? Worsened due to covid    How are you doing with your anxiety since your last visit?  Worsened due to covid    Are you having other symptoms that might be associated with depression or anxiety? No    Have you had a " significant life event? No     Do you have any concerns with your use of alcohol or other drugs? No     Discussed and does not want or need adjustment of meds.  Mentally working thru the anxiety with covid.    Social History     Tobacco Use     Smoking status: Never Smoker     Smokeless tobacco: Never Used   Substance Use Topics     Alcohol use: Yes     Comment: Beer & Wine, socially     Drug use: Never     PHQ 5/15/2017 5/16/2018 5/20/2019   PHQ-9 Total Score 5 11 10   Q9: Thoughts of better off dead/self-harm past 2 weeks Not at all Several days Several days   F/U: Thoughts of suicide or self-harm - Yes No   F/U: Self harm-plan - No -   F/U: Self-harm action - No -   F/U: Safety concerns - No No     ED-7 SCORE 5/15/2017 5/16/2018 5/20/2019   Total Score 9 14 15     Last PHQ-9 5/20/2019   1.  Little interest or pleasure in doing things 3   2.  Feeling down, depressed, or hopeless 1   3.  Trouble falling or staying asleep, or sleeping too much 0   4.  Feeling tired or having little energy 1   5.  Poor appetite or overeating 0   6.  Feeling bad about yourself 1   7.  Trouble concentrating 3   8.  Moving slowly or restless 0   Q9: Thoughts of better off dead/self-harm past 2 weeks 1   PHQ-9 Total Score 10   Difficulty at work, home, or with people Somewhat difficult   In the past two weeks have you had thoughts of suicide or self harm? No   Do you have concerns about your personal safety or the safety of others? No   In the past 2 weeks have you thought about a plan or had intention to harm yourself? -   In the past 2 weeks have you acted on these thoughts in any way? -     ED-7  5/20/2019   1. Feeling nervous, anxious, or on edge 3   2. Not being able to stop or control worrying 2   3. Worrying too much about different things 2   4. Trouble relaxing 2   5. Being so restless that it is hard to sit still 2   6. Becoming easily annoyed or irritable 2   7. Feeling afraid, as if something awful might happen 2   ED-7  Total Score 15   If you checked any problems, how difficult have they made it for you to do your work, take care of things at home, or get along with other people? Somewhat difficult       Suicide Assessment Five-step Evaluation and Treatment (SAFE-T)  Hyperlipidemia Follow-Up      Are you regularly taking any medication or supplement to lower your cholesterol?   No    Are you having muscle aches or other side effects that you think could be caused by your cholesterol lowering medication?  No    Hypothyroidism Follow-up      Since last visit, patient describes the following symptoms: Weight stable, no hair loss, no skin changes, no constipation, no loose stools      How many servings of fruits and vegetables do you eat daily?  2-3    On average, how many sweetened beverages do you drink each day (Examples: soda, juice, sweet tea, etc.  Do NOT count diet or artificially sweetened beverages)?   0    How many days per week do you exercise enough to make your heart beat faster? 7    How many minutes a day do you exercise enough to make your heart beat faster? 30 - 60    How many days per week do you miss taking your medication? 0         Reviewed and updated as needed this visit by clinical staff         Reviewed and updated as needed this visit by Provider        Social History     Tobacco Use     Smoking status: Never Smoker     Smokeless tobacco: Never Used   Substance Use Topics     Alcohol use: Yes     Comment: Beer & Wine, socially                           Current providers sharing in care for this patient include:   Patient Care Team:  Kobe Das MD as PCP - General  Kobe Das MD as Assigned PCP    The following health maintenance items are reviewed in Epic and correct as of today:  Health Maintenance   Topic Date Due     HEPATITIS C SCREENING  1950     ZOSTER IMMUNIZATION (2 of 3) 03/08/2014     PHQ-9  06/20/2019     MEDICARE ANNUAL WELLNESS VISIT  05/20/2020     ED ASSESSMENT  05/20/2020  "    LIPID  05/21/2020     COLORECTAL CANCER SCREENING  07/20/2020     INFLUENZA VACCINE (1) 09/01/2020     FALL RISK ASSESSMENT  10/11/2020     MAMMO SCREENING  10/04/2021     DTAP/TDAP/TD IMMUNIZATION (2 - Td) 12/27/2021     ADVANCE CARE PLANNING  08/22/2023     DEXA  Completed     DEPRESSION ACTION PLAN  Completed     PNEUMOCOCCAL IMMUNIZATION 65+ LOW/MEDIUM RISK  Completed     IPV IMMUNIZATION  Aged Out     MENINGITIS IMMUNIZATION  Aged Out     HEPATITIS B IMMUNIZATION  Aged Out     Labs reviewed in EPIC      ROS:  Constitutional, HEENT, cardiovascular, pulmonary, GI, , musculoskeletal, neuro, skin, endocrine and psych systems are negative, except as otherwise noted.    OBJECTIVE:   /70   Pulse 84   Temp 99.1  F (37.3  C) (Tympanic)   Resp 19   Ht 1.626 m (5' 4\")   Wt 67.1 kg (148 lb)   SpO2 98%   BMI 25.40 kg/m   Estimated body mass index is 25.4 kg/m  as calculated from the following:    Height as of this encounter: 1.626 m (5' 4\").    Weight as of this encounter: 67.1 kg (148 lb).  EXAM:   GENERAL: healthy, alert and no distress  EYES: Eyes grossly normal to inspection, PERRL and conjunctivae and sclerae normal  HENT: ear canals and TM's normal, nose and mouth without ulcers or lesions  NECK: no adenopathy, no asymmetry, masses, or scars and thyroid normal to palpation  RESP: lungs clear to auscultation - no rales, rhonchi or wheezes  BREAST: normal without masses, tenderness or nipple discharge and no palpable axillary masses or adenopathy  CV: regular rate and rhythm, normal S1 S2, no S3 or S4, no murmur, click or rub, no peripheral edema and peripheral pulses strong  ABDOMEN: soft, nontender, no hepatosplenomegaly, no masses and bowel sounds normal  MS: no gross musculoskeletal defects noted, no edema  SKIN: no suspicious lesions or rashes  NEURO: Normal strength and tone, mentation intact and speech normal  PSYCH: mentation appears normal, affect normal/bright- baseline anxious some  LYMPH: " no cervical, supraclavicular, axillary, or inguinal adenopathy        ASSESSMENT / PLAN:   1. Routine general medical examination at a health care facility  Good health habits discussed   - CBC with platelets differential; Future  - Comprehensive metabolic panel; Future  - TSH; Future  - TSH  - Comprehensive metabolic panel  - CBC with platelets differential    2. Acquired hypothyroidism  Stable TSh. RF done   - TSH; Future  - TSH  - levothyroxine (SYNTHROID/LEVOTHROID) 50 MCG tablet; Take 1 tablet (50 mcg) by mouth daily  Dispense: 90 tablet; Refill: 3    3. Mixed hyperlipidemia  Stable in past. Will not do lipids this year. Not on meds and does not want meds. Has good HDL  - Comprehensive metabolic panel; Future  - Comprehensive metabolic panel    4. Anemia, unspecified type  In past resolved   - CBC with platelets differential; Future  - CBC with platelets differential    5. Obsessive-compulsive disorder, unspecified type  Deals with it daily along her anxiety. No change in meds.     6. Major depressive disorder, recurrent episode, in partial remission (H)  As above. Wax and wean course for years.  No change in meds wanted.     7. Generalized anxiety disorder  Same as above.     8. FH: colon cancer  Due for colonoscopy - pt is cleared for surgery - referral sent   - GENERAL SURG ADULT REFERRAL  - EKG 12-lead complete w/read - Clinics    9. Encounter for screening mammogram for breast cancer  Mammogram ordered   - MA Screening Digital Bilateral; Future    10. H/O adenomatous polyp of colon  As above  - GENERAL SURG ADULT REFERRAL  - EKG 12-lead complete w/read - Clinics    11. Melanoma of cheek (H)  Needs year derm check - will refer  - DERMATOLOGY REFERRAL    12. Chronic seasonal allergic rhinitis due to pollen  RF done.   - fluticasone (FLONASE) 50 MCG/ACT nasal spray; INSTILL 2 SPRAYS INTO EACH NOSTRIL EVERY DAY  Dispense: 48 g; Refill: 3    13. ED (generalized anxiety disorder)  As above.   - oxazepam (SERAX)  "15 MG capsule; TAKE 1 CAPSULE BY MOUTH THREE TIMES DAILY  Dispense: 270 capsule; Refill: 3  - traZODone (DESYREL) 50 MG tablet; TAKE 1 TO 3 TABLETS BY MOUTH AT BEDTIME  Dispense: 270 tablet; Refill: 3  - venlafaxine (EFFEXOR-XR) 75 MG 24 hr capsule; TAKE 1 CAPSULE BY MOUTH ONCE DAILY WITH FOOD  Dispense: 90 capsule; Refill: 3    COUNSELING:  Reviewed preventive health counseling, as reflected in patient instructions       Regular exercise       Healthy diet/nutrition    Estimated body mass index is 24.89 kg/m  as calculated from the following:    Height as of 10/21/19: 1.626 m (5' 4\").    Weight as of 10/21/19: 65.8 kg (145 lb).         reports that she has never smoked. She has never used smokeless tobacco.      Appropriate preventive services were discussed with this patient, including applicable screening as appropriate for cardiovascular disease, diabetes, osteopenia/osteoporosis, and glaucoma.  As appropriate for age/gender, discussed screening for colorectal cancer, prostate cancer, breast cancer, and cervical cancer. Checklist reviewing preventive services available has been given to the patient.    Reviewed patients plan of care and provided an AVS. The Basic Care Plan (routine screening as documented in Health Maintenance) for Mony meets the Care Plan requirement. This Care Plan has been established and reviewed with the Patient.    Counseling Resources:  ATP IV Guidelines  Pooled Cohorts Equation Calculator  Breast Cancer Risk Calculator  FRAX Risk Assessment  ICSI Preventive Guidelines  Dietary Guidelines for Americans, 2010  USDA's MyPlate  ASA Prophylaxis  Lung CA Screening    Kobe Das MD  [facility] - [place]  "

## 2020-07-15 NOTE — PATIENT INSTRUCTIONS
Preventive Health Recommendations    See your health care provider every year to    Review health changes.     Discuss preventive care.      Review your medicines if your doctor has prescribed any.      You no longer need a yearly Pap test unless you've had an abnormal Pap test in the past 10 years. If you have vaginal symptoms, such as bleeding or discharge, be sure to talk with your provider about a Pap test.      Every 1 to 2 years, have a mammogram.  If you are over 69, talk with your health care provider about whether or not you want to continue having screening mammograms.      Every 10 years, have a colonoscopy. Or, have a yearly FIT test (stool test). These exams will check for colon cancer.       Have a cholesterol test every 5 years, or more often if your doctor advises it.       Have a diabetes test (fasting glucose) every three years. If you are at risk for diabetes, you should have this test more often.       At age 65, have a bone density scan (DEXA) to check for osteoporosis (brittle bone disease).    Shots:    Get a flu shot each year.    Get a tetanus shot every 10 years.    Talk to your doctor about your pneumonia vaccines. There are now two you should receive - Pneumovax (PPSV 23) and Prevnar (PCV 13).    Talk to your pharmacist about the shingles vaccine.    Talk to your doctor about the hepatitis B vaccine.    Nutrition:     Eat at least 5 servings of fruits and vegetables each day.      Eat whole-grain bread, whole-wheat pasta and brown rice instead of white grains and rice.      Get adequate about Calcium and Vitamin D.     Lifestyle    Exercise at least 150 minutes a week (30 minutes a day, 5 days a week). This will help you control your weight and prevent disease.      Limit alcohol to one drink per day.      No smoking.       Wear sunscreen to prevent skin cancer.       See your dentist twice a year for an exam and cleaning.      See your eye doctor every 1 to 2 years to screen for  conditions such as glaucoma, macular degeneration, cataracts, etc.    Personalized Prevention Plan  You are due for the preventive services outlined below.  Your care team is available to assist you in scheduling these services.  If you have already completed any of these items, please share that information with your care team to update in your medical record.    Health Maintenance Due   Topic Date Due     Hepatitis C Screening  1950     Zoster (Shingles) Vaccine (2 of 3) 03/08/2014     Depression Assessment  06/20/2019     Annual Wellness Visit  05/20/2020     Anxiety Assessment  05/20/2020     Cholesterol Lab  05/21/2020     Colorectal Cancer Screening  07/20/2020

## 2020-07-24 ENCOUNTER — OFFICE VISIT (OUTPATIENT)
Dept: FAMILY MEDICINE | Facility: OTHER | Age: 70
End: 2020-07-24
Attending: FAMILY MEDICINE
Payer: COMMERCIAL

## 2020-07-24 VITALS
HEART RATE: 84 BPM | RESPIRATION RATE: 19 BRPM | TEMPERATURE: 99.1 F | HEIGHT: 64 IN | OXYGEN SATURATION: 98 % | BODY MASS INDEX: 25.27 KG/M2 | SYSTOLIC BLOOD PRESSURE: 118 MMHG | DIASTOLIC BLOOD PRESSURE: 70 MMHG | WEIGHT: 148 LBS

## 2020-07-24 DIAGNOSIS — D64.9 ANEMIA, UNSPECIFIED TYPE: ICD-10-CM

## 2020-07-24 DIAGNOSIS — Z86.0101 H/O ADENOMATOUS POLYP OF COLON: ICD-10-CM

## 2020-07-24 DIAGNOSIS — F41.1 GAD (GENERALIZED ANXIETY DISORDER): ICD-10-CM

## 2020-07-24 DIAGNOSIS — Z12.31 ENCOUNTER FOR SCREENING MAMMOGRAM FOR BREAST CANCER: ICD-10-CM

## 2020-07-24 DIAGNOSIS — F33.41 MAJOR DEPRESSIVE DISORDER, RECURRENT EPISODE, IN PARTIAL REMISSION (H): ICD-10-CM

## 2020-07-24 DIAGNOSIS — J30.1 CHRONIC SEASONAL ALLERGIC RHINITIS DUE TO POLLEN: ICD-10-CM

## 2020-07-24 DIAGNOSIS — C43.39 MELANOMA OF CHEEK (H): ICD-10-CM

## 2020-07-24 DIAGNOSIS — F41.1 GENERALIZED ANXIETY DISORDER: ICD-10-CM

## 2020-07-24 DIAGNOSIS — E78.2 MIXED HYPERLIPIDEMIA: ICD-10-CM

## 2020-07-24 DIAGNOSIS — Z80.0 FH: COLON CANCER: ICD-10-CM

## 2020-07-24 DIAGNOSIS — E03.9 ACQUIRED HYPOTHYROIDISM: ICD-10-CM

## 2020-07-24 DIAGNOSIS — Z00.00 ROUTINE GENERAL MEDICAL EXAMINATION AT A HEALTH CARE FACILITY: Primary | ICD-10-CM

## 2020-07-24 DIAGNOSIS — F42.9 OBSESSIVE-COMPULSIVE DISORDER, UNSPECIFIED TYPE: ICD-10-CM

## 2020-07-24 LAB
ALBUMIN SERPL-MCNC: 4.1 G/DL (ref 3.4–5)
ALP SERPL-CCNC: 101 U/L (ref 40–150)
ALT SERPL W P-5'-P-CCNC: 36 U/L (ref 0–50)
ANION GAP SERPL CALCULATED.3IONS-SCNC: 2 MMOL/L (ref 3–14)
AST SERPL W P-5'-P-CCNC: 17 U/L (ref 0–45)
BASOPHILS # BLD AUTO: 0.1 10E9/L (ref 0–0.2)
BASOPHILS NFR BLD AUTO: 1 %
BILIRUB SERPL-MCNC: 0.2 MG/DL (ref 0.2–1.3)
BUN SERPL-MCNC: 15 MG/DL (ref 7–30)
CALCIUM SERPL-MCNC: 9.4 MG/DL (ref 8.5–10.1)
CHLORIDE SERPL-SCNC: 107 MMOL/L (ref 94–109)
CO2 SERPL-SCNC: 29 MMOL/L (ref 20–32)
CREAT SERPL-MCNC: 0.71 MG/DL (ref 0.52–1.04)
DIFFERENTIAL METHOD BLD: NORMAL
EOSINOPHIL # BLD AUTO: 0.2 10E9/L (ref 0–0.7)
EOSINOPHIL NFR BLD AUTO: 3.9 %
ERYTHROCYTE [DISTWIDTH] IN BLOOD BY AUTOMATED COUNT: 12.2 % (ref 10–15)
GFR SERPL CREATININE-BSD FRML MDRD: 87 ML/MIN/{1.73_M2}
GLUCOSE SERPL-MCNC: 88 MG/DL (ref 70–99)
HCT VFR BLD AUTO: 38.5 % (ref 35–47)
HGB BLD-MCNC: 13.1 G/DL (ref 11.7–15.7)
IMM GRANULOCYTES # BLD: 0 10E9/L (ref 0–0.4)
IMM GRANULOCYTES NFR BLD: 0.2 %
LYMPHOCYTES # BLD AUTO: 1.7 10E9/L (ref 0.8–5.3)
LYMPHOCYTES NFR BLD AUTO: 28.6 %
MCH RBC QN AUTO: 30.4 PG (ref 26.5–33)
MCHC RBC AUTO-ENTMCNC: 34 G/DL (ref 31.5–36.5)
MCV RBC AUTO: 89 FL (ref 78–100)
MONOCYTES # BLD AUTO: 0.5 10E9/L (ref 0–1.3)
MONOCYTES NFR BLD AUTO: 9.1 %
NEUTROPHILS # BLD AUTO: 3.4 10E9/L (ref 1.6–8.3)
NEUTROPHILS NFR BLD AUTO: 57.2 %
NRBC # BLD AUTO: 0 10*3/UL
NRBC BLD AUTO-RTO: 0 /100
PLATELET # BLD AUTO: 284 10E9/L (ref 150–450)
POTASSIUM SERPL-SCNC: 3.9 MMOL/L (ref 3.4–5.3)
PROT SERPL-MCNC: 8.1 G/DL (ref 6.8–8.8)
RBC # BLD AUTO: 4.31 10E12/L (ref 3.8–5.2)
SODIUM SERPL-SCNC: 138 MMOL/L (ref 133–144)
TSH SERPL DL<=0.005 MIU/L-ACNC: 1 MU/L (ref 0.4–4)
WBC # BLD AUTO: 5.9 10E9/L (ref 4–11)

## 2020-07-24 PROCEDURE — G0438 PPPS, INITIAL VISIT: HCPCS | Performed by: FAMILY MEDICINE

## 2020-07-24 PROCEDURE — 84443 ASSAY THYROID STIM HORMONE: CPT | Mod: ZL | Performed by: FAMILY MEDICINE

## 2020-07-24 PROCEDURE — 80053 COMPREHEN METABOLIC PANEL: CPT | Mod: ZL | Performed by: FAMILY MEDICINE

## 2020-07-24 PROCEDURE — 93005 ELECTROCARDIOGRAM TRACING: CPT

## 2020-07-24 PROCEDURE — G0463 HOSPITAL OUTPT CLINIC VISIT: HCPCS | Mod: 25

## 2020-07-24 PROCEDURE — 93010 ELECTROCARDIOGRAM REPORT: CPT | Performed by: INTERNAL MEDICINE

## 2020-07-24 PROCEDURE — G0463 HOSPITAL OUTPT CLINIC VISIT: HCPCS

## 2020-07-24 PROCEDURE — 85025 COMPLETE CBC W/AUTO DIFF WBC: CPT | Mod: ZL | Performed by: FAMILY MEDICINE

## 2020-07-24 PROCEDURE — 36415 COLL VENOUS BLD VENIPUNCTURE: CPT | Mod: ZL | Performed by: FAMILY MEDICINE

## 2020-07-24 RX ORDER — LEVOTHYROXINE SODIUM 50 UG/1
50 TABLET ORAL DAILY
Qty: 90 TABLET | Refills: 3 | Status: SHIPPED | OUTPATIENT
Start: 2020-07-24 | End: 2021-08-02

## 2020-07-24 RX ORDER — OXAZEPAM 15 MG/1
CAPSULE ORAL
Qty: 270 CAPSULE | Refills: 3 | Status: SHIPPED | OUTPATIENT
Start: 2020-07-24 | End: 2020-12-10

## 2020-07-24 RX ORDER — TRAZODONE HYDROCHLORIDE 50 MG/1
TABLET, FILM COATED ORAL
Qty: 270 TABLET | Refills: 3 | Status: SHIPPED | OUTPATIENT
Start: 2020-07-24 | End: 2021-08-04

## 2020-07-24 RX ORDER — VENLAFAXINE HYDROCHLORIDE 75 MG/1
CAPSULE, EXTENDED RELEASE ORAL
Qty: 90 CAPSULE | Refills: 3 | Status: SHIPPED | OUTPATIENT
Start: 2020-07-24 | End: 2020-11-10 | Stop reason: DRUGHIGH

## 2020-07-24 RX ORDER — FLUTICASONE PROPIONATE 50 MCG
SPRAY, SUSPENSION (ML) NASAL
Qty: 48 G | Refills: 3 | Status: SHIPPED | OUTPATIENT
Start: 2020-07-24 | End: 2021-08-02

## 2020-07-24 ASSESSMENT — ANXIETY QUESTIONNAIRES
2. NOT BEING ABLE TO STOP OR CONTROL WORRYING: SEVERAL DAYS
5. BEING SO RESTLESS THAT IT IS HARD TO SIT STILL: NEARLY EVERY DAY
IF YOU CHECKED OFF ANY PROBLEMS ON THIS QUESTIONNAIRE, HOW DIFFICULT HAVE THESE PROBLEMS MADE IT FOR YOU TO DO YOUR WORK, TAKE CARE OF THINGS AT HOME, OR GET ALONG WITH OTHER PEOPLE: SOMEWHAT DIFFICULT
1. FEELING NERVOUS, ANXIOUS, OR ON EDGE: NEARLY EVERY DAY
3. WORRYING TOO MUCH ABOUT DIFFERENT THINGS: SEVERAL DAYS
6. BECOMING EASILY ANNOYED OR IRRITABLE: MORE THAN HALF THE DAYS
7. FEELING AFRAID AS IF SOMETHING AWFUL MIGHT HAPPEN: SEVERAL DAYS
GAD7 TOTAL SCORE: 12

## 2020-07-24 ASSESSMENT — MIFFLIN-ST. JEOR: SCORE: 1186.32

## 2020-07-24 ASSESSMENT — PAIN SCALES - GENERAL: PAINLEVEL: NO PAIN (0)

## 2020-07-24 ASSESSMENT — PATIENT HEALTH QUESTIONNAIRE - PHQ9: 5. POOR APPETITE OR OVEREATING: SEVERAL DAYS

## 2020-07-24 NOTE — NURSING NOTE
"Chief Complaint   Patient presents with     Physical       Initial /70   Pulse 84   Temp 99.1  F (37.3  C) (Tympanic)   Resp 19   Ht 1.626 m (5' 4\")   Wt 67.1 kg (148 lb)   SpO2 98%   BMI 25.40 kg/m   Estimated body mass index is 25.4 kg/m  as calculated from the following:    Height as of this encounter: 1.626 m (5' 4\").    Weight as of this encounter: 67.1 kg (148 lb).  Medication Reconciliation: complete  Stephany Campbell LPN    "

## 2020-07-25 ASSESSMENT — ANXIETY QUESTIONNAIRES: GAD7 TOTAL SCORE: 12

## 2020-07-27 ENCOUNTER — TELEPHONE (OUTPATIENT)
Dept: SURGERY | Facility: OTHER | Age: 70
End: 2020-07-27

## 2020-07-27 ENCOUNTER — PREP FOR PROCEDURE (OUTPATIENT)
Dept: SURGERY | Facility: OTHER | Age: 70
End: 2020-07-27

## 2020-07-27 DIAGNOSIS — Z12.11 SCREENING FOR MALIGNANT NEOPLASM OF COLON: Primary | ICD-10-CM

## 2020-07-27 NOTE — LETTER
"      We want to your Colonoscopy to be as pleasant as possible. Please review the instructions below. If you have questions, you may contact us at the any of the following numbers:     [facility] Health Unit Coordinator: [phone]  Clinic Nurse (Marcia): [phone]  Surgery Education Nurse: [phone]    Date of procedure: 8/20/2020 with Dr. Robles  Admit Time: Hospital Surgery will call you the day before your procedure by 5pm with your arrival time. If your surgery is on Monday, expect a call on Friday.  If you are not contacted before 5PM, please call admitting at [phone].   After hours or on weekends, please call [phone] to postpone.   Call the clinic nurse if you become ill within 1 week of your procedure to reschedule.   .    7 DAYS BEFORE THE EXAM:  Call the Surgery Education Nurse at [phone] and have a medication list ready.   Stop Aspirin or NSAIDS (Ibuprofen, Celebrex, Naproxen, etc) 7 days before surgery.  Stop fiber supplements, herbals, vitamins, and iron. Stop eating corn, nuts and seeds.  If you are prescribed a daily 81mg Aspirin, you may continue this.  If you are prescribed blood thinners or insulin, talk to your primary provider for instructions.  Please  the following over the counter items for your bowel prep:    Two 5 mg Dulcolax (bisacodyl) tablets   One 8.3 ounce (238 g) bottle of miralax   One 10 ounce bottle of magnesium citrate   One 64 ounce bottle of gatorade-Not red, purple, or powdered.    2 DAYS BEFORE THE EXAM:   Low fiber diet.   See list of low fiber foods on page 3 of the \"Miralax, Dulcolax and Magnesium Citrate\" packet.   Drink at least 4-6 large glasses of sports drink today and tomorrow. Avoid red and purple.      1 DAY BEFORE THE EXAM:  No solid food/milk products after 12:01 AM. Drink only clear liquids all day, at least 8-10 glasses.   Please see list of clear liquids on page 2 of \"Miralax, Dulcolax and Magnesium Citrate\" packet.    Avoid anything red " or purple. No alcohol.            AT 12:00 PM NOON THE DAY BEFORE EXAM:  Take 2 Dulcolax tablets by mouth with clear liquids.            AT 6:00 PM THE DAY BEFORE EXAM:  Mix the bottle of Miralax and 64 oz. of Gatorade in a pitcher.   Drink one 8 oz. glass every 10-15 minutes until gone. Stay near a toilet.     DAY OF COLONOSCOPY 8/20/2020:             6 HOURS PRIOR TO EXAM ON DAY OF PROCEDURE:  Drink the bottle of magnesium citrate followed by a full glass of water.   You may have clear liquids up until 2 hours before arrival.  If you need to take any medications after this, take them with a tiny sip of water.   If you have asthma, bring your inhaler with you.  Shower before arrival and wear clean, comfortable clothes.   No jewelry, make-up, nail polish, hair spray, lotions, or perfumes.   [place] in Admitting through the [place] [facility]tr[facility].   You must have a responsible adult to drive and to stay with you for 4 hours at home.     TIPS FOR COLON CLEANSING BEFORE YOUR COLONOSCOPY  To get accurate results from your exam, your colon must be completely empty or you may need to repeat the colon prep and exam. If you followed instructions and your stool is clear or yellow liquid, you are ready. If you are not sure if your colon is clean, please call the clinic nurse.    You may use Tucks wipes, hemorrhoid treatments, hydrocortisone cream or alcohol-free baby wipes to ease anal irritation. You may also use Vaseline to help protect the skin.     Quickly drink each glass. Even when you are sitting on the toilet, keep drinking every 15 minutes. If you have nausea or vomiting, take a break for 30 minutes and then resume drinking.    You will have loose watery stools and may also have chills. Dress for comfort. Expect to feel bloating, nausea and other discomfort until the stool clears from your colon.       COVID-19 test is needed prior to 4 days in the morning prior to procedure. This is done as drive-up curbside testing in the  white tent on the West side of the [facility] parking lot in your vehicle. Follow the signage and bring your mobile phone if you have one to call the phone number on the sign outside the tent for check-in. If you do not have a cell phone, please call the nurse for instructions on checking in. This has been scheduled for 8/16/2020 @ 10:00 am.

## 2020-07-27 NOTE — TELEPHONE ENCOUNTER
Referral received for colonoscopy.   This patient was approved by surgery education nurses for meet and greet colonoscopy and will not need a preop or consult appointment.   Patient scheduled for colonoscopy on 8/20/2020 with  at [facility] with Gatorade bowel prep.   Instructions given via phone and instructions mailed to patient with surgery handbook.   Belinda Andrade LPN

## 2020-08-13 ENCOUNTER — ANESTHESIA EVENT (OUTPATIENT)
Dept: SURGERY | Facility: HOSPITAL | Age: 70
End: 2020-08-13
Payer: MEDICARE

## 2020-08-13 ASSESSMENT — ENCOUNTER SYMPTOMS: DYSRHYTHMIAS: 0

## 2020-08-13 NOTE — ANESTHESIA PREPROCEDURE EVALUATION
Anesthesia Pre-Procedure Evaluation    Patient: Mony Szymanski   MRN: [id number] : 1950          Preoperative Diagnosis: Screening for malignant neoplasm of colon [Z12.11]    Procedure(s):  COLONOSCOPY    Past Medical History:   Diagnosis Date     Attention deficit disorder of childhood without me 2010     Chronic fatigue syndrome 2010     FH: colon cancer      Generalized anxiety disorder 2010     Hx SBO      Hyperlipidemia 2011     Hypothyroidism 2011     Major depressive disorder, recurrent episode, unspecified 2011     Obsessive-compulsive disorders 2010     Small bowel obstruction due to adhesions (H) 2018     Past Surgical History:   Procedure Laterality Date     APPENDECTOMY       Appendicitis  1963    appendectomy     breast calcification      biopsy; RT     colonoscopy      repeat      COLONOSCOPY  6-3-2010    Repeat -15     COLONOSCOPY N/A 2015    repeat in Procedure: COLONOSCOPY;  Surgeon: Cassie Snell MD;  Location: [facility] OR     EXCISE LESION CHEEK Left 10/14/2019    Procedure: wide local excision of left check melanoma;  Surgeon: Kai Gomez MD;  Location: [facility] OR     EXCISE LESION FACE WITH FLAP PEDICLE Left 10/21/2019    Procedure: Cervicofacial Flap for Closure of Left Cheek Defect;  Surgeon: Breanna West MD;  Location: [facility] OR     EYE SURGERY       lazy eye      surgically repaired; LT     NASAL/SINUS POLYPECTOMY       wisdom teeth extracted         Anesthesia Evaluation     . Pt has had prior anesthetic. Type: General and MAC    History of anesthetic complications    states remembers last colonoscopy      ROS/MED HX    ENT/Pulmonary:     (+)allergic rhinitis, , . .    Neurologic:  - neg neurologic ROS     Cardiovascular:     (+) Dyslipidemia, ----. : . . . :. .      (-) CAD, arrhythmias and irregular heartbeat/palpitations   METS/Exercise Tolerance:  >4 METS   Hematologic:     (+) Anemia, -       Musculoskeletal:  - neg musculoskeletal ROS       GI/Hepatic:     (+) bowel prep, Other GI/Hepatic hx SBO      Renal/Genitourinary:         Endo:     (+) thyroid problem hypothyroidism, .      Psychiatric:     (+) psychiatric history anxiety, depression and other (comment) (OCD, ADHD, Chronic fatigue syndrome)      Infectious Disease:  - neg infectious disease ROS       Malignancy:   (+) Malignancy History of Skin  Skin CA status post Surgery,         Other:    (+) No chance of pregnancy C-spine cleared: N/A, no H/O Chronic Pain,no other significant disability                         Physical Exam  Normal systems: cardiovascular, pulmonary and dental    Airway   Mallampati: III  TM distance: >3 FB  Neck ROM: full    Dental     Cardiovascular   Rhythm and rate: regular and normal      Pulmonary    breath sounds clear to auscultation            Lab Results   Component Value Date    WBC 5.9 07/24/2020    HGB 13.1 07/24/2020    HCT 38.5 07/24/2020     07/24/2020    CRP <2.9 08/21/2018    SED 28 08/21/2018     07/24/2020    POTASSIUM 3.9 07/24/2020    CHLORIDE 107 07/24/2020    CO2 29 07/24/2020    BUN 15 07/24/2020    CR 0.71 07/24/2020    GLC 88 07/24/2020    AHMET 9.4 07/24/2020    MAG 2.4 (H) 08/21/2018    ALBUMIN 4.1 07/24/2020    PROTTOTAL 8.1 07/24/2020    ALT 36 07/24/2020    AST 17 07/24/2020    ALKPHOS 101 07/24/2020    BILITOTAL 0.2 07/24/2020    LIPASE 116 08/21/2018    AMYLASE 53 08/21/2018    TSH 1.00 07/24/2020       Preop Vitals  BP Readings from Last 3 Encounters:   07/24/20 118/70   10/21/19 132/76   10/16/19 134/88    Pulse Readings from Last 3 Encounters:   07/24/20 84   10/21/19 88   10/16/19 92      Resp Readings from Last 3 Encounters:   07/24/20 19   10/21/19 16   10/14/19 14    SpO2 Readings from Last 3 Encounters:   07/24/20 98%   10/21/19 95%   10/14/19 99%      Temp Readings from Last 1 Encounters:   07/24/20 99.1  F (37.3  C) (Tympanic)    Ht Readings from Last 1 Encounters:  "  07/24/20 1.626 m (5' 4\")      Wt Readings from Last 1 Encounters:   07/24/20 67.1 kg (148 lb)    Estimated body mass index is 25.4 kg/m  as calculated from the following:    Height as of 7/24/20: 1.626 m (5' 4\").    Weight as of 7/24/20: 67.1 kg (148 lb).       Anesthesia Plan      History & Physical Review  History and physical reviewed and following examination; no interval change.    ASA Status:  3 .        Plan for MAC with Intravenous and Propofol induction. Maintenance will be TIVA.  Reason for MAC:  Deep or markedly invasive procedure (G8) and Extreme anxiety (QS)             Postoperative Care      Consents  Anesthetic plan, risks, benefits and alternatives discussed with:  Patient..                 ISIAH Martin CRNA  "

## 2020-08-16 ENCOUNTER — OFFICE VISIT (OUTPATIENT)
Dept: FAMILY MEDICINE | Facility: OTHER | Age: 70
End: 2020-08-16
Attending: FAMILY MEDICINE
Payer: MEDICARE

## 2020-08-16 DIAGNOSIS — Z12.11 SCREENING FOR MALIGNANT NEOPLASM OF COLON: ICD-10-CM

## 2020-08-16 PROCEDURE — U0003 INFECTIOUS AGENT DETECTION BY NUCLEIC ACID (DNA OR RNA); SEVERE ACUTE RESPIRATORY SYNDROME CORONAVIRUS 2 (SARS-COV-2) (CORONAVIRUS DISEASE [COVID-19]), AMPLIFIED PROBE TECHNIQUE, MAKING USE OF HIGH THROUGHPUT TECHNOLOGIES AS DESCRIBED BY CMS-2020-01-R: HCPCS | Mod: ZL | Performed by: SURGERY

## 2020-08-17 LAB
SARS-COV-2 RNA SPEC QL NAA+PROBE: NOT DETECTED
SPECIMEN SOURCE: NORMAL

## 2020-08-20 ENCOUNTER — ANESTHESIA (OUTPATIENT)
Dept: SURGERY | Facility: HOSPITAL | Age: 70
End: 2020-08-20
Payer: MEDICARE

## 2020-08-20 ENCOUNTER — HOSPITAL ENCOUNTER (OUTPATIENT)
Facility: HOSPITAL | Age: 70
Discharge: HOME | End: 2020-08-20
Attending: SURGERY | Admitting: SURGERY
Payer: MEDICARE

## 2020-08-20 VITALS
RESPIRATION RATE: 16 BRPM | BODY MASS INDEX: 25.95 KG/M2 | DIASTOLIC BLOOD PRESSURE: 83 MMHG | SYSTOLIC BLOOD PRESSURE: 130 MMHG | WEIGHT: 152 LBS | TEMPERATURE: 98.4 F | HEART RATE: 68 BPM | HEIGHT: 64 IN | OXYGEN SATURATION: 99 %

## 2020-08-20 DIAGNOSIS — Z12.11 SCREENING FOR MALIGNANT NEOPLASM OF COLON: ICD-10-CM

## 2020-08-20 PROCEDURE — 88305 TISSUE EXAM BY PATHOLOGIST: CPT | Mod: TC | Performed by: SURGERY

## 2020-08-20 PROCEDURE — 25000128 H RX IP 250 OP 636: Performed by: NURSE ANESTHETIST, CERTIFIED REGISTERED

## 2020-08-20 PROCEDURE — 71000027 ZZH RECOVERY PHASE 2 EACH 15 MINS: Performed by: SURGERY

## 2020-08-20 PROCEDURE — 40000306 ZZH STATISTIC PRE PROC ASSESS II: Performed by: SURGERY

## 2020-08-20 PROCEDURE — 27210794 ZZH OR GENERAL SUPPLY STERILE: Performed by: SURGERY

## 2020-08-20 PROCEDURE — 45385 COLONOSCOPY W/LESION REMOVAL: CPT | Performed by: NURSE ANESTHETIST, CERTIFIED REGISTERED

## 2020-08-20 PROCEDURE — 45380 COLONOSCOPY AND BIOPSY: CPT | Mod: PT | Performed by: SURGERY

## 2020-08-20 PROCEDURE — 36000050 ZZH SURGERY LEVEL 2 1ST 30 MIN: Performed by: SURGERY

## 2020-08-20 PROCEDURE — 37000008 ZZH ANESTHESIA TECHNICAL FEE, 1ST 30 MIN: Performed by: SURGERY

## 2020-08-20 PROCEDURE — 25800030 ZZH RX IP 258 OP 636: Performed by: NURSE ANESTHETIST, CERTIFIED REGISTERED

## 2020-08-20 RX ORDER — SODIUM CHLORIDE, SODIUM LACTATE, POTASSIUM CHLORIDE, CALCIUM CHLORIDE 600; 310; 30; 20 MG/100ML; MG/100ML; MG/100ML; MG/100ML
INJECTION, SOLUTION INTRAVENOUS CONTINUOUS
Status: DISCONTINUED | OUTPATIENT
Start: 2020-08-20 | End: 2020-08-20 | Stop reason: HOSPADM

## 2020-08-20 RX ORDER — ONDANSETRON 4 MG/1
4 TABLET, ORALLY DISINTEGRATING ORAL EVERY 30 MIN PRN
Status: DISCONTINUED | OUTPATIENT
Start: 2020-08-20 | End: 2020-08-20 | Stop reason: HOSPADM

## 2020-08-20 RX ORDER — PROPOFOL 10 MG/ML
INJECTION, EMULSION INTRAVENOUS PRN
Status: DISCONTINUED | OUTPATIENT
Start: 2020-08-20 | End: 2020-08-20

## 2020-08-20 RX ORDER — NALOXONE HYDROCHLORIDE 0.4 MG/ML
.1-.4 INJECTION, SOLUTION INTRAMUSCULAR; INTRAVENOUS; SUBCUTANEOUS
Status: DISCONTINUED | OUTPATIENT
Start: 2020-08-20 | End: 2020-08-20 | Stop reason: HOSPADM

## 2020-08-20 RX ORDER — MEPERIDINE HYDROCHLORIDE 25 MG/ML
12.5 INJECTION INTRAMUSCULAR; INTRAVENOUS; SUBCUTANEOUS
Status: DISCONTINUED | OUTPATIENT
Start: 2020-08-20 | End: 2020-08-20 | Stop reason: HOSPADM

## 2020-08-20 RX ORDER — FLUMAZENIL 0.1 MG/ML
0.2 INJECTION, SOLUTION INTRAVENOUS
Status: DISCONTINUED | OUTPATIENT
Start: 2020-08-20 | End: 2020-08-20 | Stop reason: HOSPADM

## 2020-08-20 RX ORDER — LIDOCAINE 40 MG/G
CREAM TOPICAL
Status: DISCONTINUED | OUTPATIENT
Start: 2020-08-20 | End: 2020-08-20 | Stop reason: HOSPADM

## 2020-08-20 RX ORDER — ONDANSETRON 2 MG/ML
4 INJECTION INTRAMUSCULAR; INTRAVENOUS EVERY 30 MIN PRN
Status: DISCONTINUED | OUTPATIENT
Start: 2020-08-20 | End: 2020-08-20 | Stop reason: HOSPADM

## 2020-08-20 RX ADMIN — PROPOFOL 50 MG: 10 INJECTION, EMULSION INTRAVENOUS at 10:15

## 2020-08-20 RX ADMIN — PROPOFOL 20 MG: 10 INJECTION, EMULSION INTRAVENOUS at 10:25

## 2020-08-20 RX ADMIN — PROPOFOL 20 MG: 10 INJECTION, EMULSION INTRAVENOUS at 10:23

## 2020-08-20 RX ADMIN — SODIUM CHLORIDE, POTASSIUM CHLORIDE, SODIUM LACTATE AND CALCIUM CHLORIDE: 600; 310; 30; 20 INJECTION, SOLUTION INTRAVENOUS at 09:45

## 2020-08-20 RX ADMIN — PROPOFOL 50 MG: 10 INJECTION, EMULSION INTRAVENOUS at 10:19

## 2020-08-20 RX ADMIN — PROPOFOL 50 MG: 10 INJECTION, EMULSION INTRAVENOUS at 10:17

## 2020-08-20 RX ADMIN — PROPOFOL 20 MG: 10 INJECTION, EMULSION INTRAVENOUS at 10:27

## 2020-08-20 RX ADMIN — PROPOFOL 50 MG: 10 INJECTION, EMULSION INTRAVENOUS at 10:13

## 2020-08-20 RX ADMIN — PROPOFOL 20 MG: 10 INJECTION, EMULSION INTRAVENOUS at 10:29

## 2020-08-20 RX ADMIN — PROPOFOL 20 MG: 10 INJECTION, EMULSION INTRAVENOUS at 10:21

## 2020-08-20 RX ADMIN — PROPOFOL 20 MG: 10 INJECTION, EMULSION INTRAVENOUS at 10:31

## 2020-08-20 ASSESSMENT — MIFFLIN-ST. JEOR: SCORE: 1204.72

## 2020-08-20 NOTE — OR NURSING
Patient and responsible adult given discharge instructions with no questions regarding instructions. Jez score 19. Pain level 0/10.  Discharged from unit via ambulation. Patient discharged to home accompanied by her spouse.

## 2020-08-20 NOTE — H&P
Surgery Consult Clinic Note      RE: Mony Szymanski  : 1950        Chief Complaint:  Colon cancer screening  Personal history of tubular adenoma  2nd degree relative with colon cancer      History of Present Illness:  I am seeing Mony Szymanski at the request of Dr. Das for evaluation regarding meet and greet screening colonoscopy.  She has had three previous colonoscopies, the last in  with 1 tubular adenoma removed.  She has a family history of colon cancer in her grandmother.  She denies blood in stool, changes in bowel habits, weight loss, abdominal pain.  Previous abdominal surgeries include appendectomy.   She has no questions regarding  bowel prep.  Reports passing clear liquid stools today.     She specifically denies fevers, chills, nausea, vomiting, chest pain, shortness of breath, palpitations, sore throat, cough, or generalized feeling ill.   She had a negative COVID 19 PCR test on 2020 and 2020.    Medical history:  Past Medical History:   Diagnosis Date     Attention deficit disorder of childhood without me 2010     Chronic fatigue syndrome 2010     FH: colon cancer      Generalized anxiety disorder 2010     Hx SBO      Hyperlipidemia 2011     Hypothyroidism 2011     Major depressive disorder, recurrent episode, unspecified 2011     Obsessive-compulsive disorders 2010     Small bowel obstruction due to adhesions (H) 2018       Surgical history:  Past Surgical History:   Procedure Laterality Date     APPENDECTOMY       Appendicitis  1963    appendectomy     breast calcification      biopsy; RT     colonoscopy      repeat      COLONOSCOPY  6-3-2010    Repeat -15     COLONOSCOPY N/A 2015    repeat in Procedure: COLONOSCOPY;  Surgeon: Cassie Snell MD;  Location: [facility]     EXCISE LESION CHEEK Left 10/14/2019    Procedure: wide local excision of left check melanoma;  Surgeon: Kai Gomez MD;  Location: [facility]      EXCISE LESION FACE WITH FLAP PEDICLE Left 10/21/2019    Procedure: Cervicofacial Flap for Closure of Left Cheek Defect;  Surgeon: Breanna West MD;  Location: [facility] OR     EYE SURGERY       lazy eye  1956    surgically repaired; LT     NASAL/SINUS POLYPECTOMY       wisdom teeth extracted  1988       Family history:  Family History   Problem Relation Age of Onset     Cancer Maternal Grandmother         Colon     Cancer Paternal Grandmother         Lung     Diabetes Paternal Grandfather         Type 2     Other - See Comments Father         TIA's       Medications:  Prior to Admission medications    Medication Sig Start Date End Date Taking? Authorizing Provider   5-Hydroxytryptophan (5-HTP) 50 MG CAPS Take by mouth daily.    Yes Reported, Patient   Calcium Carbonate (TUMS 500 OR) Take by mouth 2 times daily   Yes Reported, Patient   Cholecalciferol (VITAMIN D) 1000 UNITS capsule Take 1 capsule by mouth daily.   Yes Reported, Patient   fish oil-omega-3 fatty acids (FISH OIL) 1000 MG capsule Take 2 capsules by mouth daily.   Yes Reported, Patient   fluticasone (FLONASE) 50 MCG/ACT nasal spray INSTILL 2 SPRAYS INTO EACH NOSTRIL EVERY DAY 7/24/20  Yes Kobe Das MD   glucosamine 500 MG CAPS Take by mouth daily    Yes Reported, Patient   levothyroxine (SYNTHROID/LEVOTHROID) 50 MCG tablet Take 1 tablet (50 mcg) by mouth daily 7/24/20  Yes Kobe Das MD   MAGNESIUM OXIDE PO Take 200 mg by mouth daily   Yes Reported, Patient   Multiple vitamin TABS Take 1 tablet by oral route every day with food   Yes Reported, Patient   oxazepam (SERAX) 15 MG capsule TAKE 1 CAPSULE BY MOUTH THREE TIMES DAILY 7/24/20  Yes Kobe Das MD   traZODone (DESYREL) 50 MG tablet TAKE 1 TO 3 TABLETS BY MOUTH AT BEDTIME 7/24/20  Yes Kobe Das MD   venlafaxine (EFFEXOR-XR) 75 MG 24 hr capsule TAKE 1 CAPSULE BY MOUTH ONCE DAILY WITH FOOD 7/24/20  Yes Kobe Das MD       Allergies:  The patient is allergic to  "codeine.  .  Social history:  Social History     Tobacco Use     Smoking status: Never Smoker     Smokeless tobacco: Never Used   Substance Use Topics     Alcohol use: Yes     Comment: Beer & Wine, socially     Marital status: .    Review of Systems:    Constitutional: Negative for fever, chills.   HENT: Negative for ear pain, congestion, sore throat, and ear discharge.    Eyes: Negative for blurred vision, double vision.   Respiratory: Negative for cough, hemoptysis, shortness of breath, wheezing and stridor.    Cardiovascular: Negative for chest pain, palpitations and orthopnea.   Gastrointestinal: Negative for heartburn, nausea, vomiting, abdominal pain and blood in stool.   Genitourinary: Negative for urgency, frequency   Musculoskeletal: Negative for myalgias, back pain and joint pain.   Neurological: Negative for tingling, speech change and headaches.   Endo/Heme/Allergies: Does not bruise/bleed easily.   Psychiatric/Behavioral: Negative for depression, suicidal ideas and hallucinations. The patient is not nervous/anxious.    Physical Examination:  /92   Pulse 83   Temp 98.4  F (36.9  C) (Oral)   Resp 18   Ht 1.626 m (5' 4.02\")   Wt 68.9 kg (152 lb)   SpO2 98%   BMI 26.08 kg/m    General: Alert and orientedx4, no acute distress, well-developed/well-nourished, ambulating without assistance  HEENT: normocephalic atraumatic, extraocular movements intact, sclerae anicteric, Trachea mideline  Chest:   Clear to auscultation bilaterally.  Cardiac: S1S2 , regular rate and rhythm without additional sounds  Abdomen: Soft, non-tender, non-distended  Extremities: Cursory exam unremarkable.  No peripheral edema noted.  Skin: Warm, dry, < 2 sec cap refill  Neuro: CN 2-12 grossly intact, no focal deficit, GCS 15  Psych: Pleasant, calm, asks appropriate questions      Assessment/Plan:  #1 Colonoscopy  #2 Personal history of tubular adenoma  #3 2nd degree relative with colon cancer    Mony Szymanski and I had a " discussion about colonoscopies.  The indications, risks, benefits, althernatives and technical aspects of whole colon colonoscopy were outlined with risks including, but not limited to, perforation, bleeding and inability to visualize entire colon.  Management of each was reviewed including the risk for life saving surgery and possible admittance to the hospital.  Her questions were asked and answered.  We will proceed with colonoscopy with Dr. Robles as scheduled.  Zeny Arturo [facility]  [address] [place], [place] [id number]    Referring Provider:  No referring provider defined for this encounter.     Primary Care Provider:  Kobe Das

## 2020-08-20 NOTE — BRIEF OP NOTE
[facility]    Brief Operative Note    Pre-operative diagnosis: Screening for malignant neoplasm of colon [Z12.11]  Post-operative diagnosis Colon polyp x 1     Procedure: Procedure(s):  COLONOSCOPY, with polypectomy  Surgeon: Surgeon(s) and Role:     * Rakan Robles MD - Primary  Anesthesia: Monitor Anesthesia Care   Estimated blood loss: Minimal  Drains: None  Specimens:   ID Type Source Tests Collected by Time Destination   A :  Polyp Large Intestine, Sigmoid SURGICAL PATHOLOGY EXAM Rakan Robles MD 8/20/2020 10:34 AM      Findings:   One sigmoid colon polyp.  Complications: None.  Implants: * No implants in log *

## 2020-08-20 NOTE — ANESTHESIA POSTPROCEDURE EVALUATION
Patient: Mony Szymanski    Procedure(s):  COLONOSCOPY, with polypectomy    Diagnosis:Screening for malignant neoplasm of colon [Z12.11]  Diagnosis Additional Information: No value filed.    Anesthesia Type:  MAC    Note:  Anesthesia Post Evaluation    Patient participation: Able to fully participate in evaluation  Level of consciousness: awake  Pain management: adequate  Airway patency: patent  Cardiovascular status: acceptable  Respiratory status: acceptable  Hydration status: acceptable  PONV: none     Anesthetic complications: None          Last vitals:  Vitals:    08/20/20 0943 08/20/20 1040 08/20/20 1045   BP: 138/92 127/85 127/77   Pulse: 83 80 78   Resp: 18     Temp: 98.4  F (36.9  C)     SpO2:  96% 95%         Electronically Signed By: ISIAH Moore CRNA  August 20, 2020  10:55 AM

## 2020-08-20 NOTE — ANESTHESIA CARE TRANSFER NOTE
Patient: Mony Szymanski    Procedure(s):  COLONOSCOPY, with polypectomy    Diagnosis: Screening for malignant neoplasm of colon [Z12.11]  Diagnosis Additional Information: No value filed.    Anesthesia Type:   MAC     Note:  Airway :Nasal Cannula  Patient transferred to:Phase II  Handoff Report: Identifed the Patient, Identified the Reponsible Provider, Reviewed the pertinent medical history, Discussed the surgical course, Reviewed Intra-OP anesthesia mangement and issues during anesthesia, Set expectations for post-procedure period and Allowed opportunity for questions and acknowledgement of understanding      Vitals: (Last set prior to Anesthesia Care Transfer)    CRNA VITALS  8/20/2020 1005 - 8/20/2020 1036      8/20/2020             Pulse:  79    Ht Rate:  78    SpO2:  100 %    Resp Rate (set):  8                Electronically Signed By: ISIAH Mccallum CRNA  August 20, 2020  10:36 AM

## 2020-08-20 NOTE — OP NOTE
Mony Szymanski MRN# [id number]   YOB: 1950 Age: 70 year old      Date of Admission:  8/20/2020  Date of Service:   8/20/20    Primary care provider: Kobe Das    PREOPERATIVE DIAGNOSIS: history of colon polyps, family history of colon polyps       POSTOPERATIVE DIAGNOSIS:  One sigmoid colon polyp.          PROCEDURE:  Colonoscopy with polypectomy.            INDICATIONS:  Screening colonoscopy.      Specimen:   ID Type Source Tests Collected by Time Destination   A :  Polyp Large Intestine, Sigmoid SURGICAL PATHOLOGY EXAM Rakan Robles MD 8/20/2020 10:34 AM        SURGEON: Rakan Robles MD    DESCRIPTION OF PROCEDURE: Mony Szymanski was brought into the endoscopy suite and placed in the left lateral decubitus position. After preprocedural pause and attended monitored anesthesia was administered, the external anus was inspected and was normal. Digital rectal exam was normal. The colonoscope was inserted and advanced under direct visualization to the level of the cecum which was identified by the appendiceal orifice and the ileocecal valve. The prep was excellent.. Upon slow withdrawal of the colonoscope, approximately 95% of the mucosa was directly visualized. There was a small sessile polyp in sigmoid colon removed with biopsy forceps.  The rest of the colon was without mucosal abnormality. There was no evidence of further polyps, inflammation, bleeding or AVMs. Retroflexion of the rectum was normal. The extra air was removed from the colon, and the colonoscope withdrawn. The patient tolerated the procedure well and was taken to postanesthesia care unit.     We invite the patient to return in 5 years for follow up screening evaluation due to family history.     Rakan Robles MD

## 2020-08-20 NOTE — DISCHARGE INSTRUCTIONS

## 2020-08-21 LAB — COPATH REPORT: NORMAL

## 2020-08-24 ENCOUNTER — OFFICE VISIT (OUTPATIENT)
Dept: DERMATOLOGY | Facility: OTHER | Age: 70
End: 2020-08-24
Attending: FAMILY MEDICINE
Payer: COMMERCIAL

## 2020-08-24 VITALS
DIASTOLIC BLOOD PRESSURE: 60 MMHG | HEART RATE: 83 BPM | WEIGHT: 148 LBS | HEIGHT: 68 IN | TEMPERATURE: 99.3 F | SYSTOLIC BLOOD PRESSURE: 130 MMHG | BODY MASS INDEX: 22.43 KG/M2 | OXYGEN SATURATION: 96 %

## 2020-08-24 DIAGNOSIS — C43.39 MELANOMA OF CHEEK (H): ICD-10-CM

## 2020-08-24 PROCEDURE — 99213 OFFICE O/P EST LOW 20 MIN: CPT | Performed by: DERMATOLOGY

## 2020-08-24 PROCEDURE — G0463 HOSPITAL OUTPT CLINIC VISIT: HCPCS

## 2020-08-24 ASSESSMENT — PAIN SCALES - GENERAL: PAINLEVEL: NO PAIN (0)

## 2020-08-24 ASSESSMENT — MIFFLIN-ST. JEOR: SCORE: 1249.82

## 2020-08-24 NOTE — NURSING NOTE
"Chief Complaint   Patient presents with     Malignant Melanoma (Choroid) Evaluation       Initial /60 (BP Location: Right arm, Patient Position: Chair, Cuff Size: Adult Regular)   Pulse 83   Temp 99.3  F (37.4  C) (Tympanic)   Ht 1.727 m (5' 8\")   Wt 67.1 kg (148 lb)   SpO2 96%   BMI 22.50 kg/m   Estimated body mass index is 22.5 kg/m  as calculated from the following:    Height as of this encounter: 1.727 m (5' 8\").    Weight as of this encounter: 67.1 kg (148 lb).  Medication Reconciliation: complete  CONG SALAZAR LPN    "

## 2020-08-24 NOTE — LETTER
2020       RE: Harika Tyler  [address]  [facility] [id number]     Dear Colleague,    Thank you for referring your patient, Harika Tyler, to the [facility]. Please see a copy of my visit note below.    Visit Date:   2020      Harika returns for a skin exam.  The lesion that we biopsied  from her left cheek was a melanoma in situ (lentigo maligna type) that is now  completely gone since excisional surgery.      The repair has been excellent.  She is very pleased.  There is a visible scar, very difficult to see except in certain angles, so all is well.      We checked her face, her neck, her upper chest, her  breasts, her back, her arms and legs today, and we found no lesions concerning.      She is a brunette, and she has a  very medium complexion skin, and to have melanoma was quite a surprise, but I think she is low risk for any further melanomas.      I did suggest a return in a year for another examination, and we discussed where other melanomas might show up on her skin surfaces and that melanoma can occur in non-sun exposed sites.     Meds and allergies reviewed.          DEANN PATRICK MD             D: 2020   T: 2020   MT: SADAF      Name:     HARIKA TYLER   MRN:      [id number]        Account:      [id number]   :      1950           Visit Date:   2020      Document: [id number]        Again, thank you for allowing me to participate in the care of your patient.      Sincerely,    DEANN Patrick MD

## 2020-08-25 NOTE — PROGRESS NOTES
Visit Date:   2020      Harika returns for a skin exam.  The lesion that we biopsied  from her left cheek was a melanoma in situ (lentigo maligna type) that is now  completely gone since excisional surgery.      The repair has been excellent.  She is very pleased.  There is a visible scar, very difficult to see except in certain angles, so all is well.      We checked her face, her neck, her upper chest, her  breasts, her back, her arms and legs today, and we found no lesions concerning.      She is a brunette, and she has a  very medium complexion skin, and to have melanoma was quite a surprise, but I think she is low risk for any further melanomas.      I did suggest a return in a year for another examination, and we discussed where other melanomas might show up on her skin surfaces and that melanoma can occur in non-sun exposed sites.     Meds and allergies reviewed.          DEANN PATRICK MD             D: 2020   T: 2020   MT: SADAF      Name:     HARIKA TYLER   MRN:      [id number]        Account:      [id number]   :      1950           Visit Date:   2020      Document: [id number]

## 2020-10-09 ENCOUNTER — ANCILLARY PROCEDURE (OUTPATIENT)
Dept: MAMMOGRAPHY | Facility: OTHER | Age: 70
End: 2020-10-09
Attending: FAMILY MEDICINE
Payer: MEDICARE

## 2020-10-09 DIAGNOSIS — Z12.31 ENCOUNTER FOR SCREENING MAMMOGRAM FOR BREAST CANCER: ICD-10-CM

## 2020-10-09 PROCEDURE — 77063 BREAST TOMOSYNTHESIS BI: CPT | Mod: TC

## 2020-10-12 ENCOUNTER — TELEPHONE (OUTPATIENT)
Dept: FAMILY MEDICINE | Facility: OTHER | Age: 70
End: 2020-10-12

## 2020-10-12 NOTE — TELEPHONE ENCOUNTER
"Pt called, reports concerns related to side effects of medications. Pt reports that since starting CBD she has noticed \"muscle softening\" which she feels is related to serotonin. Pt states that she feels her effexor is not working for her and would like to switch to something else. Pt wondering about pharmacogenetic testing. Would like appt with PCP- okay with telephone or face to face. Nothing available this week. Please advise. Thank you!    "

## 2020-10-13 ENCOUNTER — VIRTUAL VISIT (OUTPATIENT)
Dept: FAMILY MEDICINE | Facility: OTHER | Age: 70
End: 2020-10-13
Attending: FAMILY MEDICINE
Payer: COMMERCIAL

## 2020-10-13 DIAGNOSIS — F33.41 MAJOR DEPRESSIVE DISORDER, RECURRENT EPISODE, IN PARTIAL REMISSION (H): Primary | ICD-10-CM

## 2020-10-13 DIAGNOSIS — F42.9 OBSESSIVE-COMPULSIVE DISORDER, UNSPECIFIED TYPE: ICD-10-CM

## 2020-10-13 DIAGNOSIS — F41.1 GENERALIZED ANXIETY DISORDER: ICD-10-CM

## 2020-10-13 PROCEDURE — G0463 HOSPITAL OUTPT CLINIC VISIT: HCPCS | Mod: TEL

## 2020-10-13 PROCEDURE — 99213 OFFICE O/P EST LOW 20 MIN: CPT | Mod: 95 | Performed by: FAMILY MEDICINE

## 2020-10-13 ASSESSMENT — ANXIETY QUESTIONNAIRES
3. WORRYING TOO MUCH ABOUT DIFFERENT THINGS: NOT AT ALL
7. FEELING AFRAID AS IF SOMETHING AWFUL MIGHT HAPPEN: NOT AT ALL
1. FEELING NERVOUS, ANXIOUS, OR ON EDGE: MORE THAN HALF THE DAYS
IF YOU CHECKED OFF ANY PROBLEMS ON THIS QUESTIONNAIRE, HOW DIFFICULT HAVE THESE PROBLEMS MADE IT FOR YOU TO DO YOUR WORK, TAKE CARE OF THINGS AT HOME, OR GET ALONG WITH OTHER PEOPLE: SOMEWHAT DIFFICULT
6. BECOMING EASILY ANNOYED OR IRRITABLE: NEARLY EVERY DAY
2. NOT BEING ABLE TO STOP OR CONTROL WORRYING: NEARLY EVERY DAY
GAD7 TOTAL SCORE: 10
4. TROUBLE RELAXING: MORE THAN HALF THE DAYS
5. BEING SO RESTLESS THAT IT IS HARD TO SIT STILL: NOT AT ALL

## 2020-10-13 ASSESSMENT — PATIENT HEALTH QUESTIONNAIRE - PHQ9: SUM OF ALL RESPONSES TO PHQ QUESTIONS 1-9: 9

## 2020-10-13 ASSESSMENT — PAIN SCALES - GENERAL: PAINLEVEL: NO PAIN (0)

## 2020-10-13 NOTE — NURSING NOTE
"Chief Complaint   Patient presents with     Depression       Initial There were no vitals taken for this visit. Estimated body mass index is 22.5 kg/m  as calculated from the following:    Height as of 8/24/20: 1.727 m (5' 8\").    Weight as of 8/24/20: 67.1 kg (148 lb).  Medication Reconciliation: complete  Braeden Grajeda LPN  "

## 2020-10-13 NOTE — PROGRESS NOTES
"Mony Szymanski is a 70 year old female who is being evaluated via a billable telephone visit.      The patient has been notified of following:     \"This telephone visit will be conducted via a call between you and your physician/provider. We have found that certain health care needs can be provided without the need for a physical exam.  This service lets us provide the care you need with a short phone conversation.  If a prescription is necessary we can send it directly to your pharmacy.  If lab work is needed we can place an order for that and you can then stop by our lab to have the test done at a later time.    Telephone visits are billed at different rates depending on your insurance coverage. During this emergency period, for some insurers they may be billed the same as an in-person visit.  Please reach out to your insurance provider with any questions.    If during the course of the call the physician/provider feels a telephone visit is not appropriate, you will not be charged for this service.\"    Patient has given verbal consent for Telephone visit?  Yes    What phone number would you like to be contacted at?    How would you like to obtain your AVS? MyChart    Subjective     Mony Szymanski is a 70 year old female who presents via phone visit today for the following health issues:    HPI     Depression and Anxiety Follow-Up    How are you doing with your depression since your last visit? No change    How are you doing with your anxiety since your last visit?  Worsened covid quaritine    Are you having other symptoms that might be associated with depression or anxiety? No    Have you had a significant life event? OTHER: quarintine     Do you have any concerns with your use of alcohol or other drugs? No     Stopped 5- htp  Was using CBD oil-- through [place] [facility]  Helps some but keeps her  Awake  Pt is working with pharmacist and she recommended [facility] to be done  Pt is leaving for [place] " next week  She wants to see psych to go over meds.   She wants to know if can help  She would consider seeing someone in [place]        Social History     Tobacco Use     Smoking status: Never Smoker     Smokeless tobacco: Never Used   Substance Use Topics     Alcohol use: Yes     Comment: Beer & Wine, socially     Drug use: Never     PHQ 5/20/2019 7/24/2020 10/13/2020   PHQ-9 Total Score 10 - 9   Q9: Thoughts of better off dead/self-harm past 2 weeks Several days Not at all Not at all   F/U: Thoughts of suicide or self-harm No - -   F/U: Self harm-plan - - -   F/U: Self-harm action - - -   F/U: Safety concerns No - -     ED-7 SCORE 5/20/2019 7/24/2020 10/13/2020   Total Score 15 12 10     Last PHQ-9 10/13/2020   1.  Little interest or pleasure in doing things 2   2.  Feeling down, depressed, or hopeless 1   3.  Trouble falling or staying asleep, or sleeping too much 3   4.  Feeling tired or having little energy 2   5.  Poor appetite or overeating 0   6.  Feeling bad about yourself 0   7.  Trouble concentrating 1   8.  Moving slowly or restless 0   Q9: Thoughts of better off dead/self-harm past 2 weeks 0   PHQ-9 Total Score 9   Difficulty at work, home, or with people Somewhat difficult   In the past two weeks have you had thoughts of suicide or self harm? -   Do you have concerns about your personal safety or the safety of others? -   In the past 2 weeks have you thought about a plan or had intention to harm yourself? -   In the past 2 weeks have you acted on these thoughts in any way? -     ED-7  10/13/2020   1. Feeling nervous, anxious, or on edge 2   2. Not being able to stop or control worrying 3   3. Worrying too much about different things 0   4. Trouble relaxing 2   5. Being so restless that it is hard to sit still 0   6. Becoming easily annoyed or irritable 3   7. Feeling afraid, as if something awful might happen 0   ED-7 Total Score 10   If you checked any problems, how difficult have they made it for you  to do your work, take care of things at home, or get along with other people? Somewhat difficult       Suicide Assessment Five-step Evaluation and Treatment (SAFE-T)                 Review of Systems   Constitutional, HEENT, cardiovascular, pulmonary, gi and gu systems are negative, except as otherwise noted.       Objective   Vitals - Patient Reported  Pain Score: No Pain (0)        healthy, alert and mild distress  PSYCH: Alert and oriented times 3; coherent speech, normal   rate and volume, able to articulate logical thoughts, able   to abstract reason, no tangential thoughts, no hallucinations   or delusions  Her affect is normal  RESP: No cough, no audible wheezing, able to talk in full sentences  Remainder of exam unable to be completed due to telephone visits            Assessment/Plan:    Assessment & Plan       ICD-10-CM    1. Major depressive disorder, recurrent episode, in partial remission (H)  F33.41 [facility]   2. Generalized anxiety disorder  F41.1 [facility]   3. Obsessive-compulsive disorder, unspecified type  F42.9 [facility]     We agreed on getting testing done. Sounds like got to order it and get on Website-- will work with [facility] or [facility].   Pt and I agreed she look for psychiatrist in [place] since leaving next week. Will not be able to get appt around here in a weeks time.  Pt was even looking til spring to see someone. PT sounds more desperate and recommend sooner in [place]. Pt to continue with current regimen and does not want to do any changes til sees psych and has above labs.  She is very sensitive to meds.             No follow-ups on file.    Kobe Das MD  [facility] [place]    Phone call duration:  12 minutes

## 2020-10-14 ASSESSMENT — ANXIETY QUESTIONNAIRES: GAD7 TOTAL SCORE: 10

## 2020-10-15 ENCOUNTER — TELEPHONE (OUTPATIENT)
Dept: FAMILY MEDICINE | Facility: OTHER | Age: 70
End: 2020-10-15

## 2020-10-15 DIAGNOSIS — F42.9 OBSESSIVE-COMPULSIVE DISORDER, UNSPECIFIED TYPE: Primary | ICD-10-CM

## 2020-10-15 DIAGNOSIS — F33.41 MAJOR DEPRESSIVE DISORDER, RECURRENT EPISODE, IN PARTIAL REMISSION (H): ICD-10-CM

## 2020-10-15 DIAGNOSIS — F42.9 OBSESSIVE-COMPULSIVE DISORDER, UNSPECIFIED TYPE: ICD-10-CM

## 2020-10-15 DIAGNOSIS — F41.1 GENERALIZED ANXIETY DISORDER: ICD-10-CM

## 2020-10-15 NOTE — TELEPHONE ENCOUNTER
Patient is requesting a referral to a psychiatrist at the [facility] in [place]. [phone]

## 2020-10-16 ENCOUNTER — TELEPHONE (OUTPATIENT)
Dept: PSYCHIATRY | Facility: CLINIC | Age: 70
End: 2020-10-16

## 2020-10-16 LAB — MISCELLANEOUS TEST: NORMAL

## 2020-10-16 NOTE — TELEPHONE ENCOUNTER
PSYCHIATRY CLINIC PHONE INTAKE     SERVICES REQUESTED / INTERESTED IN          Med Management    Presenting Problem and Brief History                              What would you like to be seen for? (brief description):  Pt was diagnosed 30 years ago. Pt is taking trazadone, 50mg, serax 15mg 2 a day, and effexor 75mg. Sleep is a primary concern. She started using CBD oil to help with her anxiety. Her panic attacks are better, and sleep is ok.     Have you received a mental health diagnosis? Yes   Which one (s): Severe Anxiety  Is there any history of developmental delay?  No   Are you currently seeing a mental health provider?  No            Who / month last seen:  NA  Do you have mental health records elsewhere?  No  Will you sign a release so we can obtain them?  No    Have you ever been hospitalized for psychiatric reasons?  No Describe:  She was last hospitalized in 1997    Do you have current thoughts of self-harm?  No    Do you currently have thoughts of harming others?  No       Substance Use History     Do you have any history of alcohol / illicit drug use?  No  Describe:  NA  Have you ever received treatment for this?  No    Describe:  NA     Social History     Who is the patient's a guardian?  No    Name / number: NA  Have you had an ACT team in last 12 months?  No  Describe: NA   Do you have any current or past legal issues?  No  Describe: NA   OK to leave a detailed voicemail?  Yes    Medical/ Surgical History                                   Patient Active Problem List   Diagnosis     Generalized anxiety disorder     Chronic fatigue syndrome     Attention deficit disorder     Obsessive-compulsive disorder     FH: colon cancer     Mixed hyperlipidemia     Acquired hypothyroidism     Major depressive disorder, recurrent episode, in partial remission (H)     Vaso vagal episode     Small bowel obstruction due to adhesions (H)     Melanoma of cheek (H)     Acquired facial deformity     Screening for  malignant neoplasm of colon          Medications             Current Outpatient Medications   Medication Sig Dispense Refill     5-Hydroxytryptophan (5-HTP) 50 MG CAPS Take by mouth daily.        Calcium Carbonate (TUMS 500 OR) Take by mouth 2 times daily       Cholecalciferol (VITAMIN D) 1000 UNITS capsule Take 1 capsule by mouth daily.       fish oil-omega-3 fatty acids (FISH OIL) 1000 MG capsule Take 2 capsules by mouth daily.       fluticasone (FLONASE) 50 MCG/ACT nasal spray INSTILL 2 SPRAYS INTO EACH NOSTRIL EVERY DAY 48 g 3     glucosamine 500 MG CAPS Take by mouth daily        levothyroxine (SYNTHROID/LEVOTHROID) 50 MCG tablet Take 1 tablet (50 mcg) by mouth daily 90 tablet 3     MAGNESIUM OXIDE PO Take 200 mg by mouth daily       Multiple vitamin TABS Take 1 tablet by oral route every day with food       oxazepam (SERAX) 15 MG capsule TAKE 1 CAPSULE BY MOUTH THREE TIMES DAILY 270 capsule 3     traZODone (DESYREL) 50 MG tablet TAKE 1 TO 3 TABLETS BY MOUTH AT BEDTIME 270 tablet 3     venlafaxine (EFFEXOR-XR) 75 MG 24 hr capsule TAKE 1 CAPSULE BY MOUTH ONCE DAILY WITH FOOD 90 capsule 3         DISPOSITION      10/16/20 Intake completed. Female preferred but not required. Adding to resident/NP COLTEN.     Abril Salguero,

## 2020-10-21 ENCOUNTER — NURSE TRIAGE (OUTPATIENT)
Dept: FAMILY MEDICINE | Facility: OTHER | Age: 70
End: 2020-10-21

## 2020-10-21 ENCOUNTER — VIRTUAL VISIT (OUTPATIENT)
Dept: FAMILY MEDICINE | Facility: OTHER | Age: 70
End: 2020-10-21
Attending: FAMILY MEDICINE
Payer: COMMERCIAL

## 2020-10-21 DIAGNOSIS — F41.1 GENERALIZED ANXIETY DISORDER: Primary | ICD-10-CM

## 2020-10-21 DIAGNOSIS — F42.9 OBSESSIVE-COMPULSIVE DISORDER, UNSPECIFIED TYPE: ICD-10-CM

## 2020-10-21 DIAGNOSIS — F33.41 MAJOR DEPRESSIVE DISORDER, RECURRENT EPISODE, IN PARTIAL REMISSION (H): ICD-10-CM

## 2020-10-21 PROCEDURE — 99214 OFFICE O/P EST MOD 30 MIN: CPT | Mod: 95 | Performed by: FAMILY MEDICINE

## 2020-10-21 ASSESSMENT — ANXIETY QUESTIONNAIRES
1. FEELING NERVOUS, ANXIOUS, OR ON EDGE: NEARLY EVERY DAY
2. NOT BEING ABLE TO STOP OR CONTROL WORRYING: SEVERAL DAYS
5. BEING SO RESTLESS THAT IT IS HARD TO SIT STILL: NOT AT ALL
3. WORRYING TOO MUCH ABOUT DIFFERENT THINGS: SEVERAL DAYS
4. TROUBLE RELAXING: NEARLY EVERY DAY
6. BECOMING EASILY ANNOYED OR IRRITABLE: NEARLY EVERY DAY
GAD7 TOTAL SCORE: 11
7. FEELING AFRAID AS IF SOMETHING AWFUL MIGHT HAPPEN: NOT AT ALL
IF YOU CHECKED OFF ANY PROBLEMS ON THIS QUESTIONNAIRE, HOW DIFFICULT HAVE THESE PROBLEMS MADE IT FOR YOU TO DO YOUR WORK, TAKE CARE OF THINGS AT HOME, OR GET ALONG WITH OTHER PEOPLE: SOMEWHAT DIFFICULT

## 2020-10-21 ASSESSMENT — PAIN SCALES - GENERAL: PAINLEVEL: NO PAIN (0)

## 2020-10-21 ASSESSMENT — PATIENT HEALTH QUESTIONNAIRE - PHQ9: SUM OF ALL RESPONSES TO PHQ QUESTIONS 1-9: 5

## 2020-10-21 NOTE — NURSING NOTE
"Chief Complaint   Patient presents with     Anxiety       Initial There were no vitals taken for this visit. Estimated body mass index is 22.5 kg/m  as calculated from the following:    Height as of 8/24/20: 1.727 m (5' 8\").    Weight as of 8/24/20: 67.1 kg (148 lb).  Medication Reconciliation: complete  Braeden Grajeda LPN  "

## 2020-10-21 NOTE — PROGRESS NOTES
"Mony Szymanski is a 70 year old female who is being evaluated via a billable telephone visit.      The patient has been notified of following:     \"This telephone visit will be conducted via a call between you and your physician/provider. We have found that certain health care needs can be provided without the need for a physical exam.  This service lets us provide the care you need with a short phone conversation.  If a prescription is necessary we can send it directly to your pharmacy.  If lab work is needed we can place an order for that and you can then stop by our lab to have the test done at a later time.    Telephone visits are billed at different rates depending on your insurance coverage. During this emergency period, for some insurers they may be billed the same as an in-person visit.  Please reach out to your insurance provider with any questions.    If during the course of the call the physician/provider feels a telephone visit is not appropriate, you will not be charged for this service.\"    Patient has given verbal consent for Telephone visit?  Yes    What phone number would you like to be contacted at? [phone]    How would you like to obtain your AVS? Velvethart    Subjective     Mony Szymanski is a 70 year old female who presents via phone visit today for the following health issues:    HPI     Depression and Anxiety Follow-Up    How are you doing with your depression since your last visit? No change-still worse    How are you doing with your anxiety since your last visit?  No change- still worse     Are you having other symptoms that might be associated with depression or anxiety? No    Have you had a significant life event? OTHER: covid     Do you have any concerns with your use of alcohol or other drugs? No     Pt has been more depressed with even fleeting thoughts of suicide  Still has good days and then has really bad days of anxiety and depressive sx    In past- Effexor was increased to 150mg at [facility] -- " said had reaction with muscle weakness she says--- said she overdosed on Serotonin . This is back in 1994    Said 5- HTP worked but then started CBD - thought was getting the muscle weakness and stopped 5-htp    Pt is working on getting to see Psychiatrist at [facility] but are out over 2-3 months    Pt has never went up on Serax  Pt feels anxiety may be driving her depression     Social History     Tobacco Use     Smoking status: Never Smoker     Smokeless tobacco: Never Used   Substance Use Topics     Alcohol use: Yes     Comment: Beer & Wine, socially     Drug use: Never     PHQ 7/24/2020 10/13/2020 10/21/2020   PHQ-9 Total Score - 9 5   Q9: Thoughts of better off dead/self-harm past 2 weeks Not at all Not at all Several days   F/U: Thoughts of suicide or self-harm - - -   F/U: Self harm-plan - - -   F/U: Self-harm action - - -   F/U: Safety concerns - - -     ED-7 SCORE 7/24/2020 10/13/2020 10/21/2020   Total Score 12 10 11     Last PHQ-9 10/21/2020   1.  Little interest or pleasure in doing things 1   2.  Feeling down, depressed, or hopeless 1   3.  Trouble falling or staying asleep, or sleeping too much 0   4.  Feeling tired or having little energy 1   5.  Poor appetite or overeating 0   6.  Feeling bad about yourself 0   7.  Trouble concentrating 1   8.  Moving slowly or restless 0   Q9: Thoughts of better off dead/self-harm past 2 weeks 1   PHQ-9 Total Score 5   Difficulty at work, home, or with people Very difficult   In the past two weeks have you had thoughts of suicide or self harm? -   Do you have concerns about your personal safety or the safety of others? -   In the past 2 weeks have you thought about a plan or had intention to harm yourself? -   In the past 2 weeks have you acted on these thoughts in any way? -     ED-7  10/21/2020   1. Feeling nervous, anxious, or on edge 3   2. Not being able to stop or control worrying 1   3. Worrying too much about different things 1   4. Trouble relaxing 3   5.  Being so restless that it is hard to sit still 0   6. Becoming easily annoyed or irritable 3   7. Feeling afraid, as if something awful might happen 0   ED-7 Total Score 11   If you checked any problems, how difficult have they made it for you to do your work, take care of things at home, or get along with other people? Somewhat difficult       Suicide Assessment Five-step Evaluation and Treatment (SAFE-T)                   Review of Systems   Constitutional, HEENT, cardiovascular, pulmonary, gi and gu systems are negative, except as otherwise noted.       Objective          Vitals:  No vitals were obtained today due to virtual visit.    healthy, alert and mild distress  PSYCH: Alert and oriented times 3; coherent speech, normal   rate and volume, able to articulate logical thoughts, able   to abstract reason, no tangential thoughts, no hallucinations   or delusions  Her affect is anxious  RESP: No cough, no audible wheezing, able to talk in full sentences  Remainder of exam unable to be completed due to telephone visits            Assessment/Plan:    Assessment & Plan       ICD-10-CM    1. Generalized anxiety disorder  F41.1    2. Major depressive disorder, recurrent episode, in partial remission (H)  F33.41    3. Obsessive-compulsive disorder, unspecified type  F42.9      Long discussion - pt very anxious on going up on Effexor and /or adding back 5- HTP  Discussed all meds for depression and anxiety usually will be driving her Serotonin and may then have something similar of what she had in 1994  Pt agreed to seek out psychologist  Pt agreed to add a floating extra Serax capsule to see if helps. I stated she  can go to max dose of 30mg Serax up to TID if deems helpful.  Right now - I do not see any other good options to do now with her fears and concerns and worsening sx  Pt is trying to get into see Psych  And counseling  Pt is going to get back to me via my nurse or My Chart on her progress with extra floating  Serax in next 10-14 days    Pt is very aware of r/b of higher dose of Serax/Benzos.             No follow-ups on file.    Kobe Das MD  [facility]    Phone call duration:  21 minutes

## 2020-10-21 NOTE — TELEPHONE ENCOUNTER
"Pt called reported she started CBD drops on 9/3/20, pt reports also taking Effexor for years.   Pt reports increase anxiety. Pt stated, \"my body isn't functioning well\". Pt reports concerns of taking CBD and Effexor together and questioning if Effexor is even effective. Pt is inquiring regarding different medication and treatment plan.   Pt received a call from  of   Psychiatry dept and wait time for an appointment is 2-3 months out, pt reported they would be calling her at that time to set up an appointment.     Patient is requesting you call her back at [phone]. LOV:virtual visit on 10/13/20  Pt reports many years of suicidal thoughts of harming self, but states she would never follow through, verbal agreement received patient wouldn't harm herself. Pt stated she also spoke with her nurse through her insurance and they advised she go to ED.     PCP to advise on plan. Thank you  "

## 2020-10-22 ASSESSMENT — ANXIETY QUESTIONNAIRES: GAD7 TOTAL SCORE: 11

## 2020-10-23 NOTE — TELEPHONE ENCOUNTER
[facility] Call Center    Phone Message    May a detailed message be left on voicemail: yes     Reason for Call: Other: Writer called pt to schedule AGE/GET. Pt stated that she is currently living in [place] for the winter. Writer told pt that we cannot do virutal visits outside of state boundaries. Pt decided to look at options within [place]. Might call back after she is here in [place].     Action Taken: Other: not routed    Travel Screening: Not Applicable

## 2020-10-26 ENCOUNTER — OFFICE VISIT (OUTPATIENT)
Dept: PSYCHIATRY | Facility: OTHER | Age: 70
End: 2020-10-26
Attending: PSYCHIATRY & NEUROLOGY
Payer: COMMERCIAL

## 2020-10-26 DIAGNOSIS — F41.1 GENERALIZED ANXIETY DISORDER: Primary | ICD-10-CM

## 2020-10-26 PROCEDURE — 99203 OFFICE O/P NEW LOW 30 MIN: CPT | Mod: 95 | Performed by: PSYCHIATRY & NEUROLOGY

## 2020-10-26 ASSESSMENT — ANXIETY QUESTIONNAIRES
2. NOT BEING ABLE TO STOP OR CONTROL WORRYING: MORE THAN HALF THE DAYS
3. WORRYING TOO MUCH ABOUT DIFFERENT THINGS: MORE THAN HALF THE DAYS
5. BEING SO RESTLESS THAT IT IS HARD TO SIT STILL: MORE THAN HALF THE DAYS
GAD7 TOTAL SCORE: 16
1. FEELING NERVOUS, ANXIOUS, OR ON EDGE: NEARLY EVERY DAY
6. BECOMING EASILY ANNOYED OR IRRITABLE: MORE THAN HALF THE DAYS
7. FEELING AFRAID AS IF SOMETHING AWFUL MIGHT HAPPEN: MORE THAN HALF THE DAYS

## 2020-10-26 ASSESSMENT — PATIENT HEALTH QUESTIONNAIRE - PHQ9
5. POOR APPETITE OR OVEREATING: NEARLY EVERY DAY
SUM OF ALL RESPONSES TO PHQ QUESTIONS 1-9: 8

## 2020-10-26 NOTE — NURSING NOTE
"Chief Complaint   Patient presents with     RECHECK     Telephone visit. Medication management.  Possible medication change.       Initial There were no vitals taken for this visit. Estimated body mass index is 22.5 kg/m  as calculated from the following:    Height as of 8/24/20: 1.727 m (5' 8\").    Weight as of 8/24/20: 67.1 kg (148 lb).  Medication Reconciliation: complete  LAKEISHA JUAREZ LPN    "

## 2020-10-26 NOTE — PROGRESS NOTES
"Mony Szymanski is a 70 year old female who is being evaluated via a billable telephone visit.      The patient has been notified of following:     \"This telephone visit will be conducted via a call between you and your physician/provider. We have found that certain health care needs can be provided without the need for a physical exam.  This service lets us provide the care you need with a short phone conversation.  If a prescription is necessary we can send it directly to your pharmacy.  If lab work is needed we can place an order for that and you can then stop by our lab to have the test done at a later time.    Telephone visits are billed at different rates depending on your insurance coverage. During this emergency period, for some insurers they may be billed the same as an in-person visit.  Please reach out to your insurance provider with any questions.    If during the course of the call the physician/provider feels a telephone visit is not appropriate, you will not be charged for this service.\"    Patient has given verbal consent for Telephone visit?  Yes    What phone number would you like to be contacted at? [phone]    How would you like to obtain your AVS? MyChart    Phone call duration: 23 minutes        PSYCHIATRY CLINIC PROGRESS NOTE   20 minute medication management, more than 50% of time spent counseling patient on medications, medication side effects, symptom history and management   SUBJECTIVE / INTERIM HISTORY                                                                        Social- retired. Interests:reading, being physical / active & working out, grandkids, volunteers, loves being with kiddo   Children-   Grown children    Last visit was ~5 years ago fall '2015.   - generally Mony is busy person. Likes to stay busy. Had a fair amount of activities and commitments. Pandemic changed things.  - tried CBD and \"I had a lot of SEs\". Reminds me she had issues with \"serotonin\" in past. Gets \"jelly\" " "feeling muscles  - mom narcissistic and \"weird\"   - been on Effexor for many years, and questions if working anymore. Reminds me that when they tried to increase dose in past: got what was felt to be SEs related to sertonin  - feels like \"my arm isn't where it's supposed to be\" and thinks maybe in relation meds..  - Been on psychiatric meds since 90s. Been on Effexor since 1997. Takes Serax and generally takes it twice daily, once in awhile ends up feeling need to take three times daily. Has never had any issues with misuse / abuse of it. Recalls taking Ativan many years ago and seemed to work. Klonopin \"zonked\" her out. Notes has worked really hard and put in a lot of time and effort in regards to getting better gradually over time with mental health symptoms. This has included working with therapists in the past. Is  and marriage in general is going okay. They winter in [place]     SUBSTANCE USE- no issues    SYMPTOMS-  Depressed mood, anhedonia, low energy, SI with no intent or plan, feeling hopeless, helpless. Has had panic attacks past couple months.  MEDICAL ROS- fatigue, gets where muscles feel weak like \"jelly\" with certain meds.  MEDICAL / SURGICAL HISTORY                     Patient Active Problem List   Diagnosis     Generalized anxiety disorder     Chronic fatigue syndrome     Attention deficit disorder     Obsessive-compulsive disorder     FH: colon cancer     Mixed hyperlipidemia     Acquired hypothyroidism     Major depressive disorder, recurrent episode, in partial remission (H)     Vaso vagal episode     Small bowel obstruction due to adhesions (H)     Melanoma of cheek (H)     Acquired facial deformity     Screening for malignant neoplasm of colon     ALLERGY   Codeine  MEDICATIONS                                                                                             Current Outpatient Medications   Medication Sig     Calcium Carbonate (TUMS 500 OR) Take by mouth 2 times daily     " Cholecalciferol (VITAMIN D) 1000 UNITS capsule Take 1 capsule by mouth daily.     fish oil-omega-3 fatty acids (FISH OIL) 1000 MG capsule Take 2 capsules by mouth daily.     fluticasone (FLONASE) 50 MCG/ACT nasal spray INSTILL 2 SPRAYS INTO EACH NOSTRIL EVERY DAY     glucosamine 500 MG CAPS Take by mouth daily      HEMP OIL OR EXTRACT OR OTHER CBD CANNABINOID, NOT MEDICAL CANNABIS, 8 mg      levothyroxine (SYNTHROID/LEVOTHROID) 50 MCG tablet Take 1 tablet (50 mcg) by mouth daily     MAGNESIUM OXIDE PO Take 200 mg by mouth daily     Multiple vitamin TABS Take 1 tablet by oral route every day with food     oxazepam (SERAX) 15 MG capsule TAKE 1 CAPSULE BY MOUTH THREE TIMES DAILY     traZODone (DESYREL) 50 MG tablet TAKE 1 TO 3 TABLETS BY MOUTH AT BEDTIME     venlafaxine (EFFEXOR-XR) 75 MG 24 hr capsule TAKE 1 CAPSULE BY MOUTH ONCE DAILY WITH FOOD     No current facility-administered medications for this visit.        VITALS   There were no vitals taken for this visit.     PHQ9                     [unfilled]  LABS                                                                                                                           Last Comprehensive Metabolic Panel:  Sodium   Date Value Ref Range Status   07/24/2020 138 133 - 144 mmol/L Final     Potassium   Date Value Ref Range Status   07/24/2020 3.9 3.4 - 5.3 mmol/L Final     Chloride   Date Value Ref Range Status   07/24/2020 107 94 - 109 mmol/L Final     Carbon Dioxide   Date Value Ref Range Status   07/24/2020 29 20 - 32 mmol/L Final     Anion Gap   Date Value Ref Range Status   07/24/2020 2 (L) 3 - 14 mmol/L Final     Glucose   Date Value Ref Range Status   07/24/2020 88 70 - 99 mg/dL Final     Urea Nitrogen   Date Value Ref Range Status   07/24/2020 15 7 - 30 mg/dL Final     Creatinine   Date Value Ref Range Status   07/24/2020 0.71 0.52 - 1.04 mg/dL Final     GFR Estimate   Date Value Ref Range Status   07/24/2020 87 >60 mL/min/[1.73_m2] Final      "Comment:     Non  GFR Calc  Starting 12/18/2018, serum creatinine based estimated GFR (eGFR) will be   calculated using the Chronic Kidney Disease Epidemiology Collaboration   (CKD-EPI) equation.       Calcium   Date Value Ref Range Status   07/24/2020 9.4 8.5 - 10.1 mg/dL Final     TSH   Date Value Ref Range Status   07/24/2020 1.00 0.40 - 4.00 mU/L Final     MENTAL STATUS EXAM                                                                                       Mood was depressed and anxious. . Thought process, including associations, was unremarkable and thought content was devoid of suicidal and homicidal ideation and psychotic thought. No hallucinations. Insight was good. Judgment was intact and adequate for safety. Fund of knowledge was intact. Pt demonstrates no obvious problems with attention, concentration, language, recent or remote memory although these were not formally tested.     ASSESSMENT                                                                                                      HISTORICAL:  Initial psych note 8 2015         NOTES:      This patient is a 70 year old, female who has long history in terms of depression, anxiety - therapy throughout the years. She notes ongoing depression that worsned along with isolation and the pandemic. Mony takes Effexor, Serax, and trazodone.  She has heard Serax is \"archaeic\" and we had discussion about benzodiazepine class of meds. Depression (and anxiety) worse since July '20..  She is trying CBD oil with mixed results. She reminds me higher doses of Effexor she has issues with her muscles feeling like \"jelly\". She tried 5HTP and had similar issues when was taking it with Effexor.     We ended up agreeing holding off med change until we get [facility] results back. Perhaps she metabolizes meds more slowly and hence build up higher concentration and thus SEs?? I am considering Wellbutrin given NE and DA as opposed to serotonin. "       TREATMENT RISK STATEMENT:  The risks, benefits, alternatives and potential adverse effects have been explained and are understood by the pt.  The pt agrees to the treatment plan with the ability to do so.   The pt knows to call the clinic for any problems or access emergency care if needed.        DIAGNOSES                    MDD, recurrent mod to severe  ED  OCPD traits    PLAN                                                                                                                    1)  MEDICATIONS:         -- Continue Effexor, trazodone, and Serax at current doses. Considering adding small dose Wellbutrin --> we are waiting on [facility]PD[facility] test results    2)  THERAPY:  No Change    3)  LABS:  None    4)  PT MONITOR [call for probs]:  worsening sx, SEs from meds    5)  REFERRALS [CD, medical, other]:  None    6)  RTC:  2 weeks    Of note: when in [place] her pharmacy is [facility] in [place] on [address] [facility]  Phone # [phone].

## 2020-10-27 ASSESSMENT — ANXIETY QUESTIONNAIRES: GAD7 TOTAL SCORE: 16

## 2020-11-06 ENCOUNTER — NURSE TRIAGE (OUTPATIENT)
Dept: FAMILY MEDICINE | Facility: OTHER | Age: 70
End: 2020-11-06

## 2020-11-06 NOTE — TELEPHONE ENCOUNTER
Pt calling for results regarding recent send out on 10/15/20. Pt reports her results should have been done by 2 weeks, it appears to be In Process? Pt requesting call back.    "Smoking status: Never Smoker    Smokeless tobacco: Never Used    Alcohol use Yes      Comment: rare    Drug use: No    Sexual activity: Yes     Partners: Male     Birth control/ protection: None     Other Topics Concern    None     Social History Narrative    , grown children. .        Family History:   family history includes Cataracts in her father; Diabetes in her father, mother, and paternal grandfather; Heart disease in her father and paternal grandfather.    Health Maintenance   Topic Date Due    Pneumococcal PPSV23 (Medium Risk) (1) 03/10/1987    Eye Exam  05/29/2016    Foot Exam  03/21/2017    Mammogram  04/01/2017    Hemoglobin A1c  05/15/2018    Lipid Panel  11/15/2018    TETANUS VACCINE  08/30/Merritt.      Lamont Merritt MD

## 2020-11-09 NOTE — TELEPHONE ENCOUNTER
Have not seen it.  I assume it will come to me. I assume takes a while since it is a genetic test.

## 2020-11-10 ENCOUNTER — VIRTUAL VISIT (OUTPATIENT)
Dept: PSYCHIATRY | Facility: OTHER | Age: 70
End: 2020-11-10
Attending: PSYCHIATRY & NEUROLOGY
Payer: COMMERCIAL

## 2020-11-10 DIAGNOSIS — F33.1 MAJOR DEPRESSIVE DISORDER, RECURRENT EPISODE, MODERATE (H): Primary | ICD-10-CM

## 2020-11-10 PROCEDURE — 99214 OFFICE O/P EST MOD 30 MIN: CPT | Mod: 95 | Performed by: PSYCHIATRY & NEUROLOGY

## 2020-11-10 RX ORDER — BUPROPION HYDROCHLORIDE 150 MG/1
150 TABLET ORAL EVERY MORNING
Qty: 30 TABLET | Refills: 3 | Status: SHIPPED | OUTPATIENT
Start: 2020-11-10 | End: 2021-02-02

## 2020-11-10 RX ORDER — VENLAFAXINE HYDROCHLORIDE 37.5 MG/1
37.5 CAPSULE, EXTENDED RELEASE ORAL DAILY
Qty: 30 CAPSULE | Refills: 3 | Status: SHIPPED | OUTPATIENT
Start: 2020-11-10 | End: 2021-03-01

## 2020-11-10 ASSESSMENT — ANXIETY QUESTIONNAIRES
1. FEELING NERVOUS, ANXIOUS, OR ON EDGE: NEARLY EVERY DAY
7. FEELING AFRAID AS IF SOMETHING AWFUL MIGHT HAPPEN: NOT AT ALL
GAD7 TOTAL SCORE: 12
3. WORRYING TOO MUCH ABOUT DIFFERENT THINGS: NOT AT ALL
2. NOT BEING ABLE TO STOP OR CONTROL WORRYING: SEVERAL DAYS
6. BECOMING EASILY ANNOYED OR IRRITABLE: MORE THAN HALF THE DAYS
5. BEING SO RESTLESS THAT IT IS HARD TO SIT STILL: NEARLY EVERY DAY

## 2020-11-10 ASSESSMENT — PATIENT HEALTH QUESTIONNAIRE - PHQ9
5. POOR APPETITE OR OVEREATING: NEARLY EVERY DAY
SUM OF ALL RESPONSES TO PHQ QUESTIONS 1-9: 10

## 2020-11-10 ASSESSMENT — PAIN SCALES - GENERAL: PAINLEVEL: NO PAIN (0)

## 2020-11-10 NOTE — TELEPHONE ENCOUNTER
"Pt called again for genetic testing results. Per lab this is a send out through [employer].     Writer called online number: [phone]  Per [employer] [employer] - staff stated, \"we sent an e-mail to Lindsey Steiner with medical necessity document, we need the healthcare provider to sign and date the form and results will be sent soon after that\".    Please call frank back with updates per request. [phone] pt's cell.     "

## 2020-11-10 NOTE — NURSING NOTE
"Chief Complaint   Patient presents with     RECHECK     Telephone visit.       Initial There were no vitals taken for this visit. Estimated body mass index is 22.5 kg/m  as calculated from the following:    Height as of 8/24/20: 1.727 m (5' 8\").    Weight as of 8/24/20: 67.1 kg (148 lb).  Medication Reconciliation: complete  LAKEISHA JUAREZ LPN    "

## 2020-11-10 NOTE — PROGRESS NOTES
"Mony Szymanski is a 70 year old female who is being evaluated via a billable telephone visit.      The patient has been notified of following:     \"This telephone visit will be conducted via a call between you and your physician/provider. We have found that certain health care needs can be provided without the need for a physical exam.  This service lets us provide the care you need with a short phone conversation.  If a prescription is necessary we can send it directly to your pharmacy.  If lab work is needed we can place an order for that and you can then stop by our lab to have the test done at a later time.    Telephone visits are billed at different rates depending on your insurance coverage. During this emergency period, for some insurers they may be billed the same as an in-person visit.  Please reach out to your insurance provider with any questions.    If during the course of the call the physician/provider feels a telephone visit is not appropriate, you will not be charged for this service.\"    Patient has given verbal consent for Telephone visit?  Yes    What phone number would you like to be contacted at? [phone]    How would you like to obtain your AVS? [employer]    Phone call duration: 20 minutes        PSYCHIATRY CLINIC PROGRESS NOTE   20 minute medication management, more than 50% of time spent counseling patient on medications, medication side effects, symptom history and management   SUBJECTIVE / INTERIM HISTORY                                                                        Social- retired. Interests:reading, being physical / active & working out, grandkids, volunteers, loves being with kiddo   Children-   Grown children    Last visit was 10/26/20: Continue Effexor, trazodone, and Serax at current doses. Considering adding small dose Wellbutrin --> we are waiting on GeneSight test results  - GeneSight test results \"in process\" thus still not back from what I can tell  - decreased CBD and " "increased oxazepam and \"I'm functioning\". Notes cried more in past couple weeks.   - feels like oxazepam is one of the only parts that work / help  - Mony notes she sees \"all these ads on t.v. Why haven't you mentioned these?\" I began discussion of Andie Castaneda  - mornings are the worst. Then gets better.   - generally Mony is busy person. Likes to stay busy. Had a fair amount of activities and commitments. Pandemic changed things.  - is taking CBD and asks my opinion - I noted I don't have data / studies to back it up hence if not helping any SEs I would discontinue it. . Reminds me she had issues with \"serotonin\" in past. Gets \"jelly\" feeling muscles  - mom narcissistic and \"weird\"   - been on Effexor for many years, and questions if working anymore. Reminds me that when they tried to increase dose in past: got what was felt to be SEs related to sertonin  - Been on psychiatric meds since 90s. Been on Effexor since 1997. Takes Serax and generally takes it twice daily, once in awhile ends up feeling need to take three times daily. Has never had any issues with misuse / abuse of it. Recalls taking Ativan many years ago and seemed to work. Klonopin \"zonked\" her out. Notes has worked really hard and put in a lot of time and effort in regards to getting better gradually over time with mental health symptoms. This has included working with therapists in the past. Is  and marriage in general is going okay. They winter in [place]     SUBSTANCE USE- no issues    SYMPTOMS-  Depressed mood, anhedonia, low energy, SI with no intent or plan, feeling hopeless, helpless. Has had panic attacks past couple months.  MEDICAL ROS- fatigue, gets where muscles feel weak like \"jelly\" with certain meds.  MEDICAL / SURGICAL HISTORY                     Patient Active Problem List   Diagnosis     Generalized anxiety disorder     Chronic fatigue syndrome     Attention deficit disorder     Obsessive-compulsive disorder     FH: colon " cancer     Mixed hyperlipidemia     Acquired hypothyroidism     Major depressive disorder, recurrent episode, in partial remission (H)     Vaso vagal episode     Small bowel obstruction due to adhesions (H)     Melanoma of cheek (H)     Acquired facial deformity     Screening for malignant neoplasm of colon     ALLERGY   Codeine  MEDICATIONS                                                                                             Current Outpatient Medications   Medication Sig     Calcium Carbonate (TUMS 500 OR) Take by mouth 2 times daily     Cholecalciferol (VITAMIN D) 1000 UNITS capsule Take 1 capsule by mouth daily.     fish oil-omega-3 fatty acids (FISH OIL) 1000 MG capsule Take 2 capsules by mouth daily.     fluticasone (FLONASE) 50 MCG/ACT nasal spray INSTILL 2 SPRAYS INTO EACH NOSTRIL EVERY DAY     glucosamine 500 MG CAPS Take by mouth daily      HEMP OIL OR EXTRACT OR OTHER CBD CANNABINOID, NOT MEDICAL CANNABIS, 8 mg      levothyroxine (SYNTHROID/LEVOTHROID) 50 MCG tablet Take 1 tablet (50 mcg) by mouth daily     MAGNESIUM OXIDE PO Take 200 mg by mouth daily     Multiple vitamin TABS Take 1 tablet by oral route every day with food     oxazepam (SERAX) 15 MG capsule TAKE 1 CAPSULE BY MOUTH THREE TIMES DAILY     traZODone (DESYREL) 50 MG tablet TAKE 1 TO 3 TABLETS BY MOUTH AT BEDTIME     venlafaxine (EFFEXOR-XR) 75 MG 24 hr capsule TAKE 1 CAPSULE BY MOUTH ONCE DAILY WITH FOOD     No current facility-administered medications for this visit.        VITALS   There were no vitals taken for this visit.     PHQ9                     [unfilled]  LABS                                                                                                                           Last Comprehensive Metabolic Panel:  Sodium   Date Value Ref Range Status   07/24/2020 138 133 - 144 mmol/L Final     Potassium   Date Value Ref Range Status   07/24/2020 3.9 3.4 - 5.3 mmol/L Final     Chloride   Date Value Ref Range Status    07/24/2020 107 94 - 109 mmol/L Final     Carbon Dioxide   Date Value Ref Range Status   07/24/2020 29 20 - 32 mmol/L Final     Anion Gap   Date Value Ref Range Status   07/24/2020 2 (L) 3 - 14 mmol/L Final     Glucose   Date Value Ref Range Status   07/24/2020 88 70 - 99 mg/dL Final     Urea Nitrogen   Date Value Ref Range Status   07/24/2020 15 7 - 30 mg/dL Final     Creatinine   Date Value Ref Range Status   07/24/2020 0.71 0.52 - 1.04 mg/dL Final     GFR Estimate   Date Value Ref Range Status   07/24/2020 87 >60 mL/min/[1.73_m2] Final     Comment:     Non  GFR Calc  Starting 12/18/2018, serum creatinine based estimated GFR (eGFR) will be   calculated using the Chronic Kidney Disease Epidemiology Collaboration   (CKD-EPI) equation.       Calcium   Date Value Ref Range Status   07/24/2020 9.4 8.5 - 10.1 mg/dL Final     TSH   Date Value Ref Range Status   07/24/2020 1.00 0.40 - 4.00 mU/L Final     MENTAL STATUS EXAM                                                                                       Mood was depressed and anxious. . Could hear (telephone visit) she was tearful at times. Thought process, including associations, was unremarkable and thought content was devoid of suicidal and homicidal ideation and psychotic thought. No hallucinations. Insight was good. Judgment was intact and adequate for safety. Fund of knowledge was intact. Pt demonstrates no obvious problems with attention, concentration, language, recent or remote memory although these were not formally tested.     ASSESSMENT                                                                                                      HISTORICAL:  Initial psych note 8 2015         NOTES:      This patient is a 70 year old, female who has long history in terms of depression, anxiety - therapy throughout the years. She notes ongoing depression that worsned along with isolation and the pandemic. Mony takes Effexor, Serax, and trazodone.  She  "has heard Serax is \"archaeic\" and we had discussion about benzodiazepine class of meds. Depression (and anxiety) worse since July '20..  She is trying CBD oil with mixed results. She reminds me higher doses of Effexor she has issues with her muscles feeling like \"jelly\". She tried 5HTP and had similar issues when was taking it with Effexor.     We ended up agreeing holding off med change last visit until we get GeneSight results back. Today still not back.. She is not doing well and I think it's time to make a change here given she is having a rough time. She asked about the meds she sees on t.v. to add to antidepressants such as Rexulti, Abilify... discussed their role as augmenting and risks such as TD, akathisia, metabolic SEs. She isn't keen on those. We opted on Wellbutrin - she is most afraid of chance for insomnia of which I noted we will start low dose and she will take in the morning. We are going to decrease effexor. If goes well we will consider getting rid of Effexor. Oxazepam: she would like to switch to diff med in class of which we will revisit in future.      TREATMENT RISK STATEMENT:  The risks, benefits, alternatives and potential adverse effects have been explained and are understood by the pt.  The pt agrees to the treatment plan with the ability to do so.   The pt knows to call the clinic for any problems or access emergency care if needed.        DIAGNOSES                    MDD, recurrent mod to severe  ED  OCPD traits    PLAN                                                                                                                    1)  MEDICATIONS:         -- Cont trazodone, and Serax at current doses. Decrease Effexor from 75 mg daily to 37.5 mg daily. Startt Wellbutrin  mg daily in am.     2)  THERAPY:  No Change    3)  LABS:  None    4)  PT MONITOR [call for probs]:  worsening sx, SEs from meds  inue Effexor,  5)  REFERRALS [CD, medical, other]:  None    6)  RTC: 1 " month    Of note: when in [place] her pharmacy is [facility] in [place] on [address]  Phone # [phone].

## 2020-11-11 ASSESSMENT — ANXIETY QUESTIONNAIRES: GAD7 TOTAL SCORE: 12

## 2020-11-11 NOTE — TELEPHONE ENCOUNTER
8:41 AM    Printed form and left with Braeden to have  sign and fax back to [facility].     Lindsey Steiner, RN-BSN  Care Coordination, Behavioral Health

## 2020-11-12 NOTE — TELEPHONE ENCOUNTER
9:45 AM    Contacted patient and updated that [employer] results have been received and forwarded on to  and . Advised patient that once these are reviewed  will let me know if patient needs a visit to discuss or next steps. Patient verbalized understanding.     Lindsey Steiner, RN-BSN  Care Coordination, Behavioral Health

## 2020-11-13 LAB
LOCATION PERFORMED: NORMAL
RESULT: NORMAL
SEND OUTS MISC TEST CODE: NORMAL
SEND OUTS MISC TEST SPECIMEN: NORMAL
TEST NAME: NORMAL

## 2020-12-09 ENCOUNTER — VIRTUAL VISIT (OUTPATIENT)
Dept: PSYCHIATRY | Facility: OTHER | Age: 70
End: 2020-12-09
Attending: PSYCHIATRY & NEUROLOGY
Payer: COMMERCIAL

## 2020-12-09 DIAGNOSIS — F33.0 MAJOR DEPRESSIVE DISORDER, RECURRENT EPISODE, MILD (H): Primary | ICD-10-CM

## 2020-12-09 PROCEDURE — 99213 OFFICE O/P EST LOW 20 MIN: CPT | Mod: 95 | Performed by: PSYCHIATRY & NEUROLOGY

## 2020-12-09 ASSESSMENT — PAIN SCALES - GENERAL: PAINLEVEL: NO PAIN (0)

## 2020-12-09 ASSESSMENT — PATIENT HEALTH QUESTIONNAIRE - PHQ9
SUM OF ALL RESPONSES TO PHQ QUESTIONS 1-9: 2
5. POOR APPETITE OR OVEREATING: MORE THAN HALF THE DAYS

## 2020-12-09 ASSESSMENT — ANXIETY QUESTIONNAIRES
6. BECOMING EASILY ANNOYED OR IRRITABLE: SEVERAL DAYS
GAD7 TOTAL SCORE: 8
2. NOT BEING ABLE TO STOP OR CONTROL WORRYING: SEVERAL DAYS
1. FEELING NERVOUS, ANXIOUS, OR ON EDGE: NEARLY EVERY DAY
3. WORRYING TOO MUCH ABOUT DIFFERENT THINGS: SEVERAL DAYS
5. BEING SO RESTLESS THAT IT IS HARD TO SIT STILL: NOT AT ALL
7. FEELING AFRAID AS IF SOMETHING AWFUL MIGHT HAPPEN: NOT AT ALL

## 2020-12-09 NOTE — NURSING NOTE
"Chief Complaint   Patient presents with     RECHECK     Telephone visit.  Patient feels the medication changes at last visit have helped \"very well.\"       Initial There were no vitals taken for this visit. Estimated body mass index is 22.5 kg/m  as calculated from the following:    Height as of 8/24/20: 1.727 m (5' 8\").    Weight as of 8/24/20: 67.1 kg (148 lb).  Medication Reconciliation: complete  LAKEISHA JUAREZ LPN    "

## 2020-12-09 NOTE — PROGRESS NOTES
"Mony Szymanski is a 70 year old female who is being evaluated via a billable telephone visit.      The patient has been notified of following:     \"This telephone visit will be conducted via a call between you and your physician/provider. We have found that certain health care needs can be provided without the need for a physical exam.  This service lets us provide the care you need with a short phone conversation.  If a prescription is necessary we can send it directly to your pharmacy.  If lab work is needed we can place an order for that and you can then stop by our lab to have the test done at a later time.    Telephone visits are billed at different rates depending on your insurance coverage. During this emergency period, for some insurers they may be billed the same as an in-person visit.  Please reach out to your insurance provider with any questions.    If during the course of the call the physician/provider feels a telephone visit is not appropriate, you will not be charged for this service.\"    Patient has given verbal consent for Telephone visit?  Yes    What phone number would you like to be contacted at? [phone]    How would you like to obtain your AVS? Allegrat    Phone call duration: 17 minutes        PSYCHIATRY CLINIC PROGRESS NOTE   20 minute medication management, more than 50% of time spent counseling patient on medications, medication side effects, symptom history and management   SUBJECTIVE / INTERIM HISTORY                                                                        Social- retired. Interests:reading, being physical / active & working out, grandkids, volunteers, loves being with kiddo   Children-   Grown children    Last visit was 10/26/20: Continue Effexor, trazodone, and Serax at current doses. Considering adding small dose Wellbutrin --> we are waiting on GeneSight test results  - GeneSight test results: we reviewed the results and looks like Mony's history of having issues in " "past with higher doses of Effexor correlate with her GeneSight testing and noted may require lower doses of Effexor.   - sleeping pretty well, not having panic attacks, and not crying like she was.   - STOPPED taking CBD. Notes \"I was so depressed\". Thinks does seem to correlate with when was taking the CBD  -  feels she is doing better. He noted she is interacting more with people and she hasn't done that in a long time  - mom narcissistic and \"weird\"   - Been on psychiatric meds since 90s. Been on Effexor since 1997. Takes Serax and generally takes it twice daily, once in awhile ends up feeling need to take three times daily. Has never had any issues with misuse / abuse of it. Recalls taking Ativan many years ago and seemed to work. Klonopin \"zonked\" her out. Notes has worked really hard and put in a lot of time and effort in regards to getting better gradually over time with mental health symptoms. This has included working with therapists in the past. Is  and marriage in general is going okay. They winter in [place]     SUBSTANCE USE- no issues    SYMPTOMS- These are all better:  Depressed mood, anhedonia, low energy, SI with no intent or plan, feeling hopeless, helpless. Has had panic attacks past couple months.  MEDICAL ROS- fatigue, gets where muscles feel weak like \"jelly\" with certain meds.  MEDICAL / SURGICAL HISTORY                     Patient Active Problem List   Diagnosis     Generalized anxiety disorder     Chronic fatigue syndrome     Attention deficit disorder     Obsessive-compulsive disorder     FH: colon cancer     Mixed hyperlipidemia     Acquired hypothyroidism     Major depressive disorder, recurrent episode, in partial remission (H)     Vaso vagal episode     Small bowel obstruction due to adhesions (H)     Melanoma of cheek (H)     Acquired facial deformity     Screening for malignant neoplasm of colon     ALLERGY   Codeine  MEDICATIONS                                           "                                                   Current Outpatient Medications   Medication Sig     buPROPion (WELLBUTRIN XL) 150 MG 24 hr tablet Take 1 tablet (150 mg) by mouth every morning     Calcium Carbonate (TUMS 500 OR) Take by mouth 2 times daily     Cholecalciferol (VITAMIN D) 1000 UNITS capsule Take 1 capsule by mouth daily.     fish oil-omega-3 fatty acids (FISH OIL) 1000 MG capsule Take 2 capsules by mouth daily.     fluticasone (FLONASE) 50 MCG/ACT nasal spray INSTILL 2 SPRAYS INTO EACH NOSTRIL EVERY DAY     glucosamine 500 MG CAPS Take by mouth daily      levothyroxine (SYNTHROID/LEVOTHROID) 50 MCG tablet Take 1 tablet (50 mcg) by mouth daily     MAGNESIUM OXIDE PO Take 200 mg by mouth daily     Multiple vitamin TABS Take 1 tablet by oral route every day with food     oxazepam (SERAX) 15 MG capsule TAKE 1 CAPSULE BY MOUTH THREE TIMES DAILY     traZODone (DESYREL) 50 MG tablet TAKE 1 TO 3 TABLETS BY MOUTH AT BEDTIME     venlafaxine (EFFEXOR-XR) 37.5 MG 24 hr capsule Take 1 capsule (37.5 mg) by mouth daily     No current facility-administered medications for this visit.        VITALS   There were no vitals taken for this visit.     PHQ9                     [unfilled]  LABS                                                                                                                           Last Comprehensive Metabolic Panel:  Sodium   Date Value Ref Range Status   07/24/2020 138 133 - 144 mmol/L Final     Potassium   Date Value Ref Range Status   07/24/2020 3.9 3.4 - 5.3 mmol/L Final     Chloride   Date Value Ref Range Status   07/24/2020 107 94 - 109 mmol/L Final     Carbon Dioxide   Date Value Ref Range Status   07/24/2020 29 20 - 32 mmol/L Final     Anion Gap   Date Value Ref Range Status   07/24/2020 2 (L) 3 - 14 mmol/L Final     Glucose   Date Value Ref Range Status   07/24/2020 88 70 - 99 mg/dL Final     Urea Nitrogen   Date Value Ref Range Status   07/24/2020 15 7 - 30 mg/dL Final  "    Creatinine   Date Value Ref Range Status   07/24/2020 0.71 0.52 - 1.04 mg/dL Final     GFR Estimate   Date Value Ref Range Status   07/24/2020 87 >60 mL/min/[1.73_m2] Final     Comment:     Non  GFR Calc  Starting 12/18/2018, serum creatinine based estimated GFR (eGFR) will be   calculated using the Chronic Kidney Disease Epidemiology Collaboration   (CKD-EPI) equation.       Calcium   Date Value Ref Range Status   07/24/2020 9.4 8.5 - 10.1 mg/dL Final     TSH   Date Value Ref Range Status   07/24/2020 1.00 0.40 - 4.00 mU/L Final     MENTAL STATUS EXAM                                                                                       Mood was euthymic Thought process, including associations, was unremarkable and thought content was devoid of suicidal and homicidal ideation and psychotic thought. No hallucinations. Insight was good. Judgment was intact and adequate for safety. Fund of knowledge was intact. Pt demonstrates no obvious problems with attention, concentration, language, recent or remote memory although these were not formally tested.     ASSESSMENT                                                                                                      HISTORICAL:  Initial psych note 8 2015         NOTES:      Mony Szymanski is a 70 year old, female who has long history in terms of depression, anxiety - therapy throughout the years. She notes ongoing depression that worsned along with isolation and the Covid - 19 pandemic. Mony has been taking Effexor, Serax, and trazodone for years.  She has heard Serax is \"archaeic\" and we have had discussion about benzodiazepine class of meds ( guidelines have shifted over years to try avoid long-term use and rather stick with short-term use). Depression (and anxiety) worse since July '20..  She was CBD oil with mixed results. She reminds me higher doses of Effexor she had issues with her muscles feeling like \"jelly\". She tried 5HTP and had similar issues " "when was taking it with Effexor.     She got rid of the CBD and thinks was actually contributing to depression as opposed to helping anything. We reduced effexor from 75 mg to 37.5 mg and her history with this med correlates with GeneSight testing (more prone to SEs). We started Wellbutrin and she is doing much better!! She inquires abotu changing oxazepam to something else as we spoke about and I ntoed the old phrase \"if it ain't broke, don't fix it\" --> it took a LONG time to get this place of her finally feeling better thus I really think we should leave all her meds as they currently are. I'm not concerned with the bit of overlap with Wellbutrin and Effexor given effexor at lowest dose there is.      TREATMENT RISK STATEMENT:  The risks, benefits, alternatives and potential adverse effects have been explained and are understood by the pt.  The pt agrees to the treatment plan with the ability to do so.   The pt knows to call the clinic for any problems or access emergency care if needed.        DIAGNOSES                    MDD, recurrent mild  ED  OCPD traits    PLAN                                                                                                                    1)  MEDICATIONS:         -- Cont trazodone, and Serax at current doses. Continue Effexor 37.5 mg daily and Wellbutrin  mg daily     2)  THERAPY:  No Change    3)  LABS:  None    4)  PT MONITOR [call for probs]:  worsening sx, SEs from meds  inue Effexor,  5)  REFERRALS [CD, medical, other]:  None    6)  RTC: ~ April when she returns from [place]    Of note: when in [place] her pharmacy is [facility] in [place] on [address] and [address]  Phone # [phone].                                                                         "

## 2020-12-10 DIAGNOSIS — F41.1 GAD (GENERALIZED ANXIETY DISORDER): ICD-10-CM

## 2020-12-10 RX ORDER — OXAZEPAM 15 MG/1
CAPSULE ORAL
Qty: 270 CAPSULE | Refills: 1 | Status: SHIPPED | OUTPATIENT
Start: 2020-12-10 | End: 2021-08-04

## 2020-12-10 ASSESSMENT — ANXIETY QUESTIONNAIRES: GAD7 TOTAL SCORE: 8

## 2020-12-10 NOTE — TELEPHONE ENCOUNTER
Pt called, pharmacy is requesting a new script for oxazepam signed to fill med. Med pended as pt requested and pharmacy. Please sign.

## 2020-12-14 ENCOUNTER — TELEPHONE (OUTPATIENT)
Dept: FAMILY MEDICINE | Facility: OTHER | Age: 70
End: 2020-12-14

## 2020-12-14 NOTE — TELEPHONE ENCOUNTER
PA Initiation    Medication: OXAZEPAM 15MG CAPSULES  Insurance Company: [employer] - Phone [phone] Fax [phone]  Pharmacy Filling the Rx: [facility] [address] [place] [id number] [facility]  Filling Pharmacy Phone: [phone]  Filling Pharmacy Fax: [phone]  Start Date: 12/14/2020

## 2020-12-14 NOTE — TELEPHONE ENCOUNTER
Prior Authorization Retail Medication Request  [facility] KEY# [id number]    Medication/Dose: OXAZEPAM 15MG CAPSULES  ICD code (if different than what is on RX):    Previously Tried and Failed:    Rationale:      Insurance Name:    Insurance ID:        Pharmacy Information (if different than what is on RX)  Name:    Phone:

## 2020-12-15 NOTE — TELEPHONE ENCOUNTER
Prior Authorization Approval    Authorization Effective Date: 9/16/2020  Authorization Expiration Date: 12/15/2021  Medication: OXAZEPAM 15MG CAPSULES  Approved Dose/Quantity:   Reference #: [id number]   Insurance Company: [employer] - Phone [phone] Fax [phone]  Expected CoPay:       CoPay Card Available:      Foundation Assistance Needed:    Which Pharmacy is filling the prescription (Not needed for infusion/clinic administered): [facility] [address] [place] [address], [place] - [address] [facility]  Pharmacy Notified: Yes  Patient Notified: Yes, **Instructed pharmacy to notify patient when script is ready to /ship.**

## 2021-02-02 DIAGNOSIS — F33.1 MAJOR DEPRESSIVE DISORDER, RECURRENT EPISODE, MODERATE (H): ICD-10-CM

## 2021-02-02 RX ORDER — BUPROPION HYDROCHLORIDE 150 MG/1
150 TABLET ORAL EVERY MORNING
Qty: 30 TABLET | Refills: 4 | Status: SHIPPED | OUTPATIENT
Start: 2021-02-02 | End: 2021-06-29

## 2021-02-02 NOTE — TELEPHONE ENCOUNTER
buPROPion (WELLBUTRIN XL) 150 MG 24 hr tablet      Last Written Prescription Date:  11/10/20  Last Fill Quantity: 30,   # refills: 3  Last Office Visit: 10/21/20  Future Office visit:    Next 5 appointments (look out 90 days)    Apr 28, 2021 11:20 AM  (Arrive by 11:05 AM)  Return Visit with Angie Owens MD  [facility] ([facility] ) [facility] [address] [place] [id number] [phone]           Routing refill request to provider for review/approval because:  Drug not on the G, P or [facility] refill protocol or controlled substance

## 2021-03-01 DIAGNOSIS — F33.1 MAJOR DEPRESSIVE DISORDER, RECURRENT EPISODE, MODERATE (H): ICD-10-CM

## 2021-03-01 RX ORDER — VENLAFAXINE HYDROCHLORIDE 37.5 MG/1
37.5 CAPSULE, EXTENDED RELEASE ORAL DAILY
Qty: 30 CAPSULE | Refills: 3 | Status: SHIPPED | OUTPATIENT
Start: 2021-03-01 | End: 2021-06-29

## 2021-03-01 NOTE — TELEPHONE ENCOUNTER
venlafaxine (EFFEXOR-XR) 37.5 MG 24 hr capsule      Last Written Prescription Date:  11/10/20  Last Fill Quantity: 30,   # refills: 3  Last Office Visit: 12/9/20 virtual  Future Office visit:    Next 5 appointments (look out 90 days)    Apr 28, 2021 11:20 AM  (Arrive by 11:05 AM)  Return Visit with Angie Owens MD  [facility] ([facility] ) [facility] [address] [place] [id number] [phone]

## 2021-04-28 ENCOUNTER — OFFICE VISIT (OUTPATIENT)
Dept: PSYCHIATRY | Facility: OTHER | Age: 71
End: 2021-04-28
Attending: PSYCHIATRY & NEUROLOGY
Payer: COMMERCIAL

## 2021-04-28 VITALS
SYSTOLIC BLOOD PRESSURE: 122 MMHG | WEIGHT: 145 LBS | HEART RATE: 79 BPM | OXYGEN SATURATION: 99 % | HEIGHT: 64 IN | DIASTOLIC BLOOD PRESSURE: 64 MMHG | BODY MASS INDEX: 24.75 KG/M2 | TEMPERATURE: 98.4 F

## 2021-04-28 DIAGNOSIS — F33.0 MAJOR DEPRESSIVE DISORDER, RECURRENT EPISODE, MILD (H): Primary | ICD-10-CM

## 2021-04-28 PROCEDURE — 99214 OFFICE O/P EST MOD 30 MIN: CPT | Performed by: PSYCHIATRY & NEUROLOGY

## 2021-04-28 PROCEDURE — G0463 HOSPITAL OUTPT CLINIC VISIT: HCPCS

## 2021-04-28 ASSESSMENT — ANXIETY QUESTIONNAIRES
3. WORRYING TOO MUCH ABOUT DIFFERENT THINGS: SEVERAL DAYS
IF YOU CHECKED OFF ANY PROBLEMS ON THIS QUESTIONNAIRE, HOW DIFFICULT HAVE THESE PROBLEMS MADE IT FOR YOU TO DO YOUR WORK, TAKE CARE OF THINGS AT HOME, OR GET ALONG WITH OTHER PEOPLE: SOMEWHAT DIFFICULT
6. BECOMING EASILY ANNOYED OR IRRITABLE: MORE THAN HALF THE DAYS
5. BEING SO RESTLESS THAT IT IS HARD TO SIT STILL: SEVERAL DAYS
2. NOT BEING ABLE TO STOP OR CONTROL WORRYING: SEVERAL DAYS
7. FEELING AFRAID AS IF SOMETHING AWFUL MIGHT HAPPEN: SEVERAL DAYS

## 2021-04-28 ASSESSMENT — PAIN SCALES - GENERAL: PAINLEVEL: NO PAIN (0)

## 2021-04-28 ASSESSMENT — PATIENT HEALTH QUESTIONNAIRE - PHQ9
SUM OF ALL RESPONSES TO PHQ QUESTIONS 1-9: 5
5. POOR APPETITE OR OVEREATING: SEVERAL DAYS

## 2021-04-28 ASSESSMENT — MIFFLIN-ST. JEOR: SCORE: 1172.72

## 2021-04-28 NOTE — PROGRESS NOTES
"    PSYCHIATRY CLINIC PROGRESS NOTE     SUBJECTIVE / INTERIM HISTORY                                                                        Social- retired. Interests:reading, being physical / active & working out, grandkids, volunteers, loves being with kiddo   Children-   Grown children    Last visit was December 2020:  Cont trazodone, and Serax at current doses. Continue Effexor 37.5 mg daily and Wellbutrin  mg daily   - just got back week ago from [place]. Her and  got Covid December. They both did okay  - adjusting to being back in [place]  - GeneSight test results: we reviewed the results and looks like Mony's history of having issues in past with higher doses of Effexor correlate with her GeneSight testing and noted may require lower doses of Effexor.   - sleeping pretty well, not having panic attacks, and not crying like she was.   -  feels she is doing better. He noted she is interacting more with people and she hasn't done that in a long time  - mom narcissistic and \"weird\"   - Been on psychiatric meds since 90s. Been on Effexor since 1997. Takes Serax and generally takes it twice daily, once in awhile ends up feeling need to take three times daily. Has never had any issues with misuse / abuse of it. Recalls taking Ativan many years ago and seemed to work. Klonopin \"zonked\" her out. Notes has worked really hard and put in a lot of time and effort in regards to getting better gradually over time with mental health symptoms. This has included working with therapists in the past. Is  and marriage in general is going okay. They winter in [place]     SUBSTANCE USE- no issues    SYMPTOMS- These are all better:  Depressed mood, anhedonia, low energy, SI with no intent or plan, feeling hopeless, helpless. Has had panic attacks past couple months.  MEDICAL ROS- fatigue, gets where muscles feel weak like \"jelly\" with certain meds.  MEDICAL / SURGICAL HISTORY                     Patient Active " "Problem List   Diagnosis     Generalized anxiety disorder     Chronic fatigue syndrome     Attention deficit disorder     Obsessive-compulsive disorder     FH: colon cancer     Mixed hyperlipidemia     Acquired hypothyroidism     Major depressive disorder, recurrent episode, in partial remission (H)     Vaso vagal episode     Small bowel obstruction due to adhesions (H)     Melanoma of cheek (H)     Acquired facial deformity     Screening for malignant neoplasm of colon     ALLERGY   Codeine  MEDICATIONS                                                                                             Current Outpatient Medications   Medication Sig     buPROPion (WELLBUTRIN XL) 150 MG 24 hr tablet Take 1 tablet (150 mg) by mouth every morning     Calcium Carbonate (TUMS 500 OR) Take by mouth 2 times daily     Cholecalciferol (VITAMIN D) 1000 UNITS capsule Take 1 capsule by mouth daily.     fish oil-omega-3 fatty acids (FISH OIL) 1000 MG capsule Take 2 capsules by mouth daily.     fluticasone (FLONASE) 50 MCG/ACT nasal spray INSTILL 2 SPRAYS INTO EACH NOSTRIL EVERY DAY     glucosamine 500 MG CAPS Take by mouth daily      levothyroxine (SYNTHROID/LEVOTHROID) 50 MCG tablet Take 1 tablet (50 mcg) by mouth daily     MAGNESIUM OXIDE PO Take 200 mg by mouth daily     Multiple vitamin TABS Take 1 tablet by oral route every day with food     oxazepam (SERAX) 15 MG capsule TAKE 1 CAPSULE BY MOUTH THREE TIMES DAILY     traZODone (DESYREL) 50 MG tablet TAKE 1 TO 3 TABLETS BY MOUTH AT BEDTIME     venlafaxine (EFFEXOR-XR) 37.5 MG 24 hr capsule Take 1 capsule (37.5 mg) by mouth daily     No current facility-administered medications for this visit.        VITALS   /64 (BP Location: Left arm, Patient Position: Sitting, Cuff Size: Adult Regular)   Pulse 79   Temp 98.4  F (36.9  C) (Tympanic)   Ht 1.626 m (5' 4\")   Wt 65.8 kg (145 lb)   SpO2 99%   BMI 24.89 kg/m       PHQ9                     [unfilled]  LABS                " "                                                                                                           Last Comprehensive Metabolic Panel:  Sodium   Date Value Ref Range Status   07/24/2020 138 133 - 144 mmol/L Final     Potassium   Date Value Ref Range Status   07/24/2020 3.9 3.4 - 5.3 mmol/L Final     Chloride   Date Value Ref Range Status   07/24/2020 107 94 - 109 mmol/L Final     Carbon Dioxide   Date Value Ref Range Status   07/24/2020 29 20 - 32 mmol/L Final     Anion Gap   Date Value Ref Range Status   07/24/2020 2 (L) 3 - 14 mmol/L Final     Glucose   Date Value Ref Range Status   07/24/2020 88 70 - 99 mg/dL Final     Urea Nitrogen   Date Value Ref Range Status   07/24/2020 15 7 - 30 mg/dL Final     Creatinine   Date Value Ref Range Status   07/24/2020 0.71 0.52 - 1.04 mg/dL Final     GFR Estimate   Date Value Ref Range Status   07/24/2020 87 >60 mL/min/[1.73_m2] Final     Comment:     Non  GFR Calc  Starting 12/18/2018, serum creatinine based estimated GFR (eGFR) will be   calculated using the Chronic Kidney Disease Epidemiology Collaboration   (CKD-EPI) equation.       Calcium   Date Value Ref Range Status   07/24/2020 9.4 8.5 - 10.1 mg/dL Final     TSH   Date Value Ref Range Status   07/24/2020 1.00 0.40 - 4.00 mU/L Final     MENTAL STATUS EXAM                                                                                       Mood was described as \"I've been dong pretty well.\"  Thought process, including associations, was unremarkable and thought content was devoid of suicidal and homicidal ideation and psychotic thought. No hallucinations. Insight was good. Judgment was intact and adequate for safety. Fund of knowledge was intact. Pt demonstrates no obvious problems with attention, concentration, language, recent or remote memory although these were not formally tested.     ASSESSMENT                                                                                                  " "    HISTORICAL:  Initial psych note 8 2015         NOTES:      Mony Szymanski is a 70 year old, female who has long history in terms of depression, anxiety - therapy throughout the years. She notes ongoing depression that worsned along with isolation and the Covid - 19 pandemic. Mony has been taking Effexor, Serax, and trazodone for years.  She has heard Serax is \"archaeic\" and we have had discussion about benzodiazepine class of meds ( guidelines have shifted over years to try avoid long-term use and rather stick with short-term use). Depression (and anxiety) worse since July '20. . She reminds me higher doses of Effexor she had issues with her muscles feeling like \"jelly\". She tried 5HTP and had similar issues when was taking it with Effexor.     She got rid of the CBD and thinks was actually contributing to depression as opposed to helping anything. We reduced effexor from 75 mg to 37.5 mg and her history with this med correlates with GeneSight testing (more prone to SEs). We started Wellbutrin and she is doing much better! Today happy to see her doing well. She is able to let things go, to live in the moment.       TREATMENT RISK STATEMENT:  The risks, benefits, alternatives and potential adverse effects have been explained and are understood by the pt.  The pt agrees to the treatment plan with the ability to do so.   The pt knows to call the clinic for any problems or access emergency care if needed.        DIAGNOSES                    MDD, recurrent mild  ED  OCPD traits    PLAN                                                                                                                    1)  MEDICATIONS:         -- Cont trazodone, and Serax at current doses. Continue Effexor 37.5 mg daily and Wellbutrin  mg daily     2)  THERAPY:  No Change    3)  LABS:  None    4)  PT MONITOR [call for probs]:  worsening sx, SEs from meds  inue Effexor,  5)  REFERRALS [CD, medical, other]:  None    6)  RTC: ~ 6 " months    Of note: when in [place] her pharmacy is [facility] in [place] on [address] and [facility]  Phone # [phone].

## 2021-04-28 NOTE — NURSING NOTE
"Chief Complaint   Patient presents with     RECHECK     Depression, anxiety.       Initial /64 (BP Location: Left arm, Patient Position: Sitting, Cuff Size: Adult Regular)   Pulse 79   Temp 98.4  F (36.9  C) (Tympanic)   Ht 1.626 m (5' 4\")   Wt 65.8 kg (145 lb)   SpO2 99%   BMI 24.89 kg/m   Estimated body mass index is 24.89 kg/m  as calculated from the following:    Height as of this encounter: 1.626 m (5' 4\").    Weight as of this encounter: 65.8 kg (145 lb).  Medication Reconciliation: complete  LAKEISHA JUAREZ LPN    "

## 2021-06-01 ENCOUNTER — OFFICE VISIT (OUTPATIENT)
Dept: DERMATOLOGY | Facility: OTHER | Age: 71
End: 2021-06-01
Attending: DERMATOLOGY
Payer: MEDICARE

## 2021-06-01 VITALS
BODY MASS INDEX: 24.16 KG/M2 | HEART RATE: 73 BPM | DIASTOLIC BLOOD PRESSURE: 80 MMHG | HEIGHT: 65 IN | SYSTOLIC BLOOD PRESSURE: 120 MMHG | TEMPERATURE: 98.8 F | WEIGHT: 145 LBS | OXYGEN SATURATION: 97 %

## 2021-06-01 DIAGNOSIS — Z12.83 SCREENING FOR SKIN CANCER: ICD-10-CM

## 2021-06-01 DIAGNOSIS — D22.9 MULTIPLE BENIGN NEVI: Primary | ICD-10-CM

## 2021-06-01 DIAGNOSIS — Z85.820 HISTORY OF MELANOMA: ICD-10-CM

## 2021-06-01 PROCEDURE — G0463 HOSPITAL OUTPT CLINIC VISIT: HCPCS

## 2021-06-01 PROCEDURE — 99212 OFFICE O/P EST SF 10 MIN: CPT | Performed by: DERMATOLOGY

## 2021-06-01 ASSESSMENT — MIFFLIN-ST. JEOR: SCORE: 1175.66

## 2021-06-01 ASSESSMENT — PAIN SCALES - GENERAL: PAINLEVEL: NO PAIN (0)

## 2021-06-01 NOTE — NURSING NOTE
"Chief Complaint   Patient presents with     RECHECK       Initial /80 (BP Location: Left arm, Patient Position: Chair, Cuff Size: Adult Regular)   Pulse 73   Temp 98.8  F (37.1  C) (Tympanic)   Ht 1.638 m (5' 4.5\")   Wt 65.8 kg (145 lb)   SpO2 97%   BMI 24.50 kg/m   Estimated body mass index is 24.5 kg/m  as calculated from the following:    Height as of this encounter: 1.638 m (5' 4.5\").    Weight as of this encounter: 65.8 kg (145 lb).  Medication Reconciliation: complete  CONG SALAZAR LPN    "

## 2021-06-01 NOTE — PROGRESS NOTES
Visit Date: 2021    SUBJECTIVE:  Harika returns for a general recheck.      She has had some skin cancers in the past.  Today we checked her face, her neck, her chest, her back, her breasts, buttocks, legs, arms and we saw no evidence of any melanoma.  The melanoma she had had was on her face and that area is well healed and she is pleased.  She showed numerous but scattered tiny nevi, a few seborrheic keratoses and a few cherry angiomas.  Overall, no worrisome lesions.    ASSESSMENT:  No worrisome lesions.  History of melanoma.    PLAN:  Return in 6 months for another recheck.  Meds and allergies reviewed.    DEANN Leyva MD        D: 2021   T: 2021   MT: [id number]    Name:     HARIKA TYLER  MRN:      [id number]        Account:    [id number]   :      1950           Visit Date: 2021     Document: [id number]

## 2021-06-01 NOTE — LETTER
2021       RE: Harika Tyler  [address] [facility] [address]     Dear Colleague,    Thank you for referring your patient, Harika Tyler, to the [facility]. Please see a copy of my visit note below.    Visit Date: 2021    SUBJECTIVE:  Harika returns for a general recheck.      She has had some skin cancers in the past.  Today we checked her face, her neck, her chest, her back, her breasts, buttocks, legs, arms and we saw no evidence of any melanoma.  The melanoma she had had was on her face and that area is well healed and she is pleased.  She showed numerous but scattered tiny nevi, a few seborrheic keratoses and a few cherry angiomas.  Overall, no worrisome lesions.    ASSESSMENT:  No worrisome lesions.  History of melanoma.    PLAN:  Return in 6 months for another recheck.  Meds and allergies reviewed.    DEANN Leyva MD        D: 2021   T: 2021   MT: [id number]    Name:     HARIKA TYLER  MRN:      [id number]        Account:    [id number]   :      1950           Visit Date: 2021     Document: [id number]        Again, thank you for allowing me to participate in the care of your patient.      Sincerely,    DEANN Leyva MD

## 2021-06-28 DIAGNOSIS — F33.1 MAJOR DEPRESSIVE DISORDER, RECURRENT EPISODE, MODERATE (H): ICD-10-CM

## 2021-06-29 RX ORDER — BUPROPION HYDROCHLORIDE 150 MG/1
TABLET ORAL
Qty: 30 TABLET | Refills: 4 | Status: SHIPPED | OUTPATIENT
Start: 2021-06-29 | End: 2021-10-13

## 2021-06-29 RX ORDER — VENLAFAXINE HYDROCHLORIDE 37.5 MG/1
CAPSULE, EXTENDED RELEASE ORAL
Qty: 30 CAPSULE | Refills: 4 | Status: SHIPPED | OUTPATIENT
Start: 2021-06-29 | End: 2021-10-13

## 2021-06-29 NOTE — TELEPHONE ENCOUNTER
effexor      Last Written Prescription Date:  3/1/21  Last Fill Quantity: 30,   # refills: 3  Last Office Visit: 4/28/21  Future Office visit:       welllbutrin      Last Written Prescription Date:  2/2/21  Last Fill Quantity: 30,   # refills: 4

## 2021-07-19 NOTE — PATIENT INSTRUCTIONS
Preventive Health Recommendations    See your health care provider every year to    Review health changes.     Discuss preventive care.      Review your medicines if your doctor has prescribed any.      You no longer need a yearly Pap test unless you've had an abnormal Pap test in the past 10 years. If you have vaginal symptoms, such as bleeding or discharge, be sure to talk with your provider about a Pap test.      Every 1 to 2 years, have a mammogram.  If you are over 69, talk with your health care provider about whether or not you want to continue having screening mammograms.      Every 10 years, have a colonoscopy. Or, have a yearly FIT test (stool test). These exams will check for colon cancer.       Have a cholesterol test every 5 years, or more often if your doctor advises it.       Have a diabetes test (fasting glucose) every three years. If you are at risk for diabetes, you should have this test more often.       At age 65, have a bone density scan (DEXA) to check for osteoporosis (brittle bone disease).    Shots:    Get a flu shot each year.    Get a tetanus shot every 10 years.    Talk to your doctor about your pneumonia vaccines. There are now two you should receive - Pneumovax (PPSV 23) and Prevnar (PCV 13).    Talk to your pharmacist about the shingles vaccine.    Talk to your doctor about the hepatitis B vaccine.    Nutrition:     Eat at least 5 servings of fruits and vegetables each day.      Eat whole-grain bread, whole-wheat pasta and brown rice instead of white grains and rice.      Get adequate about Calcium and Vitamin D.     Lifestyle    Exercise at least 150 minutes a week (30 minutes a day, 5 days a week). This will help you control your weight and prevent disease.      Limit alcohol to one drink per day.      No smoking.       Wear sunscreen to prevent skin cancer.       See your dentist twice a year for an exam and cleaning.      See your eye doctor every 1 to 2 years to screen for  conditions such as glaucoma, macular degeneration, cataracts, etc.    Personalized Prevention Plan  You are due for the preventive services outlined below.  Your care team is available to assist you in scheduling these services.  If you have already completed any of these items, please share that information with your care team to update in your medical record.    Health Maintenance Due   Topic Date Due     COVID-19 Vaccine (1) Never done     Hepatitis C Screening  Never done     Zoster (Shingles) Vaccine (2 of 3) 03/08/2014     Cholesterol Lab  05/21/2020     Depression Assessment  05/28/2021     Annual Wellness Visit  07/24/2021     Preventive Health Recommendations    See your health care provider every year to    Review health changes.     Discuss preventive care.      Review your medicines if your doctor has prescribed any.      You no longer need a yearly Pap test unless you've had an abnormal Pap test in the past 10 years. If you have vaginal symptoms, such as bleeding or discharge, be sure to talk with your provider about a Pap test.      Every 1 to 2 years, have a mammogram.  If you are over 69, talk with your health care provider about whether or not you want to continue having screening mammograms.      Every 10 years, have a colonoscopy. Or, have a yearly FIT test (stool test). These exams will check for colon cancer.       Have a cholesterol test every 5 years, or more often if your doctor advises it.       Have a diabetes test (fasting glucose) every three years. If you are at risk for diabetes, you should have this test more often.       At age 65, have a bone density scan (DEXA) to check for osteoporosis (brittle bone disease).    Shots:    Get a flu shot each year.    Get a tetanus shot every 10 years.    Talk to your doctor about your pneumonia vaccines. There are now two you should receive - Pneumovax (PPSV 23) and Prevnar (PCV 13).    Talk to your pharmacist about the shingles vaccine.    Talk to  your doctor about the hepatitis B vaccine.    Nutrition:     Eat at least 5 servings of fruits and vegetables each day.      Eat whole-grain bread, whole-wheat pasta and brown rice instead of white grains and rice.      Get adequate about Calcium and Vitamin D.     Lifestyle    Exercise at least 150 minutes a week (30 minutes a day, 5 days a week). This will help you control your weight and prevent disease.      Limit alcohol to one drink per day.      No smoking.       Wear sunscreen to prevent skin cancer.       See your dentist twice a year for an exam and cleaning.      See your eye doctor every 1 to 2 years to screen for conditions such as glaucoma, macular degeneration, cataracts, etc.    Personalized Prevention Plan  You are due for the preventive services outlined below.  Your care team is available to assist you in scheduling these services.  If you have already completed any of these items, please share that information with your care team to update in your medical record.    Health Maintenance Due   Topic Date Due     COVID-19 Vaccine (1) Never done     Hepatitis C Screening  Never done     Zoster (Shingles) Vaccine (2 of 3) 03/08/2014     Cholesterol Lab  05/21/2020     Depression Assessment  05/28/2021

## 2021-07-19 NOTE — PROGRESS NOTES
"SUBJECTIVE:   Mony Szymanski is a 71 year old female who presents for Preventive Visit.      Patient has been advised of split billing requirements and indicates understanding: Yes  Are you in the first 12 months of your Medicare Part B coverage?  No    Physical Health:    In general, how would you rate your overall physical health? good    Outside of work, how many days during the week do you exercise? 6-7 days/week    Outside of work, approximately how many minutes a day do you exercise?45-60 minutes    If you drink alcohol do you typically have >3 drinks per day or >7 drinks per week? No    Do you usually eat at least 4 servings of fruit and vegetables a day, include whole grains & fiber and avoid regularly eating high fat or \"junk\" foods? Yes    Do you have any problems taking medications regularly?  No    Do you have any side effects from medications? nausea. lack of appetite    Needs assistance for the following daily activities: no assistance needed    Which of the following safety concerns are present in your home?  none identified     Hearing impairment: No    In the past 6 months, have you been bothered by leaking of urine? no    Mental Health:    In general, how would you rate your overall mental or emotional health? good  PHQ-2 Score:      Do you feel safe in your environment? Yes    Have you ever done Advance Care Planning? (For example, a Health Directive, POLST, or a discussion with a medical provider or your loved ones about your wishes): Yes, advance care planning is on file.    Additional concerns to address?  No    Fall risk:  Fallen 2 or more times in the past year?: No  Any fall with injury in the past year?: No    Cognitive Screenin) Repeat 3 items (Leader, Season, Table)    2) Clock draw: NORMAL  3) 3 item recall: Recalls 2 objects   Results: NORMAL clock, 1-2 items recalled: COGNITIVE IMPAIRMENT LESS LIKELY    Mini-CogTM Copyright S Óscar. Licensed by the author for use in [facility] " Services; reprinted with permission (reggie@[email or web address]). All rights reserved.      Do you have sleep apnea, excessive snoring or daytime drowsiness?: yes        Hyperlipidemia Follow-Up      Are you regularly taking any medication or supplement to lower your cholesterol?   No    Are you having muscle aches or other side effects that you think could be caused by your cholesterol lowering medication?  No    Depression and Anxiety Follow-Up    How are you doing with your depression since your last visit? Improved     How are you doing with your anxiety since your last visit?  Improved    Are you having other symptoms that might be associated with depression or anxiety? No    Have you had a significant life event? No     Do you have any concerns with your use of alcohol or other drugs? No     Working with Dr Owens        Social History     Tobacco Use     Smoking status: Never Smoker     Smokeless tobacco: Never Used   Substance Use Topics     Alcohol use: Yes     Comment: Beer & Wine, socially     Drug use: Never     PHQ 12/9/2020 4/28/2021 7/28/2021   PHQ-9 Total Score 2 5 9   Q9: Thoughts of better off dead/self-harm past 2 weeks Not at all Not at all Several days   F/U: Thoughts of suicide or self-harm - - -   F/U: Self harm-plan - - -   F/U: Self-harm action - - -   F/U: Safety concerns - - -     ED-7 SCORE 11/10/2020 12/9/2020 7/28/2021   Total Score 12 8 14     Last PHQ-9 7/28/2021   1.  Little interest or pleasure in doing things 1   2.  Feeling down, depressed, or hopeless 1   3.  Trouble falling or staying asleep, or sleeping too much 1   4.  Feeling tired or having little energy 3   5.  Poor appetite or overeating 0   6.  Feeling bad about yourself 1   7.  Trouble concentrating 1   8.  Moving slowly or restless 0   Q9: Thoughts of better off dead/self-harm past 2 weeks 1   PHQ-9 Total Score 9   Difficulty at work, home, or with people Somewhat difficult   In the past two weeks have you had thoughts of suicide or  self harm? -   Do you have concerns about your personal safety or the safety of others? -   In the past 2 weeks have you thought about a plan or had intention to harm yourself? -   In the past 2 weeks have you acted on these thoughts in any way? -     ED-7  7/28/2021   1. Feeling nervous, anxious, or on edge 2   2. Not being able to stop or control worrying 2   3. Worrying too much about different things 2   4. Trouble relaxing 2   5. Being so restless that it is hard to sit still 3   6. Becoming easily annoyed or irritable 1   7. Feeling afraid, as if something awful might happen 2   ED-7 Total Score 14   If you checked any problems, how difficult have they made it for you to do your work, take care of things at home, or get along with other people? Somewhat difficult       Suicide Assessment Five-step Evaluation and Treatment (SAFE-T)    Hypothyroidism Follow-up      Since last visit, patient describes the following symptoms: Weight stable, no hair loss, no skin changes, no constipation, no loose stools      Reviewed and updated as needed this visit by clinical staff                 Reviewed and updated as needed this visit by Provider                Social History     Tobacco Use     Smoking status: Never Smoker     Smokeless tobacco: Never Used   Substance Use Topics     Alcohol use: Yes     Comment: Beer & Wine, socially                           Current providers sharing in care for this patient include:   Patient Care Team:  Kobe Das MD as PCP - General  Kobe Das MD as Assigned PCP  DEANN Leyva MD as Assigned Surgical Provider  Angie Owens MD as Assigned Behavioral Health Provider    The following health maintenance items are reviewed in Epic and correct as of today:  Health Maintenance   Topic Date Due     COVID-19 Vaccine (1) Never done     HEPATITIS C SCREENING  Never done     ZOSTER IMMUNIZATION (2 of 3) 03/08/2014     LIPID  05/21/2020     PHQ-9  05/28/2021     MEDICARE  "ANNUAL WELLNESS VISIT  07/24/2021     INFLUENZA VACCINE (1) 09/01/2021     DTAP/TDAP/TD IMMUNIZATION (2 - Td or Tdap) 12/27/2021     ED ASSESSMENT  04/28/2022     FALL RISK ASSESSMENT  06/01/2022     MAMMO SCREENING  10/09/2022     ADVANCE CARE PLANNING  07/24/2025     COLORECTAL CANCER SCREENING  08/20/2025     DEXA  05/18/2033     DEPRESSION ACTION PLAN  Completed     Pneumococcal Vaccine: 65+ Years  Completed     IPV IMMUNIZATION  Aged Out     MENINGITIS IMMUNIZATION  Aged Out     HEPATITIS B IMMUNIZATION  Aged Out     Labs reviewed in EPIC      ROS:  Constitutional, HEENT, cardiovascular, pulmonary, GI, , musculoskeletal, neuro, skin, endocrine and psych systems are negative, except as otherwise noted.  C/o muscle and athralgia pain for over a 5-6 month time - no other sx. Started Wellbutrin around then but had covid also  OBJECTIVE:   /72   Pulse 80   Temp 98.1  F (36.7  C)   Resp 18   Ht 1.61 m (5' 3.39\")   Wt 70.3 kg (155 lb)   SpO2 98%   BMI 27.12 kg/m   Estimated body mass index is 24.5 kg/m  as calculated from the following:    Height as of 6/1/21: 1.638 m (5' 4.5\").    Weight as of 6/1/21: 65.8 kg (145 lb).  EXAM:   GENERAL: healthy, alert and no distress  EYES: Eyes grossly normal to inspection, PERRL and conjunctivae and sclerae normal  HENT: ear canals and TM's normal, nose and mouth without ulcers or lesions  NECK: no adenopathy, no asymmetry, masses, or scars and thyroid normal to palpation  RESP: lungs clear to auscultation - no rales, rhonchi or wheezes  BREAST: normal without masses, tenderness or nipple discharge and no palpable axillary masses or adenopathy  CV: regular rate and rhythm, normal S1 S2, no S3 or S4, no murmur, click or rub, no peripheral edema and peripheral pulses strong  ABDOMEN: soft, nontender, no hepatosplenomegaly, no masses and bowel sounds normal  MS: no gross musculoskeletal defects noted, no edema  SKIN: no suspicious lesions or rashes  NEURO: Normal " "strength and tone, mentation intact and speech normal  PSYCH: mentation appears normal, affect flat/depressed         ASSESSMENT / PLAN:   1. Acquired hypothyroidism  Labs to be done in near future. Continue current medications and behavioral changes.   - TSH; Future    2. Mixed hyperlipidemia  Stable in past. Will check fasting labs. No meds needed in past   - Comprehensive metabolic panel; Future  - CBC with Platelets & Differential; Future  - Lipid Profile; Future    3. Major depressive disorder, recurrent episode, in partial remission (H)  Up and down. Working with Dr Oewns. Making some head way   - TSH; Future    4. Generalized anxiety disorder  As above   - TSH; Future    5. Melanoma of cheek (H)  Seen Dr Leyva and skin check ok   - Comprehensive metabolic panel; Future  - CBC with Platelets & Differential; Future    6. Encounter for screening mammogram for breast cancer  Due in October. Mammogram ordered   - MA Screening Digital Bilateral; Future    7. Polyarthralgia  Etiology unclear.  Will check few extra labs. May be part of aging and part of her chronic fatigue. Possible but unlikely due to Wellbutrin- she will talk with Dr UNDERWOOD.  - CRP inflammation; Future  - Erythrocyte sedimentation rate auto; Future  - Rheumatoid factor; Future  - Cyclic Citrullinated Peptide Antibody IgG; Future  - Anti Nuclear Lilliam IgG by IFA with Reflex; Future  - CK total; Future    Patient has been advised of split billing requirements and indicates understanding:     COUNSELING:  Reviewed preventive health counseling, as reflected in patient instructions       Regular exercise       Healthy diet/nutrition       Colon cancer screening    Estimated body mass index is 24.5 kg/m  as calculated from the following:    Height as of 6/1/21: 1.638 m (5' 4.5\").    Weight as of 6/1/21: 65.8 kg (145 lb).        She reports that she has never smoked. She has never used smokeless tobacco.    Appropriate preventive services were discussed with " this patient, including applicable screening as appropriate for cardiovascular disease, diabetes, osteopenia/osteoporosis, and glaucoma.  As appropriate for age/gender, discussed screening for colorectal cancer, prostate cancer, breast cancer, and cervical cancer. Checklist reviewing preventive services available has been given to the patient.    Reviewed patients plan of care and provided an AVS. The Basic Care Plan (routine screening as documented in Health Maintenance) for Mony meets the Care Plan requirement. This Care Plan has been established and reviewed with the Patient.    Counseling Resources:  ATP IV Guidelines  Pooled Cohorts Equation Calculator  Breast Cancer Risk Calculator  BRCA-Related Cancer Risk Assessment: FHS-7 Tool  FRAX Risk Assessment  ICSI Preventive Guidelines  Dietary Guidelines for Americans, 2010  USDA's MyPlate  ASA Prophylaxis  Lung CA Screening    Kobe Das MD  [facility]

## 2021-07-28 ENCOUNTER — OFFICE VISIT (OUTPATIENT)
Dept: FAMILY MEDICINE | Facility: OTHER | Age: 71
End: 2021-07-28
Attending: FAMILY MEDICINE
Payer: COMMERCIAL

## 2021-07-28 VITALS
HEIGHT: 63 IN | WEIGHT: 155 LBS | OXYGEN SATURATION: 98 % | DIASTOLIC BLOOD PRESSURE: 72 MMHG | TEMPERATURE: 98.1 F | SYSTOLIC BLOOD PRESSURE: 122 MMHG | BODY MASS INDEX: 27.46 KG/M2 | HEART RATE: 80 BPM | RESPIRATION RATE: 18 BRPM

## 2021-07-28 DIAGNOSIS — F33.41 MAJOR DEPRESSIVE DISORDER, RECURRENT EPISODE, IN PARTIAL REMISSION (H): ICD-10-CM

## 2021-07-28 DIAGNOSIS — E78.2 MIXED HYPERLIPIDEMIA: ICD-10-CM

## 2021-07-28 DIAGNOSIS — C43.39 MELANOMA OF CHEEK (H): ICD-10-CM

## 2021-07-28 DIAGNOSIS — Z12.31 ENCOUNTER FOR SCREENING MAMMOGRAM FOR BREAST CANCER: ICD-10-CM

## 2021-07-28 DIAGNOSIS — E03.9 ACQUIRED HYPOTHYROIDISM: Primary | ICD-10-CM

## 2021-07-28 DIAGNOSIS — M25.50 POLYARTHRALGIA: ICD-10-CM

## 2021-07-28 DIAGNOSIS — F41.1 GENERALIZED ANXIETY DISORDER: ICD-10-CM

## 2021-07-28 PROCEDURE — G0463 HOSPITAL OUTPT CLINIC VISIT: HCPCS

## 2021-07-28 PROCEDURE — 99214 OFFICE O/P EST MOD 30 MIN: CPT | Performed by: FAMILY MEDICINE

## 2021-07-28 ASSESSMENT — ANXIETY QUESTIONNAIRES
3. WORRYING TOO MUCH ABOUT DIFFERENT THINGS: MORE THAN HALF THE DAYS
5. BEING SO RESTLESS THAT IT IS HARD TO SIT STILL: NEARLY EVERY DAY
1. FEELING NERVOUS, ANXIOUS, OR ON EDGE: MORE THAN HALF THE DAYS
6. BECOMING EASILY ANNOYED OR IRRITABLE: SEVERAL DAYS
7. FEELING AFRAID AS IF SOMETHING AWFUL MIGHT HAPPEN: MORE THAN HALF THE DAYS
4. TROUBLE RELAXING: MORE THAN HALF THE DAYS
IF YOU CHECKED OFF ANY PROBLEMS ON THIS QUESTIONNAIRE, HOW DIFFICULT HAVE THESE PROBLEMS MADE IT FOR YOU TO DO YOUR WORK, TAKE CARE OF THINGS AT HOME, OR GET ALONG WITH OTHER PEOPLE: SOMEWHAT DIFFICULT
GAD7 TOTAL SCORE: 14
2. NOT BEING ABLE TO STOP OR CONTROL WORRYING: MORE THAN HALF THE DAYS

## 2021-07-28 ASSESSMENT — PATIENT HEALTH QUESTIONNAIRE - PHQ9: SUM OF ALL RESPONSES TO PHQ QUESTIONS 1-9: 9

## 2021-07-28 ASSESSMENT — MIFFLIN-ST. JEOR: SCORE: 1203.33

## 2021-07-28 ASSESSMENT — PAIN SCALES - GENERAL: PAINLEVEL: NO PAIN (0)

## 2021-07-28 NOTE — NURSING NOTE
"Chief Complaint   Patient presents with     Physical       Initial /72   Pulse 80   Temp 98.1  F (36.7  C)   Resp 18   Ht 1.61 m (5' 3.39\")   Wt 70.3 kg (155 lb)   SpO2 98%   BMI 27.12 kg/m   Estimated body mass index is 27.12 kg/m  as calculated from the following:    Height as of this encounter: 1.61 m (5' 3.39\").    Weight as of this encounter: 70.3 kg (155 lb).  Medication Reconciliation: complete  Braeden Grajeda LPN  "

## 2021-07-29 ASSESSMENT — ANXIETY QUESTIONNAIRES: GAD7 TOTAL SCORE: 14

## 2021-08-04 DIAGNOSIS — F41.1 GAD (GENERALIZED ANXIETY DISORDER): ICD-10-CM

## 2021-08-04 RX ORDER — OXAZEPAM 15 MG/1
CAPSULE ORAL
Qty: 270 CAPSULE | Refills: 1 | Status: CANCELLED | OUTPATIENT
Start: 2021-08-04

## 2021-08-04 RX ORDER — OXAZEPAM 15 MG/1
CAPSULE ORAL
Qty: 12 CAPSULE | Refills: 0 | Status: SHIPPED | OUTPATIENT
Start: 2021-08-04 | End: 2021-08-10

## 2021-08-04 RX ORDER — TRAZODONE HYDROCHLORIDE 50 MG/1
TABLET, FILM COATED ORAL
Qty: 270 TABLET | Refills: 3 | Status: SHIPPED | OUTPATIENT
Start: 2021-08-04 | End: 2021-11-02

## 2021-08-04 NOTE — TELEPHONE ENCOUNTER
oxazepam (SERAX) 15 MG capsule      Last Written Prescription Date:  12/10/20  Last Fill Quantity: 270,   # refills: 1  Last Office Visit: 7/28/21  Future Office visit:    Next 5 appointments (look out 90 days)    Oct 13, 2021 11:20 AM  (Arrive by 11:05 AM)  Return Visit with Angie Owens MD  [facility] ([facility] ) [address] [place] [id number]  [phone]           Routing refill request to provider for review/approval because:  Drug not on the [facility], UMP or  Health refill protocol or controlled substance

## 2021-08-04 NOTE — TELEPHONE ENCOUNTER
PCP out. Please advise, thank you.      oxazepam (SERAX) 15 MG capsule      Last Written Prescription Date:  12/10/20  Last Fill Quantity: 270,   # refills: 1  Last Office Visit: 07/28/21  Future Office visit:    Next 5 appointments (look out 90 days)    Oct 13, 2021 11:20 AM  (Arrive by 11:05 AM)  Return Visit with Angie Owens MD  [facility] ([facility] [place] [facility] ) [facility] [address] [place] [id number] [phone]           Routing refill request to provider for review/approval because:  Med not on RN protocol.

## 2021-08-05 NOTE — TELEPHONE ENCOUNTER
PDMP not working - shows zero across the board, despite multiple scripts/entries in Epic med list.  4 day refill send until PCP back in office.

## 2021-08-09 ENCOUNTER — LAB (OUTPATIENT)
Dept: LAB | Facility: OTHER | Age: 71
End: 2021-08-09
Payer: MEDICARE

## 2021-08-09 DIAGNOSIS — F33.41 MAJOR DEPRESSIVE DISORDER, RECURRENT EPISODE, IN PARTIAL REMISSION (H): ICD-10-CM

## 2021-08-09 DIAGNOSIS — E78.2 MIXED HYPERLIPIDEMIA: ICD-10-CM

## 2021-08-09 DIAGNOSIS — Z12.31 ENCOUNTER FOR SCREENING MAMMOGRAM FOR BREAST CANCER: ICD-10-CM

## 2021-08-09 DIAGNOSIS — F41.1 GENERALIZED ANXIETY DISORDER: ICD-10-CM

## 2021-08-09 DIAGNOSIS — E03.9 ACQUIRED HYPOTHYROIDISM: ICD-10-CM

## 2021-08-09 DIAGNOSIS — M25.50 POLYARTHRALGIA: ICD-10-CM

## 2021-08-09 DIAGNOSIS — C43.39 MELANOMA OF CHEEK (H): ICD-10-CM

## 2021-08-09 LAB
ALBUMIN SERPL-MCNC: 4.2 G/DL (ref 3.4–5)
ALP SERPL-CCNC: 96 U/L (ref 40–150)
ALT SERPL W P-5'-P-CCNC: 28 U/L (ref 0–50)
ANION GAP SERPL CALCULATED.3IONS-SCNC: 4 MMOL/L (ref 3–14)
AST SERPL W P-5'-P-CCNC: 14 U/L (ref 0–45)
BASOPHILS # BLD AUTO: 0.1 10E3/UL (ref 0–0.2)
BASOPHILS NFR BLD AUTO: 1 %
BILIRUB SERPL-MCNC: 0.3 MG/DL (ref 0.2–1.3)
BUN SERPL-MCNC: 21 MG/DL (ref 7–30)
CALCIUM SERPL-MCNC: 9.2 MG/DL (ref 8.5–10.1)
CHLORIDE BLD-SCNC: 107 MMOL/L (ref 94–109)
CHOLEST SERPL-MCNC: 275 MG/DL
CK SERPL-CCNC: 73 U/L (ref 30–225)
CO2 SERPL-SCNC: 28 MMOL/L (ref 20–32)
CREAT SERPL-MCNC: 0.82 MG/DL (ref 0.52–1.04)
CRP SERPL-MCNC: <2.9 MG/L (ref 0–8)
EOSINOPHIL # BLD AUTO: 0.2 10E3/UL (ref 0–0.7)
EOSINOPHIL NFR BLD AUTO: 5 %
ERYTHROCYTE [DISTWIDTH] IN BLOOD BY AUTOMATED COUNT: 12.5 % (ref 10–15)
ERYTHROCYTE [SEDIMENTATION RATE] IN BLOOD BY WESTERGREN METHOD: 27 MM/HR (ref 0–30)
FASTING STATUS PATIENT QL REPORTED: YES
GFR SERPL CREATININE-BSD FRML MDRD: 73 ML/MIN/1.73M2
GLUCOSE BLD-MCNC: 88 MG/DL (ref 70–99)
HCT VFR BLD AUTO: 40.3 % (ref 35–47)
HDLC SERPL-MCNC: 56 MG/DL
HGB BLD-MCNC: 13.7 G/DL (ref 11.7–15.7)
IMM GRANULOCYTES # BLD: 0 10E3/UL
IMM GRANULOCYTES NFR BLD: 0 %
LDLC SERPL CALC-MCNC: 180 MG/DL
LYMPHOCYTES # BLD AUTO: 1.4 10E3/UL (ref 0.8–5.3)
LYMPHOCYTES NFR BLD AUTO: 27 %
MCH RBC QN AUTO: 30.4 PG (ref 26.5–33)
MCHC RBC AUTO-ENTMCNC: 34 G/DL (ref 31.5–36.5)
MCV RBC AUTO: 89 FL (ref 78–100)
MONOCYTES # BLD AUTO: 0.3 10E3/UL (ref 0–1.3)
MONOCYTES NFR BLD AUTO: 7 %
NEUTROPHILS # BLD AUTO: 3 10E3/UL (ref 1.6–8.3)
NEUTROPHILS NFR BLD AUTO: 60 %
NONHDLC SERPL-MCNC: 219 MG/DL
NRBC # BLD AUTO: 0 10E3/UL
NRBC BLD AUTO-RTO: 0 /100
PLATELET # BLD AUTO: 289 10E3/UL (ref 150–450)
POTASSIUM BLD-SCNC: 3.8 MMOL/L (ref 3.4–5.3)
PROT SERPL-MCNC: 8.3 G/DL (ref 6.8–8.8)
RBC # BLD AUTO: 4.51 10E6/UL (ref 3.8–5.2)
SODIUM SERPL-SCNC: 139 MMOL/L (ref 133–144)
TRIGL SERPL-MCNC: 194 MG/DL
TSH SERPL DL<=0.005 MIU/L-ACNC: 0.96 MU/L (ref 0.4–4)
WBC # BLD AUTO: 4.9 10E3/UL (ref 4–11)

## 2021-08-09 PROCEDURE — 86140 C-REACTIVE PROTEIN: CPT | Mod: ZL

## 2021-08-09 PROCEDURE — 86200 CCP ANTIBODY: CPT | Mod: ZL

## 2021-08-09 PROCEDURE — 86038 ANTINUCLEAR ANTIBODIES: CPT | Mod: ZL

## 2021-08-09 PROCEDURE — 84443 ASSAY THYROID STIM HORMONE: CPT | Mod: ZL

## 2021-08-09 PROCEDURE — 83718 ASSAY OF LIPOPROTEIN: CPT | Mod: ZL

## 2021-08-09 PROCEDURE — 80053 COMPREHEN METABOLIC PANEL: CPT | Mod: ZL

## 2021-08-09 PROCEDURE — 36415 COLL VENOUS BLD VENIPUNCTURE: CPT | Mod: ZL

## 2021-08-09 PROCEDURE — 85652 RBC SED RATE AUTOMATED: CPT | Mod: ZL

## 2021-08-09 PROCEDURE — 85025 COMPLETE CBC W/AUTO DIFF WBC: CPT | Mod: ZL

## 2021-08-09 PROCEDURE — 86431 RHEUMATOID FACTOR QUANT: CPT | Mod: ZL

## 2021-08-09 PROCEDURE — 82550 ASSAY OF CK (CPK): CPT | Mod: ZL

## 2021-08-10 DIAGNOSIS — F41.1 GAD (GENERALIZED ANXIETY DISORDER): ICD-10-CM

## 2021-08-10 LAB
ANA PAT SER IF-IMP: ABNORMAL
ANA SER QL IF: ABNORMAL
ANA TITR SER IF: ABNORMAL {TITER}
CCP AB SER IA-ACNC: 1.1 U/ML
RHEUMATOID FACT SER NEPH-ACNC: <7 IU/ML

## 2021-08-10 RX ORDER — OXAZEPAM 15 MG/1
CAPSULE ORAL
Qty: 270 CAPSULE | Refills: 3 | Status: SHIPPED | OUTPATIENT
Start: 2021-08-10 | End: 2022-05-02

## 2021-09-12 ENCOUNTER — HEALTH MAINTENANCE LETTER (OUTPATIENT)
Age: 71
End: 2021-09-12

## 2021-10-13 ENCOUNTER — OFFICE VISIT (OUTPATIENT)
Dept: PSYCHIATRY | Facility: OTHER | Age: 71
End: 2021-10-13
Attending: PSYCHIATRY & NEUROLOGY
Payer: COMMERCIAL

## 2021-10-13 VITALS
TEMPERATURE: 98.4 F | OXYGEN SATURATION: 97 % | HEART RATE: 80 BPM | WEIGHT: 145 LBS | SYSTOLIC BLOOD PRESSURE: 132 MMHG | BODY MASS INDEX: 25.37 KG/M2 | DIASTOLIC BLOOD PRESSURE: 90 MMHG

## 2021-10-13 DIAGNOSIS — F33.1 MAJOR DEPRESSIVE DISORDER, RECURRENT EPISODE, MODERATE (H): ICD-10-CM

## 2021-10-13 PROCEDURE — G0463 HOSPITAL OUTPT CLINIC VISIT: HCPCS

## 2021-10-13 PROCEDURE — 99214 OFFICE O/P EST MOD 30 MIN: CPT | Performed by: PSYCHIATRY & NEUROLOGY

## 2021-10-13 RX ORDER — VENLAFAXINE HYDROCHLORIDE 37.5 MG/1
CAPSULE, EXTENDED RELEASE ORAL
Qty: 90 CAPSULE | Refills: 3 | Status: SHIPPED | OUTPATIENT
Start: 2021-10-13 | End: 2022-07-05

## 2021-10-13 RX ORDER — BUPROPION HYDROCHLORIDE 150 MG/1
TABLET ORAL
Qty: 90 TABLET | Refills: 3 | Status: SHIPPED | OUTPATIENT
Start: 2021-10-13 | End: 2022-05-24

## 2021-10-13 ASSESSMENT — ANXIETY QUESTIONNAIRES
6. BECOMING EASILY ANNOYED OR IRRITABLE: MORE THAN HALF THE DAYS
2. NOT BEING ABLE TO STOP OR CONTROL WORRYING: SEVERAL DAYS
GAD7 TOTAL SCORE: 11
3. WORRYING TOO MUCH ABOUT DIFFERENT THINGS: SEVERAL DAYS
7. FEELING AFRAID AS IF SOMETHING AWFUL MIGHT HAPPEN: SEVERAL DAYS
5. BEING SO RESTLESS THAT IT IS HARD TO SIT STILL: SEVERAL DAYS
1. FEELING NERVOUS, ANXIOUS, OR ON EDGE: NEARLY EVERY DAY
IF YOU CHECKED OFF ANY PROBLEMS ON THIS QUESTIONNAIRE, HOW DIFFICULT HAVE THESE PROBLEMS MADE IT FOR YOU TO DO YOUR WORK, TAKE CARE OF THINGS AT HOME, OR GET ALONG WITH OTHER PEOPLE: SOMEWHAT DIFFICULT

## 2021-10-13 ASSESSMENT — PATIENT HEALTH QUESTIONNAIRE - PHQ9
SUM OF ALL RESPONSES TO PHQ QUESTIONS 1-9: 7
5. POOR APPETITE OR OVEREATING: MORE THAN HALF THE DAYS

## 2021-10-13 ASSESSMENT — PAIN SCALES - GENERAL: PAINLEVEL: NO PAIN (0)

## 2021-10-13 NOTE — NURSING NOTE
"Chief Complaint   Patient presents with     RECHECK     Depression, anxiety       Initial BP (!) 132/90 (BP Location: Left arm, Patient Position: Sitting, Cuff Size: Adult Regular)   Pulse 80   Temp 98.4  F (36.9  C) (Tympanic)   Wt 65.8 kg (145 lb)   SpO2 97%   BMI 25.37 kg/m   Estimated body mass index is 25.37 kg/m  as calculated from the following:    Height as of 7/28/21: 1.61 m (5' 3.39\").    Weight as of this encounter: 65.8 kg (145 lb).  Medication Reconciliation: complete  LAKEISHA JUAREZ LPN    "

## 2021-10-13 NOTE — PROGRESS NOTES
"    PSYCHIATRY CLINIC PROGRESS NOTE     SUBJECTIVE / INTERIM HISTORY                                                                        Social- retired. Interests:reading, being physical / active & working out, grandkids, volunteers, loves being with kiddo   Children-   Grown children scattered throughout    Last visit 4/28/21: Cont trazodone, and Serax at current doses. Continue Effexor 37.5 mg daily and Wellbutrin  mg daily   - been a weird summer. People / friends passing away, friends moving (a lot to the [facility] to be with grandkids)  - her and  volunteer at [facility] otherwise they tend to not share her hobbies : golf, walking, hiking  - just got back week ago from [place]. Her and  got Covid December. They both did okay  - GeneSight test results: we reviewed the results and looks like Mony's history of having issues in past with higher doses of Effexor correlate with her GeneSight testing and noted may require lower doses of Effexor.   -  feels she is doing better. He noted she is interacting more with people and she hasn't done that in a long time  - mom narcissistic and \"weird\"   - Been on psychiatric meds since 90s. Been on Effexor since 1997. Takes Serax and generally takes it twice daily, once in awhile ends up feeling need to take three times daily. Has never had any issues with misuse / abuse of it. Recalls taking Ativan many years ago and seemed to work. Klonopin \"zonked\" her out. Notes has worked really hard and put in a lot of time and effort in regards to getting better gradually over time with mental health symptoms. This has included working with therapists in the past. Is  and marriage in general is going okay. They winter in [place]     SYMPTOMS-  Depressed mood, anhedonia, low energy, SI with no intent or plan, feeling hopeless, helpless. Has had panic attacks past couple months.  MEDICAL ROS- fatigue, gets where muscles feel weak like \"jelly\" with certain " meds.  MEDICAL / SURGICAL HISTORY                     Patient Active Problem List   Diagnosis     Generalized anxiety disorder     Chronic fatigue syndrome     Attention deficit disorder     Obsessive-compulsive disorder     FH: colon cancer     Mixed hyperlipidemia     Acquired hypothyroidism     Major depressive disorder, recurrent episode, in partial remission (H)     Vaso vagal episode     Small bowel obstruction due to adhesions (H)     Melanoma of cheek (H)     Acquired facial deformity     Screening for malignant neoplasm of colon     ALLERGY   Codeine  MEDICATIONS                                                                                             Current Outpatient Medications   Medication Sig     buPROPion (WELLBUTRIN XL) 150 MG 24 hr tablet TAKE 1 TABLET BY MOUTH ONCE DAILY IN THE MORNING     Calcium Carbonate (TUMS 500 OR) Take by mouth 2 times daily     Cholecalciferol (VITAMIN D) 1000 UNITS capsule Take 1 capsule by mouth daily.     fish oil-omega-3 fatty acids (FISH OIL) 1000 MG capsule Take 2 capsules by mouth daily.     fluticasone (FLONASE) 50 MCG/ACT nasal spray Use 2 spray(s) in each nostril once daily     glucosamine 500 MG CAPS Take by mouth daily      levothyroxine (SYNTHROID/LEVOTHROID) 50 MCG tablet Take 1 tablet by mouth once daily     MAGNESIUM OXIDE PO Take 200 mg by mouth daily     Multiple vitamin TABS Take 1 tablet by oral route every day with food     oxazepam (SERAX) 15 MG capsule TAKE 1 CAPSULE BY MOUTH THREE TIMES DAILY     traZODone (DESYREL) 50 MG tablet TAKE 1 TO 3 TABLETS BY MOUTH AT BEDTIME     venlafaxine (EFFEXOR-XR) 37.5 MG 24 hr capsule Take 1 capsule by mouth once daily     No current facility-administered medications for this visit.       VITALS   BP (!) 132/90 (BP Location: Left arm, Patient Position: Sitting, Cuff Size: Adult Regular)   Pulse 80   Temp 98.4  F (36.9  C) (Tympanic)   Wt 65.8 kg (145 lb)   SpO2 97%   BMI 25.37 kg/m       PHQ9                      [unfilled]  LABS                                                                                                                           Last Comprehensive Metabolic Panel:  Sodium   Date Value Ref Range Status   08/09/2021 139 133 - 144 mmol/L Final   07/24/2020 138 133 - 144 mmol/L Final     Potassium   Date Value Ref Range Status   08/09/2021 3.8 3.4 - 5.3 mmol/L Final   07/24/2020 3.9 3.4 - 5.3 mmol/L Final     Chloride   Date Value Ref Range Status   08/09/2021 107 94 - 109 mmol/L Final   07/24/2020 107 94 - 109 mmol/L Final     Carbon Dioxide   Date Value Ref Range Status   07/24/2020 29 20 - 32 mmol/L Final     Carbon Dioxide (CO2)   Date Value Ref Range Status   08/09/2021 28 20 - 32 mmol/L Final     Anion Gap   Date Value Ref Range Status   08/09/2021 4 3 - 14 mmol/L Final   07/24/2020 2 (L) 3 - 14 mmol/L Final     Glucose   Date Value Ref Range Status   08/09/2021 88 70 - 99 mg/dL Final   07/24/2020 88 70 - 99 mg/dL Final     Urea Nitrogen   Date Value Ref Range Status   08/09/2021 21 7 - 30 mg/dL Final   07/24/2020 15 7 - 30 mg/dL Final     Creatinine   Date Value Ref Range Status   08/09/2021 0.82 0.52 - 1.04 mg/dL Final   07/24/2020 0.71 0.52 - 1.04 mg/dL Final     GFR Estimate   Date Value Ref Range Status   08/09/2021 73 >60 mL/min/1.73m2 Final     Comment:     As of July 11, 2021, eGFR is calculated by the CKD-EPI creatinine equation, without race adjustment. eGFR can be influenced by muscle mass, exercise, and diet. The reported eGFR is an estimation only and is only applicable if the renal function is stable.   07/24/2020 87 >60 mL/min/[1.73_m2] Final     Comment:     Non  GFR Calc  Starting 12/18/2018, serum creatinine based estimated GFR (eGFR) will be   calculated using the Chronic Kidney Disease Epidemiology Collaboration   (CKD-EPI) equation.       Calcium   Date Value Ref Range Status   08/09/2021 9.2 8.5 - 10.1 mg/dL Final   07/24/2020 9.4 8.5 - 10.1 mg/dL Final  "    TSH   Date Value Ref Range Status   08/09/2021 0.96 0.40 - 4.00 mU/L Final   07/24/2020 1.00 0.40 - 4.00 mU/L Final     MENTAL STATUS EXAM                                                                                       Mood was described as \"I've been dong pretty well.\"  Thought process, including associations, was unremarkable and thought content was devoid of suicidal and homicidal ideation and psychotic thought. No hallucinations. Insight was good. Judgment was intact and adequate for safety. Fund of knowledge was intact. Pt demonstrates no obvious problems with attention, concentration, language, recent or remote memory although these were not formally tested.     ASSESSMENT                                                                                                      HISTORICAL:  Initial psych note 8 2015         NOTES:      Mony Szymanski is a 71  year old, female who has long history in terms of depression, anxiety - therapy throughout the years. She notes ongoing depression that worsned along with isolation and the Covid - 19 pandemic. Mony has been taking Effexor, Serax, and trazodone for years.  She has heard Serax is \"archaeic\" and we have had discussion about benzodiazepine class of meds ( guidelines have shifted over years to try avoid long-term use and rather stick with short-term use). Depression (and anxiety) worse since July '20. . She reminds me higher doses of Effexor she had issues with her muscles feeling like \"jelly\". She tried 5HTP and had similar issues when was taking it with Effexor.     She got rid of the CBD and thinks was actually contributing to depression as opposed to helping anything. We reduced effexor from 75 mg to 37.5 mg and her history with this med correlates with [employer] testing (more prone to SEs). We started Wellbutrin and last visit she was doing better. Today doing well, just that a lot of changes in her life. Mony does well with structure, being social, " "routine. This summer these things have been altered. 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 and one of diagnoses OCPD traits. Today Mony and I talked abbout her upbringing: mom narcissistic and \"weird\". Dad was more nurturing however still with his issues .. we also talked today about differences of opinion / different lifestyles with her kids.      TREATMENT RISK STATEMENT:  The risks, benefits, alternatives and potential adverse effects have been explained and are understood by the pt.  The pt agrees to the treatment plan with the ability to do so.   The pt knows to call the clinic for any problems or access emergency care if needed.        DIAGNOSES                    MDD, recurrent mild  ED  OCPD traits    PLAN                                                                                                                    1)  MEDICATIONS:         -- Cont trazodone, and Serax at current doses. Continue Effexor 37.5 mg daily and Wellbutrin  mg daily     2)  THERAPY:  No Change    3)  LABS:  None    4)  PT MONITOR [call for probs]:  worsening sx, SEs from meds  inue Effexor,  5)  REFERRALS [CD, medical, other]:  None    6)  RTC: ~ 6 months    Of note: when in [place] her pharmacy is [facility] in [place] on [address]  Phone # [phone].                                                                     "

## 2021-10-14 ASSESSMENT — ANXIETY QUESTIONNAIRES: GAD7 TOTAL SCORE: 11

## 2021-10-18 ENCOUNTER — ANCILLARY PROCEDURE (OUTPATIENT)
Dept: MAMMOGRAPHY | Facility: OTHER | Age: 71
End: 2021-10-18
Attending: FAMILY MEDICINE
Payer: MEDICARE

## 2021-10-18 ENCOUNTER — TELEPHONE (OUTPATIENT)
Dept: MAMMOGRAPHY | Facility: OTHER | Age: 71
End: 2021-10-18

## 2021-10-18 DIAGNOSIS — Z12.31 ENCOUNTER FOR SCREENING MAMMOGRAM FOR BREAST CANCER: ICD-10-CM

## 2021-10-18 PROCEDURE — 77063 BREAST TOMOSYNTHESIS BI: CPT | Mod: TC

## 2021-11-02 DIAGNOSIS — J30.1 CHRONIC SEASONAL ALLERGIC RHINITIS DUE TO POLLEN: ICD-10-CM

## 2021-11-02 DIAGNOSIS — E03.9 ACQUIRED HYPOTHYROIDISM: ICD-10-CM

## 2021-11-02 DIAGNOSIS — F41.1 GAD (GENERALIZED ANXIETY DISORDER): ICD-10-CM

## 2021-11-02 RX ORDER — LEVOTHYROXINE SODIUM 50 UG/1
50 TABLET ORAL DAILY
Qty: 90 TABLET | Refills: 1 | Status: SHIPPED | OUTPATIENT
Start: 2021-11-02 | End: 2022-05-05

## 2021-11-02 RX ORDER — TRAZODONE HYDROCHLORIDE 50 MG/1
TABLET, FILM COATED ORAL
Qty: 270 TABLET | Refills: 1 | Status: SHIPPED | OUTPATIENT
Start: 2021-11-02 | End: 2022-08-03

## 2021-11-02 RX ORDER — FLUTICASONE PROPIONATE 50 MCG
SPRAY, SUSPENSION (ML) NASAL
Qty: 48 G | Refills: 1 | Status: SHIPPED | OUTPATIENT
Start: 2021-11-02 | End: 2022-06-21

## 2021-11-02 NOTE — TELEPHONE ENCOUNTER
Synthroid  Last Written Prescription Date:  8.2.2021  Last Fill Quantity: 90,   # refills: 0  Last Office Visit: 7.28.2021  Future Office visit:       Routing refill request to provider for review/approval because:       Flonase  Last Written Prescription Date:  8.20.2021  Last Fill Quantity: 48g,   # refills: 0  Last Office Visit:   Future Office visit:       Routing refill request to provider for review/approval because:    Trazodone      Last Written Prescription Date:  8.4.2021  Last Fill Quantity: 270,   # refills: 3  Last Office Visit:   Future Office visit:       Routing refill request to provider for review/approval because:

## 2021-12-17 ENCOUNTER — TELEPHONE (OUTPATIENT)
Dept: FAMILY MEDICINE | Facility: OTHER | Age: 71
End: 2021-12-17
Payer: COMMERCIAL

## 2021-12-17 NOTE — TELEPHONE ENCOUNTER
Received PA from [facility] for Oxazepam 15MG capsules. Submitted on CMM. Waiting for response.

## 2021-12-20 NOTE — TELEPHONE ENCOUNTER
Received APPROVAL from [facility] for Oxazepam 15MG capsules. Effective 09/18/2021-12/17/2022. Forms scanned to Epic.

## 2021-12-27 ENCOUNTER — PATIENT OUTREACH (OUTPATIENT)
Dept: OTHER | Facility: HOSPITAL | Age: 71
End: 2021-12-27
Payer: COMMERCIAL

## 2022-05-02 ENCOUNTER — OFFICE VISIT (OUTPATIENT)
Dept: PSYCHIATRY | Facility: OTHER | Age: 72
End: 2022-05-02
Attending: PSYCHIATRY & NEUROLOGY
Payer: COMMERCIAL

## 2022-05-02 VITALS
BODY MASS INDEX: 25.37 KG/M2 | TEMPERATURE: 97.9 F | SYSTOLIC BLOOD PRESSURE: 120 MMHG | DIASTOLIC BLOOD PRESSURE: 88 MMHG | OXYGEN SATURATION: 95 % | HEART RATE: 91 BPM | WEIGHT: 145 LBS

## 2022-05-02 DIAGNOSIS — F41.1 GAD (GENERALIZED ANXIETY DISORDER): ICD-10-CM

## 2022-05-02 PROCEDURE — 99214 OFFICE O/P EST MOD 30 MIN: CPT | Performed by: PSYCHIATRY & NEUROLOGY

## 2022-05-02 PROCEDURE — G0463 HOSPITAL OUTPT CLINIC VISIT: HCPCS

## 2022-05-02 RX ORDER — OXAZEPAM 15 MG/1
CAPSULE ORAL
Qty: 270 CAPSULE | Refills: 3 | Status: SHIPPED | OUTPATIENT
Start: 2022-05-02 | End: 2022-08-03

## 2022-05-02 ASSESSMENT — PAIN SCALES - GENERAL: PAINLEVEL: NO PAIN (0)

## 2022-05-02 NOTE — NURSING NOTE
"Chief Complaint   Patient presents with     RECHECK       Depression, anxiety.       Initial /88 (BP Location: Left arm, Patient Position: Sitting, Cuff Size: Adult Regular)   Pulse 91   Temp 97.9  F (36.6  C) (Tympanic)   Wt 65.8 kg (145 lb)   SpO2 95%   BMI 25.37 kg/m   Estimated body mass index is 25.37 kg/m  as calculated from the following:    Height as of 7/28/21: 1.61 m (5' 3.39\").    Weight as of this encounter: 65.8 kg (145 lb).  Medication Reconciliation: complete  LAKEISHA JUAREZ LPN    "

## 2022-05-02 NOTE — PROGRESS NOTES
"    PSYCHIATRY CLINIC PROGRESS NOTE     SUBJECTIVE / INTERIM HISTORY                                                                        Social- retired. Interests:reading, being physical / active & working out, grandkids, volunteers, loves being with kiddo   Children-   Grown children scattered throughout    Last visit October '21: Cont trazodone, and Serax at current doses. Continue Effexor 37.5 mg daily and Wellbutrin  mg daily   - they got back five days ago  - was thinking she would have to \"reinvent the wheel\" when got back from [place] given lot of people passed away and moved last summer  - started going to [employer]   - plans on doing some volunteering  - Mony agrees with me she is doing much better as compared to year or so ago  - GeneSight test results: we reviewed the results and looks like Mony's history of having issues in past with higher doses of Effexor correlate with her GeneSight testing and noted may require lower doses of Effexor.   - mom narcissistic and \"weird\"   - Been on psychiatric meds since 90s. Been on Effexor since 1997. Takes Serax and generally takes it twice daily, once in awhile ends up feeling need to take three times daily. Has never had any issues with misuse / abuse of it. Recalls taking Ativan many years ago and seemed to work. Klonopin \"zonked\" her out. Notes has worked really hard and put in a lot of time and effort in regards to getting better gradually over time with mental health symptoms. This has included working with therapists in the past. Is  and marriage in general is going okay. They winter in [place]     SYMPTOMS-  Depressed mood, anhedonia, low energy, SI with no intent or plan, feeling hopeless, helpless. Has had panic attacks past couple months.  MEDICAL ROS- fatigue, gets where muscles feel weak like \"jelly\" with certain meds.  MEDICAL / SURGICAL HISTORY                     Patient Active Problem List   Diagnosis     Generalized anxiety " disorder     Chronic fatigue syndrome     Attention deficit disorder     Obsessive-compulsive disorder     FH: colon cancer     Mixed hyperlipidemia     Acquired hypothyroidism     Major depressive disorder, recurrent episode, in partial remission (H)     Vaso vagal episode     Small bowel obstruction due to adhesions (H)     Melanoma of cheek (H)     Acquired facial deformity     Screening for malignant neoplasm of colon     ALLERGY   Codeine  MEDICATIONS                                                                                             Current Outpatient Medications   Medication Sig     buPROPion (WELLBUTRIN XL) 150 MG 24 hr tablet TAKE 1 TABLET BY MOUTH ONCE DAILY IN THE MORNING     Calcium Carbonate (TUMS 500 OR) Take by mouth 2 times daily     Cholecalciferol (VITAMIN D) 1000 UNITS capsule Take 1 capsule by mouth daily.     fish oil-omega-3 fatty acids 1000 MG capsule Take 2 capsules by mouth daily.     fluticasone (FLONASE) 50 MCG/ACT nasal spray Use 2 spray(s) in each nostril once daily     glucosamine 500 MG CAPS Take by mouth daily      levothyroxine (SYNTHROID/LEVOTHROID) 50 MCG tablet Take 1 tablet (50 mcg) by mouth daily     MAGNESIUM OXIDE PO Take 200 mg by mouth daily     Multiple vitamin TABS Take 1 tablet by oral route every day with food     oxazepam (SERAX) 15 MG capsule TAKE 1 CAPSULE BY MOUTH THREE TIMES DAILY     traZODone (DESYREL) 50 MG tablet TAKE 1 TO 3 TABLETS BY MOUTH AT BEDTIME     venlafaxine (EFFEXOR-XR) 37.5 MG 24 hr capsule Take 1 capsule by mouth once daily     No current facility-administered medications for this visit.       VITALS   /88 (BP Location: Left arm, Patient Position: Sitting, Cuff Size: Adult Regular)   Pulse 91   Temp 97.9  F (36.6  C) (Tympanic)   Wt 65.8 kg (145 lb)   SpO2 95%   BMI 25.37 kg/m       PHQ9                     [unfilled]  LABS                                                                                                                            Last Comprehensive Metabolic Panel:  Sodium   Date Value Ref Range Status   08/09/2021 139 133 - 144 mmol/L Final   07/24/2020 138 133 - 144 mmol/L Final     Potassium   Date Value Ref Range Status   08/09/2021 3.8 3.4 - 5.3 mmol/L Final   07/24/2020 3.9 3.4 - 5.3 mmol/L Final     Chloride   Date Value Ref Range Status   08/09/2021 107 94 - 109 mmol/L Final   07/24/2020 107 94 - 109 mmol/L Final     Carbon Dioxide   Date Value Ref Range Status   07/24/2020 29 20 - 32 mmol/L Final     Carbon Dioxide (CO2)   Date Value Ref Range Status   08/09/2021 28 20 - 32 mmol/L Final     Anion Gap   Date Value Ref Range Status   08/09/2021 4 3 - 14 mmol/L Final   07/24/2020 2 (L) 3 - 14 mmol/L Final     Glucose   Date Value Ref Range Status   08/09/2021 88 70 - 99 mg/dL Final   07/24/2020 88 70 - 99 mg/dL Final     Urea Nitrogen   Date Value Ref Range Status   08/09/2021 21 7 - 30 mg/dL Final   07/24/2020 15 7 - 30 mg/dL Final     Creatinine   Date Value Ref Range Status   08/09/2021 0.82 0.52 - 1.04 mg/dL Final   07/24/2020 0.71 0.52 - 1.04 mg/dL Final     GFR Estimate   Date Value Ref Range Status   08/09/2021 73 >60 mL/min/1.73m2 Final     Comment:     As of July 11, 2021, eGFR is calculated by the CKD-EPI creatinine equation, without race adjustment. eGFR can be influenced by muscle mass, exercise, and diet. The reported eGFR is an estimation only and is only applicable if the renal function is stable.   07/24/2020 87 >60 mL/min/[1.73_m2] Final     Comment:     Non  GFR Calc  Starting 12/18/2018, serum creatinine based estimated GFR (eGFR) will be   calculated using the Chronic Kidney Disease Epidemiology Collaboration   (CKD-EPI) equation.       Calcium   Date Value Ref Range Status   08/09/2021 9.2 8.5 - 10.1 mg/dL Final   07/24/2020 9.4 8.5 - 10.1 mg/dL Final     TSH   Date Value Ref Range Status   08/09/2021 0.96 0.40 - 4.00 mU/L Final   07/24/2020 1.00 0.40 - 4.00 mU/L Final     MENTAL  "STATUS EXAM                                                                                       Mood was described as \"I've been dong pretty well.\"  Thought process, including associations, was unremarkable and thought content was devoid of suicidal and homicidal ideation and psychotic thought. No hallucinations. Insight was good. Judgment was intact and adequate for safety. Fund of knowledge was intact. Pt demonstrates no obvious problems with attention, concentration, language, recent or remote memory although these were not formally tested.     ASSESSMENT                                                                                                      HISTORICAL:  Initial psych note 8 2015         NOTES:      Mony Szymanski is a 71  year old, female who has long history in terms of depression, anxiety - therapy throughout the years. She notes ongoing depression that worsned along with isolation and the Covid - 19 pandemic. Mony has been taking Effexor, Serax, and trazodone for years.  She has heard Serax is \"archaeic\" and we have had discussion about benzodiazepine class of meds ( guidelines have shifted over years to try avoid long-term use and rather stick with short-term use).    She got rid of the CBD and Mony felt was contributing to depression as opposed to helping anything. We reduced effexor from 75 mg to 37.5 mg and her history with this med correlates with GeneSight testing (more prone to SEs). We started Wellbutrin and has helped her significantly.      Mony does well with structure, being social, routine. Last summer she had several friends pass away and move away; was difficult for her. Today she notes to be doing well and has is doing better with acceptance of change, being more assertive.     TREATMENT RISK STATEMENT:  The risks, benefits, alternatives and potential adverse effects have been explained and are understood by the pt.  The pt agrees to the treatment plan with the ability to do so.   The " pt knows to call the clinic for any problems or access emergency care if needed.        DIAGNOSES                    MDD, recurrent mild  ED  OCPD traits    PLAN                                                                                                                    1)  MEDICATIONS:         -- Cont trazodone, and Serax 15 mg 3 times daily filled today #270 tabs 3 refills.    Continue Effexor 37.5 mg daily and Wellbutrin  mg daily     2)  THERAPY:  No Change    3)  LABS:  None    4)  PT MONITOR [call for probs]:  worsening sx, SEs from meds    5)  REFERRALS [CD, medical, other]:  None    6)  RTC: ~ 6 months    Of note: when in [place] her pharmacy is [facility] in [place] on [address]  Phone # [phone].

## 2022-05-05 DIAGNOSIS — E03.9 ACQUIRED HYPOTHYROIDISM: ICD-10-CM

## 2022-05-05 RX ORDER — LEVOTHYROXINE SODIUM 50 UG/1
50 TABLET ORAL DAILY
Qty: 90 TABLET | Refills: 1 | Status: SHIPPED | OUTPATIENT
Start: 2022-05-05 | End: 2022-08-03

## 2022-05-05 NOTE — TELEPHONE ENCOUNTER
levothyroxine (SYNTHROID/LEVOTHROID) 50 MCG tablet      Last Written Prescription Date:  11/02/21  Last Fill Quantity: 90,   # refills: 1  Last Office Visit: 07/28/21  Future Office visit:    Next 5 appointments (look out 90 days)    Jul 05, 2022 10:30 AM  (Arrive by 10:15 AM)  Return Visit with DEANN Leyva MD  [facility] ([facility] ) [address] [place] [address] [phone]           Routing refill request to provider for review/approval because:

## 2022-07-05 ENCOUNTER — OFFICE VISIT (OUTPATIENT)
Dept: DERMATOLOGY | Facility: OTHER | Age: 72
End: 2022-07-05
Attending: DERMATOLOGY
Payer: COMMERCIAL

## 2022-07-05 VITALS
DIASTOLIC BLOOD PRESSURE: 84 MMHG | HEIGHT: 64 IN | OXYGEN SATURATION: 98 % | SYSTOLIC BLOOD PRESSURE: 110 MMHG | BODY MASS INDEX: 24.75 KG/M2 | HEART RATE: 80 BPM | WEIGHT: 145 LBS

## 2022-07-05 DIAGNOSIS — Z12.83 SCREENING FOR SKIN CANCER: ICD-10-CM

## 2022-07-05 DIAGNOSIS — D22.9 MULTIPLE BENIGN NEVI: Primary | ICD-10-CM

## 2022-07-05 DIAGNOSIS — L82.1 SEBORRHEIC KERATOSES: ICD-10-CM

## 2022-07-05 PROCEDURE — 99213 OFFICE O/P EST LOW 20 MIN: CPT | Performed by: DERMATOLOGY

## 2022-07-05 PROCEDURE — G0463 HOSPITAL OUTPT CLINIC VISIT: HCPCS

## 2022-07-05 ASSESSMENT — PAIN SCALES - GENERAL: PAINLEVEL: NO PAIN (0)

## 2022-07-05 NOTE — LETTER
2022       RE: Harika Tyler  [address] [facility] [address]     Dear Colleague,    Thank you for referring your patient, Harika Tyler, to the [facility]. Please see a copy of my visit note below.    Visit Date: 2022    SUBJECTIVE:  Harika returns for an annual skin check.  She has had some skin cancers in the past.  She requests a complete skin exam.    OBJECTIVE:  A very healthy lady in no distress.  We checked her face, her chest, her breasts, her back, her buttocks, the perianal region, the pubic region, the arms, legs, hands and the feet.  We found scattered seborrheic keratoses, especially on the upper chest.  We found no precancers or skin cancer-like lesions. She was not aware of any lesions that were new to her that were dark in color, that were tender to touch, nor was there any bleeding reported.      ASSESSMENT:  No worrisome skin lesions.    PLAN:  Harika was reassured.  Advised that I would be happy to see her yearly if she wished and in the interim, should she develop any new lesions.  Overall, she does not show any major sun damage or precancerous regions in the high-risk area.    MEDICATIONS AND ALLERGIES:  Reviewed.    Return in 1 year or p.r.n. before that time.    DEANN Leyva MD        D: 2022   T: 2022   MT: [facility]    Name:     HARIKA TYLER  MRN:      [id number]        Account:    [id number]   :      1950           Visit Date: 2022     Document: [id number]      Again, thank you for allowing me to participate in the care of your patient.      Sincerely,    DEANN Leyva MD       None known

## 2022-07-05 NOTE — NURSING NOTE
"Chief Complaint   Patient presents with     Follow Up     Skin Exam       Initial /84 (BP Location: Left arm, Patient Position: Sitting, Cuff Size: Adult Regular)   Pulse 80   Ht 1.626 m (5' 4\")   Wt 65.8 kg (145 lb)   SpO2 98%   BMI 24.89 kg/m   Estimated body mass index is 24.89 kg/m  as calculated from the following:    Height as of this encounter: 1.626 m (5' 4\").    Weight as of this encounter: 65.8 kg (145 lb).  Medication Reconciliation: complete  LAKEISHA JUAREZ LPN    "

## 2022-07-05 NOTE — PROGRESS NOTES
Visit Date: 2022    SUBJECTIVE:  Harika returns for an annual skin check.  She has had some skin cancers in the past.  She requests a complete skin exam.    OBJECTIVE:  A very healthy lady in no distress.  We checked her face, her chest, her breasts, her back, her buttocks, the perianal region, the pubic region, the arms, legs, hands and the feet.  We found scattered seborrheic keratoses, especially on the upper chest.  We found no precancers or skin cancer-like lesions. She was not aware of any lesions that were new to her that were dark in color, that were tender to touch, nor was there any bleeding reported.      ASSESSMENT:  No worrisome skin lesions.    PLAN:  Harika was reassured.  Advised that I would be happy to see her yearly if she wished and in the interim, should she develop any new lesions.  Overall, she does not show any major sun damage or precancerous regions in the high-risk area.    MEDICATIONS AND ALLERGIES:  Reviewed.    Return in 1 year or p.r.n. before that time.    DEANN Leyva MD        D: 2022   T: 2022   MT: [facility]    Name:     HARIKA TYLER  MRN:      [id number]        Account:    [id number]   :      1950           Visit Date: 2022     Document: [id number]

## 2022-07-27 ENCOUNTER — NURSE TRIAGE (OUTPATIENT)
Dept: FAMILY MEDICINE | Facility: OTHER | Age: 72
End: 2022-07-27

## 2022-07-27 NOTE — TELEPHONE ENCOUNTER
"7/27/2022 4:00 PM  Pt reports \"taking Wellbutrin over past couple years, but over the past few weeks not feeling well, weight loss, decreased appetite\". Pt reports she believes her sx are related to her medication. Pt reports total weight loss of a \"few pounds\" but reports eating high calorie foods.   Pt does have appointment scheduled on 8/3/22 with Dr. Simon.     Pt would like phone visit or in person visit. Pt reports upcoming trip planned this weekend. Requesting to speak or see provider on Friday if possible. Prefers a phone visit as pt will be out of town for a couple days. Pt reports \"I'm not suicidal\". \"I was just hoping to speak with Dr. Simon\".     [phone]    Reason for Disposition    [1] Depression AND [2] worsening (e.g.,sleeping poorly, less able to do activities of daily living)    Additional Information    Negative: Patient attempted suicide    Negative: Patient is threatening suicide now    Negative: Violent behavior, or threatening to physically hurt or kill someone    Negative: [1] Patient is very confused (disoriented, slurred speech) AND [2] no other adult (e.g., friend or family member) available    Negative: [1] Difficult to awaken or acting very confused (disoriented, slurred speech) AND [2] new onset    Negative: Sounds like a life-threatening emergency to the triager    Negative: Alcohol use, abuse or dependence, question or problem related to    Negative: Drug abuse or dependence, question or problem related to    Negative: Bipolar disorder (manic depression)    Negative: Depression during the postpartum period (< 1 year since delivery)    Negative: [1] Depression AND [2] unable to do any of normal activities (e.g., self care, school, work; in comparison to baseline).    Negative: Very strange or confused behavior    Negative: Patient sounds very sick or weak to the triager    Answer Assessment - Initial Assessment Questions  1. CONCERN: \"What happened that made you call today?\"    " "  See note  2. DEPRESSION SYMPTOM SCREENING: \"How are you feeling overall?\" (e.g., decreased energy, increased sleeping or difficulty sleeping, difficulty concentrating, feelings of sadness, guilt, hopelessness, or worthlessness)      All of the above listed.   3. RISK OF HARM - SUICIDAL IDEATION:  \"Do you ever have thoughts of hurting or killing yourself?\"  (e.g., yes, no, no but preoccupation with thoughts about death)    - INTENT:  \"Do you have thoughts of hurting or killing yourself right NOW?\" (e.g., yes, no, N/A)    - PLAN: \"Do you have a specific plan for how you would do this?\" (e.g., gun, knife, overdose, no plan, N/A)      occasional thought no plan.   4. RISK OF HARM - HOMICIDAL IDEATION:  \"Do you ever have thoughts of hurting or killing someone else?\"  (e.g., yes, no, no but preoccupation with thoughts about death)    - INTENT:  \"Do you have thoughts of hurting or killing someone right NOW?\" (e.g., yes, no, N/A)    - PLAN: \"Do you have a specific plan for how you would do this?\" (e.g., gun, knife, no plan, N/A)       Denies plan  5. FUNCTIONAL IMPAIRMENT: \"How have things been going for you overall in your life? Have you had any more difficulties than usual doing your normal daily activities?\"  (e.g., better, same, worse; self-care, school, work, interactions)      yes  6. SUPPORT: \"Who is with you now?\" \"Who do you live with?\" \"Do you have family or friends nearby who you can talk to?\"       family  7. THERAPIST: \"Do you have a counselor or therapist? Name?\"      no  8. STRESSORS: \"Has there been any new stress or recent changes in your life?\"      No, pt stated \"It gets worse when the weather got warmer\".   9. DRUG ABUSE/ALCOHOL: \"Do you drink alcohol or use any illegal drugs?\"       no  10. OTHER: \"Do you have any other health or medical symptoms right now?\" (e.g., fever)        Denies   11. PREGNANCY: \"Is there any chance you are pregnant?\" \"When was your last menstrual period?\"        " no    Protocols used: DEPRESSION-A-AH

## 2022-07-27 NOTE — TELEPHONE ENCOUNTER
7/27/2022 4:32 PM  Pt called scheduled around 4 PM overbook Friday. Pt did report this time would work best for her. Pt very thankful.

## 2022-07-28 ASSESSMENT — ANXIETY QUESTIONNAIRES
6. BECOMING EASILY ANNOYED OR IRRITABLE: NEARLY EVERY DAY
GAD7 TOTAL SCORE: 16
4. TROUBLE RELAXING: SEVERAL DAYS
GAD7 TOTAL SCORE: 16
5. BEING SO RESTLESS THAT IT IS HARD TO SIT STILL: NEARLY EVERY DAY
3. WORRYING TOO MUCH ABOUT DIFFERENT THINGS: NEARLY EVERY DAY
2. NOT BEING ABLE TO STOP OR CONTROL WORRYING: NEARLY EVERY DAY
1. FEELING NERVOUS, ANXIOUS, OR ON EDGE: NEARLY EVERY DAY
IF YOU CHECKED OFF ANY PROBLEMS ON THIS QUESTIONNAIRE, HOW DIFFICULT HAVE THESE PROBLEMS MADE IT FOR YOU TO DO YOUR WORK, TAKE CARE OF THINGS AT HOME, OR GET ALONG WITH OTHER PEOPLE: VERY DIFFICULT
7. FEELING AFRAID AS IF SOMETHING AWFUL MIGHT HAPPEN: NOT AT ALL

## 2022-07-28 ASSESSMENT — PATIENT HEALTH QUESTIONNAIRE - PHQ9: SUM OF ALL RESPONSES TO PHQ QUESTIONS 1-9: 13

## 2022-07-28 NOTE — NURSING NOTE
"No chief complaint on file.      Initial There were no vitals taken for this visit. Estimated body mass index is 24.89 kg/m  as calculated from the following:    Height as of 7/5/22: 1.626 m (5' 4\").    Weight as of 7/5/22: 65.8 kg (145 lb).  Medication Reconciliation: complete  Viktoriya Fregoso LPN    "

## 2022-07-28 NOTE — PROGRESS NOTES
Mony is a 72 year old who is being evaluated via a billable telephone visit.      What phone number would you like to be contacted at? [phone]  How would you like to obtain your AVS? Lisa    Assessment & Plan     Decreased appetite / Weight loss / Nausea  Advised that I would agree I am concerned that her sx are not medication related. Okay to continue this. She is unable to come in for labs prior to our appt next week, but will order labs in advance of appt. Consider egd, vs ct based on results. In the meantime, she can consider empiric tx with zantac otc to see if this helps sx.     Major depressive disorder, recurrent episode, in partial remission (H)  This is slightly worsened, but at this point pt would like to remain on current regimen if there is felt to be alternative cause for above sx. I would recommend we attribute sx to medication only as diagnosis of exclusion.     15 minutes spent on the date of the encounter doing chart review, review of test results, interpretation of tests, patient visit and documentation     No follow-ups on file.    Ivon Simon MD  [facility] - [place]    Subjective   Mony is a 72 year old,  presenting for the following health issues:    No chief complaint on file.    HPI     Depression and Anxiety Follow-Up    How are you doing with your depression since your last visit? Worsened     How are you doing with your anxiety since your last visit?  Worsened     Are you having other symptoms that might be associated with depression or anxiety? Yes:  Stomach pain    Have you had a significant life event? No     Do you have any concerns with your use of alcohol or other drugs? No     Felt medication (wellbutrin) worked fairly well when started, but had initial GI side effects. These seemed to tyrell over time, however, over the last month or so, increased GI concerns. Initially wondered if related to wellbutrin, however, now is concerned something else is going  on.     Notes that on the wellbutrin has always been constipated. Stools actually have softened in the last month, no black or bloody stools, no abd pain. She also states that she has had mostly upper abdominal discomfort (? Squeezing, but not painful) that is fairly constant. Waxes and wanes. Associated with nausea, but no vomiting. Appetite is minimal. Overall feeling very unwell. Worsens symptoms if she is very active. Unable to exercise as hard as she used to, previously walking more often. She has lost weight, unclear how much. Has actually started to eat more calories in response, but notes weight continues to drop.     Social History     Tobacco Use     Smoking status: Never Smoker     Smokeless tobacco: Never Used   Substance Use Topics     Alcohol use: Yes     Comment: Beer & Wine, socially     Drug use: Never     PHQ 7/28/2021 10/13/2021 7/28/2022   PHQ-9 Total Score 9 7 13   Q9: Thoughts of better off dead/self-harm past 2 weeks Several days Not at all Several days   F/U: Thoughts of suicide or self-harm - - -   F/U: Self harm-plan - - -   F/U: Self-harm action - - -   F/U: Safety concerns - - -     ED-7 SCORE 7/28/2021 10/13/2021 7/28/2022   Total Score 14 11 16     Last PHQ-9 7/28/2022   1.  Little interest or pleasure in doing things 1   2.  Feeling down, depressed, or hopeless 1   3.  Trouble falling or staying asleep, or sleeping too much 3   4.  Feeling tired or having little energy 1   5.  Poor appetite or overeating 3   6.  Feeling bad about yourself 0   7.  Trouble concentrating 3   8.  Moving slowly or restless 0   Q9: Thoughts of better off dead/self-harm past 2 weeks 1   PHQ-9 Total Score 13   Difficulty at work, home, or with people Very difficult   In the past two weeks have you had thoughts of suicide or self harm? -   Do you have concerns about your personal safety or the safety of others? -   In the past 2 weeks have you thought about a plan or had intention to harm yourself? -   In the  past 2 weeks have you acted on these thoughts in any way? -     ED-7  7/28/2022   1. Feeling nervous, anxious, or on edge 3   2. Not being able to stop or control worrying 3   3. Worrying too much about different things 3   4. Trouble relaxing 1   5. Being so restless that it is hard to sit still 3   6. Becoming easily annoyed or irritable 3   7. Feeling afraid, as if something awful might happen 0   ED-7 Total Score 16   If you checked any problems, how difficult have they made it for you to do your work, take care of things at home, or get along with other people? Very difficult       Review of Systems   Constitutional, HEENT, cardiovascular, pulmonary, gi and gu systems are negative, except as otherwise noted.      Objective           Vitals:  No vitals were obtained today due to virtual visit.    Physical Exam   healthy, alert and no distress  PSYCH: Alert and oriented times 3; coherent speech, normal   rate and volume, able to articulate logical thoughts, able   to abstract reason, no tangential thoughts, no hallucinations   or delusions  Her affect is normal  RESP: No cough, no audible wheezing, able to talk in full sentences  Remainder of exam unable to be completed due to telephone visits      Phone call duration: 9 minutes    .  ..

## 2022-07-28 NOTE — COMMUNITY RESOURCES LIST (ENGLISH)
07/28/2022   [facility] - Outpatient Clinics  N/A  For questions about this resource list or additional care needs, please contact your primary care clinic or care manager.  Phone: [phone]   Email: N/A   Address: [address] [place] [id number]   Hours: N/A        Mental Health       Individual counseling  1  [facility]. - [place] Distance: 0.19 miles      COVID-19 Status: Regular Operations, COVID-19 Status: Phone/Virtual   [address] [facility] [address] [place] [address]  Language: English  Hours: Mon - Thu 8:00 AM - 5:00 PM  Fees: Insurance, Self Pay, Sliding Fee   Phone: [phone] Website: [email or web address]     2  [facility] - [place] Distance: 0.38 miles      COVID-19 Status: Regular Operations   [address] [facility] [address] [place] [address]  Language: English  Hours: Mon - Thu 9:00 AM - 8:00 PM , Fri - Sat 9:00 AM - 5:00 PM Appt. Only  Fees: Insurance, Self Pay   Phone: [phone] Ext.1 Website: [email or web address]          Important Numbers & Websites       Emergency Services   911  City Services   311  Poison Control   (721) 789-5424  Suicide Prevention Lifeline   (580) 860-9921 (TALK)  Child Abuse Hotline   (541) 798-9631 (4-A-Child)  Sexual Assault Hotline   (658) 698-1941 (HOPE)  National Runaway Safeline   (450) 929-3589 (RUNAWAY)  All-Options Talkline   (515) 711-9233  Substance Abuse Referral   (576) 669-6088 (HELP)

## 2022-07-29 ENCOUNTER — VIRTUAL VISIT (OUTPATIENT)
Dept: FAMILY MEDICINE | Facility: OTHER | Age: 72
End: 2022-07-29
Attending: FAMILY MEDICINE
Payer: COMMERCIAL

## 2022-07-29 DIAGNOSIS — R63.4 WEIGHT LOSS: ICD-10-CM

## 2022-07-29 DIAGNOSIS — F33.41 MAJOR DEPRESSIVE DISORDER, RECURRENT EPISODE, IN PARTIAL REMISSION (H): ICD-10-CM

## 2022-07-29 DIAGNOSIS — R11.0 NAUSEA: ICD-10-CM

## 2022-07-29 DIAGNOSIS — R63.0 DECREASED APPETITE: Primary | ICD-10-CM

## 2022-07-29 PROCEDURE — 99213 OFFICE O/P EST LOW 20 MIN: CPT | Mod: 95 | Performed by: FAMILY MEDICINE

## 2022-08-03 ENCOUNTER — OFFICE VISIT (OUTPATIENT)
Dept: FAMILY MEDICINE | Facility: OTHER | Age: 72
End: 2022-08-03
Attending: FAMILY MEDICINE
Payer: COMMERCIAL

## 2022-08-03 VITALS
RESPIRATION RATE: 16 BRPM | DIASTOLIC BLOOD PRESSURE: 80 MMHG | OXYGEN SATURATION: 98 % | BODY MASS INDEX: 25.75 KG/M2 | WEIGHT: 150 LBS | TEMPERATURE: 99 F | SYSTOLIC BLOOD PRESSURE: 106 MMHG | HEART RATE: 74 BPM

## 2022-08-03 DIAGNOSIS — Z00.00 ENCOUNTER FOR MEDICARE ANNUAL WELLNESS EXAM: ICD-10-CM

## 2022-08-03 DIAGNOSIS — F33.41 MAJOR DEPRESSIVE DISORDER, RECURRENT EPISODE, IN PARTIAL REMISSION (H): ICD-10-CM

## 2022-08-03 DIAGNOSIS — J30.1 CHRONIC SEASONAL ALLERGIC RHINITIS DUE TO POLLEN: ICD-10-CM

## 2022-08-03 DIAGNOSIS — F33.1 MAJOR DEPRESSIVE DISORDER, RECURRENT EPISODE, MODERATE (H): ICD-10-CM

## 2022-08-03 DIAGNOSIS — F41.1 GAD (GENERALIZED ANXIETY DISORDER): ICD-10-CM

## 2022-08-03 DIAGNOSIS — R63.4 WEIGHT LOSS: ICD-10-CM

## 2022-08-03 DIAGNOSIS — R11.0 NAUSEA: Primary | ICD-10-CM

## 2022-08-03 DIAGNOSIS — R63.0 DECREASED APPETITE: ICD-10-CM

## 2022-08-03 DIAGNOSIS — E78.2 MIXED HYPERLIPIDEMIA: ICD-10-CM

## 2022-08-03 DIAGNOSIS — M85.80 OSTEOPENIA, UNSPECIFIED LOCATION: ICD-10-CM

## 2022-08-03 DIAGNOSIS — Z12.31 VISIT FOR SCREENING MAMMOGRAM: ICD-10-CM

## 2022-08-03 DIAGNOSIS — E03.9 ACQUIRED HYPOTHYROIDISM: ICD-10-CM

## 2022-08-03 DIAGNOSIS — Z78.0 ASYMPTOMATIC MENOPAUSAL STATE: ICD-10-CM

## 2022-08-03 LAB
ALBUMIN SERPL-MCNC: 4.1 G/DL (ref 3.4–5)
ALP SERPL-CCNC: 91 U/L (ref 40–150)
ALT SERPL W P-5'-P-CCNC: 23 U/L (ref 0–50)
ANION GAP SERPL CALCULATED.3IONS-SCNC: 5 MMOL/L (ref 3–14)
AST SERPL W P-5'-P-CCNC: 15 U/L (ref 0–45)
BASOPHILS # BLD AUTO: 0.1 10E3/UL (ref 0–0.2)
BASOPHILS NFR BLD AUTO: 1 %
BILIRUB SERPL-MCNC: 0.3 MG/DL (ref 0.2–1.3)
BUN SERPL-MCNC: 14 MG/DL (ref 7–30)
CALCIUM SERPL-MCNC: 9.6 MG/DL (ref 8.5–10.1)
CHLORIDE BLD-SCNC: 105 MMOL/L (ref 94–109)
CHOLEST SERPL-MCNC: 234 MG/DL
CO2 SERPL-SCNC: 27 MMOL/L (ref 20–32)
CREAT SERPL-MCNC: 0.73 MG/DL (ref 0.52–1.04)
EOSINOPHIL # BLD AUTO: 0.3 10E3/UL (ref 0–0.7)
EOSINOPHIL NFR BLD AUTO: 5 %
ERYTHROCYTE [DISTWIDTH] IN BLOOD BY AUTOMATED COUNT: 12.3 % (ref 10–15)
ERYTHROCYTE [SEDIMENTATION RATE] IN BLOOD BY WESTERGREN METHOD: 25 MM/HR (ref 0–30)
FASTING STATUS PATIENT QL REPORTED: ABNORMAL
GFR SERPL CREATININE-BSD FRML MDRD: 88 ML/MIN/1.73M2
GLUCOSE BLD-MCNC: 88 MG/DL (ref 70–99)
HCT VFR BLD AUTO: 39.8 % (ref 35–47)
HDLC SERPL-MCNC: 59 MG/DL
HGB BLD-MCNC: 13.6 G/DL (ref 11.7–15.7)
IMM GRANULOCYTES # BLD: 0 10E3/UL
IMM GRANULOCYTES NFR BLD: 0 %
LDLC SERPL CALC-MCNC: 149 MG/DL
LIPASE SERPL-CCNC: 150 U/L (ref 73–393)
LYMPHOCYTES # BLD AUTO: 1.9 10E3/UL (ref 0.8–5.3)
LYMPHOCYTES NFR BLD AUTO: 33 %
MCH RBC QN AUTO: 30.4 PG (ref 26.5–33)
MCHC RBC AUTO-ENTMCNC: 34.2 G/DL (ref 31.5–36.5)
MCV RBC AUTO: 89 FL (ref 78–100)
MONOCYTES # BLD AUTO: 0.5 10E3/UL (ref 0–1.3)
MONOCYTES NFR BLD AUTO: 8 %
NEUTROPHILS # BLD AUTO: 3.1 10E3/UL (ref 1.6–8.3)
NEUTROPHILS NFR BLD AUTO: 53 %
NONHDLC SERPL-MCNC: 175 MG/DL
NRBC # BLD AUTO: 0 10E3/UL
NRBC BLD AUTO-RTO: 0 /100
PLATELET # BLD AUTO: 278 10E3/UL (ref 150–450)
POTASSIUM BLD-SCNC: 3.8 MMOL/L (ref 3.4–5.3)
PREALB SERPL IA-MCNC: 35 MG/DL (ref 15–45)
PROT SERPL-MCNC: 8.2 G/DL (ref 6.8–8.8)
RBC # BLD AUTO: 4.48 10E6/UL (ref 3.8–5.2)
SODIUM SERPL-SCNC: 137 MMOL/L (ref 133–144)
TRIGL SERPL-MCNC: 128 MG/DL
TSH SERPL DL<=0.005 MIU/L-ACNC: 0.87 MU/L (ref 0.4–4)
WBC # BLD AUTO: 5.8 10E3/UL (ref 4–11)

## 2022-08-03 PROCEDURE — 84134 ASSAY OF PREALBUMIN: CPT | Mod: ZL | Performed by: FAMILY MEDICINE

## 2022-08-03 PROCEDURE — 83690 ASSAY OF LIPASE: CPT | Mod: ZL | Performed by: FAMILY MEDICINE

## 2022-08-03 PROCEDURE — 36415 COLL VENOUS BLD VENIPUNCTURE: CPT | Mod: ZL | Performed by: FAMILY MEDICINE

## 2022-08-03 PROCEDURE — 82306 VITAMIN D 25 HYDROXY: CPT | Mod: ZL | Performed by: FAMILY MEDICINE

## 2022-08-03 PROCEDURE — 80053 COMPREHEN METABOLIC PANEL: CPT | Mod: ZL | Performed by: FAMILY MEDICINE

## 2022-08-03 PROCEDURE — 85652 RBC SED RATE AUTOMATED: CPT | Mod: ZL | Performed by: FAMILY MEDICINE

## 2022-08-03 PROCEDURE — 84443 ASSAY THYROID STIM HORMONE: CPT | Mod: ZL | Performed by: FAMILY MEDICINE

## 2022-08-03 PROCEDURE — G0439 PPPS, SUBSEQ VISIT: HCPCS | Performed by: FAMILY MEDICINE

## 2022-08-03 PROCEDURE — 85025 COMPLETE CBC W/AUTO DIFF WBC: CPT | Mod: ZL | Performed by: FAMILY MEDICINE

## 2022-08-03 PROCEDURE — 80061 LIPID PANEL: CPT | Mod: ZL | Performed by: FAMILY MEDICINE

## 2022-08-03 RX ORDER — LEVOTHYROXINE SODIUM 50 UG/1
50 TABLET ORAL DAILY
Qty: 90 TABLET | Refills: 3 | Status: SHIPPED | OUTPATIENT
Start: 2022-08-03 | End: 2023-08-08

## 2022-08-03 RX ORDER — TRAZODONE HYDROCHLORIDE 50 MG/1
TABLET, FILM COATED ORAL
Qty: 270 TABLET | Refills: 3 | Status: SHIPPED | OUTPATIENT
Start: 2022-08-03 | End: 2023-07-26

## 2022-08-03 RX ORDER — BUPROPION HYDROCHLORIDE 150 MG/1
TABLET ORAL
Qty: 90 TABLET | Refills: 3 | Status: SHIPPED | OUTPATIENT
Start: 2022-08-03 | End: 2022-08-23

## 2022-08-03 RX ORDER — OXAZEPAM 15 MG/1
CAPSULE ORAL
Qty: 270 CAPSULE | Refills: 3 | Status: SHIPPED | OUTPATIENT
Start: 2022-08-03 | End: 2023-05-05

## 2022-08-03 RX ORDER — FLUTICASONE PROPIONATE 50 MCG
SPRAY, SUSPENSION (ML) NASAL
Qty: 48 G | Refills: 11 | Status: SHIPPED | OUTPATIENT
Start: 2022-08-03 | End: 2023-08-08

## 2022-08-03 ASSESSMENT — ANXIETY QUESTIONNAIRES
2. NOT BEING ABLE TO STOP OR CONTROL WORRYING: NEARLY EVERY DAY
7. FEELING AFRAID AS IF SOMETHING AWFUL MIGHT HAPPEN: SEVERAL DAYS
GAD7 TOTAL SCORE: 17
IF YOU CHECKED OFF ANY PROBLEMS ON THIS QUESTIONNAIRE, HOW DIFFICULT HAVE THESE PROBLEMS MADE IT FOR YOU TO DO YOUR WORK, TAKE CARE OF THINGS AT HOME, OR GET ALONG WITH OTHER PEOPLE: SOMEWHAT DIFFICULT
GAD7 TOTAL SCORE: 17
4. TROUBLE RELAXING: NEARLY EVERY DAY
1. FEELING NERVOUS, ANXIOUS, OR ON EDGE: NEARLY EVERY DAY
3. WORRYING TOO MUCH ABOUT DIFFERENT THINGS: NEARLY EVERY DAY
6. BECOMING EASILY ANNOYED OR IRRITABLE: NEARLY EVERY DAY
5. BEING SO RESTLESS THAT IT IS HARD TO SIT STILL: SEVERAL DAYS

## 2022-08-03 ASSESSMENT — ENCOUNTER SYMPTOMS
SORE THROAT: 0
HEADACHES: 0
FEVER: 0
PARESTHESIAS: 0
CONSTIPATION: 0
SHORTNESS OF BREATH: 0
ABDOMINAL PAIN: 1
DYSURIA: 0
COUGH: 1
BREAST MASS: 0
CHILLS: 0
DIZZINESS: 1
NERVOUS/ANXIOUS: 1
MYALGIAS: 0
DIARRHEA: 0
FREQUENCY: 0
HEARTBURN: 0
ARTHRALGIAS: 0
EYE PAIN: 0
HEMATOCHEZIA: 0
WEAKNESS: 0
JOINT SWELLING: 0
NAUSEA: 0
PALPITATIONS: 0
HEMATURIA: 0

## 2022-08-03 ASSESSMENT — PAIN SCALES - GENERAL: PAINLEVEL: MILD PAIN (2)

## 2022-08-03 ASSESSMENT — PATIENT HEALTH QUESTIONNAIRE - PHQ9: SUM OF ALL RESPONSES TO PHQ QUESTIONS 1-9: 15

## 2022-08-03 ASSESSMENT — ACTIVITIES OF DAILY LIVING (ADL): CURRENT_FUNCTION: NO ASSISTANCE NEEDED

## 2022-08-03 NOTE — PROGRESS NOTES
"SUBJECTIVE:   Mony Szymanski is a 72 year old female who presents for Preventive Visit.    Patient has been advised of split billing requirements and indicates understanding: Yes     Are you in the first 12 months of your Medicare coverage?  No    Healthy Habits:     In general, how would you rate your overall health?  Excellent    Frequency of exercise:  2-3 days/week    Duration of exercise:  15-30 minutes    Do you usually eat at least 4 servings of fruit and vegetables a day, include whole grains    & fiber and avoid regularly eating high fat or \"junk\" foods?  Yes    Taking medications regularly:  Yes    Medication side effects:  Other    Ability to successfully perform activities of daily living:  No assistance needed    Home Safety:  Throw rugs in the hallway    Hearing Impairment:  Need to ask people to speak up or repeat themselves and difficulty understanding soft or whispered speech    In the past 6 months, have you been bothered by leaking of urine? Yes    In general, how would you rate your overall mental or emotional health?  Fair      PHQ-2 Total Score: 1    Additional concerns today:  No     Do you feel safe in your environment? Yes    Have you ever done Advance Care Planning? (For example, a Health Directive, POLST, or a discussion with a medical provider or your loved ones about your wishes): Yes, advance care planning is on file.    Fall risk  Fallen 2 or more times in the past year?: No  Any fall with injury in the past year?: No    Cognitive Screening   1) Repeat 3 items (Leader, Season, Table)    2) Clock draw: NORMAL  3) 3 item recall: Recalls 3 objects  Results: 3 items recalled: COGNITIVE IMPAIRMENT LESS LIKELY    Mini-CogTM Copyright EFE Cantrell. Licensed by the author for use in [facility]; reprinted with permission (reggie@.[facility]). All rights reserved.      Do you have sleep apnea, excessive snoring or daytime drowsiness?: yes    Reviewed and updated as needed this visit by clinical " staff   Tobacco  Allergies    Med Hx  Surg Hx  Fam Hx  Soc Hx          Reviewed and updated as needed this visit by Provider        Surg Hx            Social History     Tobacco Use     Smoking status: Never Smoker     Smokeless tobacco: Never Used   Substance Use Topics     Alcohol use: Yes     Comment: Beer & Wine, socially     If you drink alcohol do you typically have >3 drinks per day or >7 drinks per week? No    Alcohol Use 8/3/2022   Prescreen: >3 drinks/day or >7 drinks/week? No   Prescreen: >3 drinks/day or >7 drinks/week? -     Current providers sharing in care for this patient include:  Patient Care Team:  Kobe Das MD as PCP - General  Kobe Das MD as Assigned PCP  DEANN Leyva MD as Assigned Surgical Provider  Angie Owens MD as Assigned Behavioral Health Provider    The following health maintenance items are reviewed in Epic and correct as of today:  Health Maintenance Due   Topic Date Due     HEPATITIS C SCREENING  Never done     DTAP/TDAP/TD IMMUNIZATION (2 - Td or Tdap) 12/27/2021     COVID-19 Vaccine (4 - Booster for Pfizer series) 01/29/2022     LIPID  08/09/2022     TSH W/FREE T4 REFLEX  08/09/2022       Cancer Screenings:  Colon - 2025 due  Cervical - NA  Breast - Due  Lung - NA  Prostate - NA  Skin - No lesions of concern.     Immunizations:  Tdap - 2011  Zoster - 2014  Influenza - out of season  Prevnar - 2018  Pneumovax - 2019  Covid - Pfizer 1 booster    Cardiovascular:  Fasting glucose/Hgb A1C: pending  Cholesterol:   ASCVD score:     The 10-year ASCVD risk score (Pili SHABBIR Fritz, et al., 2013) is: 7.3%    Values used to calculate the score:      Age: 72 years      Sex: Female      Is Non- : No      Diabetic: No      Tobacco smoker: No      Systolic Blood Pressure: 106 mmHg      Is BP treated: No      HDL Cholesterol: 56 mg/dL      Total Cholesterol: 275 mg/dL    Other:  Osteoporosis - due for dexa  --Vitamin D - pending    Geriatric  "Syndromes:  Continence-  Cognitive Screening: normal mini cog.   Fall Risk: Low  Advanced Care Plan: Has this on file.       Review of Systems   Constitutional: Negative for chills and fever.   HENT: Negative for congestion, ear pain, hearing loss and sore throat.    Eyes: Negative for pain and visual disturbance.   Respiratory: Positive for cough. Negative for shortness of breath.    Cardiovascular: Negative for chest pain, palpitations and peripheral edema.   Gastrointestinal: Positive for abdominal pain. Negative for constipation, diarrhea, heartburn, hematochezia and nausea.   Breasts:  Negative for tenderness, breast mass and discharge.   Genitourinary: Negative for dysuria, frequency, genital sores, hematuria, pelvic pain, urgency, vaginal bleeding and vaginal discharge.   Musculoskeletal: Negative for arthralgias, joint swelling and myalgias.   Skin: Negative for rash.   Neurological: Positive for dizziness. Negative for weakness, headaches and paresthesias.   Psychiatric/Behavioral: Positive for mood changes. The patient is nervous/anxious.        OBJECTIVE:   /80 (BP Location: Left arm, Patient Position: Chair, Cuff Size: Adult Regular)   Pulse 74   Temp 99  F (37.2  C) (Tympanic)   Resp 16   Wt 68 kg (150 lb)   SpO2 98%   BMI 25.75 kg/m   Estimated body mass index is 25.75 kg/m  as calculated from the following:    Height as of 7/5/22: 1.626 m (5' 4\").    Weight as of this encounter: 68 kg (150 lb).  Physical Exam  GENERAL APPEARANCE: healthy, alert and no distress  EYES: Eyes grossly normal to inspection, PERRL and conjunctivae and sclerae normal  HENT: ear canals and TM's normal, nose and mouth without ulcers or lesions, oropharynx clear and oral mucous membranes moist  NECK: no adenopathy, no asymmetry, masses, or scars and thyroid normal to palpation  RESP: lungs clear to auscultation - no rales, rhonchi or wheezes  BREAST: normal without masses, tenderness or nipple discharge and no " palpable axillary masses or adenopathy  CV: regular rate and rhythm, normal S1 S2, no S3 or S4, no murmur, click or rub, no peripheral edema and peripheral pulses strong  ABDOMEN: soft, nontender, no hepatosplenomegaly, no masses and bowel sounds normal  MS: no musculoskeletal defects are noted and gait is age appropriate without ataxia  SKIN: no suspicious lesions or rashes  NEURO: Normal strength and tone, sensory exam grossly normal, mentation intact and speech normal  PSYCH: mentation appears normal and affect normal/bright    Diagnostic Test Results:  Labs reviewed in Epic  Results for orders placed or performed in visit on 08/03/22   Lipid Profile (Chol, Trig, HDL, LDL calc)     Status: Abnormal   Result Value Ref Range    Cholesterol 234 (H) <200 mg/dL    Triglycerides 128 <150 mg/dL    Direct Measure HDL 59 >=50 mg/dL    LDL Cholesterol Calculated 149 (H) <=100 mg/dL    Non HDL Cholesterol 175 (H) <130 mg/dL    Patient Fasting > 8hrs? Unknown     Narrative    Cholesterol  Desirable:  <200 mg/dL    Triglycerides  Normal:  Less than 150 mg/dL  Borderline High:  150-199 mg/dL  High:  200-499 mg/dL  Very High:  Greater than or equal to 500 mg/dL    Direct Measure HDL  Female:  Greater than or equal to 50 mg/dL   Male:  Greater than or equal to 40 mg/dL    LDL Cholesterol  Desirable:  <100mg/dL  Above Desirable:  100-129 mg/dL   Borderline High:  130-159 mg/dL   High:  160-189 mg/dL   Very High:  >= 190 mg/dL    Non HDL Cholesterol  Desirable:  130 mg/dL  Above Desirable:  130-159 mg/dL  Borderline High:  160-189 mg/dL  High:  190-219 mg/dL  Very High:  Greater than or equal to 220 mg/dL   TSH with free T4 reflex     Status: Normal   Result Value Ref Range    TSH 0.87 0.40 - 4.00 mU/L   Prealbumin     Status: Normal   Result Value Ref Range    Prealbumin 35 15 - 45 mg/dL   Comprehensive metabolic panel (BMP + Alb, Alk Phos, ALT, AST, Total. Bili, TP)     Status: Normal   Result Value Ref Range    Sodium 137 133 -  144 mmol/L    Potassium 3.8 3.4 - 5.3 mmol/L    Chloride 105 94 - 109 mmol/L    Carbon Dioxide (CO2) 27 20 - 32 mmol/L    Anion Gap 5 3 - 14 mmol/L    Urea Nitrogen 14 7 - 30 mg/dL    Creatinine 0.73 0.52 - 1.04 mg/dL    Calcium 9.6 8.5 - 10.1 mg/dL    Glucose 88 70 - 99 mg/dL    Alkaline Phosphatase 91 40 - 150 U/L    AST 15 0 - 45 U/L    ALT 23 0 - 50 U/L    Protein Total 8.2 6.8 - 8.8 g/dL    Albumin 4.1 3.4 - 5.0 g/dL    Bilirubin Total 0.3 0.2 - 1.3 mg/dL    GFR Estimate 88 >60 mL/min/1.73m2   Lipase     Status: Normal   Result Value Ref Range    Lipase 150 73 - 393 U/L   ESR: Erythrocyte sedimentation rate     Status: Normal   Result Value Ref Range    Erythrocyte Sedimentation Rate 25 0 - 30 mm/hr   CBC with platelets and differential     Status: None   Result Value Ref Range    WBC Count 5.8 4.0 - 11.0 10e3/uL    RBC Count 4.48 3.80 - 5.20 10e6/uL    Hemoglobin 13.6 11.7 - 15.7 g/dL    Hematocrit 39.8 35.0 - 47.0 %    MCV 89 78 - 100 fL    MCH 30.4 26.5 - 33.0 pg    MCHC 34.2 31.5 - 36.5 g/dL    RDW 12.3 10.0 - 15.0 %    Platelet Count 278 150 - 450 10e3/uL    % Neutrophils 53 %    % Lymphocytes 33 %    % Monocytes 8 %    % Eosinophils 5 %    % Basophils 1 %    % Immature Granulocytes 0 %    NRBCs per 100 WBC 0 <1 /100    Absolute Neutrophils 3.1 1.6 - 8.3 10e3/uL    Absolute Lymphocytes 1.9 0.8 - 5.3 10e3/uL    Absolute Monocytes 0.5 0.0 - 1.3 10e3/uL    Absolute Eosinophils 0.3 0.0 - 0.7 10e3/uL    Absolute Basophils 0.1 0.0 - 0.2 10e3/uL    Absolute Immature Granulocytes 0.0 <=0.4 10e3/uL    Absolute NRBCs 0.0 10e3/uL   CBC with platelets and differential     Status: None    Narrative    The following orders were created for panel order CBC with platelets and differential.  Procedure                               Abnormality         Status                     ---------                               -----------         ------                     CBC with platelets and d...[[id number]]                       "Final result                 Please view results for these tests on the individual orders.       ASSESSMENT / PLAN:   Mony was seen today for physical.    Diagnoses and all orders for this visit:    Encounter for Medicare annual wellness exam  See HCM above    Nausea / Weight loss / Decreased appetite  See notes from virtual visit last week, labs today per that note. CT and and endoscopy planned.      Acquired hypothyroidism  -     levothyroxine (SYNTHROID/LEVOTHROID) 50 MCG tablet; Take 1 tablet (50 mcg) by mouth daily    Mixed hyperlipidemia  -     Lipid Profile (Chol, Trig, HDL, LDL calc); Future  -     Lipid Profile (Chol, Trig, HDL, LDL calc)    Osteopenia, unspecified location  -     DX Hip/Pelvis/Spine; Future  -     Vitamin D Deficiency; Future    Visit for screening mammogram  -     MA Screen Bilateral w/Harjit; Future    Asymptomatic menopausal state   -     DX Hip/Pelvis/Spine; Future    Major depressive disorder, recurrent episode, moderate (H) / Major depressive disorder, recurrent episode, in partial remission (H)  -     buPROPion (WELLBUTRIN XL) 150 MG 24 hr tablet; TAKE 1 TABLET BY MOUTH ONCE DAILY IN THE MORNING    Chronic seasonal allergic rhinitis due to pollen  -     fluticasone (FLONASE) 50 MCG/ACT nasal spray; Use 2 spray(s) in each nostril once daily    ED (generalized anxiety disorder)  -     oxazepam (SERAX) 15 MG capsule; TAKE 1 CAPSULE BY MOUTH THREE TIMES DAILY  -     traZODone (DESYREL) 50 MG tablet; TAKE 1 TO 3 TABLETS BY MOUTH AT BEDTIME    Patient has been advised of split billing requirements and indicates understanding: Yes    COUNSELING:  Reviewed preventive health counseling, as reflected in patient instructions    Estimated body mass index is 25.75 kg/m  as calculated from the following:    Height as of 7/5/22: 1.626 m (5' 4\").    Weight as of this encounter: 68 kg (150 lb).      She reports that she has never smoked. She has never used smokeless tobacco.      Appropriate " preventive services were discussed with this patient, including applicable screening as appropriate for cardiovascular disease, diabetes, osteopenia/osteoporosis, and glaucoma.  As appropriate for age/gender, discussed screening for colorectal cancer, prostate cancer, breast cancer, and cervical cancer. Checklist reviewing preventive services available has been given to the patient.    Reviewed patients plan of care and provided an AVS. The Basic Care Plan (routine screening as documented in Health Maintenance) for Mony meets the Care Plan requirement. This Care Plan has been established and reviewed with the Patient.    Counseling Resources:  ATP IV Guidelines  Pooled Cohorts Equation Calculator  Breast Cancer Risk Calculator  Breast Cancer: Medication to Reduce Risk  FRAX Risk Assessment  ICSI Preventive Guidelines  Dietary Guidelines for Americans, 2010  Meedor's MyPlate  ASA Prophylaxis  Lung CA Screening    Ivon Simon MD  [facility]    Identified Health Risks:    The patient was provided with written information regarding signs of hearing loss.  Information on urinary incontinence and treatment options given to patient.  The patient was provided with suggestions to help her develop a healthy emotional lifestyle.  The patient s PHQ-9 score is consistent with moderate depression. She was provided with information regarding depression and was advised to schedule a follow up appointment in 6 weeks to further address this issue.

## 2022-08-03 NOTE — NURSING NOTE
"Chief Complaint   Patient presents with     Physical       Initial /80 (BP Location: Left arm, Patient Position: Chair, Cuff Size: Adult Regular)   Pulse 74   Temp 99  F (37.2  C) (Tympanic)   Resp 16   Wt 68 kg (150 lb)   SpO2 98%   BMI 25.75 kg/m   Estimated body mass index is 25.75 kg/m  as calculated from the following:    Height as of 7/5/22: 1.626 m (5' 4\").    Weight as of this encounter: 68 kg (150 lb).  Medication Reconciliation: complete  Telma Schulz    "

## 2022-08-03 NOTE — COMMUNITY RESOURCES LIST (ENGLISH)
08/03/2022   [facility] - Outpatient Clinics  N/A  For questions about this resource list or additional care needs, please contact your primary care clinic or care manager.  Phone: [phone]   Email: N/A   Address: [address] [place] [id number]   Hours: N/A        Mental Health       Individual counseling  1  [facility], [facility]. - [place] Distance: 0.19 miles      COVID-19 Status: Regular Operations, COVID-19 Status: Phone/Virtual   [address] [facility] [address] [place] [address]  Language: English  Hours: Mon - Thu 8:00 AM - 5:00 PM  Fees: Insurance, Self Pay, Sliding Fee   Phone: [phone] Website: [email or web address]     2  [facility] - [place] Distance: 0.38 miles      COVID-19 Status: Regular Operations   [address] [facility] [address] [place] [address]  Language: English  Hours: Mon - Thu 9:00 AM - 8:00 PM , Fri - Sat 9:00 AM - 5:00 PM Appt. Only  Fees: Insurance, Self Pay   Phone: [phone] Ext.1 Website: [email or web address]          Important Numbers & Websites       Emergency Services   911  City Services   311  Poison Control   (973) 376-9631  Suicide Prevention Lifeline   (170) 876-5589 (TALK)  Child Abuse Hotline   (913) 908-4308 (4-A-Child)  Sexual Assault Hotline   (481) 749-5842 (HOPE)  National Runaway Safeline   (619) 578-4956 (RUNAWAY)  All-Options Talkline   (897) 631-8389  Substance Abuse Referral   (156) 901-3854 (HELP)

## 2022-08-03 NOTE — PATIENT INSTRUCTIONS
Patient Education   Personalized Prevention Plan  You are due for the preventive services outlined below.  Your care team is available to assist you in scheduling these services.  If you have already completed any of these items, please share that information with your care team to update in your medical record.  Health Maintenance Due   Topic Date Due     Hepatitis C Screening  Never done     Diptheria Tetanus Pertussis (DTAP/TDAP/TD) Vaccine (2 - Td or Tdap) 12/27/2021     COVID-19 Vaccine (4 - Booster for Pfizer series) 01/29/2022     Cholesterol Lab  08/09/2022     Thyroid Function Lab  08/09/2022       Signs of Hearing Loss      Hearing much better with one ear can be a sign of hearing loss.   Hearing loss is a problem shared by many people. In fact, it is one of the most common health problems, particularly as people age. Most people age 65 and older have some hearing loss. By age 80, almost everyone does. Hearing loss often occurs slowly over the years. So you may not realize your hearing has gotten worse.  Have your hearing checked  Call your healthcare provider if you:    Have to strain to hear normal conversation    Have to watch other people s faces very carefully to follow what they re saying    Need to ask people to repeat what they ve said    Often misunderstand what people are saying    Turn the volume of the television or radio up so high that others complain    Feel that people are mumbling when they re talking to you    Find that the effort to hear leaves you feeling tired and irritated    Notice, when using the phone, that you hear better with one ear than the other  [employer] last reviewed this educational content on 1/1/2020 2000-2021 [employer]. All rights reserved. This information is not intended as a substitute for professional medical care. Always follow your healthcare professional's instructions.          Urinary Incontinence, Female (Adult)   Urinary incontinence means  loss of bladder control. This problem affects many women, especially as they get older. If you have incontinence, you may be embarrassed to ask for help. But know that this problem can be treated.   Types of Incontinence  There are different types of incontinence. Two of the main types are described here. You can have more than one type.     Stress incontinence. With this type, urine leaks when pressure (stress) is put on the bladder. This may happen when you cough, sneeze, or laugh. Stress incontinence most often occurs because the pelvic floor muscles that support the bladder and urethra are weak. This can happen after pregnancy and vaginal childbirth or a hysterectomy. It can also be due to excess body weight or hormone changes.    Urge incontinence (also called overactive bladder). With this type, a sudden urge to urinate is felt often. This may happen even though there may not be much urine in the bladder. The need to urinate often during the night is common. Urge incontinence most often occurs because of bladder spasms. This may be due to bladder irritation or infection. Damage to bladder nerves or pelvic muscles, constipation, and certain medicines can also lead to urge incontinence.  Treatment depends on the cause. Further evaluation is needed to find the type you have. This will likely include an exam and certain tests. Based on the results, you and your healthcare provider can then plan treatment. Until a diagnosis is made, the home care tips below can help ease symptoms.   Home care    Do pelvic floor muscle exercises, if they are prescribed. The pelvic floor muscles help support the bladder and urethra. Many women find that their symptoms improve when doing special exercises that strengthen these muscles. To do the exercises, contract the muscles you would use to stop your stream of urine. But do this when you re not urinating. Hold for 10 seconds, then relax. Repeat 10 to 20 times in a row, at least 3  times a day. Your healthcare provider may give you other instructions for how to do the exercises and how often.    Keep a bladder diary. This helps track how often and how much you urinate over a set period of time. Bring this diary with you to your next visit with the provider. The information can help your provider learn more about your bladder problem.    Lose weight, if advised to by your provider. Extra weight puts pressure on the bladder. Your provider can help you create a weight-loss plan that s right for you. This may include exercising more and making certain diet changes.    Don't have foods and drinks that may irritate the bladder. These can include alcohol and caffeinated drinks.    Quit smoking. Smoking and other tobacco use can lead to a long-term (chronic) cough that strains the pelvic floor muscles. Smoking may also damage the bladder and urethra. Talk with your provider about treatments or methods you can use to quit smoking.    If drinking large amounts of fluid makes you have symptoms, you may be advised to limit your fluid intake. You may also be advised to drink most of your fluids during the day and to limit fluids at night.    If you re worried about urine leakage or accidents, you may wear absorbent pads to catch urine. Change the pads often. This helps reduce discomfort. It may also reduce the risk of skin or bladder infections.    Follow-up care  Follow up with your healthcare provider, or as directed. It may take some to find the right treatment for your problem. But healthy lifestyle changes can be made right away. These include such things as exercising on a regular basis, eating a healthy diet, losing weight (if needed), and quitting smoking. Your treatment plan may include special therapies or medicines. Certain procedures or surgery may also be options. Talk about any questions you have with your provider.   When to seek medical advice  Call the healthcare provider right away if any  of these occur:    Fever of 100.4 F (38 C) or higher, or as directed by your provider    Bladder pain or fullness    Belly swelling    Nausea or vomiting    Back pain    Weakness, dizziness, or fainting  [employer] last reviewed this educational content on 1/1/2020 2000-2021 [employer]. All rights reserved. This information is not intended as a substitute for professional medical care. Always follow your healthcare professional's instructions.        Your Health Risk Assessment indicates you feel you are not in good emotional health.    Recreation   Recreation is not limited to sports and team events. It includes any activity that provides relaxation, interest, enjoyment, and exercise. Recreation provides an outlet for physical, mental, and social energy. It can give a sense of worth and achievement. It can help you stay healthy.    Mental Exercise and Social Involvement  Mental and emotional health is as important as physical health. Keep in touch with friends and family. Stay as active as possible. Continue to learn and challenge yourself.   Things you can do to stay mentally active are:    Learn something new, like a foreign language or musical instrument.     Play SCRABBLE or do crossword puzzles. If you cannot find people to play these games with you at home, you can play them with others on your computer through the Internet.     Join a games club--anything from card games to chess or checkers or lawn bowling.     Start a new hobby.     Go back to school.     Volunteer.     Read.   Keep up with world events.    Depression and Suicide in Older Adults    Nearly 2 million older Americans have some type of depression. Some of them even take their own lives. Yet depression among older adults is often ignored. Learn the warning signs. You may help spare a loved one needless pain. You may also save a life.   What is depression?  Depression is a common and serious illness that affects the way you think  "and feel. It is not a normal part of aging, nor is it a sign of weakness, a character flaw, or something you can snap out of. Most people with depression need treatment to get better. The most common symptom is a feeling of deep sadness. People who are depressed also may seem tired and listless. And nothing seems to give them pleasure. It s normal to grieve or be sad sometimes. But sadness lessens or passes with time. Depression rarely goes away or improves on its own. A person with clinical depression can't \"snap out of it.\" Other symptoms of depression are:     Sleeping more or less than normal    Eating more or less than normal    Having headaches, stomachaches, or other pains that don t go away    Feeling nervous,  empty,  or worthless    Crying a great deal    Thinking or talking about suicide or death    Loss of interest in activities previously enjoyed    Social isolation    Feeling confused or forgetful  What causes it?  The causes of depression aren t fully known. But it is thought to result from a complex blend of these factors:     Biochemistry. Certain chemicals in the brain play a role.    Genes. Depression does run in families.    Life stress. Life stresses can also trigger depression in some people. Older adults often face many stressors, such as death of friends or a spouse, health problems, and financial concerns.    Chronic conditions. This includes conditions such as diabetes, heart disease, or cancer. These can cause symptoms of depression. Medicine side effects can cause changes in thoughts and behaviors.  How you can help  Often, depressed people may not want to ask for help. When they do, they may be ignored. Or, they may receive the wrong treatment. You can help by showing parents and older friends love and support. If they seem depressed, don t lecture the person, ignore the symptoms, or discount the symptoms as a  normal  part of aging -which they are not. Get involved, listen, and show " interest and support.   Help them understand that depression is a treatable illness. Tell them you can help them find the right treatment. Offer to go to their healthcare provider's appointment with them for support when the symptoms are discussed. With their approval, contact a local mental health center, social service agency, or hospital about services.   You can be an advocate for him or her at healthcare appointments. Many older adults have chronic illnesses that can cause symptoms of depression. Medicine side effects can change thoughts and behaviors. You can help make sure that the healthcare provider looks at all of these factors. He or she should refer your family member or friend to a mental healthcare provider when needed. in some cases, untreated depression can lead to a misdiagnosis. A person may be diagnosed with a brain disorder such as dementia. If the healthcare provider does not take the issue of depression seriously, help your family member or friend to find another provider.   Don't be afraid to ask  If you think an older person you care about could be suicidal, ask,  Have you thought about suicide?  Most people will tell you the truth. If they say  yes,  they may already have a plan for how and when they will attempt it. Find out as much as you can. The more detailed the plan, and the easier it is to carry out, the more danger the person is in right now. Tell the person you are there for them and do not want them to harm him or herself. Don't wait to get help for the person. Call the person's healthcare provider, local hospital, or emergency services.   To learn more    National Suicide Prevention Lifeline (crisis hotline) 044-051-GBUS (214-601-2659)    National [place] of Mental Qwmwuu102-786-3586avg.nimh.nih.gov    National [place] on Mental Uwowycb962-384-6022tcc.zan.org    Mental Health Fyudqzh515-957-9136hcy.nmha.org    National Suicide Xxkkeky734-PCHNYII (833-578-2989)    Call  911  Never leave the person alone. A person who is actively suicidal needs psychiatric care right away. They will need constant supervision. Never leave the person out of sight. Call 911 or the national 24-hour suicide crisis hotline at 670-702-WBUW (396-563-3193). You can also take the person to the closest emergency room.   [employer] last reviewed this educational content on 5/1/2020 2000-2021 [employer]. All rights reserved. This information is not intended as a substitute for professional medical care. Always follow your healthcare professional's instructions.

## 2022-08-05 ENCOUNTER — HOSPITAL ENCOUNTER (OUTPATIENT)
Dept: CT IMAGING | Facility: HOSPITAL | Age: 72
Discharge: HOME | End: 2022-08-05
Attending: FAMILY MEDICINE | Admitting: FAMILY MEDICINE
Payer: MEDICARE

## 2022-08-05 DIAGNOSIS — R11.0 NAUSEA: ICD-10-CM

## 2022-08-05 LAB — DEPRECATED CALCIDIOL+CALCIFEROL SERPL-MC: 95 UG/L (ref 20–75)

## 2022-08-05 PROCEDURE — 250N000011 HC RX IP 250 OP 636: Performed by: RADIOLOGY

## 2022-08-05 PROCEDURE — G1010 CDSM STANSON: HCPCS

## 2022-08-05 RX ORDER — IOPAMIDOL 755 MG/ML
74 INJECTION, SOLUTION INTRAVASCULAR ONCE
Status: COMPLETED | OUTPATIENT
Start: 2022-08-05 | End: 2022-08-05

## 2022-08-05 RX ORDER — IOPAMIDOL 755 MG/ML
17 INJECTION, SOLUTION INTRAVASCULAR ONCE
Status: COMPLETED | OUTPATIENT
Start: 2022-08-05 | End: 2022-08-05

## 2022-08-05 RX ADMIN — IOPAMIDOL 74 ML: 755 INJECTION, SOLUTION INTRAVENOUS at 09:25

## 2022-08-05 RX ADMIN — IOPAMIDOL 17 ML: 755 INJECTION, SOLUTION INTRAVENOUS at 09:24

## 2022-08-10 ENCOUNTER — PREP FOR PROCEDURE (OUTPATIENT)
Dept: SURGERY | Facility: OTHER | Age: 72
End: 2022-08-10

## 2022-08-10 ENCOUNTER — OFFICE VISIT (OUTPATIENT)
Dept: SURGERY | Facility: OTHER | Age: 72
End: 2022-08-10
Attending: FAMILY MEDICINE
Payer: COMMERCIAL

## 2022-08-10 ENCOUNTER — HOSPITAL ENCOUNTER (OUTPATIENT)
Facility: HOSPITAL | Age: 72
End: 2022-08-10
Attending: SURGERY | Admitting: SURGERY
Payer: MEDICARE

## 2022-08-10 VITALS
HEART RATE: 90 BPM | TEMPERATURE: 98.5 F | BODY MASS INDEX: 24.41 KG/M2 | HEIGHT: 64 IN | OXYGEN SATURATION: 98 % | SYSTOLIC BLOOD PRESSURE: 130 MMHG | DIASTOLIC BLOOD PRESSURE: 86 MMHG | WEIGHT: 143 LBS

## 2022-08-10 DIAGNOSIS — R11.0 NAUSEA: ICD-10-CM

## 2022-08-10 DIAGNOSIS — R63.4 WEIGHT LOSS: ICD-10-CM

## 2022-08-10 DIAGNOSIS — R11.0 NAUSEA: Primary | ICD-10-CM

## 2022-08-10 PROCEDURE — 99203 OFFICE O/P NEW LOW 30 MIN: CPT | Performed by: SURGERY

## 2022-08-10 PROCEDURE — G0463 HOSPITAL OUTPT CLINIC VISIT: HCPCS

## 2022-08-10 ASSESSMENT — PAIN SCALES - GENERAL: PAINLEVEL: MILD PAIN (3)

## 2022-08-10 NOTE — PROGRESS NOTES
[facility] Surgery Consultation    CC:  Bloating and abdominal pain     HPI:  This 72 year old year old female is seen at the request of Dr. Simon for evaluation of bloating and abdominal pain. She reports that she has been having symptoms for almost two years though seems to becoming more pronounced in the last few months. She will have upper abdominal fullness with early satiety. She will as times have lower abdominal fullness and pain. It does not seem to be associated with eating. She denies nausea and vomiting. It does not feel like when she was admitted to hospital in 2018. She has had her appendix removed. She denies bleeding with bowel movements. She admits to losing several pounds over the last few weeks.     Past Medical History:   Diagnosis Date     Attention deficit disorder of childhood without me 12/22/2010     Chronic fatigue syndrome 12/22/2010     FH: colon cancer      Generalized anxiety disorder 12/22/2010     Hx SBO      Hyperlipidemia 12/27/2011     Hypothyroidism 12/27/2011     Major depressive disorder, recurrent episode, unspecified 4/27/2011     Obsessive-compulsive disorders 12/22/2010     Small bowel obstruction due to adhesions (H) 8/21/2018       Past Surgical History:   Procedure Laterality Date     APPENDECTOMY       Appendicitis  1963    appendectomy     BIOPSY BREAST Right 1993    benign     breast calcification      biopsy; RT     colonoscopy  2004    repeat 2014     COLONOSCOPY  6-3-2010    Repeat 2014-15     COLONOSCOPY N/A 7/20/2015    repeat in 2020//Procedure: COLONOSCOPY;  Surgeon: Cassie Snell MD;  Location: [facility] OR     COLONOSCOPY N/A 8/20/2020    Procedure: COLONOSCOPY, with polypectomy;  Surgeon: Rakan Robles MD;  Location: [facility] OR     EXCISE LESION CHEEK Left 10/14/2019    Procedure: wide local excision of left check melanoma;  Surgeon: Kai Gomez MD;  Location: [facility] OR     EXCISE LESION FACE WITH FLAP PEDICLE Left 10/21/2019    Procedure: Cervicofacial  "Flap for Closure of Left Cheek Defect;  Surgeon: Breanna West MD;  Location: [facility] OR     EYE SURGERY       lazy eye  1956    surgically repaired; LT     NASAL/SINUS POLYPECTOMY       wisdom teeth extracted  1988       Allergies   Allergen Reactions     Codeine Nausea and Vomiting     Okay with other narcotics       Current Outpatient Medications   Medication     buPROPion (WELLBUTRIN XL) 150 MG 24 hr tablet     Calcium Carbonate (TUMS 500 OR)     Cholecalciferol (VITAMIN D) 1000 UNITS capsule     fish oil-omega-3 fatty acids 1000 MG capsule     fluticasone (FLONASE) 50 MCG/ACT nasal spray     glucosamine 500 MG CAPS     levothyroxine (SYNTHROID/LEVOTHROID) 50 MCG tablet     MAGNESIUM OXIDE PO     Multiple vitamin TABS     oxazepam (SERAX) 15 MG capsule     ranitidine (ZANTAC) 150 MG tablet     traZODone (DESYREL) 50 MG tablet     No current facility-administered medications for this visit.       HABITS:    Social History     Tobacco Use     Smoking status: Never Smoker     Smokeless tobacco: Never Used   Substance Use Topics     Alcohol use: Yes     Comment: Beer & Wine, socially     Drug use: Never       Family History   Problem Relation Age of Onset     Cancer Maternal Grandmother         Colon     Cancer Paternal Grandmother         Lung     Diabetes Paternal Grandfather         Type 2     Other - See Comments Father         TIA's       REVIEW OF SYSTEMS:  Ten point review of systems negative except those mentioned in the HPI.     OBJECTIVE:    /86 (BP Location: Left arm, Patient Position: Sitting, Cuff Size: Adult Regular)   Pulse 90   Temp 98.5  F (36.9  C) (Tympanic)   Ht 1.626 m (5' 4\")   Wt 64.9 kg (143 lb)   SpO2 98%   BMI 24.55 kg/m      GENERAL: Generally appears well, in no distress with appropriate affect.  HEENT:   Sclerae anicteric - normocephalic atraumatic   No exam performed      IMPRESSION:    72 y.o. female with months of abdominal fullness, bloating and occasional pain. Recently " started on H2 blocker. She has been admitted for parital bowel obstruction in the past(2018) we reviewed most recent CT scan. Did discuss there is a broad differential for this including PUD, gastroparesis, IBS, Gallbladder disease, as well as mental health issues. Will begin evaluation looking for surgical issues including ultrasound of gallbladder as well as an upper endoscopy.      PLAN:    See above.     Rakan Robles MD,     8/10/2022  1:43 PM

## 2022-08-10 NOTE — PATIENT INSTRUCTIONS
Thank you for allowing our surgical team to participate in your care. Please review the following instructions to prepare for your upcoming Upper Endoscopy. You may call any of the numbers listed below with questions you may have.  [facility] Health Unit Coordinator: [phone]  Clinic Surgery Nurse ([facility]): [phone]  Surgery Education Nurse: [phone]    Date of Procedure: 9/9/22 with Dr. Robles  Admit time: Hospital surgery will call you the day before your procedure by 5pm with your admit time. If your surgery is on Monday, please expect a call on Friday. If we were unable to reach you by 5pm, you may call  [phone] for your arrival time.    COVID-19 test is needed prior to procedure, even if you've been vaccinated.  If you are going home on the same day as your procedure (surgery):   1-2 days before your procedure, take an at-home, rapid antigen test. You can buy these at many stores. Or you can order free, at-home tests at icanbuyid.gov/tests. If you can't find an at-home antigen test, please schedule a PCR test with [facility] by calling [phone], or visiting [email or web address]. We can't accept tests that are more than 4 days old.   If your test is NEGATIVE, please take a photo of the test. Show the photo to the nurse when you come in for your procedure (surgery)  If your test is POSITIVE, please contact the Pre-Admission office at [phone]. If you are calling after 5 PM on weekdays, and on the weekend, please call [phone] and ask to speak with the House Supervisor.  If you are staying overnight in the hospital you will need to get a PCR covid test 2-4 days prior to procedure (surgery).     Please call the Surgery Education Nurses 1 week prior to your surgery date at [phone] for further instructions. Have your medication/allergy lists ready.    Do not take Aspirin or NSAIDs (Ibuprofen, Motrin, Aleve, Celebrex, Naproxen) vitamins/supplements 7 days before your procedure.   If you are  on blood thinners or insulin, please call your primary care provider for instruction. If you are prescribed a daily 81 mg Aspirin, you may continue.    Please call the clinic surgery nurse or your regular doctor if you get sick within 2 weeks of the procedure. (vomiting, diarrhea, fever, cough, cold or any other symptoms of illness)    Do not eat any solid foods or milk products after 10pm the night prior to surgery.   You may have clear liquids only up until 4 hours prior to surgery.   Please see the table below for a list of clear liquids.     You will need a responsible adult available to drive you home and stay with you for at least 4 hours after you leave the hospital. You will not be allowed to drive yourself. If you need to take a taxi or the bus you must have a responsible person to ride with you (not the taxi/). Your procedure will be cancelled if you do not bring a responsible adult.    Questions or concerns can be directed to the clinic or surgery education nurse at any of the numbers listed above. If you have a scheduling or appointment question, please call the Health Unit Coordinator between 8am and 4pm Monday through Friday. After hours or on weekends, please call [phone] to postpone.       Clear Liquid Diet  You may have: Do NOT have:     ? Tea, coffee (no cream)   Milk or milk products such as ice cream, malts or shakes     ? Water, Vitamin Water, Smart Water, Coconut Water, PowerAde, Propel, Soda pop, (Sprite, 7 UP, Ginger Ale, Gatorade (not red or purple)   Red or purple drinks of any kind such as  Cranberry juice or grape juice.     ? Clear nutrition drinks (Resource Breeze, Ensure Active protein drink (peach flavor)   Red or purple Jell-O, Popsicles, Mervin-Aid, Sorbet and candy.     ? Jell-O, Popsicles (no milk or fruit pieces) or Italian ice (not red or purple)   Juices with pulp such as orange, grapefruit, pineapple or tomato juice               Honey, Sugar   Cream soups of any kind      ? Fat-free soup broth or bouillon   Alcohol     ? Plain hard candy, such as clear Life Savers (not red or purple)      ? Powdered Lemonade such as Crystal Light, Country Time      ? Clear juices and fruit-flavored drinks such as apple juice, white grape juice, Hi-C and Mervin-Aid (not red or purple)          SURGERY HANDBOOK            Your surgery is scheduled:    Date: 9/9/22  ________________________________    Time: hospital surgery will call the day prior to your procedure with arrival time  ________________________________      Surgeon's Name:   _______________________        Pre-Op Physical Fax Numbers:          Pre-Admissions  [phone]        Your surgery is located at:  [place] [address]         Before Your Surgery  For Patients and Visitors at [facility]    Welcome  As you get ready for surgery, you may have a lot of questions.  This brochure will help you know what to expect before and after surgery.  You and your family are the most important members of your health care team.  You will need to take an active role in your care.    Be sure to ask questions and learn all that you can about your surgery.  If you have any safety concerns, we urge you to tell a nurse as soon as possible.   This brochure is for information only.  It does not replace the advice of your doctor.  Always follow your doctor's advice.    If you or your  are deaf or hard of hearing, or prefer a language other than English, please let us know.  We have many free services, including interpreters and other aids to help you communicate. You may ask for help  through any member of your care team or by calling Language Services at [phone], option 2.    GETTING READY FOR SURGERY  Always follow your surgeon's instructions.  If you don't, your surgery could be canceled.  Please use the following checklist.  You have been scheduled for surgery and we would like  to give you some information that will assist in helping get the best possible outcome.      Before Surgery:   If for any reason you decide not to have the surgery, please contact your surgeon's office.  We can easily cancel or reschedule the procedure. If it is after hours, please call [phone].    Please keep in mind that the time of surgery is subject to change.  Make sure you have nothing to eat after midnight. If your surgery is later in the afternoon, this recommendation might change, but not until the day before surgery after the actual time of the surgery has been established.    Within 30 Days of Surgery: not needed  Have a pre-surgery physical exam with your family doctor or partner.  If you use a [facility], all of your information from the pre-op physical will be in the Epic computer system.  Ask the doctor to send all of your results to the hospital before the surgery.  The doctor also may ask you to bring the results with you on the day of surgery or you can fax them to [phone].    Tell the doctor if:  You are allergic to latex or rubber  (Latex and rubber gloves are often used in medical care).  You are taking any medicines (including aspirin), vitamins (Vitamin E, Fish Oil, Omegas) or herbal products.  You will need to stop taking some medicines before surgery.  You have any medical problems (allergies, diabetes or heart disease, for example).  You have a pacemaker or an AICD (automatic implanted cardiac defibrillator).  If you do, please bring the ID card with you on the day of surgery.  You are a smoker.  People who smoke have a higher risk of infection after surgery.  Ask your doctor how you can quit smoking.    Within 7 days of Surgery:  Prior to your surgical procedure, a nurse will be contacting you to obtain a health history. A nurse will call you within a few days of surgery to go over these and other instructions.  If you do not hear from them, please call them at  [phone].      Call your insurance company.  Ask if you need pre-approval for your surgery.  If you do not have insurance, please let us know.  Arrange for someone to drive you home after surgery.  If you will have same-day surgery, you may not drive or take public transportation home by yourself.  Arrange for someone to stay with you for 24 hours after you go home.  This person must be a responsible adult (ie- Family member or friend).    The Day Before Surgery:   Call your surgeon if there are any changes in your health.  This includes signs of a cold or flu (such as a sore throat, runny nose, cough, rash or fever).  Do not smoke, drink alcohol or take over-the-counter medicine (unless your surgeon tells you to) for 24 hours before and after surgery.  If you take prescribed drugs:  You may need to stop them until after the surgery.  Follow your doctor's orders.  You may resume Aspirin and/or blood thinners after your surgery as directed by your physician/surgeon.  NO SOLID FOOD. CLEAR LIQUIDS ONLY.  Follow your surgeon's orders for eating and drinking.  You need to have an empty stomach before surgery.  This will make the surgery as safe as possible.  If you don't follow your doctor's orders, your surgery could be changed to another date.    The Day of Surgery:  Please remove deodorant, cologne, scented lotion, makeup, nail polish and jewelry (including rings and body piercings).  If you wear artificial nails, please remove at least one nail before coming to the hospital.  Wear clean, loose clothing to the hospital.  Bring these items to the hospital:  Your insurance card.  A list of all the medicines you take.  Include vitamins, minerals, herbs and over-the-counter drugs.  Note any drug allergies.  A copy of your advance health care directive, if you have one.  This tells us what treatment you would want -- and who would make health care decisions -- if you could no longer speak for yourself.  You may request  this form in advance or download it from [email or web address].  A case for any glasses, contact lenses, hearing aids or dentures.  Your inhaler or CPAP machine, if you use these at home.  Leave extra cash, jewelry and other valuables at home.    When You Arrive:  When you get to the hospital, you will:  Check in.  If you are under age 18, you must be with a parent or legal guardian.  Electronically sign consent forms, if you haven't already.  These electronic forms state that you know the risks and benefits of surgery.  When you sign the forms, you give us permission to do the surgery.  Do not sign them unless you understand what will happen during and after your surgery.  If you have any questions about your surgery, ask to speak with your doctor before you sign the forms.  If you don't understand the answers, ask again.  Receive a copy of the Patients Bill of Rights.  If you do not receive a copy, please ask for one.  Change into hospital clothes.  Your belongings will be placed in a bag.  We will return them to you after surgery.  Meet with the anesthesia provider.  He or she will tell you what kind of anesthesia (medicine) will be used to keep you comfortable during surgery.  Remember: It's okay to remind doctors and nurses to wash their hands before touching you.   In most cases, your surgeon will use a marker to write his or her initials on the surgery site.  This ensures that the exact site is operated on.  For safety reasons, we will ask you the same questions many times.  For example, we may ask your name and birth date over and over again.  Friends and family can stay with you until it's time for surgery.  While you're in surgery, they will be in the waiting area.  Please note that cell phones are not allowed in some patient care areas.  If you have questions about what will happen in the operating room, talk to your care team.    After Surgery:  We will move you to a recovery room where we will watch  you closely.  If you have any pain or discomfort, tell your nurse.  He or she will try to make you comfortable.    If you are staying overnight we will move you to your hospital room after you are awake.  If you are going home we will move you to another room.  Friends and family may be able to join you.  The length of time you spend in recovery depends on the type of medicine you received, your medical condition, and the type of surgery you had.    Going Home:  We will let you know when you're ready to leave the hospital.  Before you leave, we will tell you how to care for yourself at home.  If you do not understand something, please say so.  We will answer any questions you have.  We will then help you get ready to leave.  When you are discharged from the recovery room, the nurses will review instructions with you and your caregiver.  You must have an adult with you for the first 4 hours after you leave the hospital. Take it easy when you get home.  You will need some time to recover -- you may be more tired than you realize at first.  Rest and relax for rest of the day at home.  You'll feel better and heal faster if you take good care of yourself.  Symptoms of infection need to be reported to your surgery office. Please call your Surgeon.      Thank you for following these important instructions.

## 2022-08-10 NOTE — NURSING NOTE
"Chief Complaint   Patient presents with     Consult     Nausea, weight loss        Initial /86 (BP Location: Left arm, Patient Position: Sitting, Cuff Size: Adult Regular)   Pulse 90   Temp 98.5  F (36.9  C) (Tympanic)   Ht 1.626 m (5' 4\")   Wt 64.9 kg (143 lb)   SpO2 98%   BMI 24.55 kg/m   Estimated body mass index is 24.55 kg/m  as calculated from the following:    Height as of this encounter: 1.626 m (5' 4\").    Weight as of this encounter: 64.9 kg (143 lb).  Medication Reconciliation: complete  Lina Miranda LPN      "

## 2022-08-17 ENCOUNTER — VIRTUAL VISIT (OUTPATIENT)
Dept: FAMILY MEDICINE | Facility: OTHER | Age: 72
End: 2022-08-17
Attending: FAMILY MEDICINE
Payer: COMMERCIAL

## 2022-08-17 DIAGNOSIS — F41.1 GENERALIZED ANXIETY DISORDER: ICD-10-CM

## 2022-08-17 DIAGNOSIS — F33.41 MAJOR DEPRESSIVE DISORDER, RECURRENT EPISODE, IN PARTIAL REMISSION (H): Primary | ICD-10-CM

## 2022-08-17 DIAGNOSIS — R11.0 NAUSEA: ICD-10-CM

## 2022-08-17 PROCEDURE — 99213 OFFICE O/P EST LOW 20 MIN: CPT | Mod: 95 | Performed by: FAMILY MEDICINE

## 2022-08-17 ASSESSMENT — ANXIETY QUESTIONNAIRES
3. WORRYING TOO MUCH ABOUT DIFFERENT THINGS: NEARLY EVERY DAY
6. BECOMING EASILY ANNOYED OR IRRITABLE: MORE THAN HALF THE DAYS
5. BEING SO RESTLESS THAT IT IS HARD TO SIT STILL: NEARLY EVERY DAY
1. FEELING NERVOUS, ANXIOUS, OR ON EDGE: NEARLY EVERY DAY
4. TROUBLE RELAXING: NEARLY EVERY DAY
IF YOU CHECKED OFF ANY PROBLEMS ON THIS QUESTIONNAIRE, HOW DIFFICULT HAVE THESE PROBLEMS MADE IT FOR YOU TO DO YOUR WORK, TAKE CARE OF THINGS AT HOME, OR GET ALONG WITH OTHER PEOPLE: VERY DIFFICULT
GAD7 TOTAL SCORE: 19
2. NOT BEING ABLE TO STOP OR CONTROL WORRYING: MORE THAN HALF THE DAYS
GAD7 TOTAL SCORE: 19
7. FEELING AFRAID AS IF SOMETHING AWFUL MIGHT HAPPEN: NEARLY EVERY DAY

## 2022-08-17 ASSESSMENT — PATIENT HEALTH QUESTIONNAIRE - PHQ9: SUM OF ALL RESPONSES TO PHQ QUESTIONS 1-9: 17

## 2022-08-17 NOTE — PROGRESS NOTES
Mony is a 72 year old who is being evaluated via a billable telephone visit.      What phone number would you like to be contacted at? [phone]  How would you like to obtain your AVS? MyChart    Assessment & Plan     Major depressive disorder, recurrent episode, in partial remission (H) / Generalized anxiety disorder  Some worsening of depression recently, more tearful at home. Does have psychiatry appt in October, but unlikely to get in sooner due to MD on leave. Would like to reconsider trial of counseling, referral was sent. Discussed medications as well. Has been on numerous meds over the years without effect or not tolerated. May also attempt to increase her wellbutrin to 300mg daily. Use the 150mg tabs x 2 for now to make sure this is tolerated. Call next week for fill of 300mg if tolerating.   - Adult Mental Health  Referral    Nausea  Symptoms of nausea, low appetite have improved. Did start zantac, unclear if this is the cause. Regardless doing much better. She is planning on cancelling endoscopy at this point which is reasonable. We can revisit if recurrence of sx.     15 minutes spent on the date of the encounter doing chart review, review of test results, patient visit and documentation   Ivon Simon MD  [facility] [place]    Subjective   Mony is a 72 year old, presenting for the following health issues:  No chief complaint on file.    HPI     Concern - Follow up  Onset: abdominal issues  Description: 8/3/22 appointment  Intensity: moderate  Progression of Symptoms:  improving  Accompanying Signs & Symptoms: None  Previous history of similar problem: Yes  Precipitating factors:        Worsened by: NA  Alleviating factors:        Improved by: NA  Therapies tried and outcome:  none     Depression and Anxiety Follow-Up    How are you doing with your depression since your last visit? No change    How are you doing with your anxiety since your last visit?  No change    Are  you having other symptoms that might be associated with depression or anxiety? No    Have you had a significant life event? OTHER: .     Do you have any concerns with your use of alcohol or other drugs? No    Social History     Tobacco Use     Smoking status: Never Smoker     Smokeless tobacco: Never Used   Substance Use Topics     Alcohol use: Yes     Comment: Beer & Wine, socially     Drug use: Never     PHQ 7/28/2022 8/3/2022 8/17/2022   PHQ-9 Total Score 13 15 17   Q9: Thoughts of better off dead/self-harm past 2 weeks Several days Several days Several days   F/U: Thoughts of suicide or self-harm - - -   F/U: Self harm-plan - - -   F/U: Self-harm action - - -   F/U: Safety concerns - - -     ED-7 SCORE 7/28/2022 8/3/2022 8/17/2022   Total Score 16 17 19     Last PHQ-9 8/17/2022   1.  Little interest or pleasure in doing things 1   2.  Feeling down, depressed, or hopeless 2   3.  Trouble falling or staying asleep, or sleeping too much 3   4.  Feeling tired or having little energy 3   5.  Poor appetite or overeating 3   6.  Feeling bad about yourself 1   7.  Trouble concentrating 3   8.  Moving slowly or restless 0   Q9: Thoughts of better off dead/self-harm past 2 weeks 1   PHQ-9 Total Score 17   Difficulty at work, home, or with people Very difficult   In the past two weeks have you had thoughts of suicide or self harm? -   Do you have concerns about your personal safety or the safety of others? -   In the past 2 weeks have you thought about a plan or had intention to harm yourself? -   In the past 2 weeks have you acted on these thoughts in any way? -     ED-7  8/17/2022   1. Feeling nervous, anxious, or on edge 3   2. Not being able to stop or control worrying 2   3. Worrying too much about different things 3   4. Trouble relaxing 3   5. Being so restless that it is hard to sit still 3   6. Becoming easily annoyed or irritable 2   7. Feeling afraid, as if something awful might happen 3   ED-7 Total Score 19    If you checked any problems, how difficult have they made it for you to do your work, take care of things at home, or get along with other people? Very difficult       Review of Systems   Constitutional, HEENT, cardiovascular, pulmonary, gi and gu systems are negative, except as otherwise noted.      Objective           Vitals:  No vitals were obtained today due to virtual visit.    Physical Exam   healthy, alert and no distress  PSYCH: Alert and oriented times 3; coherent speech, normal   rate and volume, able to articulate logical thoughts, able   to abstract reason, no tangential thoughts, no hallucinations   or delusions  Her affect is normal  RESP: No cough, no audible wheezing, able to talk in full sentences  Remainder of exam unable to be completed due to telephone visits    No results found for any visits on 08/17/22.      Phone call duration: 8 minutes    .  ..

## 2022-08-23 ENCOUNTER — MYC MEDICAL ADVICE (OUTPATIENT)
Dept: FAMILY MEDICINE | Facility: OTHER | Age: 72
End: 2022-08-23

## 2022-08-23 DIAGNOSIS — F33.1 MAJOR DEPRESSIVE DISORDER, RECURRENT EPISODE, MODERATE (H): ICD-10-CM

## 2022-08-23 RX ORDER — BUPROPION HYDROCHLORIDE 300 MG/1
TABLET ORAL
Qty: 90 TABLET | Refills: 3 | Status: SHIPPED | OUTPATIENT
Start: 2022-08-23 | End: 2023-05-01

## 2022-08-23 NOTE — TELEPHONE ENCOUNTER
Please see MCM and advise. Let me know what you want for dosage and I can pend if you wish.  Viktoriya Fregoso LPN

## 2022-10-11 ENCOUNTER — OFFICE VISIT (OUTPATIENT)
Dept: PSYCHIATRY | Facility: OTHER | Age: 72
End: 2022-10-11
Attending: PSYCHIATRY & NEUROLOGY
Payer: COMMERCIAL

## 2022-10-11 VITALS
HEART RATE: 100 BPM | TEMPERATURE: 98.9 F | SYSTOLIC BLOOD PRESSURE: 136 MMHG | OXYGEN SATURATION: 97 % | DIASTOLIC BLOOD PRESSURE: 88 MMHG

## 2022-10-11 DIAGNOSIS — G47.00 PERSISTENT INSOMNIA: Primary | ICD-10-CM

## 2022-10-11 PROCEDURE — 99215 OFFICE O/P EST HI 40 MIN: CPT | Performed by: PSYCHIATRY & NEUROLOGY

## 2022-10-11 PROCEDURE — G0463 HOSPITAL OUTPT CLINIC VISIT: HCPCS

## 2022-10-11 RX ORDER — ESZOPICLONE 2 MG/1
2 TABLET, FILM COATED ORAL AT BEDTIME
COMMUNITY
End: 2023-05-01

## 2022-10-11 RX ORDER — ESZOPICLONE 2 MG/1
2 TABLET, FILM COATED ORAL AT BEDTIME
Qty: 30 TABLET | Refills: 5 | Status: SHIPPED | OUTPATIENT
Start: 2022-10-11 | End: 2023-05-01

## 2022-10-11 RX ORDER — DEXTROAMPHETAMINE SACCHARATE, AMPHETAMINE ASPARTATE, DEXTROAMPHETAMINE SULFATE AND AMPHETAMINE SULFATE 2.5; 2.5; 2.5; 2.5 MG/1; MG/1; MG/1; MG/1
10 TABLET ORAL 2 TIMES DAILY
COMMUNITY
End: 2023-05-01

## 2022-10-11 RX ORDER — QUETIAPINE FUMARATE 25 MG/1
25 TABLET, FILM COATED ORAL
COMMUNITY
End: 2023-05-05

## 2022-10-11 ASSESSMENT — ANXIETY QUESTIONNAIRES
5. BEING SO RESTLESS THAT IT IS HARD TO SIT STILL: NEARLY EVERY DAY
GAD7 TOTAL SCORE: 17
6. BECOMING EASILY ANNOYED OR IRRITABLE: NEARLY EVERY DAY
2. NOT BEING ABLE TO STOP OR CONTROL WORRYING: SEVERAL DAYS
1. FEELING NERVOUS, ANXIOUS, OR ON EDGE: NEARLY EVERY DAY
7. FEELING AFRAID AS IF SOMETHING AWFUL MIGHT HAPPEN: NEARLY EVERY DAY
3. WORRYING TOO MUCH ABOUT DIFFERENT THINGS: SEVERAL DAYS
GAD7 TOTAL SCORE: 17
IF YOU CHECKED OFF ANY PROBLEMS ON THIS QUESTIONNAIRE, HOW DIFFICULT HAVE THESE PROBLEMS MADE IT FOR YOU TO DO YOUR WORK, TAKE CARE OF THINGS AT HOME, OR GET ALONG WITH OTHER PEOPLE: SOMEWHAT DIFFICULT

## 2022-10-11 ASSESSMENT — PAIN SCALES - GENERAL: PAINLEVEL: NO PAIN (0)

## 2022-10-11 ASSESSMENT — PATIENT HEALTH QUESTIONNAIRE - PHQ9
SUM OF ALL RESPONSES TO PHQ QUESTIONS 1-9: 15
5. POOR APPETITE OR OVEREATING: NEARLY EVERY DAY

## 2022-10-11 NOTE — NURSING NOTE
"Chief Complaint   Patient presents with     RECHECK     Anxiety, depression.  Medication review.       Initial /88   Pulse 100   Temp 98.9  F (37.2  C) (Tympanic)   SpO2 97%  Estimated body mass index is 24.55 kg/m  as calculated from the following:    Height as of 8/10/22: 1.626 m (5' 4\").    Weight as of 8/10/22: 64.9 kg (143 lb).  Medication Reconciliation: complete  LAKEISHA JUAREZ LPN    "

## 2022-10-11 NOTE — PROGRESS NOTES
"    PSYCHIATRY CLINIC PROGRESS NOTE     SUBJECTIVE / INTERIM HISTORY                                                                        Social- retired. Interests:reading, being physical / active & working out, grandkids, volunteers, loves being with kiddo   Children-   Grown children scattered throughout    Last visit 5/2/22:  Cont trazodone, and Serax 15 mg 3 times daily filled today #270 tabs 3 refills.    Continue Effexor 37.5 mg daily and Wellbutrin  mg daily     - had a rough summer. When came back from [place] did okay but then oMny's friend's were all gone, she found herself depressed and anxious  - Mony went to [facility] this [facility] - she was started on Adderall and LUnesta. Adderall causing too much anxiety / physical shaking. Decided this morning stopping Adderall  - \"I'm afraid to do anything\"  - Mony feels anxiety has been worse overall than depression  - tired of \"shaking\" I inquire does she mean twitching? Tremor? What part of body? She notes this has been going on for years and others can't see it. Feels like her inside is \"shaking\"  - mom narcissistic and \"weird\"   - Been on psychiatric meds since 90s. Been on Effexor since 1997. Takes Serax and generally takes it twice daily, once in awhile ends up feeling need to take three times daily. Has never had any issues with misuse / abuse of it. Recalls taking Ativan many years ago and seemed to work. Klonopin \"zonked\" her out. Notes has worked really hard and put in a lot of time and effort in regards to getting better gradually over time with mental health symptoms. This has included working with therapists in the past. Is  and marriage in general is going okay. They winter in [place]     SYMPTOMS-  Depressed mood, anhedonia, low energy, SI with no intent or plan, feeling hopeless, helpless. Has had panic attacks past couple months.  MEDICAL ROS- \"shaking\"  fatigue, gets where muscles feel weak like \"jelly\" with certain meds.  MEDICAL / " SURGICAL HISTORY                     Patient Active Problem List   Diagnosis     Generalized anxiety disorder     Chronic fatigue syndrome     Attention deficit disorder     Obsessive-compulsive disorder     FH: colon cancer     Mixed hyperlipidemia     Acquired hypothyroidism     Major depressive disorder, recurrent episode, in partial remission (H)     Vaso vagal episode     Small bowel obstruction due to adhesions (H)     Melanoma of cheek (H)     Acquired facial deformity     Screening for malignant neoplasm of colon     ALLERGY   Codeine  MEDICATIONS                                                                                             Current Outpatient Medications   Medication Sig     buPROPion (WELLBUTRIN XL) 300 MG 24 hr tablet TAKE 1 TABLET BY MOUTH ONCE DAILY IN THE MORNING     Calcium Carbonate (TUMS 500 OR) Take by mouth 2 times daily     Cholecalciferol (VITAMIN D) 1000 UNITS capsule Take 1 capsule by mouth daily.     fish oil-omega-3 fatty acids 1000 MG capsule Take 2 capsules by mouth daily.     fluticasone (FLONASE) 50 MCG/ACT nasal spray Use 2 spray(s) in each nostril once daily     glucosamine 500 MG CAPS Take by mouth daily      levothyroxine (SYNTHROID/LEVOTHROID) 50 MCG tablet Take 1 tablet (50 mcg) by mouth daily     MAGNESIUM OXIDE PO Take 200 mg by mouth daily     Multiple vitamin TABS Take 1 tablet by oral route every day with food     oxazepam (SERAX) 15 MG capsule TAKE 1 CAPSULE BY MOUTH THREE TIMES DAILY     traZODone (DESYREL) 50 MG tablet TAKE 1 TO 3 TABLETS BY MOUTH AT BEDTIME     ranitidine (ZANTAC) 150 MG tablet Take 150 mg by mouth 2 times daily (Patient not taking: Reported on 10/11/2022)     No current facility-administered medications for this visit.       VITALS   /88   Pulse 100   Temp 98.9  F (37.2  C) (Tympanic)   SpO2 97%      PHQ9                     [unfilled]  LABS                                                                                                                            Last Comprehensive Metabolic Panel:  Sodium   Date Value Ref Range Status   08/03/2022 137 133 - 144 mmol/L Final   07/24/2020 138 133 - 144 mmol/L Final     Potassium   Date Value Ref Range Status   08/03/2022 3.8 3.4 - 5.3 mmol/L Final   07/24/2020 3.9 3.4 - 5.3 mmol/L Final     Chloride   Date Value Ref Range Status   08/03/2022 105 94 - 109 mmol/L Final   07/24/2020 107 94 - 109 mmol/L Final     Carbon Dioxide   Date Value Ref Range Status   07/24/2020 29 20 - 32 mmol/L Final     Carbon Dioxide (CO2)   Date Value Ref Range Status   08/03/2022 27 20 - 32 mmol/L Final     Anion Gap   Date Value Ref Range Status   08/03/2022 5 3 - 14 mmol/L Final   07/24/2020 2 (L) 3 - 14 mmol/L Final     Glucose   Date Value Ref Range Status   08/03/2022 88 70 - 99 mg/dL Final   07/24/2020 88 70 - 99 mg/dL Final     Urea Nitrogen   Date Value Ref Range Status   08/03/2022 14 7 - 30 mg/dL Final   07/24/2020 15 7 - 30 mg/dL Final     Creatinine   Date Value Ref Range Status   08/03/2022 0.73 0.52 - 1.04 mg/dL Final   07/24/2020 0.71 0.52 - 1.04 mg/dL Final     GFR Estimate   Date Value Ref Range Status   08/03/2022 88 >60 mL/min/1.73m2 Final     Comment:     Effective December 21, 2021 eGFRcr in adults is calculated using the 2021 CKD-EPI creatinine equation which includes age and gender (Francisco Javier et al., NEJM, DOI: 10.1056/UMQGdi4756272)   07/24/2020 87 >60 mL/min/[1.73_m2] Final     Comment:     Non  GFR Calc  Starting 12/18/2018, serum creatinine based estimated GFR (eGFR) will be   calculated using the Chronic Kidney Disease Epidemiology Collaboration   (CKD-EPI) equation.       Calcium   Date Value Ref Range Status   08/03/2022 9.6 8.5 - 10.1 mg/dL Final   07/24/2020 9.4 8.5 - 10.1 mg/dL Final     TSH   Date Value Ref Range Status   08/03/2022 0.87 0.40 - 4.00 mU/L Final   07/24/2020 1.00 0.40 - 4.00 mU/L Final     MENTAL STATUS EXAM                                                   "                                     Mood was described as anxious. Affect congruent to mood, tearful.  Thought process, including associations, was unremarkable and thought content was devoid of suicidal and homicidal ideation and psychotic thought. No hallucinations. Insight was good. Judgment was intact and adequate for safety. Fund of knowledge was intact. Pt demonstrates no obvious problems with attention, concentration, language, recent or remote memory although these were not formally tested.     ASSESSMENT                                                                                                      HISTORICAL:  Initial psych note 8 2015         NOTES:  She took CBD for while and she felt contributed to depression. GeneSight testing correlated with Effexor SEs when she was on higher doses    Mony Szymanski is a 72  year old, female who has long history in terms of depression, anxiety - therapy throughout the years. She notes ongoing depression that worsened along with isolation and the Covid - 19 pandemic. Mony has been taking Effexor, Serax, and trazodone for years.  She has heard Serax is \"archaeic\" and we have had discussion about benzodiazepine class of meds ( guidelines have shifted over years to try avoid long-term use and rather stick with short-term use).    Mony does well with structure, being social, routine. This summer has been rough including a lot of her friends have left the area. I've been on a leave of absence thus I haven't seen Mony since spring. Her primary increased Wellbutrin dose this summer and per chart notes initially Mony felt it was helping. Mony went to different psych med provider this summer and was started on Lunesta and Adderall. Today she notes Adderall is making her more anxious and it only helps when she takes it at same time as the Serax.. I noted essentially she is taking a med with depressant effect (the benzo) and a stimulant (the Adderall) at the same time of which " "have opposing actions. We agreed on having her discontinue the Adderall. Mony was also prescribed quetiapine and has been taking that in middle of the night. We agreed today in effort to minimize medications Mony going to try take 3 trazodone (she has been taking 2) and stop taking the quetiapine.      She inquired about treatment options for anxiety other than medications. I noted alpha-stim. Mony notes she tried it one time and thankfully her  was around because she notes \"I would have \" if he wasn't there.    I'm hesitant to add more medications.. I foremost think we should try to get Mony into CBT - insomnia if possible. Noted I'm going to look into through [facility] (virtual visits).    We also spent time today discussing importance of structure in routine and keeping on top of these can help with anxiety.       TREATMENT RISK STATEMENT:  The risks, benefits, alternatives and potential adverse effects have been explained and are understood by the pt.  The pt agrees to the treatment plan with the ability to do so.   The pt knows to call the clinic for any problems or access emergency care if needed.        DIAGNOSES                    MDD, recurrent mild  ED  OCPD traits    PLAN                                                                                                                    1)  MEDICATIONS:         -- She is discontinuing the Adderall she started on this summer. Continue Lunesta 2 mg HS for now I sent in fill with 5 refills. . She is going to try discontinuing quetiapine 25 mg 1-2 tabs HS prn insomnia. Increase  Trazodone 100 mg HS to 150 mg HS, continue Serax 15 mg 3 times daily    Continue Effexor 37.5 mg daily and Wellbutrin XL 300mg daily     2)  THERAPY:  No Change    3)  LABS:  None    4)  PT MONITOR [call for probs]:  worsening sx, SEs from meds    5)  REFERRALS [CD, medical, other]:  None    6)  RTC: ~ she didn't set up follow-up for now given they go to [place] for " summer.    Of note: when in [place] her pharmacy is [facility] in [place] on [address] [facility] [address]  Phone # [phone].

## 2022-10-20 ENCOUNTER — TELEPHONE (OUTPATIENT)
Dept: MAMMOGRAPHY | Facility: OTHER | Age: 72
End: 2022-10-20

## 2022-10-20 ENCOUNTER — ANCILLARY PROCEDURE (OUTPATIENT)
Dept: MAMMOGRAPHY | Facility: OTHER | Age: 72
End: 2022-10-20
Attending: FAMILY MEDICINE
Payer: MEDICARE

## 2022-10-20 DIAGNOSIS — Z12.31 VISIT FOR SCREENING MAMMOGRAM: ICD-10-CM

## 2022-10-20 PROCEDURE — 77067 SCR MAMMO BI INCL CAD: CPT | Mod: TC

## 2022-11-22 DIAGNOSIS — F33.1 MAJOR DEPRESSIVE DISORDER, RECURRENT EPISODE, MODERATE (H): ICD-10-CM

## 2022-11-22 DIAGNOSIS — J30.1 CHRONIC SEASONAL ALLERGIC RHINITIS DUE TO POLLEN: ICD-10-CM

## 2022-11-23 RX ORDER — FLUTICASONE PROPIONATE 50 MCG
SPRAY, SUSPENSION (ML) NASAL
Qty: 48 G | Refills: 0 | OUTPATIENT
Start: 2022-11-23

## 2022-11-28 RX ORDER — BUPROPION HYDROCHLORIDE 150 MG/1
TABLET ORAL
Qty: 90 TABLET | Refills: 1 | Status: SHIPPED | OUTPATIENT
Start: 2022-11-28 | End: 2023-05-01

## 2022-11-28 NOTE — TELEPHONE ENCOUNTER
Wellbutrin       Last Written Prescription Date:  8/23/22  Last Fill Quantity: 90,   # refills: 3  Last Office Visit: 10/11/22  Future Office visit:

## 2022-12-05 ENCOUNTER — TELEPHONE (OUTPATIENT)
Dept: PSYCHIATRY | Facility: OTHER | Age: 72
End: 2022-12-05

## 2022-12-05 NOTE — TELEPHONE ENCOUNTER
Patient contacted and notified cancellation of appt d/t patient being out of state.  Will cancel appt on 12-21-22. Thank you

## 2023-01-09 ENCOUNTER — TRANSFERRED RECORDS (OUTPATIENT)
Dept: HEALTH INFORMATION MANAGEMENT | Facility: CLINIC | Age: 73
End: 2023-01-09

## 2023-04-03 ENCOUNTER — TRANSFERRED RECORDS (OUTPATIENT)
Dept: HEALTH INFORMATION MANAGEMENT | Facility: CLINIC | Age: 73
End: 2023-04-03

## 2023-05-01 ENCOUNTER — TELEPHONE (OUTPATIENT)
Dept: PSYCHIATRY | Facility: OTHER | Age: 73
End: 2023-05-01

## 2023-05-01 ENCOUNTER — MYC MEDICAL ADVICE (OUTPATIENT)
Dept: FAMILY MEDICINE | Facility: OTHER | Age: 73
End: 2023-05-01

## 2023-05-01 ENCOUNTER — NURSE TRIAGE (OUTPATIENT)
Dept: FAMILY MEDICINE | Facility: OTHER | Age: 73
End: 2023-05-01

## 2023-05-01 ENCOUNTER — OFFICE VISIT (OUTPATIENT)
Dept: FAMILY MEDICINE | Facility: OTHER | Age: 73
End: 2023-05-01
Attending: FAMILY MEDICINE
Payer: COMMERCIAL

## 2023-05-01 VITALS
WEIGHT: 143 LBS | OXYGEN SATURATION: 99 % | HEIGHT: 64 IN | TEMPERATURE: 98.4 F | BODY MASS INDEX: 24.41 KG/M2 | HEART RATE: 101 BPM

## 2023-05-01 DIAGNOSIS — F41.0 PANIC ATTACK: ICD-10-CM

## 2023-05-01 DIAGNOSIS — F41.1 GENERALIZED ANXIETY DISORDER: ICD-10-CM

## 2023-05-01 DIAGNOSIS — G47.00 INSOMNIA, UNSPECIFIED TYPE: ICD-10-CM

## 2023-05-01 DIAGNOSIS — G47.00 INSOMNIA, UNSPECIFIED TYPE: Primary | ICD-10-CM

## 2023-05-01 DIAGNOSIS — F33.2 SEVERE EPISODE OF RECURRENT MAJOR DEPRESSIVE DISORDER, WITHOUT PSYCHOTIC FEATURES (H): Primary | ICD-10-CM

## 2023-05-01 PROCEDURE — 99214 OFFICE O/P EST MOD 30 MIN: CPT | Performed by: FAMILY MEDICINE

## 2023-05-01 PROCEDURE — G0463 HOSPITAL OUTPT CLINIC VISIT: HCPCS | Performed by: FAMILY MEDICINE

## 2023-05-01 RX ORDER — MIRTAZAPINE 7.5 MG/1
7.5 TABLET, FILM COATED ORAL AT BEDTIME
Qty: 90 TABLET | Refills: 1 | Status: SHIPPED | OUTPATIENT
Start: 2023-05-01 | End: 2023-05-24

## 2023-05-01 RX ORDER — RAMELTEON 8 MG/1
8 TABLET ORAL AT BEDTIME
Qty: 90 TABLET | Refills: 3 | Status: SHIPPED | OUTPATIENT
Start: 2023-05-01 | End: 2023-05-05

## 2023-05-01 NOTE — TELEPHONE ENCOUNTER
"To: Nurse to Dr. Owens    I called patient to reschedule her 5/1/23 appointment as provider out.  She took the next available appointment on May 24th, explained she has been waiting a long time for the appointment and asked if there was another [facility] person she could see.... \"because I am not doing very well\"  Please return her call as soon as possible to discuss @ [phone]    Thank you  "

## 2023-05-01 NOTE — PROGRESS NOTES
Assessment & Plan     Severe episode of recurrent major depressive disorder, without psychotic features (H) / Panic attack / Generalized anxiety disorder  Pt is struggling with severe depression chronically which has been worse over the last several months. Hospitalized for 5 days in [place] and was seen by psychiatry down there which was helpful. They had recommended change in her sleeping medications to ramelteon, but this was not prescribed (see below).     Pt presented to  today with acute panic symptoms today that were spurred on by that fact that she was unable to be seen by psychiatry today (appt cancelled due to provider being out). She does not yet have a therapist here that she has been able to establish with and is concerned that she will have to wait a long time to see them and now there has been a further delay for referral.    Pt does continue to struggle with passive SI, states this is chronic for several years. Actually better than it has been in a while. Acute panic today with delay of appt, feeling better that we now have a plan. Discussed admission to the hospital for acute management of medications and for safety. Pt declines at this time, but is considering. Discussed she may come to the ED or call me anytime if she feels unsafe, worsening SI, helplessness occur. Med changes as below. Therapy referral was placed as well. I will also contact psychiatry in [place].     Insomnia, unspecified type  Recommended stopping lunesta due to risk, trial of ramelteon. Prescribed. Continue trazodone 150mg nightly, Seroquel as well. Stop lunesta.   - ramelteon (ROZEREM) 8 MG tablet  Dispense: 90 tablet; Refill: 3    Ivon Simon MD  [facility] - CALEB Sykes is a 72 year old, presenting for the following health issues:  Depression and Anxiety         View : No data to display.              HPI     Depression and Anxiety Follow-Up    How are you doing with your depression  "since your last visit? Worsened. Hospitalized over winter, struggling the last several months.     How are you doing with your anxiety since your last visit?  Worsened, does not have therapist here, was not able to see psychiatry today which did trigger panic.     Are you having other symptoms that might be associated with depression or anxiety? Yes:  Suicidal Ideation, no plan, persistent and chronic for last several years. Sleeping only with her medications. Feels she will kill herself if she cannot sleep, has been at that place in the past.     Have you had a significant life event? No     Do you have any concerns with your use of alcohol or other drugs? No    Social History     Tobacco Use     Smoking status: Never     Smokeless tobacco: Never   Substance Use Topics     Alcohol use: Yes     Comment: Beer & Wine, socially     Drug use: Never         8/3/2022     3:00 PM 8/17/2022    11:00 AM 10/11/2022     1:20 PM   PHQ   PHQ-9 Total Score 15 17 15   Q9: Thoughts of better off dead/self-harm past 2 weeks Several days Several days Several days         8/3/2022     3:00 PM 8/17/2022    11:00 AM 10/11/2022     1:20 PM   ED-7 SCORE   Total Score 17 19 17     Review of Systems   Constitutional, HEENT, cardiovascular, pulmonary, gi and gu systems are negative, except as otherwise noted.      Objective    Pulse 101   Temp 98.4  F (36.9  C) (Tympanic)   Ht 1.626 m (5' 4\")   Wt 64.9 kg (143 lb)   SpO2 99%   BMI 24.55 kg/m    Body mass index is 24.55 kg/m .  Physical Exam   GENERAL: alert and no distress  RESP: lungs clear to auscultation - no rales, rhonchi or wheezes  CV: regular rates and rhythm, normal S1 S2, no S3 or S4, no murmur, click or rub and no peripheral edema  PSYCH: tearful, anxious, judgement and insight intact and appearance well groomed    No results found for any visits on 05/01/23.      "

## 2023-05-01 NOTE — PROGRESS NOTES
Pt was seen at [facility] in [facility]   Detail Level: Detailed X Size Of Lesion In Cm (Optional): 0

## 2023-05-01 NOTE — Clinical Note
I am having a hard to time tracking down pts [place] psychiatrist. Could you call her now that she is home and get the clinic name she saw him at? Looks like he practices at several locations

## 2023-05-01 NOTE — TELEPHONE ENCOUNTER
"    Reason for Disposition    Patient sounds very upset or troubled to the triager    Answer Assessment - Initial Assessment Questions  1. CONCERN: \"Did anything happen that prompted you to call today?\"       Was to be seen today with Dr Owens and has waited 6 months for this appointment.  She is in the clinic at the moment stating she need's help and is on and off suicidal  2. ANXIETY SYMPTOMS: \"Can you describe how you (your loved one; patient) have been feeling?\" (e.g., tense, restless, panicky, anxious, keyed up, overwhelmed, sense of impending doom).       anxious  3. ONSET: \"How long have you been feeling this way?\" (e.g., hours, days, weeks)      6 months  4. SEVERITY: \"How would you rate the level of anxiety?\" (e.g., 0 - 10; or mild, moderate, severe).      10/10  5. FUNCTIONAL IMPAIRMENT: \"How have these feelings affected your ability to do daily activities?\" \"Have you had more difficulty than usual doing your normal daily activities?\" (e.g., getting better, same, worse; self-care, school, work, interactions)      yes  6. HISTORY: \"Have you felt this way before?\" \"Have you ever been diagnosed with an anxiety problem in the past?\" (e.g., generalized anxiety disorder, panic attacks, PTSD). If Yes, ask: \"How was this problem treated?\" (e.g., medicines, counseling, etc.)      yes  7. RISK OF HARM - SUICIDAL IDEATION: \"Do you ever have thoughts of hurting or killing yourself?\" If Yes, ask:  \"Do you have these feelings now?\" \"Do you have a plan on how you would do this?\"      On and off suicidal  8. TREATMENT:  \"What has been done so far to treat this anxiety?\" (e.g., medicines, relaxation strategies). \"What has helped?\"      Yes she is on a fist full of medication  9. TREATMENT - THERAPIST: \"Do you have a counselor or therapist? Name?\"      yes  10. POTENTIAL TRIGGERS: \"Do you drink caffeinated beverages (e.g., coffee, patricia, teas), and how much daily?\" \"Do you drink alcohol or use any drugs?\" \"Have you " "started any new medicines recently?\"      yes  10. PATIENT SUPPORT: \"Who is with you now?\" \"Who do you live with?\" \"Do you have family or friends who you can talk to?\"         no  11. OTHER SYMPTOMS: \"Do you have any other symptoms?\" (e.g., feeling depressed, trouble concentrating, trouble sleeping, trouble breathing, palpitations or fast heartbeat, chest pain, sweating, nausea, or diarrhea)        no  12. PREGNANCY: \"Is there any chance you are pregnant?\" \"When was your last menstrual period?\"        no    Protocols used: ANXIETY AND PANIC ATTACK-A-OH      "

## 2023-05-02 NOTE — TELEPHONE ENCOUNTER
Patient contacted.  Scheduled her to see Dr. Owens on 5-5-2023 at 11:45 am. Patient is ok with this.

## 2023-05-04 NOTE — PROGRESS NOTES
Answers for HPI/ROS submitted by the patient on 5/5/2023  If you checked off any problems, how difficult have these problems made it for you to do your work, take care of things at home, or get along with other people?: Somewhat difficult  PHQ9 TOTAL SCORE: 8  ED 7 TOTAL SCORE: 13      Assessment & Plan     Severe episode of recurrent major depressive disorder, without psychotic features (H) / Insomnia, unspecified type  Stable, was able to get Psychiatry appt today. Will not bill this visit.     No follow-ups on file.    Ivon Simon MD  [facility] - CALEB Sykes is a 72 year old, presenting for the following health issues:  Anxiety      HPI     Depression and Anxiety Follow-Up    How are you doing with your depression since your last visit? No change    How are you doing with your anxiety since your last visit?  No change    Are you having other symptoms that might be associated with depression or anxiety? No    Have you had a significant life event? OTHER: family      Do you have any concerns with your use of alcohol or other drugs? No    Social History     Tobacco Use     Smoking status: Never     Passive exposure: Never     Smokeless tobacco: Never   Vaping Use     Vaping status: Never Used   Substance Use Topics     Alcohol use: Yes     Comment: Beer & Wine, socially     Drug use: Never         8/17/2022    11:00 AM 10/11/2022     1:20 PM 5/5/2023     7:54 AM   PHQ   PHQ-9 Total Score 17 15 8   Q9: Thoughts of better off dead/self-harm past 2 weeks Several days Several days Several days   F/U: Thoughts of suicide or self-harm   Yes   F/U: Self harm-plan   No   F/U: Self-harm action   No   F/U: Safety concerns   No         8/17/2022    11:00 AM 10/11/2022     1:20 PM 5/5/2023     7:54 AM   ED-7 SCORE   Total Score   13 (moderate anxiety)   Total Score 19 17 13         5/5/2023     7:54 AM   Last PHQ-9   1.  Little interest or pleasure in doing things 1   2.  Feeling down,  "depressed, or hopeless 1   3.  Trouble falling or staying asleep, or sleeping too much 2   4.  Feeling tired or having little energy 1   5.  Poor appetite or overeating 0   6.  Feeling bad about yourself 1   7.  Trouble concentrating 1   8.  Moving slowly or restless 0   Q9: Thoughts of better off dead/self-harm past 2 weeks 1   PHQ-9 Total Score 8   In the past two weeks have you had thoughts of suicide or self harm? Yes   Do you have concerns about your personal safety or the safety of others? No   In the past 2 weeks have you thought about a plan or had intention to harm yourself? No   In the past 2 weeks have you acted on these thoughts in any way? No         5/5/2023     7:54 AM   ED-7    1. Feeling nervous, anxious, or on edge 3   2. Not being able to stop or control worrying 1   3. Worrying too much about different things 1   4. Trouble relaxing 3   5. Being so restless that it is hard to sit still 2   6. Becoming easily annoyed or irritable 2   7. Feeling afraid, as if something awful might happen 1   ED-7 Total Score 13   If you checked any problems, how difficult have they made it for you to do your work, take care of things at home, or get along with other people? Somewhat difficult     Review of Systems   Constitutional, HEENT, cardiovascular, pulmonary, gi and gu systems are negative, except as otherwise noted.      Objective    /84 (BP Location: Left arm, Patient Position: Sitting, Cuff Size: Adult Large)   Pulse 91   Temp 98.6  F (37  C) (Tympanic)   Resp 16   Ht 1.626 m (5' 4\")   Wt 71.2 kg (157 lb)   SpO2 98%   BMI 26.95 kg/m    Body mass index is 26.95 kg/m .  Physical Exam   GENERAL: alert and no distress  NECK: no adenopathy, no asymmetry, masses, or scars and thyroid normal to palpation  RESP: lungs clear to auscultation - no rales, rhonchi or wheezes  CV: regular rates and rhythm, normal S1 S2, no S3 or S4, no murmur, click or rub and no peripheral edema  ABDOMEN: soft, " nontender, no hepatosplenomegaly, no masses and bowel sounds normal  MS: no gross musculoskeletal defects noted, no edema  PSYCH: mentation appears normal, affect flat, judgement and insight intact and appearance well groomed    No results found for any visits on 05/05/23.

## 2023-05-05 ENCOUNTER — OFFICE VISIT (OUTPATIENT)
Dept: PSYCHIATRY | Facility: OTHER | Age: 73
End: 2023-05-05
Attending: PSYCHIATRY & NEUROLOGY
Payer: COMMERCIAL

## 2023-05-05 ENCOUNTER — OFFICE VISIT (OUTPATIENT)
Dept: FAMILY MEDICINE | Facility: OTHER | Age: 73
End: 2023-05-05
Attending: FAMILY MEDICINE
Payer: MEDICARE

## 2023-05-05 VITALS
TEMPERATURE: 98.6 F | HEART RATE: 91 BPM | OXYGEN SATURATION: 98 % | BODY MASS INDEX: 26.95 KG/M2 | SYSTOLIC BLOOD PRESSURE: 125 MMHG | DIASTOLIC BLOOD PRESSURE: 84 MMHG | WEIGHT: 157 LBS

## 2023-05-05 VITALS
TEMPERATURE: 98.6 F | HEIGHT: 64 IN | RESPIRATION RATE: 16 BRPM | DIASTOLIC BLOOD PRESSURE: 84 MMHG | HEART RATE: 91 BPM | SYSTOLIC BLOOD PRESSURE: 125 MMHG | OXYGEN SATURATION: 98 % | WEIGHT: 157 LBS | BODY MASS INDEX: 26.8 KG/M2

## 2023-05-05 DIAGNOSIS — G47.00 INSOMNIA, UNSPECIFIED TYPE: ICD-10-CM

## 2023-05-05 DIAGNOSIS — F33.2 SEVERE EPISODE OF RECURRENT MAJOR DEPRESSIVE DISORDER, WITHOUT PSYCHOTIC FEATURES (H): Primary | ICD-10-CM

## 2023-05-05 DIAGNOSIS — F41.1 GAD (GENERALIZED ANXIETY DISORDER): ICD-10-CM

## 2023-05-05 PROCEDURE — G0463 HOSPITAL OUTPT CLINIC VISIT: HCPCS

## 2023-05-05 PROCEDURE — 99214 OFFICE O/P EST MOD 30 MIN: CPT | Performed by: PSYCHIATRY & NEUROLOGY

## 2023-05-05 RX ORDER — QUETIAPINE FUMARATE 25 MG/1
TABLET, FILM COATED ORAL
Qty: 60 TABLET | Refills: 3 | Status: SHIPPED | OUTPATIENT
Start: 2023-05-05 | End: 2023-08-08

## 2023-05-05 RX ORDER — OXAZEPAM 15 MG/1
15 CAPSULE ORAL 3 TIMES DAILY PRN
Qty: 270 CAPSULE | Refills: 3 | Status: SHIPPED | OUTPATIENT
Start: 2023-05-05 | End: 2023-05-17 | Stop reason: DRUGHIGH

## 2023-05-05 ASSESSMENT — ANXIETY QUESTIONNAIRES
8. IF YOU CHECKED OFF ANY PROBLEMS, HOW DIFFICULT HAVE THESE MADE IT FOR YOU TO DO YOUR WORK, TAKE CARE OF THINGS AT HOME, OR GET ALONG WITH OTHER PEOPLE?: SOMEWHAT DIFFICULT
1. FEELING NERVOUS, ANXIOUS, OR ON EDGE: NEARLY EVERY DAY
GAD7 TOTAL SCORE: 13
6. BECOMING EASILY ANNOYED OR IRRITABLE: MORE THAN HALF THE DAYS
GAD7 TOTAL SCORE: 13
7. FEELING AFRAID AS IF SOMETHING AWFUL MIGHT HAPPEN: SEVERAL DAYS
2. NOT BEING ABLE TO STOP OR CONTROL WORRYING: SEVERAL DAYS
3. WORRYING TOO MUCH ABOUT DIFFERENT THINGS: SEVERAL DAYS
GAD7 TOTAL SCORE: 13
7. FEELING AFRAID AS IF SOMETHING AWFUL MIGHT HAPPEN: SEVERAL DAYS
4. TROUBLE RELAXING: NEARLY EVERY DAY
5. BEING SO RESTLESS THAT IT IS HARD TO SIT STILL: MORE THAN HALF THE DAYS
IF YOU CHECKED OFF ANY PROBLEMS ON THIS QUESTIONNAIRE, HOW DIFFICULT HAVE THESE PROBLEMS MADE IT FOR YOU TO DO YOUR WORK, TAKE CARE OF THINGS AT HOME, OR GET ALONG WITH OTHER PEOPLE: SOMEWHAT DIFFICULT

## 2023-05-05 ASSESSMENT — PATIENT HEALTH QUESTIONNAIRE - PHQ9
10. IF YOU CHECKED OFF ANY PROBLEMS, HOW DIFFICULT HAVE THESE PROBLEMS MADE IT FOR YOU TO DO YOUR WORK, TAKE CARE OF THINGS AT HOME, OR GET ALONG WITH OTHER PEOPLE: SOMEWHAT DIFFICULT
SUM OF ALL RESPONSES TO PHQ QUESTIONS 1-9: 8
SUM OF ALL RESPONSES TO PHQ QUESTIONS 1-9: 8

## 2023-05-05 ASSESSMENT — PAIN SCALES - GENERAL
PAINLEVEL: NO PAIN (0)
PAINLEVEL: NO PAIN (0)

## 2023-05-05 NOTE — PROGRESS NOTES
"    PSYCHIATRY CLINIC PROGRESS NOTE     SUBJECTIVE / INTERIM HISTORY                                                                        Social- retired. Interests:reading, being physical / active & working out, grandkids, volunteers, loves being with kiddo   Children-   Grown children scattered throughout    Last visit 10/2022: She is discontinuing the Adderall she started on this summer. Continue Lunesta 2 mg HS for now I sent in fill with 5 refills. . She is going to try discontinuing quetiapine 25 mg 1-2 tabs HS prn insomnia. Increase  Trazodone 100 mg HS to 150 mg HS, continue Serax 15 mg 3 times daily    Continue Effexor 37.5 mg daily and Wellbutrin XL 300mg daily     - had a rough winter and was hospitalized in [place]. Now off Wellbutrin and Effexor. Was started on Zoloft and feels it is helping . Is still taking oxazepam and has tried to decrease hence more often 2 times per day as opposed to 3 times per day. Found a good psychiatrist down there and a good therapist. Psychiatrist down there was in favor of her trying to decrease oxazepam.   - inquires about quetiapine for anxiety and insomnia as it DOES help but psychiatrist in [place] told her he wasn't keen on her taking this med.  - saw primary this Monday - they started mirtazapine 7.5 mg HS. Not helping with sleep / insomnia but Mony notes it does already seem to be helping with mood  - mom narcissistic and \"weird\"     MEDICAL ROS- does NOT have the shaking like she did when she took Effexor and Wellbutrin.  MEDICAL / SURGICAL HISTORY                     Patient Active Problem List   Diagnosis     Generalized anxiety disorder     Chronic fatigue syndrome     Attention deficit disorder     Obsessive-compulsive disorder     FH: colon cancer     Mixed hyperlipidemia     Acquired hypothyroidism     Severe episode of recurrent major depressive disorder, without psychotic features (H)     Vaso vagal episode     Small bowel obstruction due to adhesions (H)     " Melanoma of cheek (H)     Acquired facial deformity     Screening for malignant neoplasm of colon     ALLERGY   Codeine  MEDICATIONS                                                                                             Current Outpatient Medications   Medication Sig     Calcium Carbonate (TUMS 500 OR) Take by mouth 2 times daily     Cholecalciferol (VITAMIN D) 1000 UNITS capsule Take 1 capsule by mouth daily.     fish oil-omega-3 fatty acids 1000 MG capsule Take 2 capsules by mouth daily.     fluticasone (FLONASE) 50 MCG/ACT nasal spray Use 2 spray(s) in each nostril once daily     glucosamine 500 MG CAPS Take by mouth daily      levothyroxine (SYNTHROID/LEVOTHROID) 50 MCG tablet Take 1 tablet (50 mcg) by mouth daily     MAGNESIUM OXIDE PO Take 200 mg by mouth daily     mirtazapine (REMERON) 7.5 MG tablet Take 1 tablet (7.5 mg) by mouth At Bedtime     Multiple vitamin TABS Take 1 tablet by oral route every day with food     oxazepam (SERAX) 15 MG capsule TAKE 1 CAPSULE BY MOUTH THREE TIMES DAILY     QUEtiapine (SEROQUEL) 25 MG tablet Take 1 tablet (25 mg) by mouth Take 1 - 2 tabs as needed  Prescribed by Mariza Lau     sertraline (ZOLOFT) 50 MG tablet Take 1 tablet by mouth daily     traZODone (DESYREL) 50 MG tablet TAKE 1 TO 3 TABLETS BY MOUTH AT BEDTIME     No current facility-administered medications for this visit.       VITALS   /84 (BP Location: Right arm, Patient Position: Sitting, Cuff Size: Adult Regular)   Pulse 91   Temp 98.6  F (37  C) (Tympanic)   Wt 71.2 kg (157 lb)   SpO2 98%   BMI 26.95 kg/m       PHQ9                     [unfilled]  LABS                                                                                                                           Last Comprehensive Metabolic Panel:  Sodium   Date Value Ref Range Status   08/03/2022 137 133 - 144 mmol/L Final   07/24/2020 138 133 - 144 mmol/L Final     Potassium   Date Value Ref Range Status   08/03/2022 3.8 3.4 -  5.3 mmol/L Final   07/24/2020 3.9 3.4 - 5.3 mmol/L Final     Chloride   Date Value Ref Range Status   08/03/2022 105 94 - 109 mmol/L Final   07/24/2020 107 94 - 109 mmol/L Final     Carbon Dioxide   Date Value Ref Range Status   07/24/2020 29 20 - 32 mmol/L Final     Carbon Dioxide (CO2)   Date Value Ref Range Status   08/03/2022 27 20 - 32 mmol/L Final     Anion Gap   Date Value Ref Range Status   08/03/2022 5 3 - 14 mmol/L Final   07/24/2020 2 (L) 3 - 14 mmol/L Final     Glucose   Date Value Ref Range Status   08/03/2022 88 70 - 99 mg/dL Final   07/24/2020 88 70 - 99 mg/dL Final     Urea Nitrogen   Date Value Ref Range Status   08/03/2022 14 7 - 30 mg/dL Final   07/24/2020 15 7 - 30 mg/dL Final     Creatinine   Date Value Ref Range Status   08/03/2022 0.73 0.52 - 1.04 mg/dL Final   07/24/2020 0.71 0.52 - 1.04 mg/dL Final     GFR Estimate   Date Value Ref Range Status   08/03/2022 88 >60 mL/min/1.73m2 Final     Comment:     Effective December 21, 2021 eGFRcr in adults is calculated using the 2021 CKD-EPI creatinine equation which includes age and gender (Francisco Javier et al., NEJ, DOI: 10.1056/WNAEtt5235568)   07/24/2020 87 >60 mL/min/[1.73_m2] Final     Comment:     Non  GFR Calc  Starting 12/18/2018, serum creatinine based estimated GFR (eGFR) will be   calculated using the Chronic Kidney Disease Epidemiology Collaboration   (CKD-EPI) equation.       Calcium   Date Value Ref Range Status   08/03/2022 9.6 8.5 - 10.1 mg/dL Final   07/24/2020 9.4 8.5 - 10.1 mg/dL Final     TSH   Date Value Ref Range Status   08/03/2022 0.87 0.40 - 4.00 mU/L Final   07/24/2020 1.00 0.40 - 4.00 mU/L Final     MENTAL STATUS EXAM                                                                                       Mood anxious. Affect congruent to mood Thought process, including associations, was unremarkable and thought content was devoid of suicidal and homicidal ideation and psychotic thought. No hallucinations. Insight was  good. Judgment was intact and adequate for safety. Fund of knowledge was intact. Pt demonstrates no obvious problems with attention, concentration, language, recent or remote memory although these were not formally tested.     ASSESSMENT                                                                                                      HISTORICAL:  Initial psych note 8 2015         NOTES:  She took CBD for while and she felt contributed to depression. GeneSight testing correlated with Effexor SEs when she was on higher doses    Mony Szymanski is a 72  year old, female who has long history in terms of depression, anxiety - therapy throughout the years. Depression worsened along with isolation and the Covid - 19 pandemic. Mony had been taking Effexor, Serax, and trazodone for years. She was on Effexor for 20+ years. She was also on Wellbutrin.    She had a rough winter when in [place] this year - hospitalized and was working regularly with a psychiatrist and a therapist. Effexor was discontinued as was Wellbutrin. She is NOT having the shaking like she used to since this change. She was started on Zoloft and she feels it is helping. Psychiatrist wasn't keen on her taking quetiapine prn anxiety / insomnia. We discussed this today: yes, this is in class of meds the antipsychotics thus comes with risk increased appetite, weight gain, increase lipids and glucose. With this being said, this is a very low dose she is taking hence likely less risk for these SEs. It helps her sleep - we agreed on keeping it. We also discussed the oxazepam: benzos their risks and over time medicine has shifted to try avoid long-term use of these meds. We reviewed risks such as memory / cognitive, falls, tolerance, dependence. The loza in Mony's life is her taking oxazepam greatly improves her quality of life / daily function. We came to agreement we feel therefore the benefits of her continuing this medication outweigh the SEs. Her primary started her on  mirtazapine 7.5 mg earlier this week to try help with sleep (Mony hasn't been taking quetiapine) and not helping with sleep but she thinks it is already helping with mood.       TREATMENT RISK STATEMENT:  The risks, benefits, alternatives and potential adverse effects have been explained and are understood by the pt.  The pt agrees to the treatment plan with the ability to do so.   The pt knows to call the clinic for any problems or access emergency care if needed.        DIAGNOSES                    MDD, recurrent mild  ED  OCPD traits    PLAN                                                                                                                    1)  MEDICATIONS:         -- Continue quetiapine 25 mg 1-2 tabs HS prn anxiety / insomnia I filled this today.continue Serax 15 mg up to 3 times daily prn anxiety / insomnia I sent in refills today.  Continue Zoloft 50 mg daily and Remeron 7.5 mg HS    2)  THERAPY:  No Change    3)  LABS:  None    4)  PT MONITOR [call for probs]:  worsening sx, SEs from meds    5)  REFERRALS [CD, medical, other]:  None    6)  RTC: ~ 3 weeks    Of note: when in [place] her pharmacy is [email or web address] in [place] on [address] [facility]  Phone # [phone].

## 2023-05-08 ENCOUNTER — TELEPHONE (OUTPATIENT)
Dept: PSYCHIATRY | Facility: OTHER | Age: 73
End: 2023-05-08

## 2023-05-08 NOTE — TELEPHONE ENCOUNTER
Received a PA from [facility] for Oxazepam 15MG capsules. Submitted on CMM. Waiting for a response.

## 2023-05-09 ENCOUNTER — HOSPITAL ENCOUNTER (OUTPATIENT)
Dept: BONE DENSITY | Facility: HOSPITAL | Age: 73
Discharge: HOME | End: 2023-05-09
Attending: FAMILY MEDICINE | Admitting: FAMILY MEDICINE
Payer: MEDICARE

## 2023-05-09 DIAGNOSIS — F33.2 SEVERE EPISODE OF RECURRENT MAJOR DEPRESSIVE DISORDER, WITHOUT PSYCHOTIC FEATURES (H): Primary | ICD-10-CM

## 2023-05-09 PROCEDURE — 77080 DXA BONE DENSITY AXIAL: CPT

## 2023-05-15 NOTE — TELEPHONE ENCOUNTER
Will you relay the denial for oxazepam to Mony? We could try a different benzodiazepine such as clonazepam (Klonopin) or lorazepam (Ativan). If she is much opposed to this I can try writing a letter.

## 2023-05-16 ENCOUNTER — TELEPHONE (OUTPATIENT)
Dept: PSYCHIATRY | Facility: OTHER | Age: 73
End: 2023-05-16

## 2023-05-16 DIAGNOSIS — F33.1 MAJOR DEPRESSIVE DISORDER, RECURRENT EPISODE, MODERATE (H): Primary | ICD-10-CM

## 2023-05-16 DIAGNOSIS — F41.1 GAD (GENERALIZED ANXIETY DISORDER): ICD-10-CM

## 2023-05-16 NOTE — TELEPHONE ENCOUNTER
Patient contacted with information pertaining to the PA denial for Oxazepam.  Patient is willing to try Ativan.  She has tried in the past and feels it has helped.  Patient states Klonopin did not help.  Please send Rx to [facility] in [place].  Next f/u appt is on 5-24-23.  Thank you

## 2023-05-17 ENCOUNTER — LAB (OUTPATIENT)
Dept: LAB | Facility: OTHER | Age: 73
End: 2023-05-17
Payer: MEDICARE

## 2023-05-17 DIAGNOSIS — M85.80 OSTEOPENIA WITH HIGH RISK OF FRACTURE: ICD-10-CM

## 2023-05-17 LAB
ANION GAP SERPL CALCULATED.3IONS-SCNC: 10 MMOL/L (ref 7–15)
BUN SERPL-MCNC: 13.4 MG/DL (ref 8–23)
CALCIUM SERPL-MCNC: 10 MG/DL (ref 8.8–10.2)
CHLORIDE SERPL-SCNC: 103 MMOL/L (ref 98–107)
CREAT SERPL-MCNC: 0.72 MG/DL (ref 0.51–0.95)
DEPRECATED HCO3 PLAS-SCNC: 28 MMOL/L (ref 22–29)
GFR SERPL CREATININE-BSD FRML MDRD: 89 ML/MIN/1.73M2
GLUCOSE SERPL-MCNC: 87 MG/DL (ref 70–99)
POTASSIUM SERPL-SCNC: 3.3 MMOL/L (ref 3.4–5.3)
SODIUM SERPL-SCNC: 141 MMOL/L (ref 136–145)

## 2023-05-17 PROCEDURE — 36415 COLL VENOUS BLD VENIPUNCTURE: CPT | Mod: ZL

## 2023-05-17 PROCEDURE — 82306 VITAMIN D 25 HYDROXY: CPT | Mod: ZL

## 2023-05-17 PROCEDURE — 80048 BASIC METABOLIC PNL TOTAL CA: CPT | Mod: ZL

## 2023-05-17 RX ORDER — LORAZEPAM 1 MG/1
1 TABLET ORAL 3 TIMES DAILY PRN
Qty: 90 TABLET | Refills: 3 | Status: SHIPPED | OUTPATIENT
Start: 2023-05-17 | End: 2023-08-10

## 2023-05-17 NOTE — TELEPHONE ENCOUNTER
Dose equivalent is oxazepam 15 mg to 1 mg lorazepam. I sent lorazepam 1 mg up to 3 times daily prn anxiety to [facility].

## 2023-05-19 ENCOUNTER — TELEPHONE (OUTPATIENT)
Dept: PSYCHIATRY | Facility: OTHER | Age: 73
End: 2023-05-19

## 2023-05-19 LAB — DEPRECATED CALCIDIOL+CALCIFEROL SERPL-MC: 99 UG/L (ref 20–75)

## 2023-05-19 NOTE — TELEPHONE ENCOUNTER
[facility] for LORazepam 1MG tablets. Submitted on CMM. Waiting for a response.

## 2023-05-22 NOTE — TELEPHONE ENCOUNTER
Received an APPROVAL from [facility] for Lorazepam 1mg tablets. Effective 02/18/2023 to 05/19/2024. Forms scanned to Epic.

## 2023-05-24 ENCOUNTER — TELEPHONE (OUTPATIENT)
Dept: FAMILY MEDICINE | Facility: OTHER | Age: 73
End: 2023-05-24

## 2023-05-24 ENCOUNTER — OFFICE VISIT (OUTPATIENT)
Dept: PSYCHIATRY | Facility: OTHER | Age: 73
End: 2023-05-24
Attending: PSYCHIATRY & NEUROLOGY
Payer: COMMERCIAL

## 2023-05-24 VITALS
BODY MASS INDEX: 24.89 KG/M2 | TEMPERATURE: 98.5 F | WEIGHT: 145 LBS | OXYGEN SATURATION: 99 % | DIASTOLIC BLOOD PRESSURE: 82 MMHG | SYSTOLIC BLOOD PRESSURE: 130 MMHG | HEART RATE: 75 BPM

## 2023-05-24 DIAGNOSIS — F41.1 GAD (GENERALIZED ANXIETY DISORDER): Primary | ICD-10-CM

## 2023-05-24 PROCEDURE — G0463 HOSPITAL OUTPT CLINIC VISIT: HCPCS

## 2023-05-24 PROCEDURE — 99213 OFFICE O/P EST LOW 20 MIN: CPT | Performed by: PSYCHIATRY & NEUROLOGY

## 2023-05-24 ASSESSMENT — COLUMBIA-SUICIDE SEVERITY RATING SCALE - C-SSRS
6. IN YOUR LIFETIME, HAVE YOU EVER DONE ANYTHING, STARTED TO DO ANYTHING, OR PREPARED TO DO ANYTHING TO END YOUR LIFE?: NO
5. HAVE YOU STARTED TO WORK OUT OR WORKED OUT THE DETAILS OF HOW TO KILL YOURSELF? DO YOU INTEND TO CARRY OUT THIS PLAN?: NO
3. HAVE YOU BEEN THINKING ABOUT HOW YOU MIGHT KILL YOURSELF?: YES
4. HAVE YOU HAD THESE THOUGHTS AND HAD SOME INTENTION OF ACTING ON THEM?: NO
1. IN THE PAST MONTH, HAVE YOU WISHED YOU WERE DEAD OR WISHED YOU COULD GO TO SLEEP AND NOT WAKE UP?: NO
2. HAVE YOU ACTUALLY HAD ANY THOUGHTS OF KILLING YOURSELF?: YES

## 2023-05-24 ASSESSMENT — ANXIETY QUESTIONNAIRES
2. NOT BEING ABLE TO STOP OR CONTROL WORRYING: MORE THAN HALF THE DAYS
GAD7 TOTAL SCORE: 15
7. FEELING AFRAID AS IF SOMETHING AWFUL MIGHT HAPPEN: SEVERAL DAYS
4. TROUBLE RELAXING: NEARLY EVERY DAY
1. FEELING NERVOUS, ANXIOUS, OR ON EDGE: NEARLY EVERY DAY
GAD7 TOTAL SCORE: 15
3. WORRYING TOO MUCH ABOUT DIFFERENT THINGS: SEVERAL DAYS
5. BEING SO RESTLESS THAT IT IS HARD TO SIT STILL: MORE THAN HALF THE DAYS
6. BECOMING EASILY ANNOYED OR IRRITABLE: NEARLY EVERY DAY
7. FEELING AFRAID AS IF SOMETHING AWFUL MIGHT HAPPEN: SEVERAL DAYS
8. IF YOU CHECKED OFF ANY PROBLEMS, HOW DIFFICULT HAVE THESE MADE IT FOR YOU TO DO YOUR WORK, TAKE CARE OF THINGS AT HOME, OR GET ALONG WITH OTHER PEOPLE?: SOMEWHAT DIFFICULT
IF YOU CHECKED OFF ANY PROBLEMS ON THIS QUESTIONNAIRE, HOW DIFFICULT HAVE THESE PROBLEMS MADE IT FOR YOU TO DO YOUR WORK, TAKE CARE OF THINGS AT HOME, OR GET ALONG WITH OTHER PEOPLE: SOMEWHAT DIFFICULT
GAD7 TOTAL SCORE: 15

## 2023-05-24 ASSESSMENT — PAIN SCALES - GENERAL: PAINLEVEL: MILD PAIN (2)

## 2023-05-24 NOTE — PROGRESS NOTES
"    PSYCHIATRY CLINIC PROGRESS NOTE     SUBJECTIVE / INTERIM HISTORY                                                                        Social- retired. Interests:reading, being physical / active & working out, grandkids, volunteers, loves being with kiddo   Children-   Grown children scattered throughout    Last visit 5/5/23:  Continue quetiapine 25 mg 1-2 tabs HS prn anxiety / insomnia I filled this today.continue Serax 15 mg up to 3 times daily prn anxiety / insomnia I sent in refills today.  Continue Zoloft 50 mg daily and Remeron 7.5 mg HS  - interim last visit insurance would no longer cover oxazepam. Switched to Ativan and going okay  - stopped mirtazapine about 2 weeks ago. Mony didn't feel it was helping. Has not noticed any difference with mood or anxiety since stopping it.  - sleep has been pretty good. Tends to wake up every couple hours but is able to meditate and fall back asleep  - mom narcissistic and \"weird\"     MEDICAL ROS- has had pins and needles sensations in back of head and neck. Initially when put on Zoloft felt like back of head / neck was in a clamp  MEDICAL / SURGICAL HISTORY                     Patient Active Problem List   Diagnosis     Generalized anxiety disorder     Chronic fatigue syndrome     Attention deficit disorder     Obsessive-compulsive disorder     FH: colon cancer     Mixed hyperlipidemia     Acquired hypothyroidism     Severe episode of recurrent major depressive disorder, without psychotic features (H)     Vaso vagal episode     Small bowel obstruction due to adhesions (H)     Melanoma of cheek (H)     Acquired facial deformity     Screening for malignant neoplasm of colon     ALLERGY   Codeine  MEDICATIONS                                                                                             Current Outpatient Medications   Medication Sig     alendronate (FOSAMAX) 70 MG tablet Take 1 tablet (70 mg) by mouth every 7 days for 90 days     Calcium Carbonate (TUMS " 500 OR) Take by mouth 2 times daily     Cholecalciferol (VITAMIN D) 1000 UNITS capsule Take 1 capsule by mouth daily.     fish oil-omega-3 fatty acids 1000 MG capsule Take 2 capsules by mouth daily.     fluticasone (FLONASE) 50 MCG/ACT nasal spray Use 2 spray(s) in each nostril once daily     glucosamine 500 MG CAPS Take by mouth daily      levothyroxine (SYNTHROID/LEVOTHROID) 50 MCG tablet Take 1 tablet (50 mcg) by mouth daily     LORazepam (ATIVAN) 1 MG tablet Take 1 tablet (1 mg) by mouth 3 times daily as needed for anxiety     MAGNESIUM OXIDE PO Take 200 mg by mouth daily     Multiple vitamin TABS Take 1 tablet by oral route every day with food     QUEtiapine (SEROQUEL) 25 MG tablet Take 1 - 2 tabs as needed for anxiety / insomnia     sertraline (ZOLOFT) 50 MG tablet Take 1 tablet by mouth daily     traZODone (DESYREL) 50 MG tablet TAKE 1 TO 3 TABLETS BY MOUTH AT BEDTIME     mirtazapine (REMERON) 7.5 MG tablet Take 1 tablet (7.5 mg) by mouth At Bedtime (Patient not taking: Reported on 5/24/2023)     No current facility-administered medications for this visit.       VITALS   /82   Pulse 75   Temp 98.5  F (36.9  C) (Tympanic)   Wt 65.8 kg (145 lb)   SpO2 99%   BMI 24.89 kg/m       PHQ9                     [unfilled]  LABS                                                                                                                           Last Comprehensive Metabolic Panel:  Sodium   Date Value Ref Range Status   05/17/2023 141 136 - 145 mmol/L Final   07/24/2020 138 133 - 144 mmol/L Final     Potassium   Date Value Ref Range Status   05/17/2023 3.3 (L) 3.4 - 5.3 mmol/L Final   08/03/2022 3.8 3.4 - 5.3 mmol/L Final   07/24/2020 3.9 3.4 - 5.3 mmol/L Final     Chloride   Date Value Ref Range Status   05/17/2023 103 98 - 107 mmol/L Final   08/03/2022 105 94 - 109 mmol/L Final   07/24/2020 107 94 - 109 mmol/L Final     Carbon Dioxide   Date Value Ref Range Status   07/24/2020 29 20 - 32 mmol/L  "Final     Carbon Dioxide (CO2)   Date Value Ref Range Status   05/17/2023 28 22 - 29 mmol/L Final   08/03/2022 27 20 - 32 mmol/L Final     Anion Gap   Date Value Ref Range Status   05/17/2023 10 7 - 15 mmol/L Final   08/03/2022 5 3 - 14 mmol/L Final   07/24/2020 2 (L) 3 - 14 mmol/L Final     Glucose   Date Value Ref Range Status   05/17/2023 87 70 - 99 mg/dL Final   08/03/2022 88 70 - 99 mg/dL Final   07/24/2020 88 70 - 99 mg/dL Final     Urea Nitrogen   Date Value Ref Range Status   05/17/2023 13.4 8.0 - 23.0 mg/dL Final   08/03/2022 14 7 - 30 mg/dL Final   07/24/2020 15 7 - 30 mg/dL Final     Creatinine   Date Value Ref Range Status   05/17/2023 0.72 0.51 - 0.95 mg/dL Final   07/24/2020 0.71 0.52 - 1.04 mg/dL Final     GFR Estimate   Date Value Ref Range Status   05/17/2023 89 >60 mL/min/1.73m2 Final     Comment:     eGFR calculated using 2021 CKD-EPI equation.   07/24/2020 87 >60 mL/min/[1.73_m2] Final     Comment:     Non  GFR Calc  Starting 12/18/2018, serum creatinine based estimated GFR (eGFR) will be   calculated using the Chronic Kidney Disease Epidemiology Collaboration   (CKD-EPI) equation.       Calcium   Date Value Ref Range Status   05/17/2023 10.0 8.8 - 10.2 mg/dL Final   07/24/2020 9.4 8.5 - 10.1 mg/dL Final     TSH   Date Value Ref Range Status   08/03/2022 0.87 0.40 - 4.00 mU/L Final   07/24/2020 1.00 0.40 - 4.00 mU/L Final     MENTAL STATUS EXAM                                                                                       Mood \"I know I'm doing a lot better since the last time I saw you.\" Affect congruent to mood.  Thought process, including associations, was unremarkable and thought content was devoid of suicidal and homicidal ideation and psychotic thought. No hallucinations. Insight was good. Judgment was intact and adequate for safety. Fund of knowledge was intact. Pt demonstrates no obvious problems with attention, concentration, language, recent or remote memory " although these were not formally tested.     ASSESSMENT                                                                                                      HISTORICAL:  Initial psych note 8 2015         NOTES:  She took CBD for while and she felt contributed to depression. GeneSight testing correlated with Effexor SEs when she was on higher doses. She had shaking when on Effexor and WEllbutrin.    oMny Szymanski is a 73 year old, female with long history of depression, anxiety - therapy throughout the years. Depression worsened along with isolation and the Covid - 19 pandemic. Mony had been taking Effexor, Serax, and trazodone for years. She was on Effexor for 20+ years. She was also on Wellbutrin.    She had a rough winter  when in [place] this year - hospitalized and was working regularly with a psychiatrist and a therapist. Effexor was discontinued as was Wellbutrin. Last visit she was taking mirtazapine 7.5 mg HS and today she reports she stopped it 2 weeks ago given she didn't feel it was helping. No difference in mood since she stopped it. Interim last visit I received paperwork from her insurance noting they will no longer cover oxazepam -> we switched to lorazepam and for the most part is similar effect for Mony to the oxazepam.. and much cheaper!     TREATMENT RISK STATEMENT:  The risks, benefits, alternatives and potential adverse effects have been explained and are understood by the pt.  The pt agrees to the treatment plan with the ability to do so.   The pt knows to call the clinic for any problems or access emergency care if needed.        DIAGNOSES                    MDD, recurrent mild  ED  OCPD traits    PLAN                                                                                                                    1)  MEDICATIONS:         -- Continue quetiapine 25 mg 1-2 tabs HS prn anxiety / insomnia I filled this today. No longer on oxazepam 15 mg up to 3 times daily prn anxiety / insomnia -> we  switched given insurance wouldn't cover to lorazepam 1 mg up to 3 times daily.  Continue Zoloft 50 mg daily. She discontinued Remeron 7.5 mg HS    2)  THERAPY:  No Change    3)  LABS:  None    4)  PT MONITOR [call for probs]:  worsening sx, SEs from meds    5)  REFERRALS [CD, medical, other]:  None    6)  RTC: ~ 1 month    Of note: when in [place] her pharmacy is [facility] in [place] on [address] [facility] [address]  Phone # [phone].                                                             Answers for HPI/ROS submitted by the patient on 5/24/2023  If you checked off any problems, how difficult have these problems made it for you to do your work, take care of things at home, or get along with other people?: Somewhat difficult  PHQ9 TOTAL SCORE: 8  ED 7 TOTAL SCORE: 15

## 2023-05-27 ENCOUNTER — APPOINTMENT (OUTPATIENT)
Dept: CT IMAGING | Facility: HOSPITAL | Age: 73
End: 2023-05-27
Attending: EMERGENCY MEDICINE
Payer: MEDICARE

## 2023-05-27 ENCOUNTER — APPOINTMENT (OUTPATIENT)
Dept: GENERAL RADIOLOGY | Facility: HOSPITAL | Age: 73
End: 2023-05-27
Attending: EMERGENCY MEDICINE
Payer: MEDICARE

## 2023-05-27 ENCOUNTER — HOSPITAL ENCOUNTER (EMERGENCY)
Facility: HOSPITAL | Age: 73
Discharge: HOME | End: 2023-05-27
Attending: EMERGENCY MEDICINE | Admitting: EMERGENCY MEDICINE
Payer: MEDICARE

## 2023-05-27 VITALS
OXYGEN SATURATION: 100 % | RESPIRATION RATE: 18 BRPM | TEMPERATURE: 99.4 F | HEART RATE: 99 BPM | DIASTOLIC BLOOD PRESSURE: 96 MMHG | SYSTOLIC BLOOD PRESSURE: 140 MMHG

## 2023-05-27 DIAGNOSIS — S80.01XA CONTUSION OF RIGHT KNEE, INITIAL ENCOUNTER: ICD-10-CM

## 2023-05-27 DIAGNOSIS — S09.90XA INJURY OF HEAD, INITIAL ENCOUNTER: ICD-10-CM

## 2023-05-27 DIAGNOSIS — S90.31XA CONTUSION OF RIGHT FOOT, INITIAL ENCOUNTER: ICD-10-CM

## 2023-05-27 PROCEDURE — G1010 CDSM STANSON: HCPCS

## 2023-05-27 PROCEDURE — 73630 X-RAY EXAM OF FOOT: CPT | Mod: RT

## 2023-05-27 PROCEDURE — 73562 X-RAY EXAM OF KNEE 3: CPT | Mod: RT

## 2023-05-27 PROCEDURE — 99285 EMERGENCY DEPT VISIT HI MDM: CPT | Mod: 25

## 2023-05-27 PROCEDURE — 99284 EMERGENCY DEPT VISIT MOD MDM: CPT | Performed by: EMERGENCY MEDICINE

## 2023-05-27 ASSESSMENT — ACTIVITIES OF DAILY LIVING (ADL): ADLS_ACUITY_SCORE: 35

## 2023-05-27 ASSESSMENT — ENCOUNTER SYMPTOMS
CARDIOVASCULAR NEGATIVE: 1
RESPIRATORY NEGATIVE: 1
DIZZINESS: 1
GASTROINTESTINAL NEGATIVE: 1
EYES NEGATIVE: 1
CONSTITUTIONAL NEGATIVE: 1
HEMATOLOGIC/LYMPHATIC NEGATIVE: 1

## 2023-05-27 NOTE — ED NOTES
Abrasion on R knee cleaned with saline, abx ointment applied, bandage placed. Ace wrapped both R knee and R foot.   Pt. Discharged and exited department via wheelchair.
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
## 2023-06-10 LAB — PHQ9 SCORE: 11

## 2023-06-21 ENCOUNTER — OFFICE VISIT (OUTPATIENT)
Dept: PSYCHIATRY | Facility: OTHER | Age: 73
End: 2023-06-21
Attending: PSYCHIATRY & NEUROLOGY
Payer: COMMERCIAL

## 2023-06-21 VITALS
DIASTOLIC BLOOD PRESSURE: 84 MMHG | TEMPERATURE: 98.6 F | OXYGEN SATURATION: 97 % | HEART RATE: 87 BPM | BODY MASS INDEX: 24.89 KG/M2 | SYSTOLIC BLOOD PRESSURE: 128 MMHG | WEIGHT: 145 LBS

## 2023-06-21 DIAGNOSIS — F41.1 GAD (GENERALIZED ANXIETY DISORDER): Primary | ICD-10-CM

## 2023-06-21 PROCEDURE — 99213 OFFICE O/P EST LOW 20 MIN: CPT | Performed by: PSYCHIATRY & NEUROLOGY

## 2023-06-21 PROCEDURE — G0463 HOSPITAL OUTPT CLINIC VISIT: HCPCS

## 2023-06-21 ASSESSMENT — PATIENT HEALTH QUESTIONNAIRE - PHQ9
SUM OF ALL RESPONSES TO PHQ QUESTIONS 1-9: 8
10. IF YOU CHECKED OFF ANY PROBLEMS, HOW DIFFICULT HAVE THESE PROBLEMS MADE IT FOR YOU TO DO YOUR WORK, TAKE CARE OF THINGS AT HOME, OR GET ALONG WITH OTHER PEOPLE: SOMEWHAT DIFFICULT
SUM OF ALL RESPONSES TO PHQ QUESTIONS 1-9: 8

## 2023-06-21 ASSESSMENT — ANXIETY QUESTIONNAIRES
IF YOU CHECKED OFF ANY PROBLEMS ON THIS QUESTIONNAIRE, HOW DIFFICULT HAVE THESE PROBLEMS MADE IT FOR YOU TO DO YOUR WORK, TAKE CARE OF THINGS AT HOME, OR GET ALONG WITH OTHER PEOPLE: SOMEWHAT DIFFICULT
5. BEING SO RESTLESS THAT IT IS HARD TO SIT STILL: NEARLY EVERY DAY
3. WORRYING TOO MUCH ABOUT DIFFERENT THINGS: SEVERAL DAYS
GAD7 TOTAL SCORE: 11
4. TROUBLE RELAXING: MORE THAN HALF THE DAYS
GAD7 TOTAL SCORE: 11
1. FEELING NERVOUS, ANXIOUS, OR ON EDGE: MORE THAN HALF THE DAYS
8. IF YOU CHECKED OFF ANY PROBLEMS, HOW DIFFICULT HAVE THESE MADE IT FOR YOU TO DO YOUR WORK, TAKE CARE OF THINGS AT HOME, OR GET ALONG WITH OTHER PEOPLE?: SOMEWHAT DIFFICULT
2. NOT BEING ABLE TO STOP OR CONTROL WORRYING: SEVERAL DAYS
7. FEELING AFRAID AS IF SOMETHING AWFUL MIGHT HAPPEN: SEVERAL DAYS
GAD7 TOTAL SCORE: 11
7. FEELING AFRAID AS IF SOMETHING AWFUL MIGHT HAPPEN: SEVERAL DAYS
6. BECOMING EASILY ANNOYED OR IRRITABLE: SEVERAL DAYS

## 2023-06-21 ASSESSMENT — COLUMBIA-SUICIDE SEVERITY RATING SCALE - C-SSRS
6. IN YOUR LIFETIME, HAVE YOU EVER DONE ANYTHING, STARTED TO DO ANYTHING, OR PREPARED TO DO ANYTHING TO END YOUR LIFE?: NO
1. IN THE PAST MONTH, HAVE YOU WISHED YOU WERE DEAD OR WISHED YOU COULD GO TO SLEEP AND NOT WAKE UP?: YES
2. HAVE YOU ACTUALLY HAD ANY THOUGHTS OF KILLING YOURSELF?: NO

## 2023-06-21 ASSESSMENT — PAIN SCALES - GENERAL: PAINLEVEL: NO PAIN (0)

## 2023-06-21 NOTE — PROGRESS NOTES
"    PSYCHIATRY CLINIC PROGRESS NOTE     SUBJECTIVE / INTERIM HISTORY                                                                        Social- retired. Interests:reading, being physical / active & working out, grandkids, volunteers, loves being with kiddo   Children-   Grown children scattered throughout    Last visit 5/24/23: Continue quetiapine 25 mg 1-2 tabs HS prn anxiety / insomnia I filled this today. No longer on oxazepam 15 mg up to 3 times daily prn anxiety / insomnia -> we switched given insurance wouldn't cover to lorazepam 1 mg up to 3 times daily.  Continue Zoloft 50 mg daily. She discontinued Remeron 7.5 mg HS  - her and Tarik talk about / reflect at end of week   - gets tearful when she starts talking about / thinking about friends/ social situations  - has had some good days  - is becoming more comfortable spending time by her self.  - sleep has been pretty good. Tends to wake up every couple hours but is able to meditate and fall back asleep  - mom narcissistic and \"weird\"     MEDICAL ROS- has had pins and needles sensations in back of head and neck. Initially when put on Zoloft felt like back of head / neck was in a clamp  MEDICAL / SURGICAL HISTORY                     Patient Active Problem List   Diagnosis     Generalized anxiety disorder     Chronic fatigue syndrome     Attention deficit disorder     Obsessive-compulsive disorder     FH: colon cancer     Mixed hyperlipidemia     Acquired hypothyroidism     Severe episode of recurrent major depressive disorder, without psychotic features (H)     Vaso vagal episode     Small bowel obstruction due to adhesions (H)     Melanoma of cheek (H)     Acquired facial deformity     Screening for malignant neoplasm of colon     ALLERGY   Codeine  MEDICATIONS                                                                                             Current Outpatient Medications   Medication Sig     alendronate (FOSAMAX) 70 MG tablet Take 1 tablet (70 " mg) by mouth every 7 days for 90 days     Calcium Carbonate (TUMS 500 OR) Take by mouth 2 times daily     Cholecalciferol (VITAMIN D) 1000 UNITS capsule Take 1 capsule by mouth daily.     fish oil-omega-3 fatty acids 1000 MG capsule Take 2 capsules by mouth daily.     fluticasone (FLONASE) 50 MCG/ACT nasal spray Use 2 spray(s) in each nostril once daily     glucosamine 500 MG CAPS Take by mouth daily      levothyroxine (SYNTHROID/LEVOTHROID) 50 MCG tablet Take 1 tablet (50 mcg) by mouth daily     LORazepam (ATIVAN) 1 MG tablet Take 1 tablet (1 mg) by mouth 3 times daily as needed for anxiety     MAGNESIUM OXIDE PO Take 200 mg by mouth daily     Multiple vitamin TABS Take 1 tablet by oral route every day with food     QUEtiapine (SEROQUEL) 25 MG tablet Take 1 - 2 tabs as needed for anxiety / insomnia     sertraline (ZOLOFT) 50 MG tablet Take 1 tablet by mouth daily     traZODone (DESYREL) 50 MG tablet TAKE 1 TO 3 TABLETS BY MOUTH AT BEDTIME     No current facility-administered medications for this visit.       VITALS   /84   Pulse 87   Temp 98.6  F (37  C) (Tympanic)   Wt 65.8 kg (145 lb)   SpO2 97%   BMI 24.89 kg/m       PHQ9                     [unfilled]  LABS                                                                                                                           Last Comprehensive Metabolic Panel:  Sodium   Date Value Ref Range Status   05/17/2023 141 136 - 145 mmol/L Final   07/24/2020 138 133 - 144 mmol/L Final     Potassium   Date Value Ref Range Status   05/17/2023 3.3 (L) 3.4 - 5.3 mmol/L Final   08/03/2022 3.8 3.4 - 5.3 mmol/L Final   07/24/2020 3.9 3.4 - 5.3 mmol/L Final     Chloride   Date Value Ref Range Status   05/17/2023 103 98 - 107 mmol/L Final   08/03/2022 105 94 - 109 mmol/L Final   07/24/2020 107 94 - 109 mmol/L Final     Carbon Dioxide   Date Value Ref Range Status   07/24/2020 29 20 - 32 mmol/L Final     Carbon Dioxide (CO2)   Date Value Ref Range Status  "  05/17/2023 28 22 - 29 mmol/L Final   08/03/2022 27 20 - 32 mmol/L Final     Anion Gap   Date Value Ref Range Status   05/17/2023 10 7 - 15 mmol/L Final   08/03/2022 5 3 - 14 mmol/L Final   07/24/2020 2 (L) 3 - 14 mmol/L Final     Glucose   Date Value Ref Range Status   05/17/2023 87 70 - 99 mg/dL Final   08/03/2022 88 70 - 99 mg/dL Final   07/24/2020 88 70 - 99 mg/dL Final     Urea Nitrogen   Date Value Ref Range Status   05/17/2023 13.4 8.0 - 23.0 mg/dL Final   08/03/2022 14 7 - 30 mg/dL Final   07/24/2020 15 7 - 30 mg/dL Final     Creatinine   Date Value Ref Range Status   05/17/2023 0.72 0.51 - 0.95 mg/dL Final   07/24/2020 0.71 0.52 - 1.04 mg/dL Final     GFR Estimate   Date Value Ref Range Status   05/17/2023 89 >60 mL/min/1.73m2 Final     Comment:     eGFR calculated using 2021 CKD-EPI equation.   07/24/2020 87 >60 mL/min/[1.73_m2] Final     Comment:     Non  GFR Calc  Starting 12/18/2018, serum creatinine based estimated GFR (eGFR) will be   calculated using the Chronic Kidney Disease Epidemiology Collaboration   (CKD-EPI) equation.       Calcium   Date Value Ref Range Status   05/17/2023 10.0 8.8 - 10.2 mg/dL Final   07/24/2020 9.4 8.5 - 10.1 mg/dL Final     TSH   Date Value Ref Range Status   08/03/2022 0.87 0.40 - 4.00 mU/L Final   07/24/2020 1.00 0.40 - 4.00 mU/L Final     MENTAL STATUS EXAM                                                                                       Mood \"I've had some good days\" Affect congruent to mood.  Thought process, including associations, was unremarkable and thought content was devoid of suicidal and homicidal ideation and psychotic thought. No hallucinations. Insight was good. Judgment was intact and adequate for safety. Fund of knowledge was intact. Pt demonstrates no obvious problems with attention, concentration, language, recent or remote memory although these were not formally tested.     ASSESSMENT                                                 "                                                      HISTORICAL:  Initial psych note 8 2015         NOTES:  She took CBD for while and she felt contributed to depression. [employer] testing correlated with Effexor SEs when she was on higher doses. She had shaking when on Effexor and WEllbutrin.    Mony Szymanski is a 73 year old, female with long history of depression, anxiety - therapy throughout the years. Depression worsened along with isolation and the Covid - 19 pandemic. Mony had been taking Effexor, Serax, and trazodone for years. She was on Effexor for 20+ years. She was also on Wellbutrin.    She had a rough winter when in [place] this year - hospitalized and was working regularly with a psychiatrist and a therapist. Effexor was discontinued as was Wellbutrin. Mony is now taking Zoloft. And instead of oxazepam, she is on lorazepam. I noted I think she is doing much better in comparison to the past several years! Some ups and downs are normal and overall seems her anxiety and depression are improving.     TREATMENT RISK STATEMENT:  The risks, benefits, alternatives and potential adverse effects have been explained and are understood by the pt.  The pt agrees to the treatment plan with the ability to do so.   The pt knows to call the clinic for any problems or access emergency care if needed.        DIAGNOSES                    MDD, recurrent mild  ED  OCPD traits    PLAN                                                                                                                    1)  MEDICATIONS:         -- Continue quetiapine 25 mg 1-2 tabs HS prn anxiety / insomnia. Continue  lorazepam 1 mg up to 3 times daily.  Continue Zoloft 50 mg daily.     2)  THERAPY:  No Change    3)  LABS:  None    4)  PT MONITOR [call for probs]:  worsening sx, SEs from meds    5)  REFERRALS [CD, medical, other]:  None    6)  RTC: ~ 1 month    Of note: when in [place] her pharmacy is [facility] in [place] on [address] and [facility] [address]   Phone # [phone].                                                           Answers for HPI/ROS submitted by the patient on 6/21/2023  If you checked off any problems, how difficult have these problems made it for you to do your work, take care of things at home, or get along with other people?: Somewhat difficult  PHQ9 TOTAL SCORE: 8  ED 7 TOTAL SCORE: 11

## 2023-06-29 ENCOUNTER — TRANSFERRED RECORDS (OUTPATIENT)
Dept: HEALTH INFORMATION MANAGEMENT | Facility: CLINIC | Age: 73
End: 2023-06-29

## 2023-07-26 ENCOUNTER — OFFICE VISIT (OUTPATIENT)
Dept: PSYCHIATRY | Facility: OTHER | Age: 73
End: 2023-07-26
Attending: PSYCHIATRY & NEUROLOGY
Payer: COMMERCIAL

## 2023-07-26 VITALS
OXYGEN SATURATION: 97 % | WEIGHT: 145 LBS | SYSTOLIC BLOOD PRESSURE: 120 MMHG | DIASTOLIC BLOOD PRESSURE: 88 MMHG | TEMPERATURE: 97.8 F | HEART RATE: 78 BPM | BODY MASS INDEX: 24.89 KG/M2

## 2023-07-26 DIAGNOSIS — F41.1 GAD (GENERALIZED ANXIETY DISORDER): ICD-10-CM

## 2023-07-26 PROCEDURE — 99213 OFFICE O/P EST LOW 20 MIN: CPT | Performed by: PSYCHIATRY & NEUROLOGY

## 2023-07-26 PROCEDURE — G0463 HOSPITAL OUTPT CLINIC VISIT: HCPCS

## 2023-07-26 RX ORDER — TRAZODONE HYDROCHLORIDE 50 MG/1
TABLET, FILM COATED ORAL
Qty: 270 TABLET | Refills: 3 | Status: SHIPPED | OUTPATIENT
Start: 2023-07-26 | End: 2024-09-04

## 2023-07-26 ASSESSMENT — PATIENT HEALTH QUESTIONNAIRE - PHQ9
10. IF YOU CHECKED OFF ANY PROBLEMS, HOW DIFFICULT HAVE THESE PROBLEMS MADE IT FOR YOU TO DO YOUR WORK, TAKE CARE OF THINGS AT HOME, OR GET ALONG WITH OTHER PEOPLE: SOMEWHAT DIFFICULT
SUM OF ALL RESPONSES TO PHQ QUESTIONS 1-9: 9
SUM OF ALL RESPONSES TO PHQ QUESTIONS 1-9: 9

## 2023-07-26 ASSESSMENT — ANXIETY QUESTIONNAIRES
1. FEELING NERVOUS, ANXIOUS, OR ON EDGE: NEARLY EVERY DAY
6. BECOMING EASILY ANNOYED OR IRRITABLE: MORE THAN HALF THE DAYS
2. NOT BEING ABLE TO STOP OR CONTROL WORRYING: SEVERAL DAYS
IF YOU CHECKED OFF ANY PROBLEMS ON THIS QUESTIONNAIRE, HOW DIFFICULT HAVE THESE PROBLEMS MADE IT FOR YOU TO DO YOUR WORK, TAKE CARE OF THINGS AT HOME, OR GET ALONG WITH OTHER PEOPLE: SOMEWHAT DIFFICULT
5. BEING SO RESTLESS THAT IT IS HARD TO SIT STILL: NEARLY EVERY DAY
4. TROUBLE RELAXING: NEARLY EVERY DAY
GAD7 TOTAL SCORE: 14
3. WORRYING TOO MUCH ABOUT DIFFERENT THINGS: SEVERAL DAYS
7. FEELING AFRAID AS IF SOMETHING AWFUL MIGHT HAPPEN: SEVERAL DAYS
GAD7 TOTAL SCORE: 14

## 2023-07-26 ASSESSMENT — PAIN SCALES - GENERAL: PAINLEVEL: NO PAIN (0)

## 2023-07-26 NOTE — PROGRESS NOTES
"    PSYCHIATRY CLINIC PROGRESS NOTE     SUBJECTIVE / INTERIM HISTORY                                                                        Social- retired. Interests:reading, being physical / active & working out, grandkids, volunteers, loves being with kiddo   Children-   Grown children scattered throughout    Last visit 5/24/23: Continue quetiapine 25 mg 1-2 tabs HS prn anxiety / insomnia I filled this today. No longer on oxazepam 15 mg up to 3 times daily prn anxiety / insomnia -> we switched given insurance wouldn't cover  mg to lorazepam 1 mg up to 3 times daily.  Continue Zoloft 50 mg daily. She discontinued Remeron 7.5HS  - \"I've been feeling better\". As anxiety is lowering she has been crying more, which is a positive thing for her.  - has been tired. Mony feels like she naps a lot. Sleeps from 9-6. In relation to be tiring   - in terms of anxiety and mood \"it's been a long haul\"  - started working with Hannah Zhouwell  - volunteering at [employer] is very helpful  - mom narcissistic and \"weird\". \"It was my job to keep her happy. Her happiness was more important than mine.\"    MEDICAL ROS- has had pins and needles sensations in back of head and neck. Initially when put on Zoloft felt like back of head / neck was in a clamp  MEDICAL / SURGICAL HISTORY                     Patient Active Problem List   Diagnosis    Generalized anxiety disorder    Chronic fatigue syndrome    Attention deficit disorder    Obsessive-compulsive disorder    FH: colon cancer    Mixed hyperlipidemia    Acquired hypothyroidism    Severe episode of recurrent major depressive disorder, without psychotic features (H)    Vaso vagal episode    Small bowel obstruction due to adhesions (H)    Melanoma of cheek (H)    Acquired facial deformity    Screening for malignant neoplasm of colon     ALLERGY   Codeine  MEDICATIONS                                                                                             Current Outpatient Medications "   Medication Sig    alendronate (FOSAMAX) 70 MG tablet Take 1 tablet (70 mg) by mouth every 7 days for 90 days    Calcium Carbonate (TUMS 500 OR) Take by mouth 2 times daily    Cholecalciferol (VITAMIN D) 1000 UNITS capsule Take 1 capsule by mouth daily.    fish oil-omega-3 fatty acids 1000 MG capsule Take 2 capsules by mouth daily.    fluticasone (FLONASE) 50 MCG/ACT nasal spray Use 2 spray(s) in each nostril once daily    glucosamine 500 MG CAPS Take by mouth daily     levothyroxine (SYNTHROID/LEVOTHROID) 50 MCG tablet Take 1 tablet (50 mcg) by mouth daily    LORazepam (ATIVAN) 1 MG tablet Take 1 tablet (1 mg) by mouth 3 times daily as needed for anxiety    MAGNESIUM OXIDE PO Take 200 mg by mouth daily    Multiple vitamin TABS Take 1 tablet by oral route every day with food    QUEtiapine (SEROQUEL) 25 MG tablet Take 1 - 2 tabs as needed for anxiety / insomnia    sertraline (ZOLOFT) 50 MG tablet Take 1 tablet by mouth daily    traZODone (DESYREL) 50 MG tablet TAKE 1 TO 3 TABLETS BY MOUTH AT BEDTIME     No current facility-administered medications for this visit.       VITALS   /88   Pulse 78   Temp 97.8  F (36.6  C) (Tympanic)   Wt 65.8 kg (145 lb)   SpO2 97%   BMI 24.89 kg/m       PHQ9                     [unfilled]  LABS                                                                                                                           Last Comprehensive Metabolic Panel:  Sodium   Date Value Ref Range Status   05/17/2023 141 136 - 145 mmol/L Final   07/24/2020 138 133 - 144 mmol/L Final     Potassium   Date Value Ref Range Status   05/17/2023 3.3 (L) 3.4 - 5.3 mmol/L Final   08/03/2022 3.8 3.4 - 5.3 mmol/L Final   07/24/2020 3.9 3.4 - 5.3 mmol/L Final     Chloride   Date Value Ref Range Status   05/17/2023 103 98 - 107 mmol/L Final   08/03/2022 105 94 - 109 mmol/L Final   07/24/2020 107 94 - 109 mmol/L Final     Carbon Dioxide   Date Value Ref Range Status   07/24/2020 29 20 - 32 mmol/L  "Final     Carbon Dioxide (CO2)   Date Value Ref Range Status   05/17/2023 28 22 - 29 mmol/L Final   08/03/2022 27 20 - 32 mmol/L Final     Anion Gap   Date Value Ref Range Status   05/17/2023 10 7 - 15 mmol/L Final   08/03/2022 5 3 - 14 mmol/L Final   07/24/2020 2 (L) 3 - 14 mmol/L Final     Glucose   Date Value Ref Range Status   05/17/2023 87 70 - 99 mg/dL Final   08/03/2022 88 70 - 99 mg/dL Final   07/24/2020 88 70 - 99 mg/dL Final     Urea Nitrogen   Date Value Ref Range Status   05/17/2023 13.4 8.0 - 23.0 mg/dL Final   08/03/2022 14 7 - 30 mg/dL Final   07/24/2020 15 7 - 30 mg/dL Final     Creatinine   Date Value Ref Range Status   05/17/2023 0.72 0.51 - 0.95 mg/dL Final   07/24/2020 0.71 0.52 - 1.04 mg/dL Final     GFR Estimate   Date Value Ref Range Status   05/17/2023 89 >60 mL/min/1.73m2 Final     Comment:     eGFR calculated using 2021 CKD-EPI equation.   07/24/2020 87 >60 mL/min/[1.73_m2] Final     Comment:     Non  GFR Calc  Starting 12/18/2018, serum creatinine based estimated GFR (eGFR) will be   calculated using the Chronic Kidney Disease Epidemiology Collaboration   (CKD-EPI) equation.       Calcium   Date Value Ref Range Status   05/17/2023 10.0 8.8 - 10.2 mg/dL Final   07/24/2020 9.4 8.5 - 10.1 mg/dL Final     TSH   Date Value Ref Range Status   08/03/2022 0.87 0.40 - 4.00 mU/L Final   07/24/2020 1.00 0.40 - 4.00 mU/L Final     MENTAL STATUS EXAM                                                                                       Mood \"I've been feeling better\" Affect congruent to mood.  Thought process, including associations, was unremarkable and thought content was devoid of suicidal and homicidal ideation and psychotic thought. No hallucinations. Insight was good. Judgment was intact and adequate for safety. Fund of knowledge was intact. Pt demonstrates no obvious problems with attention, concentration, language, recent or remote memory although these were not formally tested. "     ASSESSMENT                                                                                                      HISTORICAL:  Initial psych note 8 2015         NOTES:  She took CBD for while and she felt contributed to depression. GeneSight testing correlated with Effexor SEs when she was on higher doses. She had shaking when on Effexor and WEllbutrin.    Mony Szymanski is a 73 year old, female with long history of depression, anxiety - therapy throughout the years. Depression worsened along with isolation and the Covid - 19 pandemic. Mony had been taking Effexor, Serax, and trazodone for years. She was on Effexor for 20+ years. She was also on Wellbutrin.    Mony notes she continues to feel she is doing better after a long haul: in particular the past 6 months have been difficult. She established with therapist Hannah Ritter and feels they have good rapport.      TREATMENT RISK STATEMENT:  The risks, benefits, alternatives and potential adverse effects have been explained and are understood by the pt.  The pt agrees to the treatment plan with the ability to do so.   The pt knows to call the clinic for any problems or access emergency care if needed.        DIAGNOSES                    MDD, recurrent mild  ED  OCPD traits    PLAN                                                                                                                    1)  MEDICATIONS:         -- Continue quetiapine 25 mg 1-2 tabs HS prn anxiety / insomnia. Continue  lorazepam 1 mg up to 3 times daily.  Continue Zoloft 50 mg daily.     2)  THERAPY:  No Change    3)  LABS:  None    4)  PT MONITOR [call for probs]:   worsening sx, SEs from meds    5)  REFERRALS [CD, medical, other]:  None    6)  RTC: ~ 1 month    Of note: when in [place] her pharmacy is [facility] in [place] on [address] [facility]  Phone # [phone].                         Answers submitted by the patient for this visit:  Patient Health Questionnaire (Submitted on  7/26/2023)  If you checked off any problems, how difficult have these problems made it for you to do your work, take care of things at home, or get along with other people?: Somewhat difficult  PHQ9 TOTAL SCORE: 9  ED-7 (Submitted on 7/26/2023)  ED 7 TOTAL SCORE: 14

## 2023-07-31 ENCOUNTER — OFFICE VISIT (OUTPATIENT)
Dept: DERMATOLOGY | Facility: OTHER | Age: 73
End: 2023-07-31
Attending: DERMATOLOGY
Payer: MEDICARE

## 2023-07-31 VITALS
SYSTOLIC BLOOD PRESSURE: 122 MMHG | OXYGEN SATURATION: 97 % | HEART RATE: 76 BPM | WEIGHT: 145 LBS | DIASTOLIC BLOOD PRESSURE: 80 MMHG | BODY MASS INDEX: 24.75 KG/M2 | HEIGHT: 64 IN

## 2023-07-31 DIAGNOSIS — Z12.83 SCREENING FOR SKIN CANCER: Primary | ICD-10-CM

## 2023-07-31 DIAGNOSIS — L82.1 SEBORRHEIC KERATOSES: ICD-10-CM

## 2023-07-31 DIAGNOSIS — D22.9 MULTIPLE BENIGN NEVI: ICD-10-CM

## 2023-07-31 PROCEDURE — G0463 HOSPITAL OUTPT CLINIC VISIT: HCPCS

## 2023-07-31 PROCEDURE — 99212 OFFICE O/P EST SF 10 MIN: CPT | Performed by: DERMATOLOGY

## 2023-07-31 ASSESSMENT — PAIN SCALES - GENERAL: PAINLEVEL: NO PAIN (0)

## 2023-07-31 NOTE — PROGRESS NOTES
Visit Date: 2023    SUBJECTIVE:  Harika returns for a general skin exam.  Last seen about a year ago.    OBJECTIVE:  Exam shows a healthy lady in no distress.  Examination shows numerous seborrheic keratoses scattered about and macular nevi.  We checked Harkia's chest, breasts, back, neck, scalp at the edges and face.  We found no atypical or unusual lesions.  There is no history of any lesions, bleeding, crusting or causing of special concerns.  She has brown eyes, medium skin and does not show sun damage or aging changes as would most woman of her age.  Overall, very pleasant, healthy lady.    ASSESSMENT: No worrisome skin lesions.     PLAN:  Reassured.  Return in 1 year or sooner if she should notice anything of concern.    DEANN Leyva MD        D: 2023   T: 2023   MT: NESTOR    Name:     HARIKA TYLER  MRN:      [id number]        Account:    [id number]   :      1950           Visit Date: 2023     Document: [id number]

## 2023-07-31 NOTE — LETTER
2023       RE: Harika Tyler  [address] [facility] [id number]     Dear Colleague,    Thank you for referring your patient, Harika Tyler, to the [facility]. Please see a copy of my visit note below.    Visit Date: 2023    SUBJECTIVE:  Harika returns for a general skin exam.  Last seen about a year ago.    OBJECTIVE:  Exam shows a healthy lady in no distress.  Examination shows numerous seborrheic keratoses scattered about and macular nevi.  We checked Harika's chest, breasts, back, neck, scalp at the edges and face.  We found no atypical or unusual lesions.  There is no history of any lesions, bleeding, crusting or causing of special concerns.  She has brown eyes, medium skin and does not show sun damage or aging changes as would most woman of her age.  Overall, very pleasant, healthy lady.    ASSESSMENT: No worrisome skin lesions.     PLAN:  Reassured.  Return in 1 year or sooner if she should notice anything of concern.    DEANN Leyva MD        D: 2023   T: 2023   MT: NESTOR    Name:     HARIKA TYLER.  MRN:      [id number]        Account:    [id number]   :      1950           Visit Date: 2023     Document: [id number]      Again, thank you for allowing me to participate in the care of your patient.      Sincerely,    DEANN Leyva MD

## 2023-08-08 ENCOUNTER — OFFICE VISIT (OUTPATIENT)
Dept: FAMILY MEDICINE | Facility: OTHER | Age: 73
End: 2023-08-08
Attending: FAMILY MEDICINE
Payer: COMMERCIAL

## 2023-08-08 VITALS
TEMPERATURE: 98.5 F | DIASTOLIC BLOOD PRESSURE: 80 MMHG | HEIGHT: 64 IN | SYSTOLIC BLOOD PRESSURE: 114 MMHG | OXYGEN SATURATION: 96 % | HEART RATE: 94 BPM | WEIGHT: 157 LBS | RESPIRATION RATE: 16 BRPM | BODY MASS INDEX: 26.8 KG/M2

## 2023-08-08 DIAGNOSIS — F41.1 GAD (GENERALIZED ANXIETY DISORDER): ICD-10-CM

## 2023-08-08 DIAGNOSIS — Z12.31 ENCOUNTER FOR SCREENING MAMMOGRAM FOR BREAST CANCER: ICD-10-CM

## 2023-08-08 DIAGNOSIS — J30.1 CHRONIC SEASONAL ALLERGIC RHINITIS DUE TO POLLEN: ICD-10-CM

## 2023-08-08 DIAGNOSIS — Z00.00 ENCOUNTER FOR MEDICARE ANNUAL WELLNESS EXAM: ICD-10-CM

## 2023-08-08 DIAGNOSIS — E78.2 MIXED HYPERLIPIDEMIA: Primary | ICD-10-CM

## 2023-08-08 DIAGNOSIS — E03.9 ACQUIRED HYPOTHYROIDISM: ICD-10-CM

## 2023-08-08 DIAGNOSIS — E55.9 VITAMIN D DEFICIENCY: ICD-10-CM

## 2023-08-08 LAB
CHOLEST SERPL-MCNC: 271 MG/DL
HDLC SERPL-MCNC: 65 MG/DL
LDLC SERPL CALC-MCNC: ABNORMAL MG/DL
NONHDLC SERPL-MCNC: 206 MG/DL
TRIGL SERPL-MCNC: 494 MG/DL
TSH SERPL DL<=0.005 MIU/L-ACNC: 1.61 UIU/ML (ref 0.3–4.2)

## 2023-08-08 PROCEDURE — 80061 LIPID PANEL: CPT | Mod: ZL | Performed by: FAMILY MEDICINE

## 2023-08-08 PROCEDURE — G0463 HOSPITAL OUTPT CLINIC VISIT: HCPCS

## 2023-08-08 PROCEDURE — 84443 ASSAY THYROID STIM HORMONE: CPT | Mod: ZL | Performed by: FAMILY MEDICINE

## 2023-08-08 PROCEDURE — 36415 COLL VENOUS BLD VENIPUNCTURE: CPT | Mod: ZL | Performed by: FAMILY MEDICINE

## 2023-08-08 PROCEDURE — G0439 PPPS, SUBSEQ VISIT: HCPCS | Performed by: FAMILY MEDICINE

## 2023-08-08 PROCEDURE — 82306 VITAMIN D 25 HYDROXY: CPT | Mod: ZL | Performed by: FAMILY MEDICINE

## 2023-08-08 RX ORDER — FLUTICASONE PROPIONATE 50 MCG
SPRAY, SUSPENSION (ML) NASAL
Qty: 48 G | Refills: 11 | Status: SHIPPED | OUTPATIENT
Start: 2023-08-08 | End: 2024-06-03

## 2023-08-08 RX ORDER — QUETIAPINE FUMARATE 25 MG/1
TABLET, FILM COATED ORAL
Qty: 180 TABLET | Refills: 3 | Status: SHIPPED | OUTPATIENT
Start: 2023-08-08 | End: 2024-09-05

## 2023-08-08 RX ORDER — LEVOTHYROXINE SODIUM 50 UG/1
50 TABLET ORAL DAILY
Qty: 90 TABLET | Refills: 3 | Status: SHIPPED | OUTPATIENT
Start: 2023-08-08 | End: 2024-06-03

## 2023-08-08 ASSESSMENT — ENCOUNTER SYMPTOMS
SHORTNESS OF BREATH: 0
HEARTBURN: 0
CONSTIPATION: 0
WEAKNESS: 0
HEMATOCHEZIA: 0
JOINT SWELLING: 0
MYALGIAS: 0
HEMATURIA: 0
PALPITATIONS: 0
EYE PAIN: 0
DIZZINESS: 0
ABDOMINAL PAIN: 0
NAUSEA: 0
CHILLS: 0
DIARRHEA: 1
COUGH: 0
NERVOUS/ANXIOUS: 1
FEVER: 0
SORE THROAT: 0
HEADACHES: 0
PARESTHESIAS: 0
DYSURIA: 0
FREQUENCY: 0

## 2023-08-08 ASSESSMENT — PAIN SCALES - GENERAL: PAINLEVEL: NO PAIN (0)

## 2023-08-08 ASSESSMENT — PATIENT HEALTH QUESTIONNAIRE - PHQ9
SUM OF ALL RESPONSES TO PHQ QUESTIONS 1-9: 8
SUM OF ALL RESPONSES TO PHQ QUESTIONS 1-9: 8
10. IF YOU CHECKED OFF ANY PROBLEMS, HOW DIFFICULT HAVE THESE PROBLEMS MADE IT FOR YOU TO DO YOUR WORK, TAKE CARE OF THINGS AT HOME, OR GET ALONG WITH OTHER PEOPLE: SOMEWHAT DIFFICULT

## 2023-08-08 ASSESSMENT — ACTIVITIES OF DAILY LIVING (ADL): CURRENT_FUNCTION: NO ASSISTANCE NEEDED

## 2023-08-08 NOTE — PROGRESS NOTES
"Answers submitted by the patient for this visit:  Patient Health Questionnaire (Submitted on 8/8/2023)  If you checked off any problems, how difficult have these problems made it for you to do your work, take care of things at home, or get along with other people?: Somewhat difficult  PHQ9 TOTAL SCORE: 8  SUBJECTIVE:   Mony is a 73 year old who presents for Preventive Visit.      5/16/2023     3:42 PM   Additional Questions   Roomed by Shavonne   Accompanied by Self       Are you in the first 12 months of your Medicare coverage?  No    Healthy Habits:     In general, how would you rate your overall health?  Good    Frequency of exercise:  6-7 days/week    Duration of exercise:  30-45 minutes    Do you usually eat at least 4 servings of fruit and vegetables a day, include whole grains    & fiber and avoid regularly eating high fat or \"junk\" foods?  Yes    Taking medications regularly:  Yes    Medication side effects:  Other    Ability to successfully perform activities of daily living:  No assistance needed    Home Safety:  No safety concerns identified    Hearing Impairment:  No hearing concerns    In the past 6 months, have you been bothered by leaking of urine?  No    In general, how would you rate your overall mental or emotional health?  Good    Additional concerns today:  No  Answers submitted by the patient for this visit:  Patient Health Questionnaire (Submitted on 8/8/2023)  If you checked off any problems, how difficult have these problems made it for you to do your work, take care of things at home, or get along with other people?: Somewhat difficult  PHQ9 TOTAL SCORE: 8    Have you ever done Advance Care Planning? (For example, a Health Directive, POLST, or a discussion with a medical provider or your loved ones about your wishes): Yes, advance care planning is on file.       Fall risk  Fallen 2 or more times in the past year?: No  Any fall with injury in the past year?: No    Cognitive Screening   1) " Repeat 3 items (Leader, Season, Table)    2) Clock draw: NORMAL  3) 3 item recall: Recalls 3 objects  Results: 3 items recalled: COGNITIVE IMPAIRMENT LESS LIKELY    Mini-CogTM Copyright S Óscar. Licensed by the author for use in [facility]; reprinted with permission (reggie@[facility]). All rights reserved.      Do you have sleep apnea, excessive snoring or daytime drowsiness? : no    Reviewed and updated as needed this visit by clinical staff   Tobacco  Allergies  Meds  Problems  Med Hx  Surg Hx  Fam Hx          Reviewed and updated as needed this visit by Provider   Tobacco  Allergies  Meds  Problems  Med Hx  Surg Hx  Fam Hx         Social History     Tobacco Use    Smoking status: Never     Passive exposure: Never    Smokeless tobacco: Never   Substance Use Topics    Alcohol use: Yes     Comment: Beer & Wine, socially           8/8/2023     2:53 PM   Alcohol Use   Prescreen: >3 drinks/day or >7 drinks/week? No     Do you have a current opioid prescription? No  Do you use any other controlled substances or medications that are not prescribed by a provider? None    Current providers sharing in care for this patient include:   Patient Care Team:  Ivon Simon MD as PCP - General (Family Medicine)  Angie Owens MD as Assigned Behavioral Health Provider  Ivon Simon MD as Assigned PCP  Rakan Robles MD as Assigned Surgical Provider    The following health maintenance items are reviewed in Epic and correct as of today:  Health Maintenance   Topic Date Due    HEPATITIS C SCREENING  Never done    DTAP/TDAP/TD IMMUNIZATION (2 - Td or Tdap) 12/27/2021    COVID-19 Vaccine (5 - Pfizer series) 02/04/2023    INFLUENZA VACCINE (1) 09/01/2023    ED ASSESSMENT  10/26/2023    PHQ-9  11/08/2023    MEDICARE ANNUAL WELLNESS VISIT  08/08/2024    LIPID  08/08/2024    TSH W/FREE T4 REFLEX  08/08/2024    FALL RISK ASSESSMENT  08/08/2024    MAMMO SCREENING  10/20/2024    COLORECTAL CANCER  "SCREENING  08/20/2025    DEXA  05/09/2028    ADVANCE CARE PLANNING  08/08/2028    DEPRESSION ACTION PLAN  Completed    Pneumococcal Vaccine: 65+ Years  Completed    ZOSTER IMMUNIZATION  Completed    IPV IMMUNIZATION  Aged Out    MENINGITIS IMMUNIZATION  Aged Out     Review of Systems   Constitutional:  Negative for chills and fever.   HENT:  Negative for congestion, ear pain, hearing loss and sore throat.    Eyes:  Negative for pain and visual disturbance.   Respiratory:  Negative for cough and shortness of breath.    Cardiovascular:  Negative for chest pain, palpitations and peripheral edema.   Gastrointestinal:  Positive for diarrhea. Negative for abdominal pain, constipation, heartburn, hematochezia and nausea.   Genitourinary:  Positive for urgency. Negative for dysuria, frequency, genital sores and hematuria.   Musculoskeletal:  Negative for joint swelling and myalgias.   Skin:  Negative for rash.   Neurological:  Negative for dizziness, weakness, headaches and paresthesias.   Psychiatric/Behavioral:  Positive for mood changes. The patient is nervous/anxious.        OBJECTIVE:   /80 (BP Location: Left arm, Patient Position: Sitting, Cuff Size: Adult Regular)   Pulse 94   Temp 98.5  F (36.9  C) (Tympanic)   Resp 16   Ht 1.626 m (5' 4\")   Wt 71.2 kg (157 lb)   SpO2 96%   BMI 26.95 kg/m   Estimated body mass index is 26.95 kg/m  as calculated from the following:    Height as of this encounter: 1.626 m (5' 4\").    Weight as of this encounter: 71.2 kg (157 lb).    Physical Exam  GENERAL APPEARANCE: healthy, alert and no distress  EYES: Eyes grossly normal to inspection, PERRL and conjunctivae and sclerae normal  HENT: ear canals and TM's normal, nose and mouth without ulcers or lesions, oropharynx clear and oral mucous membranes moist  NECK: no adenopathy, no asymmetry, masses, or scars and thyroid normal to palpation  RESP: lungs clear to auscultation - no rales, rhonchi or wheezes  BREAST: " declines  CV: regular rates and rhythm, normal S1 S2, no S3 or S4, no murmur, click or rub, and peripheral pulses strong  ABDOMEN: soft, nontender, no hepatosplenomegaly, no masses and bowel sounds normal  MS: no musculoskeletal defects are noted and gait is age appropriate without ataxia  SKIN: no suspicious lesions or rashes  NEURO: Normal strength and tone, sensory exam grossly normal, mentation intact and speech normal  PSYCH: mentation appears normal and affect normal/bright    Diagnostic Test Results:  Labs reviewed in Epic  Results for orders placed or performed in visit on 08/08/23   Lipid Profile     Status: Abnormal   Result Value Ref Range    Cholesterol 271 (H) <200 mg/dL    Triglycerides 494 (H) <150 mg/dL    Direct Measure HDL 65 >=50 mg/dL    LDL Cholesterol Calculated      Non HDL Cholesterol 206 (H) <130 mg/dL    Narrative    Cholesterol  Desirable:  <200 mg/dL    Triglycerides  Normal:  Less than 150 mg/dL  Borderline High:  150-199 mg/dL  High:  200-499 mg/dL  Very High:  Greater than or equal to 500 mg/dL    Direct Measure HDL  Female:  Greater than or equal to 50 mg/dL   Male:  Greater than or equal to 40 mg/dL    LDL Cholesterol  Desirable:  <100mg/dL  Above Desirable:  100-129 mg/dL   Borderline High:  130-159 mg/dL   High:  160-189 mg/dL   Very High:  >= 190 mg/dL    Non HDL Cholesterol  Desirable:  130 mg/dL  Above Desirable:  130-159 mg/dL  Borderline High:  160-189 mg/dL  High:  190-219 mg/dL  Very High:  Greater than or equal to 220 mg/dL   TSH WITH FREE T4 REFLEX     Status: Normal   Result Value Ref Range    TSH 1.61 0.30 - 4.20 uIU/mL   Vitamin D Deficiency     Status: Normal   Result Value Ref Range    Vitamin D, Total (25-Hydroxy) 67 20 - 75 ug/L    Narrative    Season, race, dietary intake, and treatment affect the concentration of 25-hydroxy-Vitamin D. Values may decrease during winter months and increase during summer months. Values 20-29 ug/L may indicate Vitamin D insufficiency  and values <20 ug/L may indicate Vitamin D deficiency.    Vitamin D determination is routinely performed by an immunoassay specific for 25 hydroxyvitamin D3.  If an individual is on vitamin D2(ergocalciferol) supplementation, please specify 25 OH vitamin D2 and D3 level determination by LCMSMS test [id number].         ASSESSMENT / PLAN:   Mony was seen today for physical.    Diagnoses and all orders for this visit:    Encounter for Medicare annual wellness exam  -     Full Code    Mixed hyperlipidemia  -     Lipid Profile; Future  -     Lipid Profile    Acquired hypothyroidism  -     TSH WITH FREE T4 REFLEX; Future  -     levothyroxine (SYNTHROID/LEVOTHROID) 50 MCG tablet; Take 1 tablet (50 mcg) by mouth daily  -     TSH WITH FREE T4 REFLEX    ED (generalized anxiety disorder)  -     sertraline (ZOLOFT) 50 MG tablet; Take 1 tablet (50 mg) by mouth daily  -     QUEtiapine (SEROQUEL) 25 MG tablet; Take 1 - 2 tabs as needed for anxiety / insomnia    Chronic seasonal allergic rhinitis due to pollen  -     fluticasone (FLONASE) 50 MCG/ACT nasal spray; Use 2 spray(s) in each nostril once daily    Vitamin D deficiency  -     Vitamin D Deficiency; Future  -     Vitamin D Deficiency    Encounter for screening mammogram for breast cancer  -     MA Screen Bilateral w/Harjit; Future    Patient has been advised of split billing requirements and indicates understanding: Yes      COUNSELING:  Reviewed preventive health counseling, as reflected in patient instructions    She reports that she has never smoked. She has never been exposed to tobacco smoke. She has never used smokeless tobacco.      Appropriate preventive services were discussed with this patient, including applicable screening as appropriate for cardiovascular disease, diabetes, osteopenia/osteoporosis, and glaucoma.  As appropriate for age/gender, discussed screening for colorectal cancer, prostate cancer, breast cancer, and cervical cancer. Checklist reviewing preventive  services available has been given to the patient.    Reviewed patients plan of care and provided an AVS. The Basic Care Plan (routine screening as documented in Health Maintenance) for Mony meets the Care Plan requirement. This Care Plan has been established and reviewed with the Patient.      Ivon Simon MD  [facility] [place]    Identified Health Risks:  I have reviewed Opioid Use Disorder and Substance Use Disorder risk factors and made any needed referrals. The patient's PHQ-9 score is consistent with mild depression. She was provided with information regarding depression - pt has follow up appt already scheduled to address.

## 2023-08-08 NOTE — Clinical Note
Pt would like refills of medication for 3 mo. Do you feel comfortable refilling ativan for that much?  Thanks.,   Ivon

## 2023-08-10 DIAGNOSIS — F41.1 GAD (GENERALIZED ANXIETY DISORDER): ICD-10-CM

## 2023-08-10 LAB — DEPRECATED CALCIDIOL+CALCIFEROL SERPL-MC: 67 UG/L (ref 20–75)

## 2023-08-10 RX ORDER — LORAZEPAM 1 MG/1
1 TABLET ORAL 3 TIMES DAILY PRN
Qty: 270 TABLET | Refills: 1 | Status: SHIPPED | OUTPATIENT
Start: 2023-08-21 | End: 2024-06-12

## 2023-08-10 NOTE — PATIENT INSTRUCTIONS
Patient Education   Personalized Prevention Plan  You are due for the preventive services outlined below.  Your care team is available to assist you in scheduling these services.  If you have already completed any of these items, please share that information with your care team to update in your medical record.  Health Maintenance Due   Topic Date Due     Hepatitis C Screening  Never done     Diptheria Tetanus Pertussis (DTAP/TDAP/TD) Vaccine (2 - Td or Tdap) 12/27/2021     COVID-19 Vaccine (5 - Pfizer series) 02/04/2023     Learning About Depression Screening  What is depression screening?  Depression screening is a way to see if you have depression symptoms. It may be done by a doctor or counselor. It's often part of a routine checkup. That's because your mental health is just as important as your physical health.  Depression is a mental health condition that affects how you feel, think, and act. You may:  Have less energy.  Lose interest in your daily activities.  Feel sad and grouchy for a long time.  Depression is very common. It affects people of all ages.  Many things can lead to depression. Some people become depressed after they have a stroke or find out they have a major illness like cancer or heart disease. The death of a loved one or a breakup may lead to depression. It can run in families. Most experts believe that a combination of inherited genes and stressful life events can cause it.  What happens during screening?  You may be asked to fill out a form about your depression symptoms. You and the doctor will discuss your answers. The doctor may ask you more questions to learn more about how you think, act, and feel.  What happens after screening?  If you have symptoms of depression, your doctor will talk to you about your options.  Doctors usually treat depression with medicines or counseling. Often, combining the two works best. Many people don't get help because they think that they'll get over the  "depression on their own. But people with depression may not get better unless they get treatment.  The cause of depression is not well understood. There may be many factors involved. But if you have depression, it's not your fault.  A serious symptom of depression is thinking about death or suicide. If you or someone you care about talks about this or about feeling hopeless, get help right away.  It's important to know that depression can be treated. Medicine, counseling, and self-care may help.  Where can you learn more?  Go to [email or web address]://[email or web address]/patiented  Enter T185 in the search box to learn more about \"Learning About Depression Screening.\"  Current as of: October 20, 2022               Content Version: 13.7    4959-3138 [employer].   Care instructions adapted under license by your healthcare professional. If you have questions about a medical condition or this instruction, always ask your healthcare professional. [employer] disclaims any warranty or liability for your use of this information.         "

## 2023-08-29 ENCOUNTER — OFFICE VISIT (OUTPATIENT)
Dept: PSYCHIATRY | Facility: OTHER | Age: 73
End: 2023-08-29
Attending: PSYCHIATRY & NEUROLOGY
Payer: COMMERCIAL

## 2023-08-29 VITALS
HEART RATE: 83 BPM | DIASTOLIC BLOOD PRESSURE: 80 MMHG | OXYGEN SATURATION: 95 % | BODY MASS INDEX: 26.95 KG/M2 | TEMPERATURE: 98.8 F | SYSTOLIC BLOOD PRESSURE: 124 MMHG | WEIGHT: 157 LBS

## 2023-08-29 DIAGNOSIS — F41.1 GAD (GENERALIZED ANXIETY DISORDER): Primary | ICD-10-CM

## 2023-08-29 PROCEDURE — 99213 OFFICE O/P EST LOW 20 MIN: CPT | Performed by: PSYCHIATRY & NEUROLOGY

## 2023-08-29 PROCEDURE — G0463 HOSPITAL OUTPT CLINIC VISIT: HCPCS

## 2023-08-29 ASSESSMENT — PAIN SCALES - GENERAL: PAINLEVEL: NO PAIN (0)

## 2023-08-29 NOTE — PROGRESS NOTES
"    PSYCHIATRY CLINIC PROGRESS NOTE     SUBJECTIVE / INTERIM HISTORY                                                                        Social- retired. Interests:reading, being physical / active & working out, grandkids, volunteers, loves being with kiddo   Children-   Grown children   Last visit 7/26/23:   - \"I just woke up in a funk today.\"   - Went was in [place] someone asked her \"what's your trauma?\"  - \"I think I did a lot of letting go today!\" Today let herself sit and feel her emotions.   - \"I've notice I've had more energy.\"   - working with Hannah Ritter  - volunteering at [employer] is very helpful  - mom narcissistic and \"weird\". \"It was my job to keep her happy. Her happiness was more important than mine.\"    MEDICAL ROS- has had pins and needles sensations in back of head and neck. Initially when put on Zoloft felt like back of head / neck was in a clamp  MEDICAL / SURGICAL HISTORY                     Patient Active Problem List   Diagnosis    Generalized anxiety disorder    Chronic fatigue syndrome    Attention deficit disorder    Obsessive-compulsive disorder    FH: colon cancer    Mixed hyperlipidemia    Acquired hypothyroidism    Severe episode of recurrent major depressive disorder, without psychotic features (H)    Vaso vagal episode    Small bowel obstruction due to adhesions (H)    Melanoma of cheek (H)    Acquired facial deformity    Screening for malignant neoplasm of colon     ALLERGY   Codeine  MEDICATIONS                                                                                             Current Outpatient Medications   Medication Sig    Calcium Carbonate (TUMS 500 OR) Take by mouth 2 times daily    fish oil-omega-3 fatty acids 1000 MG capsule Take 2 capsules by mouth daily.    fluticasone (FLONASE) 50 MCG/ACT nasal spray Use 2 spray(s) in each nostril once daily    glucosamine 500 MG CAPS Take by mouth daily     levothyroxine (SYNTHROID/LEVOTHROID) 50 MCG tablet Take 1 tablet (50 " mcg) by mouth daily    LORazepam (ATIVAN) 1 MG tablet Take 1 tablet (1 mg) by mouth 3 times daily as needed for anxiety    MAGNESIUM OXIDE PO Take 200 mg by mouth daily    Multiple vitamin TABS Take 1 tablet by oral route every day with food    QUEtiapine (SEROQUEL) 25 MG tablet Take 1 - 2 tabs as needed for anxiety / insomnia    sertraline (ZOLOFT) 50 MG tablet Take 1 tablet (50 mg) by mouth daily    traZODone (DESYREL) 50 MG tablet TAKE 1 TO 3 TABLETS BY MOUTH AT BEDTIME    alendronate (FOSAMAX) 70 MG tablet Take 1 tablet (70 mg) by mouth every 7 days for 90 days     No current facility-administered medications for this visit.       VITALS   /80   Pulse 83   Temp 98.8  F (37.1  C) (Tympanic)   Wt 71.2 kg (157 lb)   SpO2 95%   BMI 26.95 kg/m       PHQ9                     [unfilled]  LABS                                                                                                                           Last Comprehensive Metabolic Panel:  Sodium   Date Value Ref Range Status   05/17/2023 141 136 - 145 mmol/L Final   07/24/2020 138 133 - 144 mmol/L Final     Potassium   Date Value Ref Range Status   05/17/2023 3.3 (L) 3.4 - 5.3 mmol/L Final   08/03/2022 3.8 3.4 - 5.3 mmol/L Final   07/24/2020 3.9 3.4 - 5.3 mmol/L Final     Chloride   Date Value Ref Range Status   05/17/2023 103 98 - 107 mmol/L Final   08/03/2022 105 94 - 109 mmol/L Final   07/24/2020 107 94 - 109 mmol/L Final     Carbon Dioxide   Date Value Ref Range Status   07/24/2020 29 20 - 32 mmol/L Final     Carbon Dioxide (CO2)   Date Value Ref Range Status   05/17/2023 28 22 - 29 mmol/L Final   08/03/2022 27 20 - 32 mmol/L Final     Anion Gap   Date Value Ref Range Status   05/17/2023 10 7 - 15 mmol/L Final   08/03/2022 5 3 - 14 mmol/L Final   07/24/2020 2 (L) 3 - 14 mmol/L Final     Glucose   Date Value Ref Range Status   05/17/2023 87 70 - 99 mg/dL Final   08/03/2022 88 70 - 99 mg/dL Final   07/24/2020 88 70 - 99 mg/dL Final     Urea  "Nitrogen   Date Value Ref Range Status   05/17/2023 13.4 8.0 - 23.0 mg/dL Final   08/03/2022 14 7 - 30 mg/dL Final   07/24/2020 15 7 - 30 mg/dL Final     Creatinine   Date Value Ref Range Status   05/17/2023 0.72 0.51 - 0.95 mg/dL Final   07/24/2020 0.71 0.52 - 1.04 mg/dL Final     GFR Estimate   Date Value Ref Range Status   05/17/2023 89 >60 mL/min/1.73m2 Final     Comment:     eGFR calculated using 2021 CKD-EPI equation.   07/24/2020 87 >60 mL/min/[1.73_m2] Final     Comment:     Non  GFR Calc  Starting 12/18/2018, serum creatinine based estimated GFR (eGFR) will be   calculated using the Chronic Kidney Disease Epidemiology Collaboration   (CKD-EPI) equation.       Calcium   Date Value Ref Range Status   05/17/2023 10.0 8.8 - 10.2 mg/dL Final   07/24/2020 9.4 8.5 - 10.1 mg/dL Final     TSH   Date Value Ref Range Status   08/08/2023 1.61 0.30 - 4.20 uIU/mL Final   08/03/2022 0.87 0.40 - 4.00 mU/L Final   07/24/2020 1.00 0.40 - 4.00 mU/L Final     MENTAL STATUS EXAM                                                                                       Mood today \"I just woke up in a funk today.\" Affect congruent to mood and tearful.  Thought process, including associations, was unremarkable and thought content was devoid of homicidal ideation and psychotic thought. No hallucinations. Insight was good. Judgment was intact and adequate for safety. Fund of knowledge was intact. Pt demonstrates no obvious problems with attention, concentration, language, recent or remote memory although these were not formally tested.     ASSESSMENT                                                                                                      HISTORICAL:  Initial psych note 8 2015         NOTES:  She took CBD for while and she felt contributed to depression. GeneSight testing correlated with Effexor SEs when she was on higher doses. She had shaking when on Effexor and WEllbutrin.    Mony Szymanski is a 73 year old, " "female with long history of depression, anxiety - therapy throughout the years. Depression worsened along with isolation and the Covid - 19 pandemic. Mony had been taking Effexor, Serax, and trazodone for years. She was on Effexor for 20+ years. She was also on Wellbutrin.    Mony notes she continues to feel she is doing better after a long haul: in particular the past 6 months have been difficult. She established with therapist Hannah Ritter and feels they have good rapport. She noted she is fine with Hannah and I collaborating. Today Mony is having a rough day and notes she just woke up in a funk. Been tearful, including throughout our visit. Discussed as I've gotten to know her better I see this as healing. At one point today she noted \"I think I did a lot of letting go today!\" She reminded me that growing up her mom in particular didn't allow Mony to express her emotions... Also we discussed today Mony has been making changes in that she is allowing herself to rest and \"just be\" at times as opposed to always being on the go. She was able to identify that on a whole she has had more energy the past month.      TREATMENT RISK STATEMENT:  The risks, benefits, alternatives and potential adverse effects have been explained and are understood by the pt.  The pt agrees to the treatment plan with the ability to do so.   The pt knows to call the clinic for any problems or access emergency care if needed.        DIAGNOSES                    MDD, recurrent mild  ED  OCPD traits    PLAN                                                                                                                    1)  MEDICATIONS:         -- Continue quetiapine 25 mg 1-2 tabs HS prn anxiety / insomnia. Continue  lorazepam 1 mg up to 3 times daily.  Continue Zoloft 50 mg daily.     2)  THERAPY:  No Change    3)  LABS:  None    4)  PT MONITOR [call for probs]:   worsening sx, SEs from meds    5)  REFERRALS [CD, medical, other]:  None    6)  " RTC: ~ 1 month    Of note: when in [place] her pharmacy is [facility] in [place] on [address] [facility]  Phone # [phone].

## 2023-10-10 ENCOUNTER — OFFICE VISIT (OUTPATIENT)
Dept: PSYCHIATRY | Facility: OTHER | Age: 73
End: 2023-10-10
Attending: PSYCHIATRY & NEUROLOGY
Payer: COMMERCIAL

## 2023-10-10 VITALS
TEMPERATURE: 97.4 F | HEART RATE: 86 BPM | DIASTOLIC BLOOD PRESSURE: 68 MMHG | BODY MASS INDEX: 26.95 KG/M2 | WEIGHT: 157 LBS | SYSTOLIC BLOOD PRESSURE: 110 MMHG | OXYGEN SATURATION: 98 %

## 2023-10-10 DIAGNOSIS — F41.1 GAD (GENERALIZED ANXIETY DISORDER): Primary | ICD-10-CM

## 2023-10-10 PROCEDURE — G0463 HOSPITAL OUTPT CLINIC VISIT: HCPCS

## 2023-10-10 PROCEDURE — 99214 OFFICE O/P EST MOD 30 MIN: CPT | Performed by: PSYCHIATRY & NEUROLOGY

## 2023-10-10 ASSESSMENT — ANXIETY QUESTIONNAIRES
6. BECOMING EASILY ANNOYED OR IRRITABLE: SEVERAL DAYS
GAD7 TOTAL SCORE: 10
7. FEELING AFRAID AS IF SOMETHING AWFUL MIGHT HAPPEN: SEVERAL DAYS
GAD7 TOTAL SCORE: 10
4. TROUBLE RELAXING: MORE THAN HALF THE DAYS
1. FEELING NERVOUS, ANXIOUS, OR ON EDGE: MORE THAN HALF THE DAYS
2. NOT BEING ABLE TO STOP OR CONTROL WORRYING: SEVERAL DAYS
5. BEING SO RESTLESS THAT IT IS HARD TO SIT STILL: MORE THAN HALF THE DAYS
3. WORRYING TOO MUCH ABOUT DIFFERENT THINGS: SEVERAL DAYS
IF YOU CHECKED OFF ANY PROBLEMS ON THIS QUESTIONNAIRE, HOW DIFFICULT HAVE THESE PROBLEMS MADE IT FOR YOU TO DO YOUR WORK, TAKE CARE OF THINGS AT HOME, OR GET ALONG WITH OTHER PEOPLE: SOMEWHAT DIFFICULT

## 2023-10-10 ASSESSMENT — PATIENT HEALTH QUESTIONNAIRE - PHQ9
SUM OF ALL RESPONSES TO PHQ QUESTIONS 1-9: 6
SUM OF ALL RESPONSES TO PHQ QUESTIONS 1-9: 6
10. IF YOU CHECKED OFF ANY PROBLEMS, HOW DIFFICULT HAVE THESE PROBLEMS MADE IT FOR YOU TO DO YOUR WORK, TAKE CARE OF THINGS AT HOME, OR GET ALONG WITH OTHER PEOPLE: SOMEWHAT DIFFICULT

## 2023-10-10 ASSESSMENT — PAIN SCALES - GENERAL: PAINLEVEL: NO PAIN (0)

## 2023-10-10 NOTE — PROGRESS NOTES
"Visit duration: 34 minutes. 4 minutes outside of visit documenting, reviewing chart. Total time day of visit 38 minutes.     PSYCHIATRY CLINIC PROGRESS NOTE     SUBJECTIVE / INTERIM HISTORY                                                                        Social- retired. Interests:reading, being physical / active & working out, grandkids, volunteers, loves being with kiddo   Children-   Grown children   Last visit 8/29/23: Continue quetiapine 25 mg 1-2 tabs HS prn anxiety / insomnia. Continue  lorazepam 1 mg up to 3 times daily.  Continue Zoloft 50 mg daily.   - \"Every week is a little better.\"  Energy, enthusiasm  - \"When I used to get up I was a basket case.\" Functioning better  - Days are \"looking more normal\". Walking friends, [employer]  - \"there's not a lot of we in our marriage\"  - still feels she is crying most mornings. For most part feels she is grieving. For one feels like she can't be the grandma she wants to be given she isn't healthy  - Bridge group, tennis group down in [place]  - Went was in [place] someone asked her \"what's your trauma?\"  - working with Hannah Ritter  - volunteering at [employer] is very helpful  - mom narcissistic and \"weird\". \"It was my job to keep her happy. Her happiness was more important than mine.\"    MEDICAL ROS- has had pins and needles sensations in back of head and neck. Initially when put on Zoloft felt like back of head / neck was in a clamp  MEDICAL / SURGICAL HISTORY                     Patient Active Problem List   Diagnosis    Generalized anxiety disorder    Chronic fatigue syndrome    Attention deficit disorder    Obsessive-compulsive disorder    FH: colon cancer    Mixed hyperlipidemia    Acquired hypothyroidism    Severe episode of recurrent major depressive disorder, without psychotic features (H)    Vaso vagal episode    Small bowel obstruction due to adhesions (H)    Melanoma of cheek (H)    Acquired facial deformity    Screening for malignant neoplasm of colon "     ALLERGY   Codeine  MEDICATIONS                                                                                             Current Outpatient Medications   Medication Sig    Calcium Carbonate (TUMS 500 OR) Take by mouth 2 times daily    fish oil-omega-3 fatty acids 1000 MG capsule Take 2 capsules by mouth daily.    fluticasone (FLONASE) 50 MCG/ACT nasal spray Use 2 spray(s) in each nostril once daily    glucosamine 500 MG CAPS Take by mouth daily     levothyroxine (SYNTHROID/LEVOTHROID) 50 MCG tablet Take 1 tablet (50 mcg) by mouth daily    LORazepam (ATIVAN) 1 MG tablet Take 1 tablet (1 mg) by mouth 3 times daily as needed for anxiety    MAGNESIUM OXIDE PO Take 200 mg by mouth daily    Multiple vitamin TABS Take 1 tablet by oral route every day with food    QUEtiapine (SEROQUEL) 25 MG tablet Take 1 - 2 tabs as needed for anxiety / insomnia    sertraline (ZOLOFT) 50 MG tablet Take 1 tablet (50 mg) by mouth daily    traZODone (DESYREL) 50 MG tablet TAKE 1 TO 3 TABLETS BY MOUTH AT BEDTIME    alendronate (FOSAMAX) 70 MG tablet Take 1 tablet (70 mg) by mouth every 7 days for 90 days     No current facility-administered medications for this visit.       VITALS   /68   Pulse 86   Temp 97.4  F (36.3  C) (Tympanic)   Wt 71.2 kg (157 lb)   SpO2 98%   BMI 26.95 kg/m       PHQ9                     [unfilled]  LABS                                                                                                                           Last Comprehensive Metabolic Panel:  Sodium   Date Value Ref Range Status   05/17/2023 141 136 - 145 mmol/L Final   07/24/2020 138 133 - 144 mmol/L Final     Potassium   Date Value Ref Range Status   05/17/2023 3.3 (L) 3.4 - 5.3 mmol/L Final   08/03/2022 3.8 3.4 - 5.3 mmol/L Final   07/24/2020 3.9 3.4 - 5.3 mmol/L Final     Chloride   Date Value Ref Range Status   05/17/2023 103 98 - 107 mmol/L Final   08/03/2022 105 94 - 109 mmol/L Final   07/24/2020 107 94 - 109 mmol/L Final  "    Carbon Dioxide   Date Value Ref Range Status   07/24/2020 29 20 - 32 mmol/L Final     Carbon Dioxide (CO2)   Date Value Ref Range Status   05/17/2023 28 22 - 29 mmol/L Final   08/03/2022 27 20 - 32 mmol/L Final     Anion Gap   Date Value Ref Range Status   05/17/2023 10 7 - 15 mmol/L Final   08/03/2022 5 3 - 14 mmol/L Final   07/24/2020 2 (L) 3 - 14 mmol/L Final     Glucose   Date Value Ref Range Status   05/17/2023 87 70 - 99 mg/dL Final   08/03/2022 88 70 - 99 mg/dL Final   07/24/2020 88 70 - 99 mg/dL Final     Urea Nitrogen   Date Value Ref Range Status   05/17/2023 13.4 8.0 - 23.0 mg/dL Final   08/03/2022 14 7 - 30 mg/dL Final   07/24/2020 15 7 - 30 mg/dL Final     Creatinine   Date Value Ref Range Status   05/17/2023 0.72 0.51 - 0.95 mg/dL Final   07/24/2020 0.71 0.52 - 1.04 mg/dL Final     GFR Estimate   Date Value Ref Range Status   05/17/2023 89 >60 mL/min/1.73m2 Final     Comment:     eGFR calculated using 2021 CKD-EPI equation.   07/24/2020 87 >60 mL/min/[1.73_m2] Final     Comment:     Non  GFR Calc  Starting 12/18/2018, serum creatinine based estimated GFR (eGFR) will be   calculated using the Chronic Kidney Disease Epidemiology Collaboration   (CKD-EPI) equation.       Calcium   Date Value Ref Range Status   05/17/2023 10.0 8.8 - 10.2 mg/dL Final   07/24/2020 9.4 8.5 - 10.1 mg/dL Final     TSH   Date Value Ref Range Status   08/08/2023 1.61 0.30 - 4.20 uIU/mL Final   08/03/2022 0.87 0.40 - 4.00 mU/L Final   07/24/2020 1.00 0.40 - 4.00 mU/L Final     MENTAL STATUS EXAM                                                                                       Mood today \"I just woke up in a funk today.\" Affect congruent to mood and tearful.  Thought process, including associations, was unremarkable and thought content was devoid of homicidal ideation and psychotic thought. No hallucinations. Insight was good. Judgment was intact and adequate for safety. Fund of knowledge was intact. Pt " demonstrates no obvious problems with attention, concentration, language, recent or remote memory although these were not formally tested.     ASSESSMENT                                                                                                      HISTORICAL:  Initial psych note 8 2015         NOTES:  She took CBD for while and she felt contributed to depression. GeneSight testing correlated with Effexor SEs when she was on higher doses. She had shaking when on Effexor and WEllbutrin.    Mony Szymanski is a 73 year old, female with long history of depression, anxiety - therapy throughout the years. Depression worsened along with isolation and the Covid - 19 pandemic. Mony had been taking Effexor, Serax, and trazodone for years. She was on Effexor for 20+ years. She was also on Wellbutrin.    Moyn has made a lot of progress. Wasn't until last year while she was in [place] someone mentioned trauma in regards to her mental health issues. She has been working on traumatic experiences with her therapist Hannah. Today we discussed not only as a child but in her marriage learning to stand up for herself, assertiveness, doing what she wants to do, etc. Her and her  will be heading to [place] in two weeks. She established with a psychiatrist whom also works with her on therapy last year and I noted I feel much better knowing she has this working relationship in place as she is ready to head out of [place] for the next several months.     TREATMENT RISK STATEMENT:  The risks, benefits, alternatives and potential adverse effects have been explained and are understood by the pt.  The pt agrees to the treatment plan with the ability to do so.   The pt knows to call the clinic for any problems or access emergency care if needed.        DIAGNOSES                    MDD, recurrent mild  ED  OCPD traits    PLAN                                                                                                                    1)   MEDICATIONS:         -- Continue quetiapine 25 mg 1-2 tabs HS prn anxiety / insomnia. Continue  lorazepam 1 mg up to 3 times daily.  Continue Zoloft 50 mg daily.     2)  THERAPY:  No Change    3)  LABS:  None    4)  PT MONITOR [call for probs]:   worsening sx, SEs from meds    5)  REFERRALS [CD, medical, other]:  None    6)  RTC: ~ 1 month    Of note: when in [place] her pharmacy is [facility] in [place] on [facility]  Phone # [phone].   Answers submitted by the patient for this visit:  Patient Health Questionnaire (Submitted on 10/10/2023)  If you checked off any problems, how difficult have these problems made it for you to do your work, take care of things at home, or get along with other people?: Somewhat difficult  PHQ9 TOTAL SCORE: 6  ED-7 (Submitted on 10/10/2023)  ED 7 TOTAL SCORE: 10

## 2024-05-06 ENCOUNTER — TELEPHONE (OUTPATIENT)
Dept: MAMMOGRAPHY | Facility: OTHER | Age: 74
End: 2024-05-06

## 2024-05-06 ENCOUNTER — ANCILLARY PROCEDURE (OUTPATIENT)
Dept: MAMMOGRAPHY | Facility: OTHER | Age: 74
End: 2024-05-06
Attending: FAMILY MEDICINE
Payer: MEDICARE

## 2024-05-06 DIAGNOSIS — Z12.31 ENCOUNTER FOR SCREENING MAMMOGRAM FOR BREAST CANCER: ICD-10-CM

## 2024-05-06 PROCEDURE — 77063 BREAST TOMOSYNTHESIS BI: CPT | Mod: TC

## 2024-05-09 ENCOUNTER — HOSPITAL ENCOUNTER (OUTPATIENT)
Dept: ULTRASOUND IMAGING | Facility: HOSPITAL | Age: 74
Discharge: HOME | End: 2024-05-09
Attending: FAMILY MEDICINE
Payer: MEDICARE

## 2024-05-09 ENCOUNTER — HOSPITAL ENCOUNTER (OUTPATIENT)
Dept: MAMMOGRAPHY | Facility: OTHER | Age: 74
Discharge: HOME | End: 2024-05-09
Attending: FAMILY MEDICINE
Payer: MEDICARE

## 2024-05-09 DIAGNOSIS — R92.8 ABNORMAL MAMMOGRAM: ICD-10-CM

## 2024-05-09 PROCEDURE — 76642 ULTRASOUND BREAST LIMITED: CPT | Mod: RT

## 2024-05-09 PROCEDURE — 77061 BREAST TOMOSYNTHESIS UNI: CPT | Mod: TC,RT

## 2024-05-15 ENCOUNTER — OFFICE VISIT (OUTPATIENT)
Dept: PSYCHIATRY | Facility: OTHER | Age: 74
End: 2024-05-15
Attending: PSYCHIATRY & NEUROLOGY
Payer: COMMERCIAL

## 2024-05-15 VITALS
RESPIRATION RATE: 16 BRPM | DIASTOLIC BLOOD PRESSURE: 88 MMHG | TEMPERATURE: 99 F | HEART RATE: 79 BPM | SYSTOLIC BLOOD PRESSURE: 126 MMHG | OXYGEN SATURATION: 98 % | WEIGHT: 156 LBS | BODY MASS INDEX: 26.78 KG/M2

## 2024-05-15 DIAGNOSIS — F41.1 GAD (GENERALIZED ANXIETY DISORDER): Primary | ICD-10-CM

## 2024-05-15 PROCEDURE — G0463 HOSPITAL OUTPT CLINIC VISIT: HCPCS

## 2024-05-15 PROCEDURE — 99215 OFFICE O/P EST HI 40 MIN: CPT | Performed by: PSYCHIATRY & NEUROLOGY

## 2024-05-15 ASSESSMENT — ANXIETY QUESTIONNAIRES
6. BECOMING EASILY ANNOYED OR IRRITABLE: SEVERAL DAYS
GAD7 TOTAL SCORE: 10
7. FEELING AFRAID AS IF SOMETHING AWFUL MIGHT HAPPEN: SEVERAL DAYS
IF YOU CHECKED OFF ANY PROBLEMS ON THIS QUESTIONNAIRE, HOW DIFFICULT HAVE THESE PROBLEMS MADE IT FOR YOU TO DO YOUR WORK, TAKE CARE OF THINGS AT HOME, OR GET ALONG WITH OTHER PEOPLE: SOMEWHAT DIFFICULT
4. TROUBLE RELAXING: MORE THAN HALF THE DAYS
7. FEELING AFRAID AS IF SOMETHING AWFUL MIGHT HAPPEN: SEVERAL DAYS
GAD7 TOTAL SCORE: 10
8. IF YOU CHECKED OFF ANY PROBLEMS, HOW DIFFICULT HAVE THESE MADE IT FOR YOU TO DO YOUR WORK, TAKE CARE OF THINGS AT HOME, OR GET ALONG WITH OTHER PEOPLE?: SOMEWHAT DIFFICULT
GAD7 TOTAL SCORE: 10
3. WORRYING TOO MUCH ABOUT DIFFERENT THINGS: SEVERAL DAYS
1. FEELING NERVOUS, ANXIOUS, OR ON EDGE: MORE THAN HALF THE DAYS
5. BEING SO RESTLESS THAT IT IS HARD TO SIT STILL: MORE THAN HALF THE DAYS
2. NOT BEING ABLE TO STOP OR CONTROL WORRYING: SEVERAL DAYS

## 2024-05-15 ASSESSMENT — PATIENT HEALTH QUESTIONNAIRE - PHQ9
10. IF YOU CHECKED OFF ANY PROBLEMS, HOW DIFFICULT HAVE THESE PROBLEMS MADE IT FOR YOU TO DO YOUR WORK, TAKE CARE OF THINGS AT HOME, OR GET ALONG WITH OTHER PEOPLE: SOMEWHAT DIFFICULT
SUM OF ALL RESPONSES TO PHQ QUESTIONS 1-9: 12
SUM OF ALL RESPONSES TO PHQ QUESTIONS 1-9: 12

## 2024-05-15 ASSESSMENT — PAIN SCALES - GENERAL: PAINLEVEL: MILD PAIN (2)

## 2024-05-15 NOTE — PROGRESS NOTES
"Visit duration 43 minutes. Total time date of service was 43 minutes.   PSYCHIATRY CLINIC PROGRESS NOTE     SUBJECTIVE / INTERIM HISTORY                                                                        Social- retired. Interests:reading, being physical / active & working out, grandkids, volunteers, loves being with kiddo   Children-   Grown children   Last visit October '23: Continue quetiapine 25 mg 1-2 tabs HS prn anxiety / insomnia. Continue  lorazepam 1 mg up to 3 times daily.  Continue Zoloft 50 mg daily.   - back from [place] end of April  - \"I kind of have two questions for you?\" For one her upper back / shoulders tense up during her games. Also questions if is okay to \"veg more\".  - started having sleep problems in late 20s. For 12-13 years has struggled off and on with insomnia  - has been giving up some of her extracurricular activities  - been trying to get more exercise in.  - Went was in [place] someone asked her \"what's your trauma?\"  - working with Hannah Ritter  - mom narcissistic and \"weird\". \"It was my job to keep her happy. Her happiness was more important than mine.\"    MEDICAL ROS- has had pins and needles sensations in back of head and neck. Initially when put on Zoloft felt like back of head / neck was in a clamp  MEDICAL / SURGICAL HISTORY                     Patient Active Problem List   Diagnosis    Generalized anxiety disorder    Chronic fatigue syndrome    Attention deficit disorder    Obsessive-compulsive disorder    FH: colon cancer    Mixed hyperlipidemia    Acquired hypothyroidism    Severe episode of recurrent major depressive disorder, without psychotic features (H)    Vaso vagal episode    Small bowel obstruction due to adhesions (H)    Melanoma of cheek (H)    Acquired facial deformity    Screening for malignant neoplasm of colon     ALLERGY   Codeine  MEDICATIONS                                                                                             Current Outpatient Medications "   Medication Sig Dispense Refill    Calcium Carbonate (TUMS 500 OR) Take by mouth 2 times daily      fish oil-omega-3 fatty acids 1000 MG capsule Take 2 capsules by mouth daily.      fluticasone (FLONASE) 50 MCG/ACT nasal spray Use 2 spray(s) in each nostril once daily 48 g 11    glucosamine 500 MG CAPS Take by mouth daily       levothyroxine (SYNTHROID/LEVOTHROID) 50 MCG tablet Take 1 tablet (50 mcg) by mouth daily 90 tablet 3    LORazepam (ATIVAN) 1 MG tablet Take 1 tablet (1 mg) by mouth 3 times daily as needed for anxiety 270 tablet 1    MAGNESIUM OXIDE PO Take 200 mg by mouth daily      Multiple vitamin TABS Take 1 tablet by oral route every day with food      QUEtiapine (SEROQUEL) 25 MG tablet Take 1 - 2 tabs as needed for anxiety / insomnia 180 tablet 3    sertraline (ZOLOFT) 50 MG tablet Take 1 tablet (50 mg) by mouth daily 90 tablet 3    traZODone (DESYREL) 50 MG tablet TAKE 1 TO 3 TABLETS BY MOUTH AT BEDTIME 270 tablet 3     No current facility-administered medications for this visit.       VITALS   /88   Pulse 79   Temp 99  F (37.2  C) (Tympanic)   Resp 16   Wt 70.8 kg (156 lb)   SpO2 98%   BMI 26.78 kg/m       PHQ9                     [unfilled]  LABS                                                                                                                           Last Comprehensive Metabolic Panel:  Sodium   Date Value Ref Range Status   05/17/2023 141 136 - 145 mmol/L Final   07/24/2020 138 133 - 144 mmol/L Final     Potassium   Date Value Ref Range Status   05/17/2023 3.3 (L) 3.4 - 5.3 mmol/L Final   08/03/2022 3.8 3.4 - 5.3 mmol/L Final   07/24/2020 3.9 3.4 - 5.3 mmol/L Final     Chloride   Date Value Ref Range Status   05/17/2023 103 98 - 107 mmol/L Final   08/03/2022 105 94 - 109 mmol/L Final   07/24/2020 107 94 - 109 mmol/L Final     Carbon Dioxide   Date Value Ref Range Status   07/24/2020 29 20 - 32 mmol/L Final     Carbon Dioxide (CO2)   Date Value Ref Range Status    05/17/2023 28 22 - 29 mmol/L Final   08/03/2022 27 20 - 32 mmol/L Final     Anion Gap   Date Value Ref Range Status   05/17/2023 10 7 - 15 mmol/L Final   08/03/2022 5 3 - 14 mmol/L Final   07/24/2020 2 (L) 3 - 14 mmol/L Final     Glucose   Date Value Ref Range Status   05/17/2023 87 70 - 99 mg/dL Final   08/03/2022 88 70 - 99 mg/dL Final   07/24/2020 88 70 - 99 mg/dL Final     Urea Nitrogen   Date Value Ref Range Status   05/17/2023 13.4 8.0 - 23.0 mg/dL Final   08/03/2022 14 7 - 30 mg/dL Final   07/24/2020 15 7 - 30 mg/dL Final     Creatinine   Date Value Ref Range Status   05/17/2023 0.72 0.51 - 0.95 mg/dL Final   07/24/2020 0.71 0.52 - 1.04 mg/dL Final     GFR Estimate   Date Value Ref Range Status   05/17/2023 89 >60 mL/min/1.73m2 Final     Comment:     eGFR calculated using 2021 CKD-EPI equation.   07/24/2020 87 >60 mL/min/[1.73_m2] Final     Comment:     Non  GFR Calc  Starting 12/18/2018, serum creatinine based estimated GFR (eGFR) will be   calculated using the Chronic Kidney Disease Epidemiology Collaboration   (CKD-EPI) equation.       Calcium   Date Value Ref Range Status   05/17/2023 10.0 8.8 - 10.2 mg/dL Final   07/24/2020 9.4 8.5 - 10.1 mg/dL Final     TSH   Date Value Ref Range Status   08/08/2023 1.61 0.30 - 4.20 uIU/mL Final   08/03/2022 0.87 0.40 - 4.00 mU/L Final   07/24/2020 1.00 0.40 - 4.00 mU/L Final     MENTAL STATUS EXAM                                                                                       Mood depressed, anxious Affect congruent to mood and tearful.  Thought process, including associations, was unremarkable and thought content was devoid of homicidal ideation and psychotic thought. + SI with no intent or plan. No hallucinations. Insight was good. Judgment was intact and adequate for safety. Fund of knowledge was intact. Pt demonstrates no obvious problems with attention, concentration, language, recent or remote memory although these were not formally  tested.     ASSESSMENT                                                                                                      HISTORICAL:  Initial psych note 8 2015         NOTES:  She took CBD for while and she felt contributed to depression. GeneSight testing correlated with Effexor SEs when she was on higher doses. She had shaking when on Effexor and WEllbutrin.    Mony Szymanski is a 74 year old, female with long history of depression, anxiety - therapy throughout the years. Depression worsened along with isolation and the Covid - 19 pandemic. Mony had been taking Effexor, Serax, and trazodone for years. She was on Effexor for 20+ years. She was also on Wellbutrin.    Mony back from [place]. She worked with her psychiatrist who also does therapy while she was there. Mony will be working with Hannah Ritter here for therapy. Today we spent time talking about her ideas / perspective on her illness including where she feels she would like to progress and why she hasn't (much related to her relationship with her mother growing up).    TREATMENT RISK STATEMENT:  The risks, benefits, alternatives and potential adverse effects have been explained and are understood by the pt.  The pt agrees to the treatment plan with the ability to do so.   The pt knows to call the clinic for any problems or access emergency care if needed.        DIAGNOSES                    MDD, recurrent moderate  ED  OCPD traits    PLAN                                                                                                                    1)  MEDICATIONS:         -- Continue quetiapine 25 mg 1-2 tabs HS prn anxiety / insomnia. Continue  lorazepam 1 mg up to 3 times daily.  Continue Zoloft 50 mg daily.     2)  THERAPY:  No Change    3)  LABS:  None    4)  PT MONITOR [call for probs]:   worsening sx, SEs from meds    5)  REFERRALS [CD, medical, other]:  None    6)  RTC: ~ 1 month    Of note: when in [place] her pharmacy is [facility] in [place] on  [address]  Phone # [phone].     Answers submitted by the patient for this visit:  Patient Health Questionnaire (Submitted on 5/15/2024)  If you checked off any problems, how difficult have these problems made it for you to do your work, take care of things at home, or get along with other people?: Somewhat difficult  PHQ9 TOTAL SCORE: 12  ED-7 (Submitted on 5/15/2024)  ED 7 TOTAL SCORE: 10

## 2024-06-01 ASSESSMENT — ANXIETY QUESTIONNAIRES
7. FEELING AFRAID AS IF SOMETHING AWFUL MIGHT HAPPEN: SEVERAL DAYS
1. FEELING NERVOUS, ANXIOUS, OR ON EDGE: MORE THAN HALF THE DAYS
5. BEING SO RESTLESS THAT IT IS HARD TO SIT STILL: MORE THAN HALF THE DAYS
4. TROUBLE RELAXING: MORE THAN HALF THE DAYS
IF YOU CHECKED OFF ANY PROBLEMS ON THIS QUESTIONNAIRE, HOW DIFFICULT HAVE THESE PROBLEMS MADE IT FOR YOU TO DO YOUR WORK, TAKE CARE OF THINGS AT HOME, OR GET ALONG WITH OTHER PEOPLE: SOMEWHAT DIFFICULT
3. WORRYING TOO MUCH ABOUT DIFFERENT THINGS: MORE THAN HALF THE DAYS
GAD7 TOTAL SCORE: 13
2. NOT BEING ABLE TO STOP OR CONTROL WORRYING: MORE THAN HALF THE DAYS
6. BECOMING EASILY ANNOYED OR IRRITABLE: MORE THAN HALF THE DAYS
8. IF YOU CHECKED OFF ANY PROBLEMS, HOW DIFFICULT HAVE THESE MADE IT FOR YOU TO DO YOUR WORK, TAKE CARE OF THINGS AT HOME, OR GET ALONG WITH OTHER PEOPLE?: SOMEWHAT DIFFICULT
7. FEELING AFRAID AS IF SOMETHING AWFUL MIGHT HAPPEN: SEVERAL DAYS
GAD7 TOTAL SCORE: 13

## 2024-06-03 ENCOUNTER — OFFICE VISIT (OUTPATIENT)
Dept: FAMILY MEDICINE | Facility: OTHER | Age: 74
End: 2024-06-03
Attending: FAMILY MEDICINE
Payer: COMMERCIAL

## 2024-06-03 VITALS
HEIGHT: 64 IN | RESPIRATION RATE: 20 BRPM | OXYGEN SATURATION: 98 % | BODY MASS INDEX: 27.66 KG/M2 | HEART RATE: 79 BPM | WEIGHT: 162 LBS | DIASTOLIC BLOOD PRESSURE: 88 MMHG | SYSTOLIC BLOOD PRESSURE: 134 MMHG | TEMPERATURE: 98.6 F

## 2024-06-03 DIAGNOSIS — F41.1 GAD (GENERALIZED ANXIETY DISORDER): Primary | ICD-10-CM

## 2024-06-03 DIAGNOSIS — E78.2 MIXED HYPERLIPIDEMIA: ICD-10-CM

## 2024-06-03 DIAGNOSIS — E55.9 VITAMIN D DEFICIENCY: ICD-10-CM

## 2024-06-03 DIAGNOSIS — J30.1 CHRONIC SEASONAL ALLERGIC RHINITIS DUE TO POLLEN: ICD-10-CM

## 2024-06-03 DIAGNOSIS — E03.9 ACQUIRED HYPOTHYROIDISM: ICD-10-CM

## 2024-06-03 LAB
ALBUMIN SERPL BCG-MCNC: 4.8 G/DL (ref 3.5–5.2)
ALP SERPL-CCNC: 103 U/L (ref 40–150)
ALT SERPL W P-5'-P-CCNC: 18 U/L (ref 0–50)
ANION GAP SERPL CALCULATED.3IONS-SCNC: 13 MMOL/L (ref 7–15)
AST SERPL W P-5'-P-CCNC: 23 U/L (ref 0–45)
BILIRUB SERPL-MCNC: 0.3 MG/DL
BUN SERPL-MCNC: 14.3 MG/DL (ref 8–23)
CALCIUM SERPL-MCNC: 10.5 MG/DL (ref 8.8–10.2)
CHLORIDE SERPL-SCNC: 100 MMOL/L (ref 98–107)
CHOLEST SERPL-MCNC: 285 MG/DL
CREAT SERPL-MCNC: 0.82 MG/DL (ref 0.51–0.95)
DEPRECATED HCO3 PLAS-SCNC: 25 MMOL/L (ref 22–29)
EGFRCR SERPLBLD CKD-EPI 2021: 76 ML/MIN/1.73M2
FASTING STATUS PATIENT QL REPORTED: NO
FASTING STATUS PATIENT QL REPORTED: NO
GLUCOSE SERPL-MCNC: 90 MG/DL (ref 70–99)
HDLC SERPL-MCNC: 65 MG/DL
LDLC SERPL CALC-MCNC: 183 MG/DL
NONHDLC SERPL-MCNC: 220 MG/DL
POTASSIUM SERPL-SCNC: 4.6 MMOL/L (ref 3.4–5.3)
PROT SERPL-MCNC: 7.9 G/DL (ref 6.4–8.3)
SODIUM SERPL-SCNC: 138 MMOL/L (ref 135–145)
TRIGL SERPL-MCNC: 187 MG/DL
TSH SERPL DL<=0.005 MIU/L-ACNC: 1.16 UIU/ML (ref 0.3–4.2)

## 2024-06-03 PROCEDURE — 82306 VITAMIN D 25 HYDROXY: CPT | Mod: ZL | Performed by: FAMILY MEDICINE

## 2024-06-03 PROCEDURE — 82465 ASSAY BLD/SERUM CHOLESTEROL: CPT | Mod: ZL | Performed by: FAMILY MEDICINE

## 2024-06-03 PROCEDURE — 99214 OFFICE O/P EST MOD 30 MIN: CPT | Performed by: FAMILY MEDICINE

## 2024-06-03 PROCEDURE — 82607 VITAMIN B-12: CPT | Mod: ZL | Performed by: FAMILY MEDICINE

## 2024-06-03 PROCEDURE — 36415 COLL VENOUS BLD VENIPUNCTURE: CPT | Mod: ZL | Performed by: FAMILY MEDICINE

## 2024-06-03 PROCEDURE — 82040 ASSAY OF SERUM ALBUMIN: CPT | Mod: ZL | Performed by: FAMILY MEDICINE

## 2024-06-03 PROCEDURE — 84443 ASSAY THYROID STIM HORMONE: CPT | Mod: ZL | Performed by: FAMILY MEDICINE

## 2024-06-03 PROCEDURE — 84450 TRANSFERASE (AST) (SGOT): CPT | Mod: ZL | Performed by: FAMILY MEDICINE

## 2024-06-03 PROCEDURE — G0463 HOSPITAL OUTPT CLINIC VISIT: HCPCS

## 2024-06-03 RX ORDER — FLUTICASONE PROPIONATE 50 MCG
SPRAY, SUSPENSION (ML) NASAL
Qty: 48 G | Refills: 3 | Status: SHIPPED | OUTPATIENT
Start: 2024-06-03

## 2024-06-03 RX ORDER — LEVOTHYROXINE SODIUM 50 UG/1
50 TABLET ORAL DAILY
Qty: 90 TABLET | Refills: 3 | Status: SHIPPED | OUTPATIENT
Start: 2024-06-03

## 2024-06-03 ASSESSMENT — PATIENT HEALTH QUESTIONNAIRE - PHQ9
10. IF YOU CHECKED OFF ANY PROBLEMS, HOW DIFFICULT HAVE THESE PROBLEMS MADE IT FOR YOU TO DO YOUR WORK, TAKE CARE OF THINGS AT HOME, OR GET ALONG WITH OTHER PEOPLE: SOMEWHAT DIFFICULT
SUM OF ALL RESPONSES TO PHQ QUESTIONS 1-9: 11
SUM OF ALL RESPONSES TO PHQ QUESTIONS 1-9: 11

## 2024-06-03 ASSESSMENT — PAIN SCALES - GENERAL: PAINLEVEL: MILD PAIN (2)

## 2024-06-03 NOTE — Clinical Note
We discussed zoloft increase today - any thoughts if this is worth a try, overall she is feeling a bit better

## 2024-06-03 NOTE — PROGRESS NOTES
"  Assessment & Plan     Acquired hypothyroidism  Has been stable. Anxiety somewhat better, recheck, refilled medications.   - TSH with free T4 reflex  - levothyroxine (SYNTHROID/LEVOTHROID) 50 MCG tablet  Dispense: 90 tablet; Refill: 3  - TSH with free T4 reflex    Chronic seasonal allergic rhinitis due to pollen  Refilled. Currently sx are stable, controlled  - fluticasone (FLONASE) 50 MCG/ACT nasal spray  Dispense: 48 g; Refill: 3    ED (generalized anxiety disorder)  Severe, managed by psychiatry. Discussed increase in zoloft, will discuss with psychiatry    Mixed hyperlipidemia  Stable, not on statin for primary prevention, recheck non fasting today. Annual set up for Spring next year, can repeat fasting, if needed.   - Comprehensive metabolic panel (BMP + Alb, Alk Phos, ALT, AST, Total. Bili, TP)  - Lipid Profile (Chol, Trig, HDL, LDL calc)  - Comprehensive metabolic panel (BMP + Alb, Alk Phos, ALT, AST, Total. Bili, TP)  - Lipid Profile (Chol, Trig, HDL, LDL calc)    Vitamin D deficiency  Had been over supplemented, recheck now off.   - Vitamin D Deficiency  - Vitamin B12  - Vitamin D Deficiency  - Vitamin B12      BMI  Estimated body mass index is 27.81 kg/m  as calculated from the following:    Height as of this encounter: 1.626 m (5' 4\").    Weight as of this encounter: 73.5 kg (162 lb).     No follow-ups on file.    Luc Sykes is a 74 year old, presenting for the following health issues:  Recheck Medication        6/3/2024    10:57 AM   Additional Questions   Roomed by Hanna Fregoso     History of Present Illness       Mental Health Follow-up:  Patient presents to follow-up on Depression & Anxiety.Patient's depression since last visit has been:  Better  The patient is not having other symptoms associated with depression.  Patient's anxiety since last visit has been:  Better  The patient is not having other symptoms associated with anxiety.  Any significant life events: No  Patient is feeling " anxious or having panic attacks.  Patient has no concerns about alcohol or drug use.    She eats 2-3 servings of fruits and vegetables daily.She consumes 1 sweetened beverage(s) daily.She exercises with enough effort to increase her heart rate 30 to 60 minutes per day.  She exercises with enough effort to increase her heart rate 7 days per week.   She is taking medications regularly.    Abnormal Mood Symptoms  Onset/Duration: Chronic  Description: Anxiety  Depression (if yes, do PHQ-9): YES  Anxiety (if yes, do ED-7): YES  Accompanying Signs & Symptoms:  Still participating in activities that you used to enjoy: YES  Fatigue: YES  Irritability: YES  Difficulty concentrating: YES  Changes in appetite: YES- Down a little bit  Problems with sleep: No  Heart racing/beating fast: No  Abnormally elevated, expansive, or irritable mood: YES  Persistently increased activity or energy: No  Thoughts of hurting yourself or others: YES- Have days  History:  Recent stress or major life event: No  Prior depression or anxiety: Yes  Family history of depression or anxiety: YES  Alcohol/drug use: No  Difficulty sleeping: Doing better  Precipitating or alleviating factors: Childhood trauma  Therapies tried and outcome: individual therapy and medication(s)         10/10/2023     3:40 PM 5/15/2024    11:18 AM 6/3/2024    10:53 AM   PHQ   PHQ-9 Total Score 6 12 11   Q9: Thoughts of better off dead/self-harm past 2 weeks Several days Several days Several days   F/U: Thoughts of suicide or self-harm No No Yes   F/U: Self harm-plan   No   F/U: Self-harm action   No   F/U: Safety concerns No No No         10/10/2023     3:40 PM 5/15/2024    11:18 AM 6/1/2024     6:41 AM   ED-7 SCORE   Total Score 10 (moderate anxiety) 10 (moderate anxiety) 13 (moderate anxiety)   Total Score 10 10 13     Hypothyroidism Follow-up    Since last visit, patient describes the following symptoms: Weight stable, no hair loss, no skin changes, no constipation, no  "loose stools    Review of Systems  Constitutional, HEENT, cardiovascular, pulmonary, gi and gu systems are negative, except as otherwise noted.      Objective    /88 (BP Location: Left arm, Patient Position: Sitting, Cuff Size: Adult Regular)   Pulse 79   Temp 98.6  F (37  C) (Tympanic)   Resp 20   Ht 1.626 m (5' 4\")   Wt 73.5 kg (162 lb)   SpO2 98%   BMI 27.81 kg/m    Body mass index is 27.81 kg/m .    Physical Exam   GENERAL: alert and no distress  EYES: Eyes grossly normal to inspection  NECK: no adenopathy, no asymmetry, masses, or scars  RESP: lungs clear to auscultation - no rales, rhonchi or wheezes  CV: regular rates and rhythm, normal S1 S2, no S3 or S4, no murmur, click or rub, and no peripheral edema  ABDOMEN: soft, nontender, no hepatosplenomegaly, no masses and bowel sounds normal  MS: no gross musculoskeletal defects noted, no edema  SKIN: no suspicious lesions or rashes  NEURO: Normal strength and tone, mentation intact, and speech normal  PSYCH: mentation appears normal, affect normal/bright    No results found for any visits on 06/03/24.      Signed Electronically by: Ivon Simon MD    "

## 2024-06-04 ENCOUNTER — TELEPHONE (OUTPATIENT)
Dept: FAMILY MEDICINE | Facility: OTHER | Age: 74
End: 2024-06-04

## 2024-06-04 NOTE — TELEPHONE ENCOUNTER
Patient notified ok to increase zoloft to 75 MG, take 1.5 tablets. Patient verbalized understanding.  Viktoriya Fregoso LPN

## 2024-06-04 NOTE — TELEPHONE ENCOUNTER
Please call Mony, I was able to speak with psychiatry. She can increase her zoloft to 75mg daily (1.5 tabs) until psychiatry follow up.

## 2024-06-04 NOTE — TELEPHONE ENCOUNTER
FABIÁNM calling with below.  Viktoriya Fregoso LPN    Please call Mony, I was able to speak with psychiatry. She can increase her zoloft to 75mg daily (1.5 tabs) until psychiatry follow up.

## 2024-06-05 LAB
VIT B12 SERPL-MCNC: 713 PG/ML (ref 232–1245)
VIT D+METAB SERPL-MCNC: 51 NG/ML (ref 20–50)

## 2024-06-12 ENCOUNTER — OFFICE VISIT (OUTPATIENT)
Dept: PSYCHIATRY | Facility: OTHER | Age: 74
End: 2024-06-12
Attending: PSYCHIATRY & NEUROLOGY
Payer: COMMERCIAL

## 2024-06-12 VITALS
DIASTOLIC BLOOD PRESSURE: 78 MMHG | RESPIRATION RATE: 18 BRPM | SYSTOLIC BLOOD PRESSURE: 130 MMHG | HEART RATE: 95 BPM | TEMPERATURE: 99.4 F | WEIGHT: 155 LBS | OXYGEN SATURATION: 98 % | BODY MASS INDEX: 26.61 KG/M2

## 2024-06-12 DIAGNOSIS — F41.1 GAD (GENERALIZED ANXIETY DISORDER): ICD-10-CM

## 2024-06-12 PROCEDURE — G0463 HOSPITAL OUTPT CLINIC VISIT: HCPCS

## 2024-06-12 PROCEDURE — 99213 OFFICE O/P EST LOW 20 MIN: CPT | Performed by: PSYCHIATRY & NEUROLOGY

## 2024-06-12 RX ORDER — LORAZEPAM 1 MG/1
1 TABLET ORAL 3 TIMES DAILY PRN
Qty: 270 TABLET | Refills: 1 | Status: SHIPPED | OUTPATIENT
Start: 2024-06-12 | End: 2024-09-04

## 2024-06-12 ASSESSMENT — PAIN SCALES - GENERAL: PAINLEVEL: NO PAIN (0)

## 2024-06-12 NOTE — PROGRESS NOTES
"  PSYCHIATRY CLINIC PROGRESS NOTE     SUBJECTIVE / INTERIM HISTORY                                                                        Social- retired. Interests:reading, being physical / active & working out, grandkids, volunteers, loves being with kiddo   Children-   Grown children   Last visit 5/2024:  Continue quetiapine 25 mg 1-2 tabs HS prn anxiety / insomnia. Continue  lorazepam 1 mg up to 3 times daily.  Continue Zoloft 50 mg daily.   - mornings are still tough.   - feels every week is better.   - sleep has been better.   - started having sleep problems in late 20s. For 12-13 years has struggled off and on with insomnia  - has been giving up some of her extracurricular activities. Also some of \"just fell apart\"  - been trying to get more exercise in.  - working with Hannah Ritter but hasn't seen her much   - mom narcissistic and \"weird\". \"It was my job to keep her happy. Her happiness was more important than mine.\"    MEDICAL ROS- has had pins and needles sensations in back of head and neck. Initially when put on Zoloft felt like back of head / neck was in a clamp  MEDICAL / SURGICAL HISTORY                     Patient Active Problem List   Diagnosis    Generalized anxiety disorder    Chronic fatigue syndrome    Attention deficit disorder    Obsessive-compulsive disorder    FH: colon cancer    Mixed hyperlipidemia    Acquired hypothyroidism    Severe episode of recurrent major depressive disorder, without psychotic features (H)    Vaso vagal episode    Small bowel obstruction due to adhesions (H)    Melanoma of cheek (H)    Acquired facial deformity    Screening for malignant neoplasm of colon     ALLERGY   Codeine  MEDICATIONS                                                                                             Current Outpatient Medications   Medication Sig Dispense Refill    Calcium Carbonate (TUMS 500 OR) Take by mouth 2 times daily      fish oil-omega-3 fatty acids 1000 MG capsule Take 2 " capsules by mouth daily.      fluticasone (FLONASE) 50 MCG/ACT nasal spray Use 2 spray(s) in each nostril once daily 48 g 3    glucosamine 500 MG CAPS Take by mouth daily       levothyroxine (SYNTHROID/LEVOTHROID) 50 MCG tablet Take 1 tablet (50 mcg) by mouth daily 90 tablet 3    LORazepam (ATIVAN) 1 MG tablet Take 1 tablet (1 mg) by mouth 3 times daily as needed for anxiety 270 tablet 1    MAGNESIUM OXIDE PO Take 200 mg by mouth daily      Multiple vitamin TABS Take 1 tablet by oral route every day with food      QUEtiapine (SEROQUEL) 25 MG tablet Take 1 - 2 tabs as needed for anxiety / insomnia 180 tablet 3    sertraline (ZOLOFT) 50 MG tablet Take 1 tablet (50 mg) by mouth daily (Patient taking differently: Take 75 mg by mouth daily) 90 tablet 3    traZODone (DESYREL) 50 MG tablet TAKE 1 TO 3 TABLETS BY MOUTH AT BEDTIME 270 tablet 3     No current facility-administered medications for this visit.       VITALS   /78   Pulse 95   Temp 99.4  F (37.4  C) (Tympanic)   Resp 18   Wt 70.3 kg (155 lb)   SpO2 98%   BMI 26.61 kg/m       PHQ9                     [unfilled]  LABS                                                                                                                           Last Comprehensive Metabolic Panel:  Sodium   Date Value Ref Range Status   06/03/2024 138 135 - 145 mmol/L Final     Comment:     Reference intervals for this test were updated on 09/26/2023 to more accurately reflect our healthy population. There may be differences in the flagging of prior results with similar values performed with this method. Interpretation of those prior results can be made in the context of the updated reference intervals.    07/24/2020 138 133 - 144 mmol/L Final     Potassium   Date Value Ref Range Status   06/03/2024 4.6 3.4 - 5.3 mmol/L Final   08/03/2022 3.8 3.4 - 5.3 mmol/L Final   07/24/2020 3.9 3.4 - 5.3 mmol/L Final     Chloride   Date Value Ref Range Status   06/03/2024 100 98 - 107  mmol/L Final   08/03/2022 105 94 - 109 mmol/L Final   07/24/2020 107 94 - 109 mmol/L Final     Carbon Dioxide   Date Value Ref Range Status   07/24/2020 29 20 - 32 mmol/L Final     Carbon Dioxide (CO2)   Date Value Ref Range Status   06/03/2024 25 22 - 29 mmol/L Final   08/03/2022 27 20 - 32 mmol/L Final     Anion Gap   Date Value Ref Range Status   06/03/2024 13 7 - 15 mmol/L Final   08/03/2022 5 3 - 14 mmol/L Final   07/24/2020 2 (L) 3 - 14 mmol/L Final     Glucose   Date Value Ref Range Status   06/03/2024 90 70 - 99 mg/dL Final   08/03/2022 88 70 - 99 mg/dL Final   07/24/2020 88 70 - 99 mg/dL Final     Urea Nitrogen   Date Value Ref Range Status   06/03/2024 14.3 8.0 - 23.0 mg/dL Final   08/03/2022 14 7 - 30 mg/dL Final   07/24/2020 15 7 - 30 mg/dL Final     Creatinine   Date Value Ref Range Status   06/03/2024 0.82 0.51 - 0.95 mg/dL Final   07/24/2020 0.71 0.52 - 1.04 mg/dL Final     GFR Estimate   Date Value Ref Range Status   06/03/2024 76 >60 mL/min/1.73m2 Final   07/24/2020 87 >60 mL/min/[1.73_m2] Final     Comment:     Non  GFR Calc  Starting 12/18/2018, serum creatinine based estimated GFR (eGFR) will be   calculated using the Chronic Kidney Disease Epidemiology Collaboration   (CKD-EPI) equation.       Calcium   Date Value Ref Range Status   06/03/2024 10.5 (H) 8.8 - 10.2 mg/dL Final   07/24/2020 9.4 8.5 - 10.1 mg/dL Final     TSH   Date Value Ref Range Status   06/03/2024 1.16 0.30 - 4.20 uIU/mL Final   08/03/2022 0.87 0.40 - 4.00 mU/L Final   07/24/2020 1.00 0.40 - 4.00 mU/L Final     MENTAL STATUS EXAM                                                                                       Mood depressed, anxious Affect congruent to mood and tearful.  Thought process, including associations, was unremarkable and thought content was devoid of homicidal ideation and psychotic thought. + SI with no intent or plan. No hallucinations. Insight was good. Judgment was intact and adequate for  safety. Fund of knowledge was intact. Pt demonstrates no obvious problems with attention, concentration, language, recent or remote memory although these were not formally tested.     ASSESSMENT                                                                                                      HISTORICAL:  Initial psych note 8 2015         NOTES:  She took CBD for while and she felt contributed to depression. GeneSight testing correlated with Effexor SEs when she was on higher doses. She had shaking when on Effexor and WEllbutrin.    Mony Szymanski is a 74 year old, female with long history of depression, anxiety - therapy throughout the years. Depression worsened along with isolation and the Covid - 19 pandemic. Mony had been taking Effexor, Serax, and trazodone for years. She was on Effexor for 20+ years. She was also on Wellbutrin.    Interim last visit she met with her primary and they agreed on increase of Zoloft. Increase from 50 mg to 75 mg daily. No major change / improvment however I noted this class of meds often takes 4-6 weeks. Today we talked about how Mony has made a lot of progress - with both therapy and medications. She is able to recognize this.    TREATMENT RISK STATEMENT:  The risks, benefits, alternatives and potential adverse effects have been explained and are understood by the pt.  The pt agrees to the treatment plan with the ability to do so.   The pt knows to call the clinic for any problems or access emergency care if needed.        DIAGNOSES                    MDD, recurrent moderate  ED  OCPD traits    PLAN                                                                                                                    1)  MEDICATIONS:         -- Continue quetiapine 25 mg 1-2 tabs HS prn anxiety / insomnia. Continue  lorazepam 1 mg up to 3 times daily.  Continue Zoloft 75 mg daily.     2)  THERAPY:  No Change    3)  LABS:  None    4)  PT MONITOR [call for probs]:   worsening sx, SEs from  meds    5)  REFERRALS [CD, medical, other]:  None    6)  RTC: ~ 1 month    Of note: when in [place] her pharmacy is [facility] in [place] on [address] and [address]  Phone # [phone].

## 2024-07-01 ENCOUNTER — OFFICE VISIT (OUTPATIENT)
Dept: DERMATOLOGY | Facility: OTHER | Age: 74
End: 2024-07-01
Attending: DERMATOLOGY
Payer: MEDICARE

## 2024-07-01 VITALS
TEMPERATURE: 98.8 F | HEIGHT: 64 IN | DIASTOLIC BLOOD PRESSURE: 90 MMHG | OXYGEN SATURATION: 98 % | BODY MASS INDEX: 26.46 KG/M2 | WEIGHT: 155 LBS | SYSTOLIC BLOOD PRESSURE: 141 MMHG | RESPIRATION RATE: 16 BRPM | HEART RATE: 69 BPM

## 2024-07-01 DIAGNOSIS — Z12.83 SCREENING FOR SKIN CANCER: Primary | ICD-10-CM

## 2024-07-01 DIAGNOSIS — D22.9 MULTIPLE BENIGN NEVI: ICD-10-CM

## 2024-07-01 PROCEDURE — 99212 OFFICE O/P EST SF 10 MIN: CPT | Performed by: DERMATOLOGY

## 2024-07-01 PROCEDURE — G0463 HOSPITAL OUTPT CLINIC VISIT: HCPCS | Performed by: DERMATOLOGY

## 2024-07-01 ASSESSMENT — PAIN SCALES - GENERAL: PAINLEVEL: NO PAIN (0)

## 2024-07-01 NOTE — PROGRESS NOTES
S: Recheck visit for Mony.  Since last seen has not had any worrisome lesions she states.    O: Examination today included the face neck scalp chest and back.  Scattered nevi hemangiomas and a few seborrheic keratoses were noted.  I found no actinic keratoses or any pigmented lesions that were of concern.  She denied the development of any tender lesions or lesions that have bled from time to time.    A: No abnormal findings on today's exam.  Reassured.  Advised that she call us should she notice anything of concern until next year's annual or semiannual visit.  Meds and allergies reviewed    P: Return in 1 year.  15 minutes spent in exam discussion and charting

## 2024-07-01 NOTE — LETTER
7/1/2024       RE: Mony Szymanski  [address] [place] [address]     Dear Colleague,    Thank you for referring your patient, Mony Szymanski, to the [facility]. Please see a copy of my visit note below.    S: Recheck visit for Mony.  Since last seen has not had any worrisome lesions she states.    O: Examination today included the face neck scalp chest and back.  Scattered nevi hemangiomas and a few seborrheic keratoses were noted.  I found no actinic keratoses or any pigmented lesions that were of concern.  She denied the development of any tender lesions or lesions that have bled from time to time.    A: No abnormal findings on today's exam.  Reassured.  Advised that she call us should she notice anything of concern until next year's annual or semiannual visit.  Meds and allergies reviewed    P: Return in 1 year.  15 minutes spent in exam discussion and charting      Again, thank you for allowing me to participate in the care of your patient.      Sincerely,    DEANN Leyva MD

## 2024-07-24 ENCOUNTER — OFFICE VISIT (OUTPATIENT)
Dept: PSYCHIATRY | Facility: OTHER | Age: 74
End: 2024-07-24
Attending: PSYCHIATRY & NEUROLOGY
Payer: COMMERCIAL

## 2024-07-24 VITALS
SYSTOLIC BLOOD PRESSURE: 146 MMHG | HEART RATE: 91 BPM | RESPIRATION RATE: 18 BRPM | OXYGEN SATURATION: 98 % | WEIGHT: 155 LBS | BODY MASS INDEX: 26.61 KG/M2 | DIASTOLIC BLOOD PRESSURE: 94 MMHG | TEMPERATURE: 98.7 F

## 2024-07-24 DIAGNOSIS — F33.1 MAJOR DEPRESSIVE DISORDER, RECURRENT EPISODE, MODERATE (H): ICD-10-CM

## 2024-07-24 DIAGNOSIS — F41.1 GAD (GENERALIZED ANXIETY DISORDER): Primary | ICD-10-CM

## 2024-07-24 PROCEDURE — G0463 HOSPITAL OUTPT CLINIC VISIT: HCPCS

## 2024-07-24 PROCEDURE — 99215 OFFICE O/P EST HI 40 MIN: CPT | Performed by: PSYCHIATRY & NEUROLOGY

## 2024-07-24 ASSESSMENT — ANXIETY QUESTIONNAIRES
GAD7 TOTAL SCORE: 10
GAD7 TOTAL SCORE: 10
2. NOT BEING ABLE TO STOP OR CONTROL WORRYING: SEVERAL DAYS
7. FEELING AFRAID AS IF SOMETHING AWFUL MIGHT HAPPEN: SEVERAL DAYS
6. BECOMING EASILY ANNOYED OR IRRITABLE: SEVERAL DAYS
4. TROUBLE RELAXING: MORE THAN HALF THE DAYS
1. FEELING NERVOUS, ANXIOUS, OR ON EDGE: MORE THAN HALF THE DAYS
5. BEING SO RESTLESS THAT IT IS HARD TO SIT STILL: MORE THAN HALF THE DAYS
3. WORRYING TOO MUCH ABOUT DIFFERENT THINGS: SEVERAL DAYS
8. IF YOU CHECKED OFF ANY PROBLEMS, HOW DIFFICULT HAVE THESE MADE IT FOR YOU TO DO YOUR WORK, TAKE CARE OF THINGS AT HOME, OR GET ALONG WITH OTHER PEOPLE?: SOMEWHAT DIFFICULT
GAD7 TOTAL SCORE: 10
7. FEELING AFRAID AS IF SOMETHING AWFUL MIGHT HAPPEN: SEVERAL DAYS
IF YOU CHECKED OFF ANY PROBLEMS ON THIS QUESTIONNAIRE, HOW DIFFICULT HAVE THESE PROBLEMS MADE IT FOR YOU TO DO YOUR WORK, TAKE CARE OF THINGS AT HOME, OR GET ALONG WITH OTHER PEOPLE: SOMEWHAT DIFFICULT

## 2024-07-24 ASSESSMENT — PATIENT HEALTH QUESTIONNAIRE - PHQ9
10. IF YOU CHECKED OFF ANY PROBLEMS, HOW DIFFICULT HAVE THESE PROBLEMS MADE IT FOR YOU TO DO YOUR WORK, TAKE CARE OF THINGS AT HOME, OR GET ALONG WITH OTHER PEOPLE: SOMEWHAT DIFFICULT
SUM OF ALL RESPONSES TO PHQ QUESTIONS 1-9: 10
SUM OF ALL RESPONSES TO PHQ QUESTIONS 1-9: 10

## 2024-07-24 ASSESSMENT — COLUMBIA-SUICIDE SEVERITY RATING SCALE - C-SSRS
2. HAVE YOU ACTUALLY HAD ANY THOUGHTS OF KILLING YOURSELF?: NO
6. IN YOUR LIFETIME, HAVE YOU EVER DONE ANYTHING, STARTED TO DO ANYTHING, OR PREPARED TO DO ANYTHING TO END YOUR LIFE?: NO
1. IN THE PAST MONTH, HAVE YOU WISHED YOU WERE DEAD OR WISHED YOU COULD GO TO SLEEP AND NOT WAKE UP?: YES

## 2024-07-24 ASSESSMENT — PAIN SCALES - GENERAL: PAINLEVEL: NO PAIN (0)

## 2024-07-24 NOTE — PROGRESS NOTES
"Visit duration 38 minutes. 2 minutes documenting. Total visit time of 40 minutes.     PSYCHIATRY CLINIC PROGRESS NOTE     SUBJECTIVE / INTERIM HISTORY                                                                        Social- retired. Interests:reading, being physical / active & working out, grandkids, volunteers, loves being with kiddo   Children-   Grown children   Last visit 6/12/24: - Continue quetiapine 25 mg 1-2 tabs HS prn anxiety / insomnia. Continue  lorazepam 1 mg up to 3 times daily.  Continue Zoloft 75 mg daily.   - sleep better, napping  - \"I just want to feel good again\"  - the crying at this point started around 10 days ago / was the week of the [place] [employer] reunion.  - feels is functioning better  - headaches and neck aches have gotten better  - energy has been better. Trying to get out more and   - as Mony thinks about it thinks much of her tearful episodes are related to issues / memories such as with her mother   - 49 yo son in [place], 47 yo daughter Abby in [place] and 39 yo daughter has pub & grill in [place]. 49 yo used to yell at her. Abby's sons are 20 yo and 18 yo. Doesn't talk to Abby much and Abby doesn't call  - started having sleep problems in late 20s. For 12-13 years has struggled off and on with insomnia  - mom narcissistic and \"weird\". \"It was my job to keep her happy. Her happiness was more important than mine.\"    MEDICAL ROS- has had pins and needles sensations in back of head and neck. Initially when put on Zoloft felt like back of head / neck was in a clamp  MEDICAL / SURGICAL HISTORY                     Patient Active Problem List   Diagnosis    Generalized anxiety disorder    Chronic fatigue syndrome    Attention deficit disorder    Obsessive-compulsive disorder    FH: colon cancer    Mixed hyperlipidemia    Acquired hypothyroidism    Severe episode of recurrent major depressive disorder, without psychotic features (H)    Vaso vagal episode    Small bowel obstruction due to " adhesions (H)    Melanoma of cheek (H)    Acquired facial deformity    Screening for malignant neoplasm of colon     ALLERGY   Codeine  MEDICATIONS                                                                                             Current Outpatient Medications   Medication Sig Dispense Refill    Calcium Carbonate (TUMS 500 OR) Take by mouth 2 times daily      fish oil-omega-3 fatty acids 1000 MG capsule Take 2 capsules by mouth daily.      fluticasone (FLONASE) 50 MCG/ACT nasal spray Use 2 spray(s) in each nostril once daily 48 g 3    glucosamine 500 MG CAPS Take by mouth daily       levothyroxine (SYNTHROID/LEVOTHROID) 50 MCG tablet Take 1 tablet (50 mcg) by mouth daily 90 tablet 3    LORazepam (ATIVAN) 1 MG tablet Take 1 tablet (1 mg) by mouth 3 times daily as needed for anxiety 270 tablet 1    MAGNESIUM OXIDE PO Take 200 mg by mouth daily      Multiple vitamin TABS Take 1 tablet by oral route every day with food      QUEtiapine (SEROQUEL) 25 MG tablet Take 1 - 2 tabs as needed for anxiety / insomnia 180 tablet 3    sertraline (ZOLOFT) 50 MG tablet Take 1 tablet (50 mg) by mouth daily (Patient taking differently: Take 75 mg by mouth daily) 90 tablet 3    traZODone (DESYREL) 50 MG tablet TAKE 1 TO 3 TABLETS BY MOUTH AT BEDTIME 270 tablet 3     No current facility-administered medications for this visit.       VITALS   BP (!) 146/94   Pulse 91   Temp 98.7  F (37.1  C) (Tympanic)   Resp 18   Wt 70.3 kg (155 lb)   SpO2 98%   BMI 26.61 kg/m       PHQ9                     [unfilled]  LABS                                                                                                                           Last Comprehensive Metabolic Panel:  Sodium   Date Value Ref Range Status   06/03/2024 138 135 - 145 mmol/L Final     Comment:     Reference intervals for this test were updated on 09/26/2023 to more accurately reflect our healthy population. There may be differences in the flagging of prior  results with similar values performed with this method. Interpretation of those prior results can be made in the context of the updated reference intervals.    07/24/2020 138 133 - 144 mmol/L Final     Potassium   Date Value Ref Range Status   06/03/2024 4.6 3.4 - 5.3 mmol/L Final   08/03/2022 3.8 3.4 - 5.3 mmol/L Final   07/24/2020 3.9 3.4 - 5.3 mmol/L Final     Chloride   Date Value Ref Range Status   06/03/2024 100 98 - 107 mmol/L Final   08/03/2022 105 94 - 109 mmol/L Final   07/24/2020 107 94 - 109 mmol/L Final     Carbon Dioxide   Date Value Ref Range Status   07/24/2020 29 20 - 32 mmol/L Final     Carbon Dioxide (CO2)   Date Value Ref Range Status   06/03/2024 25 22 - 29 mmol/L Final   08/03/2022 27 20 - 32 mmol/L Final     Anion Gap   Date Value Ref Range Status   06/03/2024 13 7 - 15 mmol/L Final   08/03/2022 5 3 - 14 mmol/L Final   07/24/2020 2 (L) 3 - 14 mmol/L Final     Glucose   Date Value Ref Range Status   06/03/2024 90 70 - 99 mg/dL Final   08/03/2022 88 70 - 99 mg/dL Final   07/24/2020 88 70 - 99 mg/dL Final     Urea Nitrogen   Date Value Ref Range Status   06/03/2024 14.3 8.0 - 23.0 mg/dL Final   08/03/2022 14 7 - 30 mg/dL Final   07/24/2020 15 7 - 30 mg/dL Final     Creatinine   Date Value Ref Range Status   06/03/2024 0.82 0.51 - 0.95 mg/dL Final   07/24/2020 0.71 0.52 - 1.04 mg/dL Final     GFR Estimate   Date Value Ref Range Status   06/03/2024 76 >60 mL/min/1.73m2 Final   07/24/2020 87 >60 mL/min/[1.73_m2] Final     Comment:     Non  GFR Calc  Starting 12/18/2018, serum creatinine based estimated GFR (eGFR) will be   calculated using the Chronic Kidney Disease Epidemiology Collaboration   (CKD-EPI) equation.       Calcium   Date Value Ref Range Status   06/03/2024 10.5 (H) 8.8 - 10.2 mg/dL Final   07/24/2020 9.4 8.5 - 10.1 mg/dL Final     TSH   Date Value Ref Range Status   06/03/2024 1.16 0.30 - 4.20 uIU/mL Final   08/03/2022 0.87 0.40 - 4.00 mU/L Final   07/24/2020 1.00 0.40  - 4.00 mU/L Final     MENTAL STATUS EXAM                                                                                       Mood depressed, anxious Affect congruent to mood and tearful.  Thought process, including associations, was unremarkable and thought content was devoid of homicidal ideation and psychotic thought. + SI with no intent or plan. No hallucinations. Insight was good. Judgment was intact and adequate for safety. Fund of knowledge was intact. Pt demonstrates no obvious problems with attention, concentration, language, recent or remote memory although these were not formally tested.     ASSESSMENT                                                                                                      HISTORICAL:  Initial psych note 8 2015         NOTES:  She took CBD for while and she felt contributed to depression. [employer] testing correlated with Effexor SEs when she was on higher doses. She had shaking when on Effexor and WEllbutrin.    Mony Szymanski is a 74 year old, female with long history of depression, anxiety - therapy throughout the years. Depression worsened along with isolation and the Covid - 19 pandemic. Mony had been taking Effexor, Serax, and trazodone for years. She was on Effexor for 20+ years. She was also on Wellbutrin.    She is very tearful today. She notes more tearful and questtions if in relation to increase of Zoloft. I pointed out also she hasn't been in regular therapy. Last summer she worked with Hannah weekly whereas been every couple months. I brought up [facility] and woman's group. I'm going to check in with the therapists as to status of woman's groups here.. Mony is somewhat interested in woman's group however prefers end summer / fall of which I noted that's fine and we continue to discuss it.     TREATMENT RISK STATEMENT:  The risks, benefits, alternatives and potential adverse effects have been explained and are understood by the pt.  The pt agrees to the treatment plan with  the ability to do so.   The pt knows to call the clinic for any problems or access emergency care if needed.        DIAGNOSES                    MDD, recurrent moderate  ED  OCPD traits    PLAN                                                                                                                    1)  MEDICATIONS:         -- Continue quetiapine 25 mg 1-2 tabs HS prn anxiety / insomnia. Continue  lorazepam 1 mg up to 3 times daily.  Continue Zoloft 75 mg daily.     2)  THERAPY:  No Change    3)  LABS:  None    4)  PT MONITOR [call for probs]:   worsening sx, SEs from meds    5)  REFERRALS [CD, medical, other]:  None    6)  RTC: ~ 1 month    Of note: when in [place] her pharmacy is [facility] in [place] on [address] [facility]  Phone # [phone].         Answers submitted by the patient for this visit:  Patient Health Questionnaire (Submitted on 7/24/2024)  If you checked off any problems, how difficult have these problems made it for you to do your work, take care of things at home, or get along with other people?: Somewhat difficult  PHQ9 TOTAL SCORE: 10  ED-7 (Submitted on 7/24/2024)  ED 7 TOTAL SCORE: 10

## 2024-08-21 ENCOUNTER — OFFICE VISIT (OUTPATIENT)
Dept: PSYCHOLOGY | Facility: OTHER | Age: 74
End: 2024-08-21
Attending: SOCIAL WORKER
Payer: COMMERCIAL

## 2024-08-21 DIAGNOSIS — F41.1 GENERALIZED ANXIETY DISORDER: Primary | ICD-10-CM

## 2024-08-21 DIAGNOSIS — F33.2 SEVERE EPISODE OF RECURRENT MAJOR DEPRESSIVE DISORDER, WITHOUT PSYCHOTIC FEATURES (H): ICD-10-CM

## 2024-08-21 PROCEDURE — 90791 PSYCH DIAGNOSTIC EVALUATION: CPT | Performed by: SOCIAL WORKER

## 2024-08-21 ASSESSMENT — PATIENT HEALTH QUESTIONNAIRE - PHQ9
5. POOR APPETITE OR OVEREATING: MORE THAN HALF THE DAYS
SUM OF ALL RESPONSES TO PHQ QUESTIONS 1-9: 13

## 2024-08-21 ASSESSMENT — ANXIETY QUESTIONNAIRES
1. FEELING NERVOUS, ANXIOUS, OR ON EDGE: MORE THAN HALF THE DAYS
IF YOU CHECKED OFF ANY PROBLEMS ON THIS QUESTIONNAIRE, HOW DIFFICULT HAVE THESE PROBLEMS MADE IT FOR YOU TO DO YOUR WORK, TAKE CARE OF THINGS AT HOME, OR GET ALONG WITH OTHER PEOPLE: SOMEWHAT DIFFICULT
7. FEELING AFRAID AS IF SOMETHING AWFUL MIGHT HAPPEN: MORE THAN HALF THE DAYS
5. BEING SO RESTLESS THAT IT IS HARD TO SIT STILL: SEVERAL DAYS
GAD7 TOTAL SCORE: 10
2. NOT BEING ABLE TO STOP OR CONTROL WORRYING: SEVERAL DAYS
6. BECOMING EASILY ANNOYED OR IRRITABLE: SEVERAL DAYS
3. WORRYING TOO MUCH ABOUT DIFFERENT THINGS: SEVERAL DAYS
GAD7 TOTAL SCORE: 10
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
## 2024-08-27 ENCOUNTER — OFFICE VISIT (OUTPATIENT)
Dept: PSYCHOLOGY | Facility: OTHER | Age: 74
End: 2024-08-27
Attending: COUNSELOR
Payer: MEDICARE

## 2024-08-27 DIAGNOSIS — F33.2 SEVERE EPISODE OF RECURRENT MAJOR DEPRESSIVE DISORDER, WITHOUT PSYCHOTIC FEATURES (H): ICD-10-CM

## 2024-08-27 DIAGNOSIS — F41.1 GENERALIZED ANXIETY DISORDER: Primary | ICD-10-CM

## 2024-08-27 PROCEDURE — 90837 PSYTX W PT 60 MINUTES: CPT | Performed by: COUNSELOR

## 2024-08-29 ENCOUNTER — PSYCHE (OUTPATIENT)
Dept: PSYCHOLOGY | Facility: OTHER | Age: 74
End: 2024-08-29
Attending: SOCIAL WORKER
Payer: MEDICARE

## 2024-08-29 DIAGNOSIS — F41.1 GENERALIZED ANXIETY DISORDER: Primary | ICD-10-CM

## 2024-08-29 PROCEDURE — 90853 GROUP PSYCHOTHERAPY: CPT

## 2024-08-30 ASSESSMENT — ANXIETY QUESTIONNAIRES
IF YOU CHECKED OFF ANY PROBLEMS ON THIS QUESTIONNAIRE, HOW DIFFICULT HAVE THESE PROBLEMS MADE IT FOR YOU TO DO YOUR WORK, TAKE CARE OF THINGS AT HOME, OR GET ALONG WITH OTHER PEOPLE: SOMEWHAT DIFFICULT
2. NOT BEING ABLE TO STOP OR CONTROL WORRYING: SEVERAL DAYS
6. BECOMING EASILY ANNOYED OR IRRITABLE: SEVERAL DAYS
3. WORRYING TOO MUCH ABOUT DIFFERENT THINGS: SEVERAL DAYS
7. FEELING AFRAID AS IF SOMETHING AWFUL MIGHT HAPPEN: MORE THAN HALF THE DAYS
1. FEELING NERVOUS, ANXIOUS, OR ON EDGE: MORE THAN HALF THE DAYS
GAD7 TOTAL SCORE: 11
GAD7 TOTAL SCORE: 11
5. BEING SO RESTLESS THAT IT IS HARD TO SIT STILL: SEVERAL DAYS

## 2024-08-30 ASSESSMENT — PATIENT HEALTH QUESTIONNAIRE - PHQ9
5. POOR APPETITE OR OVEREATING: NEARLY EVERY DAY
SUM OF ALL RESPONSES TO PHQ QUESTIONS 1-9: 14

## 2024-08-30 NOTE — CONFIDENTIAL NOTE
Counseling Progress Note    Client Name: Mony Szymanski    Date of Service (when I saw the patient): 08/27/2024    Service Type: Individual Psychotherapy    Session Start Time: 1:00   Session End Time: 1:55  Session Length: I spent 55 minutes performing psychotherapy during this office visit.  Session Number: 1    Attendees: Client     Health Maintenance Topics with due status: Overdue       Topic Date Due    HEPATITIS C SCREENING Never done    RSV VACCINE Never done    COVID-19 Vaccine 02/17/2024    FALL RISK ASSESSMENT 08/08/2024     Health Maintenance Topics with due status: Due On       Topic Date Due    MEDICARE ANNUAL WELLNESS VISIT 08/08/2024       DATA    Treatment Objective(s) Addressed in This Session: Build therapeutic rapport, give informed consent, establish understanding of presenting problem and treatment plan goals    Progress on / Status of Treatment Objective(s) / Homework: Mony was open to sharing and providing information about her presenting problems. She presented as labile throughout this session. Mony expressed frustration around feeling like she has not gotten the support she needs for her mental health. Near the end of the session she shared that she had an appointment with RUBY Thomas this week and that appointment went better than the previous appointment and she has three future appointments scheduled, before she goes back to [place] for the winter.   We discussed this as a conflict of therapy and a potential issue with insurance coverage. Once given that information Mony stated that she enjoyed this appointment but thought she would stick with seeing Hannah. She asked a few questions about changing her mind or possibly switching providers next spring when she returns from [place].    Intervention: Initial therapy appointment - psychotherapy 1:1. Provided informed consent. Gathered information about clients presenting problem. Discussed the conflict, when Mony stated she saw her  therapist, Hannah Ritter and has future appointments and explained she will need to choose one provider. Encouraged her to talk with her current provider when / if she has any concerns or frustrations.      Current Stressors / Issues: depression symptoms, difficulty with managing strong emotions, frustration with providers and avoidance / difficulty expressing her concerns    ASSESSMENT: Current Emotional / Mental Status (status of significant symptoms):  Risk status no overt psychosis and denies suicidal intent or plan  Client denies current fears or concerns for personal safety., Client denies current or recent suicidal ideation or behaviors., Client denies current or recent homicidal ideation or behaviors., Client denies current or recent self-injurious behavior or ideation., and Client denies other safety concerns.  A safety and risk management plan has not been developed at this time; however client was given the after-hours number should there be a change in any of these risk factors.    Appearance: awake, alert  Eye Contact: fair  Psychomotor Behavior: no evidence of tardive dyskinesia, dystonia, or tics  Attitude: evasive and guarded  Orientation: time, person, and place  Speech: clear, coherent  Rate / Production: Normal  Volume: Normal  Mood: anxious, elevated, and labile  Affect: intensity is dramatic  Thought Content: no evidence of psychotic thought  Thought Form: Circumferential  Insight: fair    Collateral Reports Completed: PHQ9 & GAD7    PLAN: Plan is to continue with current mental health therapist, Hannah Ritter at [facility].      Bernardino Vale, [facility]

## 2024-09-03 NOTE — CONFIDENTIAL NOTE
"[place] [facility]  TRACK: [facility]    PATIENT'S NAME: Mony Szymanski  MRN:   [id number]  :   1950  ACCT. NUMBER: [id number]  DATE OF SERVICE: 24  START TIME: 3:00p  END TIME: 4:00p  Service modality:  In-person    Diagnoses:  300.02 (F41.1) Generalized Anxiety Disorder; 296.32 (F33.1) Major Depressive Disorder, Recurrent Episode, Moderate With anxious distress per Dr. Angie Owens dated 24.       Data:    Session content: At the start of this group, patients were invited to check in by identifying themselves, describing their current emotional status, and identifying issues to address in this group.Area(s) of treatment focus addressed in this session included Symptom Management and identifying their highlights and low lights for the week and further discussion regarding situations and symptoms.        Therapeutic Interventions/Treatment Strategies:  Psychotherapist offered support, feedback and validation, set limits, and reinforced use of skills. Treatment modalities used include Motivational Interviewing, Cognitive Behavioral Therapy, and Dialectical Behavioral Therapy. Interventions include Coping Skills: Discussed how the use of intentional \"in the moment\" actions can help reduce distress and Promoted understanding of how and when to apply grounding strategies to reduce distress and increase presence in the moment and Mindfulness: Facilitated discussion of when/how to use mindfulness skills to benefit general health, mental health symptoms, and stressors.      Assessment:    Patient response:   Patient responded to session by appearing disengaged, appearing distracted, and and leaving the group 30 mins before group ended. . Mony came into the group room 15 minutes before it started and got some water, was given a brief tour of the group room area and bathrooms and sat down.  She did not engage with any of the patients who said hello to her and sat quietly in her chair.This writer did " "try to visit with Mony and explained the structure of the group and that we do the check in first and then will review the group topic of the month which is perfectionism this month.  Mony nodded her head to acknowledge she understood but did not verbally respond. She presented as irritated, flat affect with very short answers and minimal facial expressions.     Once group started, we went around with introductions and Mony shared her name and initially sat quietly while we went around with the patients sharing their highlights and lowlights of the week.  Mony became very fidgety within around 15 minutes of the group started, she then began looking at her watch and started making deep breath like sounds to get this clinicians attention as I was sitting near her. This writer turned to Mony and asked her if she was ok, she became very tearful and stated quietly \"I need to leave I can't sit here\".  This writer walked out with [facility] and Mony became upset and stated the following  \"I have been sitting and listening to other peoples problems this whole group and I am not interested in that\" she goes on that this group is not helpful to her and that she thought it would be more interactive and not just \"people talking about their problems.\".  She goes on that \"nobody called on her and didn't talk to her\".  Explained the structure of the group to Mony again and she became irritated and stated again that this is not helpful for her. I did share with her that much of this group and discussing challenges the patients are struggling with and supporting them with coping skills and how to apply skills to the topic of the month which is perfectionism this month. Mony asked if we have to keep the current structure of the group and writer advised Mony that we are not changing the structure as it is working well for others in the group.  Told Mony that we appreciate that she came and tried the group today " "and that this is clearly not what she was looking for.  She agreed and said she was not going to return to the group.  This writer did offer other options including the DBT group that her therapist, Hannah Ritter, is a facilitator at; Mony states that she will discuss this with Hannah at [facility].  She then turned and left the [facility] office.      Possible barriers to participation / learning include: and no barriers identified    Health Issues:   See medical chart       Substance Use Review:   Substance Use: No active concerns identified.    Mental Status/Behavioral Observations  Appearance:   Appropriate   Eye Contact:   Fair   Psychomotor Behavior: Normal   Attitude:   Guarded  Suspicious  irritated, disengaged    Orientation:   Person Place Time Situation All  Speech   Rate / Production: Normal    Volume:  Normal   Mood:    Anxious  Irritable  Agitated  Affect:    Restricted   Thought Content:   Clear  Thought Form:  Coherent  Circumstantial    Insight:    Poor     Plan:   Safety Plan: Recommended that patient call 911 or go to the local ED should there be a change in any of these risk factors.   Barriers to treatment: None identified  Patient Contracts (see media tab):  None  Substance Use:  no concerns    Continue or Discharge: Patient decided that she will not continue to attend the [facility] as it does not meet her needs regarding the type of group she is looking for. She emphasizes that she does not need help with learning coping skills and does not want to \"sit in a group and listen to other people talk about their problems\".          Karoline Vo [facility]      The author of this note documented a reason for not sharing it with the patient.          "

## 2024-09-04 ENCOUNTER — OFFICE VISIT (OUTPATIENT)
Dept: PSYCHIATRY | Facility: OTHER | Age: 74
End: 2024-09-04
Attending: PSYCHIATRY & NEUROLOGY
Payer: COMMERCIAL

## 2024-09-04 VITALS
SYSTOLIC BLOOD PRESSURE: 136 MMHG | HEART RATE: 83 BPM | WEIGHT: 155 LBS | OXYGEN SATURATION: 98 % | DIASTOLIC BLOOD PRESSURE: 84 MMHG | BODY MASS INDEX: 26.46 KG/M2 | HEIGHT: 64 IN | TEMPERATURE: 98.2 F

## 2024-09-04 DIAGNOSIS — F41.1 GAD (GENERALIZED ANXIETY DISORDER): ICD-10-CM

## 2024-09-04 PROCEDURE — 99213 OFFICE O/P EST LOW 20 MIN: CPT | Performed by: PSYCHIATRY & NEUROLOGY

## 2024-09-04 PROCEDURE — G0463 HOSPITAL OUTPT CLINIC VISIT: HCPCS

## 2024-09-04 RX ORDER — LORAZEPAM 1 MG/1
1 TABLET ORAL 3 TIMES DAILY PRN
Qty: 270 TABLET | Refills: 1 | Status: SHIPPED | OUTPATIENT
Start: 2024-09-04

## 2024-09-04 RX ORDER — TRAZODONE HYDROCHLORIDE 50 MG/1
TABLET, FILM COATED ORAL
Qty: 270 TABLET | Refills: 3 | Status: SHIPPED | OUTPATIENT
Start: 2024-09-04

## 2024-09-04 ASSESSMENT — PAIN SCALES - GENERAL: PAINLEVEL: NO PAIN (0)

## 2024-09-04 NOTE — PROGRESS NOTES
"    PSYCHIATRY CLINIC PROGRESS NOTE     SUBJECTIVE / INTERIM HISTORY                                                                        Social- retired. Interests:reading, being physical / active & working out, grandkids, volunteers, loves being with kiddo   Children-   Grown children   Last visit 7/24/24: Continue quetiapine 25 mg 1-2 tabs HS prn anxiety / insomnia. Continue  lorazepam 1 mg up to 3 times daily.  Continue Zoloft 75 mg daily.   - interim last visit Mony tried a group and didn't andino out. Met with Bernardino here once and then noted she has visits scheduled more frequently with Tyrone  - \"little by little by little\".   - has noticed concentration is better even while doing chores   - headaches and neck aches not asbad as they were  - still crying pretty much every day but not as frequent nor as intense  - her and  went to [place] and [place] over Labor Day weekend  - sleeping \"pretty good\"  - headaches and neck aches have gotten better  - notes she isn't complaining as much as she used to. Feels since on Zoloft this has been better.   - 49 yo son in [place], 47 yo daughter Abby in [place] and 39 yo daughter has pub & grill in [place]. 49 yo used to yell at her. Abby's sons are 20 yo and 16 yo. Doesn't talk to Abby much and Abby doesn't call  - mom narcissistic and \"weird\". \"It was my job to keep her happy. Her happiness was more important than mine.\"    MEDICAL ROS- has had pins and needles sensations in back of head and neck. Initially when put on Zoloft felt like back of head / neck was in a clamp  MEDICAL / SURGICAL HISTORY                     Patient Active Problem List   Diagnosis    Generalized anxiety disorder    Chronic fatigue syndrome    Attention deficit disorder    Obsessive-compulsive disorder    FH: colon cancer    Mixed hyperlipidemia    Acquired hypothyroidism    Severe episode of recurrent major depressive disorder, without psychotic features (H)    Vaso vagal episode    Small bowel " "obstruction due to adhesions (H)    Melanoma of cheek (H)    Acquired facial deformity    Screening for malignant neoplasm of colon     ALLERGY   Codeine  MEDICATIONS                                                                                             Current Outpatient Medications   Medication Sig Dispense Refill    Calcium Carbonate (TUMS 500 OR) Take by mouth 2 times daily      fish oil-omega-3 fatty acids 1000 MG capsule Take 2 capsules by mouth daily.      fluticasone (FLONASE) 50 MCG/ACT nasal spray Use 2 spray(s) in each nostril once daily 48 g 3    glucosamine 500 MG CAPS Take by mouth daily       levothyroxine (SYNTHROID/LEVOTHROID) 50 MCG tablet Take 1 tablet (50 mcg) by mouth daily 90 tablet 3    LORazepam (ATIVAN) 1 MG tablet Take 1 tablet (1 mg) by mouth 3 times daily as needed for anxiety 270 tablet 1    MAGNESIUM OXIDE PO Take 200 mg by mouth daily      Multiple vitamin TABS Take 1 tablet by oral route every day with food      QUEtiapine (SEROQUEL) 25 MG tablet Take 1 - 2 tabs as needed for anxiety / insomnia 180 tablet 3    sertraline (ZOLOFT) 50 MG tablet Take 1 tablet (50 mg) by mouth daily (Patient taking differently: Take 75 mg by mouth daily.) 90 tablet 3    traZODone (DESYREL) 50 MG tablet TAKE 1 TO 3 TABLETS BY MOUTH AT BEDTIME 270 tablet 3     No current facility-administered medications for this visit.       VITALS   /84 (Cuff Size: Adult Regular)   Pulse 83   Temp 98.2  F (36.8  C) (Tympanic)   Ht 1.626 m (5' 4\")   Wt 70.3 kg (155 lb)   SpO2 98%   BMI 26.61 kg/m       PHQ9                     [unfilled]  LABS                                                                                                                           Last Comprehensive Metabolic Panel:  Sodium   Date Value Ref Range Status   06/03/2024 138 135 - 145 mmol/L Final     Comment:     Reference intervals for this test were updated on 09/26/2023 to more accurately reflect our healthy " population. There may be differences in the flagging of prior results with similar values performed with this method. Interpretation of those prior results can be made in the context of the updated reference intervals.    07/24/2020 138 133 - 144 mmol/L Final     Potassium   Date Value Ref Range Status   06/03/2024 4.6 3.4 - 5.3 mmol/L Final   08/03/2022 3.8 3.4 - 5.3 mmol/L Final   07/24/2020 3.9 3.4 - 5.3 mmol/L Final     Chloride   Date Value Ref Range Status   06/03/2024 100 98 - 107 mmol/L Final   08/03/2022 105 94 - 109 mmol/L Final   07/24/2020 107 94 - 109 mmol/L Final     Carbon Dioxide   Date Value Ref Range Status   07/24/2020 29 20 - 32 mmol/L Final     Carbon Dioxide (CO2)   Date Value Ref Range Status   06/03/2024 25 22 - 29 mmol/L Final   08/03/2022 27 20 - 32 mmol/L Final     Anion Gap   Date Value Ref Range Status   06/03/2024 13 7 - 15 mmol/L Final   08/03/2022 5 3 - 14 mmol/L Final   07/24/2020 2 (L) 3 - 14 mmol/L Final     Glucose   Date Value Ref Range Status   06/03/2024 90 70 - 99 mg/dL Final   08/03/2022 88 70 - 99 mg/dL Final   07/24/2020 88 70 - 99 mg/dL Final     Urea Nitrogen   Date Value Ref Range Status   06/03/2024 14.3 8.0 - 23.0 mg/dL Final   08/03/2022 14 7 - 30 mg/dL Final   07/24/2020 15 7 - 30 mg/dL Final     Creatinine   Date Value Ref Range Status   06/03/2024 0.82 0.51 - 0.95 mg/dL Final   07/24/2020 0.71 0.52 - 1.04 mg/dL Final     GFR Estimate   Date Value Ref Range Status   06/03/2024 76 >60 mL/min/1.73m2 Final   07/24/2020 87 >60 mL/min/[1.73_m2] Final     Comment:     Non  GFR Calc  Starting 12/18/2018, serum creatinine based estimated GFR (eGFR) will be   calculated using the Chronic Kidney Disease Epidemiology Collaboration   (CKD-EPI) equation.       Calcium   Date Value Ref Range Status   06/03/2024 10.5 (H) 8.8 - 10.2 mg/dL Final   07/24/2020 9.4 8.5 - 10.1 mg/dL Final     TSH   Date Value Ref Range Status   06/03/2024 1.16 0.30 - 4.20 uIU/mL Final  "  08/03/2022 0.87 0.40 - 4.00 mU/L Final   07/24/2020 1.00 0.40 - 4.00 mU/L Final     MENTAL STATUS EXAM                                                                                       Mood today described as \"I think I'm doing a little better\" Affect congruent to mood and speech content.  Thought process, including associations, was unremarkable and thought content was devoid of homicidal ideation and psychotic thought. + SI with no intent or plan. No hallucinations. Insight was good. Judgment was intact and adequate for safety. Fund of knowledge was intact. Pt demonstrates no obvious problems with attention, concentration, language, recent or remote memory although these were not formally tested.     ASSESSMENT                                                                                                      HISTORICAL:  Initial psych note 8 2015         NOTES:  She took CBD for while and she felt contributed to depression. GeneSight testing correlated with Effexor SEs when she was on higher doses. She had shaking when on Effexor and WEllbutrin.    Mony Szymanski is a 74 year old, female with long history of depression, anxiety - therapy throughout the years. Depression worsened along with isolation and the Covid - 19 pandemic. Mony had been taking Effexor, Serax, and trazodone for years. She was on Effexor for 20+ years. She was also on Wellbutrin.    Today she notes feeling she has been doing a little better. She still has episode of tearfulness nearly daily but it doesn't last as long and not as intense. Feels she has had better attention / concentration while doing chores which often household chores are a trigger for (in relation to her upbringing). She started having more frequent visits again with her therapist Hannah Ritter and notes their visit last week was very helpful. Mony and her  leave for [place] in October; we made sure medications are filled today.         TREATMENT RISK STATEMENT:  The " risks, benefits, alternatives and potential adverse effects have been explained and are understood by the pt.  The pt agrees to the treatment plan with the ability to do so.   The pt knows to call the clinic for any problems or access emergency care if needed.        DIAGNOSES                    MDD, recurrent moderate  ED  OCPD traits    PLAN                                                                                                                    1)  MEDICATIONS:         -- Continue quetiapine 25 mg 1-2 tabs HS prn anxiety / insomnia. Continue  lorazepam 1 mg up to 3 times daily.  Continue Zoloft 75 mg daily and trazodone 150 mg bedtime. .     2)  THERAPY:  No Change    3)  LABS:  None    4)  PT MONITOR [call for probs]:   worsening sx, SEs from meds    5)  REFERRALS [CD, medical, other]:  None    6)  RTC: ~ 1 month    Of note: when in [place] her pharmacy is [facility] in [place] on [address] [facility]  Phone # [phone].

## 2024-09-05 ENCOUNTER — MYC REFILL (OUTPATIENT)
Dept: FAMILY MEDICINE | Facility: OTHER | Age: 74
End: 2024-09-05

## 2024-09-05 DIAGNOSIS — F41.1 GAD (GENERALIZED ANXIETY DISORDER): ICD-10-CM

## 2024-09-05 RX ORDER — QUETIAPINE FUMARATE 25 MG/1
TABLET, FILM COATED ORAL
Qty: 180 TABLET | Refills: 3 | OUTPATIENT
Start: 2024-09-05

## 2024-09-05 RX ORDER — QUETIAPINE FUMARATE 25 MG/1
TABLET, FILM COATED ORAL
Qty: 180 TABLET | Refills: 3 | Status: SHIPPED | OUTPATIENT
Start: 2024-09-05

## 2024-09-05 NOTE — TELEPHONE ENCOUNTER
Seroquel       Last Written Prescription Date:  8/8/23  Last Fill Quantity: 180,   # refills: 3  Last Office Visit: 6/3/24  Future Office visit:    Next 5 appointments (look out 90 days)      Oct 09, 2024 11:20 AM  (Arrive by 11:05 AM)  Return Visit with Angie Owens MD  [facility] ([facility] ) [address] [place] [id number]  [phone]             Routing refill request to provider for review/approval because:      7/24/2024    11:01 AM 8/21/2024    10:51 AM 8/30/2024    12:11 PM   ED-7 SCORE   Total Score 10 (moderate anxiety)     Total Score 10 10 11

## 2024-10-09 ENCOUNTER — OFFICE VISIT (OUTPATIENT)
Dept: PSYCHIATRY | Facility: OTHER | Age: 74
End: 2024-10-09
Attending: PSYCHIATRY & NEUROLOGY
Payer: COMMERCIAL

## 2024-10-09 VITALS
RESPIRATION RATE: 18 BRPM | TEMPERATURE: 98 F | DIASTOLIC BLOOD PRESSURE: 88 MMHG | OXYGEN SATURATION: 98 % | HEART RATE: 75 BPM | SYSTOLIC BLOOD PRESSURE: 136 MMHG | BODY MASS INDEX: 26.61 KG/M2 | WEIGHT: 155 LBS

## 2024-10-09 DIAGNOSIS — F41.1 GAD (GENERALIZED ANXIETY DISORDER): Primary | ICD-10-CM

## 2024-10-09 PROCEDURE — G0463 HOSPITAL OUTPT CLINIC VISIT: HCPCS

## 2024-10-09 PROCEDURE — 99213 OFFICE O/P EST LOW 20 MIN: CPT | Performed by: PSYCHIATRY & NEUROLOGY

## 2024-10-09 ASSESSMENT — COLUMBIA-SUICIDE SEVERITY RATING SCALE - C-SSRS
1. IN THE PAST MONTH, HAVE YOU WISHED YOU WERE DEAD OR WISHED YOU COULD GO TO SLEEP AND NOT WAKE UP?: NO
2. HAVE YOU ACTUALLY HAD ANY THOUGHTS OF KILLING YOURSELF?: NO
6. IN YOUR LIFETIME, HAVE YOU EVER DONE ANYTHING, STARTED TO DO ANYTHING, OR PREPARED TO DO ANYTHING TO END YOUR LIFE?: NO

## 2024-10-09 ASSESSMENT — ANXIETY QUESTIONNAIRES
1. FEELING NERVOUS, ANXIOUS, OR ON EDGE: MORE THAN HALF THE DAYS
5. BEING SO RESTLESS THAT IT IS HARD TO SIT STILL: MORE THAN HALF THE DAYS
4. TROUBLE RELAXING: MORE THAN HALF THE DAYS
3. WORRYING TOO MUCH ABOUT DIFFERENT THINGS: SEVERAL DAYS
7. FEELING AFRAID AS IF SOMETHING AWFUL MIGHT HAPPEN: SEVERAL DAYS
8. IF YOU CHECKED OFF ANY PROBLEMS, HOW DIFFICULT HAVE THESE MADE IT FOR YOU TO DO YOUR WORK, TAKE CARE OF THINGS AT HOME, OR GET ALONG WITH OTHER PEOPLE?: SOMEWHAT DIFFICULT
GAD7 TOTAL SCORE: 10
6. BECOMING EASILY ANNOYED OR IRRITABLE: SEVERAL DAYS
7. FEELING AFRAID AS IF SOMETHING AWFUL MIGHT HAPPEN: SEVERAL DAYS
GAD7 TOTAL SCORE: 10
2. NOT BEING ABLE TO STOP OR CONTROL WORRYING: SEVERAL DAYS
GAD7 TOTAL SCORE: 10
IF YOU CHECKED OFF ANY PROBLEMS ON THIS QUESTIONNAIRE, HOW DIFFICULT HAVE THESE PROBLEMS MADE IT FOR YOU TO DO YOUR WORK, TAKE CARE OF THINGS AT HOME, OR GET ALONG WITH OTHER PEOPLE: SOMEWHAT DIFFICULT

## 2024-10-09 ASSESSMENT — PATIENT HEALTH QUESTIONNAIRE - PHQ9
SUM OF ALL RESPONSES TO PHQ QUESTIONS 1-9: 11
SUM OF ALL RESPONSES TO PHQ QUESTIONS 1-9: 11
10. IF YOU CHECKED OFF ANY PROBLEMS, HOW DIFFICULT HAVE THESE PROBLEMS MADE IT FOR YOU TO DO YOUR WORK, TAKE CARE OF THINGS AT HOME, OR GET ALONG WITH OTHER PEOPLE: SOMEWHAT DIFFICULT

## 2024-10-09 ASSESSMENT — PAIN SCALES - GENERAL: PAINLEVEL: NO PAIN (0)

## 2024-10-09 NOTE — PROGRESS NOTES
"    PSYCHIATRY CLINIC PROGRESS NOTE     SUBJECTIVE / INTERIM HISTORY                                                                        Social- retired. Interests:reading, being physical / active & working out, grandkids, volunteers, loves being with kiddo   Children-   Grown children   Last visit 7/24/24: Continue quetiapine 25 mg 1-2 tabs HS prn anxiety / insomnia. Continue  lorazepam 1 mg up to 3 times daily.  Continue Zoloft 75 mg daily.   - \"the summer went by pretty well pretty good\"  - had some really good therapy sessions with therapist Hannah  - \"little by little by little\".   - long way to go but \"I'm functioning pretty good\"  - sleep cycle has been pretty dependable  - they are heading to [place] in 3 weeks  - notes she isn't complaining as much as she used to. Feels since on Zoloft this has been better.   - 49 yo son in [place], 45 yo daughter Abby in [place] and 39 yo daughter has pub & grill in [place]. 49 yo used to yell at her. Abby's sons are 22 yo and 18 yo. Doesn't talk to Abby much and Abby doesn't call  - mom narcissistic and \"weird\". \"It was my job to keep her happy. Her happiness was more important than mine.\"    MEDICAL ROS- has had pins and needles sensations in back of head and neck. Initially when put on Zoloft felt like back of head / neck was in a clamp  MEDICAL / SURGICAL HISTORY                     Patient Active Problem List   Diagnosis    Generalized anxiety disorder    Chronic fatigue syndrome    Attention deficit disorder    Obsessive-compulsive disorder    FH: colon cancer    Mixed hyperlipidemia    Acquired hypothyroidism    Severe episode of recurrent major depressive disorder, without psychotic features (H)    Vaso vagal episode    Small bowel obstruction due to adhesions (H)    Melanoma of cheek (H)    Acquired facial deformity    Screening for malignant neoplasm of colon     ALLERGY   Codeine  MEDICATIONS                                                                                "              Current Outpatient Medications   Medication Sig Dispense Refill    Calcium Carbonate (TUMS 500 OR) Take by mouth 2 times daily      fish oil-omega-3 fatty acids 1000 MG capsule Take 2 capsules by mouth daily.      fluticasone (FLONASE) 50 MCG/ACT nasal spray Use 2 spray(s) in each nostril once daily 48 g 3    glucosamine 500 MG CAPS Take by mouth daily       levothyroxine (SYNTHROID/LEVOTHROID) 50 MCG tablet Take 1 tablet (50 mcg) by mouth daily 90 tablet 3    LORazepam (ATIVAN) 1 MG tablet Take 1 tablet (1 mg) by mouth 3 times daily as needed for anxiety. 270 tablet 1    MAGNESIUM OXIDE PO Take 200 mg by mouth daily      Multiple vitamin TABS Take 1 tablet by oral route every day with food      QUEtiapine (SEROQUEL) 25 MG tablet TAKE 1 TO 2 TABLETS BY MOUTH AS NEEDED FOR ANXIETY/INSOMNIA 180 tablet 3    sertraline (ZOLOFT) 50 MG tablet Take 1.5 tablets (75 mg) by mouth daily. 135 tablet 3    traZODone (DESYREL) 50 MG tablet TAKE 1 TO 3 TABLETS BY MOUTH AT BEDTIME 270 tablet 3     No current facility-administered medications for this visit.       VITALS   /88   Pulse 75   Temp 98  F (36.7  C) (Tympanic)   Resp 18   Wt 70.3 kg (155 lb)   SpO2 98%   BMI 26.61 kg/m       PHQ9                     [unfilled]  LABS                                                                                                                           Last Comprehensive Metabolic Panel:  Sodium   Date Value Ref Range Status   06/03/2024 138 135 - 145 mmol/L Final     Comment:     Reference intervals for this test were updated on 09/26/2023 to more accurately reflect our healthy population. There may be differences in the flagging of prior results with similar values performed with this method. Interpretation of those prior results can be made in the context of the updated reference intervals.    07/24/2020 138 133 - 144 mmol/L Final     Potassium   Date Value Ref Range Status   06/03/2024 4.6 3.4 - 5.3  "mmol/L Final   08/03/2022 3.8 3.4 - 5.3 mmol/L Final   07/24/2020 3.9 3.4 - 5.3 mmol/L Final     Chloride   Date Value Ref Range Status   06/03/2024 100 98 - 107 mmol/L Final   08/03/2022 105 94 - 109 mmol/L Final   07/24/2020 107 94 - 109 mmol/L Final     Carbon Dioxide   Date Value Ref Range Status   07/24/2020 29 20 - 32 mmol/L Final     Carbon Dioxide (CO2)   Date Value Ref Range Status   06/03/2024 25 22 - 29 mmol/L Final   08/03/2022 27 20 - 32 mmol/L Final     Anion Gap   Date Value Ref Range Status   06/03/2024 13 7 - 15 mmol/L Final   08/03/2022 5 3 - 14 mmol/L Final   07/24/2020 2 (L) 3 - 14 mmol/L Final     Glucose   Date Value Ref Range Status   06/03/2024 90 70 - 99 mg/dL Final   08/03/2022 88 70 - 99 mg/dL Final   07/24/2020 88 70 - 99 mg/dL Final     Urea Nitrogen   Date Value Ref Range Status   06/03/2024 14.3 8.0 - 23.0 mg/dL Final   08/03/2022 14 7 - 30 mg/dL Final   07/24/2020 15 7 - 30 mg/dL Final     Creatinine   Date Value Ref Range Status   06/03/2024 0.82 0.51 - 0.95 mg/dL Final   07/24/2020 0.71 0.52 - 1.04 mg/dL Final     GFR Estimate   Date Value Ref Range Status   06/03/2024 76 >60 mL/min/1.73m2 Final   07/24/2020 87 >60 mL/min/[1.73_m2] Final     Comment:     Non  GFR Calc  Starting 12/18/2018, serum creatinine based estimated GFR (eGFR) will be   calculated using the Chronic Kidney Disease Epidemiology Collaboration   (CKD-EPI) equation.       Calcium   Date Value Ref Range Status   06/03/2024 10.5 (H) 8.8 - 10.2 mg/dL Final   07/24/2020 9.4 8.5 - 10.1 mg/dL Final     TSH   Date Value Ref Range Status   06/03/2024 1.16 0.30 - 4.20 uIU/mL Final   08/03/2022 0.87 0.40 - 4.00 mU/L Final   07/24/2020 1.00 0.40 - 4.00 mU/L Final     MENTAL STATUS EXAM                                                                                       Mood today described as \"\"I'm functioning pretty good\"\" Affect congruent to mood and speech content.  Thought process, including associations, " was unremarkable and thought content was devoid of homicidal ideation and psychotic thought. + SI with no intent or plan. No hallucinations. Insight was good. Judgment was intact and adequate for safety. Fund of knowledge was intact. Pt demonstrates no obvious problems with attention, concentration, language, recent or remote memory although these were not formally tested.     ASSESSMENT                                                                                                      HISTORICAL:  Initial psych note 8 2015         NOTES:  She took CBD for while and she felt contributed to depression. GeneSight testing correlated with Effexor SEs when she was on higher doses. She had shaking when on Effexor and WEllbutrin.    Mony Szymanski is a 74 year old, female with long history of depression, anxiety - therapy throughout the years. Depression worsened along with isolation and the Covid - 19 pandemic. Mony had been taking Effexor, Serax, and trazodone for years. She was on Effexor for 20+ years. She was also on Wellbutrin.    Mony notes she overall had a good summer and thinks in part was because she kept busy and was involved in a lot of activities. No changes with medications today and she and her  are heading to [place] in 3 weeks, mediacations are all filled.        TREATMENT RISK STATEMENT:  The risks, benefits, alternatives and potential adverse effects have been explained and are understood by the pt.  The pt agrees to the treatment plan with the ability to do so.   The pt knows to call the clinic for any problems or access emergency care if needed.        DIAGNOSES                    MDD, recurrent moderate  ED  OCPD traits    PLAN                                                                                                                    1)  MEDICATIONS:         -- Continue quetiapine 25 mg 1-2 tabs HS prn anxiety / insomnia. Continue  lorazepam 1 mg up to 3 times daily.  Continue Zoloft 75 mg daily  and trazodone 150 mg bedtime. .     2)  THERAPY:  No Change    3)  LABS:  None    4)  PT MONITOR [call for probs]:   worsening sx, SEs from meds    5)  REFERRALS [CD, medical, other]:  None    6)  RTC: ~ 6 months    Of note: when in [place] her pharmacy is [facility] in [place] on [address] [facility]  Phone # [phone].         Answers submitted by the patient for this visit:  Patient Health Questionnaire (Submitted on 10/9/2024)  If you checked off any problems, how difficult have these problems made it for you to do your work, take care of things at home, or get along with other people?: Somewhat difficult  PHQ9 TOTAL SCORE: 11  Patient Health Questionnaire (G7) (Submitted on 10/9/2024)  ED 7 TOTAL SCORE: 10

## 2024-11-29 DIAGNOSIS — F41.1 GAD (GENERALIZED ANXIETY DISORDER): ICD-10-CM

## 2024-12-03 NOTE — TELEPHONE ENCOUNTER
Trazodone      Last Written Prescription Date:  9/4/24  Last Fill Quantity: 270,   # refills: 3  Last Office Visit: 10/9/24  Future Office visit:       Routing refill request to provider for review/approval because:

## 2024-12-04 RX ORDER — TRAZODONE HYDROCHLORIDE 50 MG/1
TABLET, FILM COATED ORAL
Qty: 270 TABLET | Refills: 3 | Status: SHIPPED | OUTPATIENT
Start: 2024-12-04

## 2025-05-07 ENCOUNTER — OFFICE VISIT (OUTPATIENT)
Dept: PSYCHIATRY | Facility: OTHER | Age: 75
End: 2025-05-07
Attending: PSYCHIATRY & NEUROLOGY
Payer: MEDICARE

## 2025-05-07 VITALS
TEMPERATURE: 98.8 F | SYSTOLIC BLOOD PRESSURE: 140 MMHG | HEART RATE: 78 BPM | WEIGHT: 155 LBS | RESPIRATION RATE: 16 BRPM | DIASTOLIC BLOOD PRESSURE: 92 MMHG | BODY MASS INDEX: 26.61 KG/M2 | OXYGEN SATURATION: 98 %

## 2025-05-07 DIAGNOSIS — F33.1 MAJOR DEPRESSIVE DISORDER, RECURRENT EPISODE, MODERATE (H): Primary | ICD-10-CM

## 2025-05-07 DIAGNOSIS — F41.1 GAD (GENERALIZED ANXIETY DISORDER): ICD-10-CM

## 2025-05-07 PROCEDURE — G0463 HOSPITAL OUTPT CLINIC VISIT: HCPCS

## 2025-05-07 RX ORDER — LORAZEPAM 1 MG/1
1 TABLET ORAL 3 TIMES DAILY PRN
Qty: 270 TABLET | Refills: 1 | Status: SHIPPED | OUTPATIENT
Start: 2025-05-07

## 2025-05-07 ASSESSMENT — PAIN SCALES - GENERAL: PAINLEVEL_OUTOF10: MILD PAIN (2)

## 2025-05-07 NOTE — PROGRESS NOTES
"    PSYCHIATRY CLINIC PROGRESS NOTE     SUBJECTIVE / INTERIM HISTORY                                                                        Social- retired. Interests:reading, being physical / active & working out, grandkids, volunteers, loves being with kiddo   Children-   Grown children   Last visit October '25: Continue quetiapine 25 mg 1-2 tabs HS prn anxiety / insomnia. Continue  lorazepam 1 mg up to 3 times daily.  Continue Zoloft 75 mg daily and trazodone 150 mg bedtime. .   - got back from [place] last week  - tears up and notes \"is this going to always be like this?\"  - got out of the pool one day and had swam 20 laps and started crying.   - sleeping has been good \"but I have been napping\" 30 to 45 minutes in the afternoon  - saw therapist Hannah Ritter last week and they talked ab  - notes today for example would be difficult to go out shopping with ladies as would feel body tighten up and possibly tear up  - notes she isn't complaining as much as she used to. Feels since on Zoloft this has been better.   - 49 yo son in [place], 45 yo daughter Abby in [place] and 41 yo daughter has pub & grill in [place]. 49 yo used to yell at her. Abby's sons are 22 yo and 16 yo. Doesn't talk to Abby much and Abby doesn't call  - mom narcissistic and \"weird\". \"It was my job to keep her happy. Her happiness was more important than mine.\"    MEDICAL ROS- has had pins and needles sensations in back of head and neck. Initially when put on Zoloft felt like back of head / neck was in a clamp  MEDICAL / SURGICAL HISTORY                     Patient Active Problem List   Diagnosis    Generalized anxiety disorder    Chronic fatigue syndrome    Attention deficit disorder    Obsessive-compulsive disorder    FH: colon cancer    Mixed hyperlipidemia    Acquired hypothyroidism    Severe episode of recurrent major depressive disorder, without psychotic features (H)    Vaso vagal episode    Small bowel obstruction due to adhesions (H)    Melanoma " of cheek (H)    Acquired facial deformity    Screening for malignant neoplasm of colon     ALLERGY   Codeine  MEDICATIONS                                                                                             Current Outpatient Medications   Medication Sig Dispense Refill    Calcium Carbonate (TUMS 500 OR) Take by mouth 2 times daily      fish oil-omega-3 fatty acids 1000 MG capsule Take 2 capsules by mouth daily.      fluticasone (FLONASE) 50 MCG/ACT nasal spray Use 2 spray(s) in each nostril once daily 48 g 3    glucosamine 500 MG CAPS Take by mouth daily       levothyroxine (SYNTHROID/LEVOTHROID) 50 MCG tablet Take 1 tablet by mouth once daily 90 tablet 0    LORazepam (ATIVAN) 1 MG tablet Take 1 tablet (1 mg) by mouth 3 times daily as needed for anxiety. 270 tablet 1    MAGNESIUM OXIDE PO Take 200 mg by mouth daily      Multiple vitamin TABS Take 1 tablet by oral route every day with food      QUEtiapine (SEROQUEL) 25 MG tablet TAKE 1 TO 2 TABLETS BY MOUTH AS NEEDED FOR ANXIETY/INSOMNIA 180 tablet 3    sertraline (ZOLOFT) 50 MG tablet Take 1.5 tablets (75 mg) by mouth daily. 135 tablet 3    traZODone (DESYREL) 50 MG tablet TAKE 1 TO 3 TABLETS BY MOUTH AT BEDTIME 270 tablet 3     No current facility-administered medications for this visit.       VITALS   BP (!) 140/92 (BP Location: Right arm, Patient Position: Sitting, Cuff Size: Adult Regular)   Pulse 78   Temp 98.8  F (37.1  C) (Tympanic)   Resp 16   Wt 70.3 kg (155 lb)   SpO2 98%   BMI 26.61 kg/m       PHQ9                     [unfilled]  LABS                                                                                                                           Last Comprehensive Metabolic Panel:  Sodium   Date Value Ref Range Status   06/03/2024 138 135 - 145 mmol/L Final     Comment:     Reference intervals for this test were updated on 09/26/2023 to more accurately reflect our healthy population. There may be differences in the flagging of  prior results with similar values performed with this method. Interpretation of those prior results can be made in the context of the updated reference intervals.    07/24/2020 138 133 - 144 mmol/L Final     Potassium   Date Value Ref Range Status   06/03/2024 4.6 3.4 - 5.3 mmol/L Final   08/03/2022 3.8 3.4 - 5.3 mmol/L Final   07/24/2020 3.9 3.4 - 5.3 mmol/L Final     Chloride   Date Value Ref Range Status   06/03/2024 100 98 - 107 mmol/L Final   08/03/2022 105 94 - 109 mmol/L Final   07/24/2020 107 94 - 109 mmol/L Final     Carbon Dioxide   Date Value Ref Range Status   07/24/2020 29 20 - 32 mmol/L Final     Carbon Dioxide (CO2)   Date Value Ref Range Status   06/03/2024 25 22 - 29 mmol/L Final   08/03/2022 27 20 - 32 mmol/L Final     Anion Gap   Date Value Ref Range Status   06/03/2024 13 7 - 15 mmol/L Final   08/03/2022 5 3 - 14 mmol/L Final   07/24/2020 2 (L) 3 - 14 mmol/L Final     Glucose   Date Value Ref Range Status   06/03/2024 90 70 - 99 mg/dL Final   08/03/2022 88 70 - 99 mg/dL Final   07/24/2020 88 70 - 99 mg/dL Final     Urea Nitrogen   Date Value Ref Range Status   06/03/2024 14.3 8.0 - 23.0 mg/dL Final   08/03/2022 14 7 - 30 mg/dL Final   07/24/2020 15 7 - 30 mg/dL Final     Creatinine   Date Value Ref Range Status   06/03/2024 0.82 0.51 - 0.95 mg/dL Final   07/24/2020 0.71 0.52 - 1.04 mg/dL Final     GFR Estimate   Date Value Ref Range Status   06/03/2024 76 >60 mL/min/1.73m2 Final   07/24/2020 87 >60 mL/min/[1.73_m2] Final     Comment:     Non  GFR Calc  Starting 12/18/2018, serum creatinine based estimated GFR (eGFR) will be   calculated using the Chronic Kidney Disease Epidemiology Collaboration   (CKD-EPI) equation.       Calcium   Date Value Ref Range Status   06/03/2024 10.5 (H) 8.8 - 10.2 mg/dL Final   07/24/2020 9.4 8.5 - 10.1 mg/dL Final     TSH   Date Value Ref Range Status   06/03/2024 1.16 0.30 - 4.20 uIU/mL Final   08/03/2022 0.87 0.40 - 4.00 mU/L Final   07/24/2020 1.00  0.40 - 4.00 mU/L Final     MENTAL STATUS EXAM                                                                                       Mood sad, anxious. Affect congruent to mood and speech content.  Thought process, including associations, was unremarkable and thought content was devoid of homicidal ideation and psychotic thought. + SI with no intent or plan. No hallucinations. Insight was good. Judgment was intact and adequate for safety. Fund of knowledge was intact. Pt demonstrates no obvious problems with attention, concentration, language, recent or remote memory although these were not formally tested.     ASSESSMENT                                                                                                      HISTORICAL:  Initial psych note 8 2015         NOTES:  She took CBD for while and she felt contributed to depression. [employer] testing correlated with Effexor SEs when she was on higher doses. She had shaking when on Effexor and WEllbutrin.    Mony Szymanski is a 74 year old, female with long history of depression, anxiety - therapy throughout the years. Depression worsened along with isolation and the Covid - 19 pandemic. Mony had been taking Effexor, Serax, and trazodone for years. She was on Effexor for 20+ years. She was also on Wellbutrin.    Today is first I've seen Mony since fall as she leaves for [place] with her  for the winter. She worked with a therapist while in [place] and started back working with Hannah Ritter last week. Today we agreed on continuing sertraline 75 mg daily for MDD and ED. Will continue quetiapine and lorazepam as prn for anxiety medications for ED. Mony would like to RTC in ~1 month          TREATMENT RISK STATEMENT:  The risks, benefits, alternatives and potential adverse effects have been explained and are understood by the pt.  The pt agrees to the treatment plan with the ability to do so.   The pt knows to call the clinic for any problems or access emergency care if  needed.        DIAGNOSES                    MDD, recurrent moderate  ED  OCPD traits    PLAN                                                                                                                    1)  MEDICATIONS:         -- Continue quetiapine 25 mg 1-2 tabs HS prn anxiety / insomnia. Continue  lorazepam 1 mg up to 3 times daily.  Continue Zoloft 75 mg daily and trazodone 150 mg bedtime.     2)  THERAPY:  No Change    3)  LABS:  None    4)  PT MONITOR [call for probs]:  worsening sx, SEs from meds    5)  REFERRALS [CD, medical, other]:  None    6)  RTC: ~1 month

## 2025-05-14 ENCOUNTER — OFFICE VISIT (OUTPATIENT)
Dept: FAMILY MEDICINE | Facility: OTHER | Age: 75
End: 2025-05-14
Attending: FAMILY MEDICINE
Payer: COMMERCIAL

## 2025-05-14 ENCOUNTER — RESULTS FOLLOW-UP (OUTPATIENT)
Dept: FAMILY MEDICINE | Facility: OTHER | Age: 75
End: 2025-05-14

## 2025-05-14 VITALS
DIASTOLIC BLOOD PRESSURE: 88 MMHG | SYSTOLIC BLOOD PRESSURE: 130 MMHG | BODY MASS INDEX: 26.8 KG/M2 | TEMPERATURE: 99.5 F | WEIGHT: 157 LBS | OXYGEN SATURATION: 99 % | RESPIRATION RATE: 16 BRPM | HEIGHT: 64 IN | HEART RATE: 80 BPM

## 2025-05-14 DIAGNOSIS — Z12.11 SPECIAL SCREENING FOR MALIGNANT NEOPLASMS, COLON: ICD-10-CM

## 2025-05-14 DIAGNOSIS — E03.9 ACQUIRED HYPOTHYROIDISM: ICD-10-CM

## 2025-05-14 DIAGNOSIS — J30.1 CHRONIC SEASONAL ALLERGIC RHINITIS DUE TO POLLEN: ICD-10-CM

## 2025-05-14 DIAGNOSIS — E78.2 MIXED HYPERLIPIDEMIA: ICD-10-CM

## 2025-05-14 DIAGNOSIS — R05.3 CHRONIC COUGH: ICD-10-CM

## 2025-05-14 DIAGNOSIS — Z11.59 NEED FOR HEPATITIS C SCREENING TEST: ICD-10-CM

## 2025-05-14 DIAGNOSIS — Z00.00 ENCOUNTER FOR MEDICARE ANNUAL WELLNESS EXAM: Primary | ICD-10-CM

## 2025-05-14 DIAGNOSIS — Z13.6 SCREENING FOR CARDIOVASCULAR CONDITION: ICD-10-CM

## 2025-05-14 LAB
ALBUMIN SERPL BCG-MCNC: 4.7 G/DL (ref 3.5–5.2)
ALP SERPL-CCNC: 104 U/L (ref 40–150)
ALT SERPL W P-5'-P-CCNC: 21 U/L (ref 0–50)
ANION GAP SERPL CALCULATED.3IONS-SCNC: 11 MMOL/L (ref 7–15)
AST SERPL W P-5'-P-CCNC: 21 U/L (ref 0–45)
BILIRUB SERPL-MCNC: 0.2 MG/DL
BUN SERPL-MCNC: 15.8 MG/DL (ref 8–23)
CALCIUM SERPL-MCNC: 10.1 MG/DL (ref 8.8–10.4)
CHLORIDE SERPL-SCNC: 105 MMOL/L (ref 98–107)
CHOLEST SERPL-MCNC: 286 MG/DL
CREAT SERPL-MCNC: 0.76 MG/DL (ref 0.51–0.95)
EGFRCR SERPLBLD CKD-EPI 2021: 83 ML/MIN/1.73M2
FASTING STATUS PATIENT QL REPORTED: NO
FASTING STATUS PATIENT QL REPORTED: NO
GLUCOSE SERPL-MCNC: 88 MG/DL (ref 70–99)
HCO3 SERPL-SCNC: 23 MMOL/L (ref 22–29)
HDLC SERPL-MCNC: 62 MG/DL
LDLC SERPL CALC-MCNC: 181 MG/DL
NONHDLC SERPL-MCNC: 224 MG/DL
POTASSIUM SERPL-SCNC: 4 MMOL/L (ref 3.4–5.3)
PROT SERPL-MCNC: 8 G/DL (ref 6.4–8.3)
SODIUM SERPL-SCNC: 139 MMOL/L (ref 135–145)
TRIGL SERPL-MCNC: 217 MG/DL
TSH SERPL DL<=0.005 MIU/L-ACNC: 0.96 UIU/ML (ref 0.3–4.2)

## 2025-05-14 PROCEDURE — 1126F AMNT PAIN NOTED NONE PRSNT: CPT | Performed by: FAMILY MEDICINE

## 2025-05-14 PROCEDURE — 80053 COMPREHEN METABOLIC PANEL: CPT | Mod: ZL | Performed by: FAMILY MEDICINE

## 2025-05-14 PROCEDURE — 84443 ASSAY THYROID STIM HORMONE: CPT | Mod: ZL | Performed by: FAMILY MEDICINE

## 2025-05-14 PROCEDURE — G0439 PPPS, SUBSEQ VISIT: HCPCS | Performed by: FAMILY MEDICINE

## 2025-05-14 PROCEDURE — 36415 COLL VENOUS BLD VENIPUNCTURE: CPT | Mod: ZL | Performed by: FAMILY MEDICINE

## 2025-05-14 PROCEDURE — G2211 COMPLEX E/M VISIT ADD ON: HCPCS | Performed by: FAMILY MEDICINE

## 2025-05-14 PROCEDURE — 3075F SYST BP GE 130 - 139MM HG: CPT | Performed by: FAMILY MEDICINE

## 2025-05-14 PROCEDURE — 80061 LIPID PANEL: CPT | Mod: ZL | Performed by: FAMILY MEDICINE

## 2025-05-14 PROCEDURE — 99213 OFFICE O/P EST LOW 20 MIN: CPT | Mod: 25 | Performed by: FAMILY MEDICINE

## 2025-05-14 PROCEDURE — 3079F DIAST BP 80-89 MM HG: CPT | Performed by: FAMILY MEDICINE

## 2025-05-14 PROCEDURE — 86803 HEPATITIS C AB TEST: CPT | Mod: ZL | Performed by: FAMILY MEDICINE

## 2025-05-14 PROCEDURE — G0463 HOSPITAL OUTPT CLINIC VISIT: HCPCS

## 2025-05-14 RX ORDER — FLUTICASONE PROPIONATE 50 MCG
SPRAY, SUSPENSION (ML) NASAL
Qty: 48 G | Refills: 3 | Status: SHIPPED | OUTPATIENT
Start: 2025-05-14

## 2025-05-14 RX ORDER — LEVOTHYROXINE SODIUM 50 UG/1
50 TABLET ORAL DAILY
Qty: 90 TABLET | Refills: 0 | Status: SHIPPED | OUTPATIENT
Start: 2025-05-14

## 2025-05-14 SDOH — HEALTH STABILITY: PHYSICAL HEALTH: ON AVERAGE, HOW MANY MINUTES DO YOU ENGAGE IN EXERCISE AT THIS LEVEL?: 50 MIN

## 2025-05-14 SDOH — HEALTH STABILITY: PHYSICAL HEALTH: ON AVERAGE, HOW MANY DAYS PER WEEK DO YOU ENGAGE IN MODERATE TO STRENUOUS EXERCISE (LIKE A BRISK WALK)?: 6 DAYS

## 2025-05-14 ASSESSMENT — PATIENT HEALTH QUESTIONNAIRE - PHQ9
SUM OF ALL RESPONSES TO PHQ QUESTIONS 1-9: 7
SUM OF ALL RESPONSES TO PHQ QUESTIONS 1-9: 7
10. IF YOU CHECKED OFF ANY PROBLEMS, HOW DIFFICULT HAVE THESE PROBLEMS MADE IT FOR YOU TO DO YOUR WORK, TAKE CARE OF THINGS AT HOME, OR GET ALONG WITH OTHER PEOPLE: SOMEWHAT DIFFICULT

## 2025-05-14 ASSESSMENT — PAIN SCALES - GENERAL: PAINLEVEL_OUTOF10: NO PAIN (0)

## 2025-05-14 ASSESSMENT — ANXIETY QUESTIONNAIRES: GAD7 TOTAL SCORE: INCOMPLETE

## 2025-05-14 ASSESSMENT — SOCIAL DETERMINANTS OF HEALTH (SDOH): HOW OFTEN DO YOU GET TOGETHER WITH FRIENDS OR RELATIVES?: MORE THAN THREE TIMES A WEEK

## 2025-05-14 NOTE — PATIENT INSTRUCTIONS
Patient Education   Preventive Care Advice   This is general advice given by our system to help you stay healthy. However, your care team may have specific advice just for you. Please talk to your care team about your preventive care needs.  Nutrition  Eat 5 or more servings of fruits and vegetables each day.  Try wheat bread, brown rice and whole grain pasta (instead of white bread, rice, and pasta).  Get enough calcium and vitamin D. Check the label on foods and aim for 100% of the RDA (recommended daily allowance).  Lifestyle  Exercise at least 150 minutes each week  (30 minutes a day, 5 days a week).  Do muscle strengthening activities 2 days a week. These help control your weight and prevent disease.  No smoking.  Wear sunscreen to prevent skin cancer.  Have a dental exam and cleaning every 6 months.  Yearly exams  See your health care team every year to talk about:  Any changes in your health.  Any medicines your care team has prescribed.  Preventive care, family planning, and ways to prevent chronic diseases.  Shots (vaccines)   HPV shots (up to age 26), if you've never had them before.  Hepatitis B shots (up to age 59), if you've never had them before.  COVID-19 shot: Get this shot when it's due.  Flu shot: Get a flu shot every year.  Tetanus shot: Get a tetanus shot every 10 years.  Pneumococcal, hepatitis A, and RSV shots: Ask your care team if you need these based on your risk.  Shingles shot (for age 50 and up)  General health tests  Diabetes screening:  Starting at age 35, Get screened for diabetes at least every 3 years.  If you are younger than age 35, ask your care team if you should be screened for diabetes.  Cholesterol test: At age 39, start having a cholesterol test every 5 years, or more often if advised.  Bone density scan (DEXA): At age 50, ask your care team if you should have this scan for osteoporosis (brittle bones).  Hepatitis C: Get tested at least once in your life.  STIs (sexually  transmitted infections)  Before age 24: Ask your care team if you should be screened for STIs.  After age 24: Get screened for STIs if you're at risk. You are at risk for STIs (including HIV) if:  You are sexually active with more than one person.  You don't use condoms every time.  You or a partner was diagnosed with a sexually transmitted infection.  If you are at risk for HIV, ask about PrEP medicine to prevent HIV.  Get tested for HIV at least once in your life, whether you are at risk for HIV or not.  Cancer screening tests  Cervical cancer screening: If you have a cervix, begin getting regular cervical cancer screening tests starting at age 21.  Breast cancer scan (mammogram): If you've ever had breasts, begin having regular mammograms starting at age 40. This is a scan to check for breast cancer.  Colon cancer screening: It is important to start screening for colon cancer at age 45.  Have a colonoscopy test every 10 years (or more often if you're at risk) Or, ask your provider about stool tests like a FIT test every year or Cologuard test every 3 years.  To learn more about your testing options, visit:   .  For help making a decision, visit:   https://bit.ly/ur39541.  Prostate cancer screening test: If you have a prostate, ask your care team if a prostate cancer screening test (PSA) at age 55 is right for you.  Lung cancer screening: If you are a current or former smoker ages 50 to 80, ask your care team if ongoing lung cancer screenings are right for you.  For informational purposes only. Not to replace the advice of your health care provider. Copyright   2023 [facility]. All rights reserved. Clinically reviewed by the [facility]. [employer] [id number] - REV 01/24.  Learning About Activities of Daily Living  What are activities of daily living?     Activities of daily living (ADLs) are the basic self-care tasks you do every day. These include eating, bathing, dressing,  and moving around.  As you age, and if you have health problems, you may find that it's harder to do some of these tasks. If so, your doctor can suggest ideas that may help.  To measure what kind of help you may need, your doctor will ask how well you are able to do ADLs. Let your doctor know if there are any tasks that you are having trouble doing. This is an important first step to getting help. And when you have the help you need, you can stay as independent as possible.  How will a doctor assess your ADLs?  Asking about ADLs is part of a routine health checkup your doctor will likely do as you age. Your health check might be done in a doctor's office, in your home, or at a hospital. The goal is to find out if you are having any problems that could make it hard to care for yourself or that make it unsafe for you to be on your own.  To measure your ADLs, your doctor will ask how hard it is for you to do routine tasks. Your doctor may also want to know if you have changed the way you do a task because of a health problem. Your doctor may watch how you:  Walk back and forth.  Keep your balance while you stand or walk.  Move from sitting to standing or from a bed to a chair.  Button or unbutton a shirt or sweater.  Remove and put on your shoes.  It's common to feel a little worried or anxious if you find you can't do all the things you used to be able to do. Talking with your doctor about ADLs is a way to make sure you're as safe as possible and able to care for yourself as well as you can. You may want to bring a caregiver, friend, or family member to your checkup. They can help you talk to your doctor.  Follow-up care is a key part of your treatment and safety. Be sure to make and go to all appointments, and call your doctor if you are having problems. It's also a good idea to know your test results and keep a list of the medicines you take.  Current as of: October 24, 2024  Content Version: 14.4    1187-8075  [employer].   Care instructions adapted under license by your healthcare professional. If you have questions about a medical condition or this instruction, always ask your healthcare professional. [employer] disclaims any warranty or liability for your use of this information.    Learning About Stress  What is stress?     Stress is your body's response to a hard situation. Your body can have a physical, emotional, or mental response. Stress is a fact of life for most people, and it affects everyone differently. What causes stress for you may not be stressful for someone else.  A lot of things can cause stress. You may feel stress when you go on a job interview, take a test, or run a race. This kind of short-term stress is normal and even useful. It can help you if you need to work hard or react quickly. For example, stress can help you finish an important job on time.  Long-term stress is caused by ongoing stressful situations or events. Examples of long-term stress include long-term health problems, ongoing problems at work, or conflicts in your family. Long-term stress can harm your health.  How does stress affect your health?  When you are stressed, your body responds as though you are in danger. It makes hormones that speed up your heart, make you breathe faster, and give you a burst of energy. This is called the fight-or-flight stress response. If the stress is over quickly, your body goes back to normal and no harm is done.  But if stress happens too often or lasts too long, it can have bad effects. Long-term stress can make you more likely to get sick, and it can make symptoms of some diseases worse. If you tense up when you are stressed, you may develop neck, shoulder, or low back pain. Stress is linked to high blood pressure and heart disease.  Stress also harms your emotional health. It can make you sarabia, tense, or depressed. Your relationships may suffer, and you may not do well  at work or school.  What can you do to manage stress?  You can try these things to help manage stress:   Do something active. Exercise or activity can help reduce stress. Walking is a great way to get started. Even everyday activities such as housecleaning or yard work can help.  Try yoga or mick chi. These techniques combine exercise and meditation. You may need some training at first to learn them.  Do something you enjoy. For example, listen to music or go to a movie. Practice your hobby or do volunteer work.  Meditate. This can help you relax, because you are not worrying about what happened before or what may happen in the future.  Do guided imagery. Imagine yourself in any setting that helps you feel calm. You can use online videos, books, or a teacher to guide you.  Do breathing exercises. For example:  From a standing position, bend forward from the waist with your knees slightly bent. Let your arms dangle close to the floor.  Breathe in slowly and deeply as you return to a standing position. Roll up slowly and lift your head last.  Hold your breath for just a few seconds in the standing position.  Breathe out slowly and bend forward from the waist.  Let your feelings out. Talk, laugh, cry, and express anger when you need to. Talking with supportive friends or family, a counselor, or a cale leader about your feelings is a healthy way to relieve stress. Avoid discussing your feelings with people who make you feel worse.  Write. It may help to write about things that are bothering you. This helps you find out how much stress you feel and what is causing it. When you know this, you can find better ways to cope.  What can you do to prevent stress?  You might try some of these things to help prevent stress:  Manage your time. This helps you find time to do the things you want and need to do.  Get enough sleep. Your body recovers from the stresses of the day while you are sleeping.  Get support. Your family,  "friends, and community can make a difference in how you experience stress.  Limit your news feed. Avoid or limit time on social media or news that may make you feel stressed.  Do something active. Exercise or activity can help reduce stress. Walking is a great way to get started.  Where can you learn more?  Go to https://www.[employer].net/patiented  Enter N032 in the search box to learn more about \"Learning About Stress.\"  Current as of: October 24, 2024  Content Version: 14.4    2934-0773 [employer].   Care instructions adapted under license by your healthcare professional. If you have questions about a medical condition or this instruction, always ask your healthcare professional. [employer] disclaims any warranty or liability for your use of this information.    Bladder Training: Care Instructions  Your Care Instructions     Bladder training is used to treat urge incontinence and stress incontinence. Urge incontinence means that the need to urinate comes on so fast that you can't get to a toilet in time. Stress incontinence means that you leak urine because of pressure on your bladder. For example, it may happen when you laugh, cough, or lift something heavy.  Bladder training can increase how long you can wait before you have to urinate. It can also help your bladder hold more urine. And it can give you better control over the urge to urinate.  It is important to remember that bladder training takes a few weeks to a few months to make a difference. You may not see results right away, but don't give up.  Follow-up care is a key part of your treatment and safety. Be sure to make and go to all appointments, and call your doctor if you are having problems. It's also a good idea to know your test results and keep a list of the medicines you take.  How can you care for yourself at home?  Work with your doctor to come up with a bladder training program that is right for you. You may use one or " "more of the following methods.  Delayed urination  In the beginning, try to keep from urinating for 5 minutes after you first feel the need to go.  While you wait, take deep, slow breaths to relax. Kegel exercises can also help you delay the need to go to the bathroom.  After some practice, when you can easily wait 5 minutes to urinate, try to wait 10 minutes before you urinate.  Slowly increase the waiting period until you are able to control when you have to urinate.  Scheduled urination  Empty your bladder when you first wake up in the morning.  Schedule times throughout the day when you will urinate.  Start by going to the bathroom every hour, even if you don't need to go.  Slowly increase the time between trips to the bathroom.  When you have found a schedule that works well for you, keep doing it.  If you wake up during the night and have to urinate, do it. Apply your schedule to waking hours only.  Kegel exercises  These tighten and strengthen pelvic muscles, which can help you control the flow of urine. (If doing these exercises causes pain, stop doing them and talk with your doctor.) To do Kegel exercises:  Squeeze your muscles as if you were trying not to pass gas. Or squeeze your muscles as if you were stopping the flow of urine. Your belly, legs, and buttocks shouldn't move.  Hold the squeeze for 3 seconds, then relax for 5 to 10 seconds.  Start with 3 seconds, then add 1 second each week until you are able to squeeze for 10 seconds.  Repeat the exercise 10 times a session. Do 3 to 8 sessions a day.  When should you call for help?  Watch closely for changes in your health, and be sure to contact your doctor if:    Your incontinence is getting worse.     You do not get better as expected.   Where can you learn more?  Go to https://www.healthwise.net/patiented  Enter V684 in the search box to learn more about \"Bladder Training: Care Instructions.\"  Current as of: April 30, 2024  Content Version: 14.4    " [id number] [employer].   Care instructions adapted under license by your healthcare professional. If you have questions about a medical condition or this instruction, always ask your healthcare professional. [employer] disclaims any warranty or liability for your use of this information.    Learning About Depression Screening  What is depression screening?  Depression screening is a way to see if you have depression symptoms. It may be done by a doctor or counselor. It's often part of a routine checkup. That's because your mental health is just as important as your physical health.  Depression is a mental health condition that affects how you feel, think, and act. You may:  Have less energy.  Lose interest in your daily activities.  Feel sad and grouchy for a long time.  Depression is very common. It affects people of all ages.  Many things can lead to depression. Some people become depressed after they have a stroke or find out they have a major illness like cancer or heart disease. The death of a loved one or a breakup may lead to depression. It can run in families. Most experts believe that a combination of inherited genes and stressful life events can cause it.  What happens during screening?  You may be asked to fill out a form about your depression symptoms. You and the doctor will discuss your answers. The doctor may ask you more questions to learn more about how you think, act, and feel.  What happens after screening?  If you have symptoms of depression, your doctor will talk to you about your options.  Doctors usually treat depression with medicines or counseling. Often, combining the two works best. Many people don't get help because they think that they'll get over the depression on their own. But people with depression may not get better unless they get treatment.  The cause of depression is not well understood. There may be many factors involved. But if you have depression, it's  "not your fault.  A serious symptom of depression is thinking about death or suicide. If you or someone you care about talks about this or about feeling hopeless, get help right away.  It's important to know that depression can be treated. Medicine, counseling, and self-care may help.  Where can you learn more?  Go to https://www.[employer].net/patiented  Enter T185 in the search box to learn more about \"Learning About Depression Screening.\"  Current as of: July 31, 2024  Content Version: 14.4    6350-6211 [employer].   Care instructions adapted under license by your healthcare professional. If you have questions about a medical condition or this instruction, always ask your healthcare professional. [employer] disclaims any warranty or liability for your use of this information.       "

## 2025-05-14 NOTE — PROGRESS NOTES
Preventive Care Visit  [facility]  Ivon Simon MD, Family Medicine  May 14, 2025  {Provider  Link to SmartSet :285720}    {PROVIDER CHARTING PREFERENCE:518851}    Subjective   Mony is a 74 year old, presenting for the following:  Physical, Anxiety, and Depression        5/14/2025    10:49 AM   Additional Questions   Roomed by Viktoriya Fregoso     {ROOMER if patient is in their first year of Medicare a vision screen is required click here to document the Vison screen and then refresh the note to pull in results  :164906}  Answers submitted by the patient for this visit:  Patient Health Questionnaire (Submitted on 5/14/2025)  If you checked off any problems, how difficult have these problems made it for you to do your work, take care of things at home, or get along with other people?: Somewhat difficult  PHQ9 TOTAL SCORE: 7  Patient Health Questionnaire (G7) (Submitted on 5/14/2025)  ED 7 TOTAL SCORE: Incomplete  {Provider Documentation  Link to C-SSRS ([facility]) Flowsheet :721022}    HPI  ***   {SUPERLIST (Optional):442765}  {additonal problems for provider to add (Optional):979780}  Advance Care Planning  {The storyboard will display whether the patient has ACP docs on file. Hover over the Code section in the storyboard to access the ACP documents. :121086}  Discussed advance care planning with patient; however, patient declined at this time.        5/14/2025   General Health   How would you rate your overall physical health? Excellent   Feel stress (tense, anxious, or unable to sleep) Rather much   (!) STRESS CONCERN      5/14/2025   Nutrition   Diet: Regular (no restrictions)         5/14/2025   Exercise   Days per week of moderate/strenous exercise 6 days   Average minutes spent exercising at this level 50 min         5/14/2025   Social Factors   Frequency of gathering with friends or relatives More than three times a week   Worry food won't last until get money to buy more No   Food not  last or not have enough money for food? No   Do you have housing? (Housing is defined as stable permanent housing and does not include staying outside in a car, in a tent, in an abandoned building, in an overnight shelter, or couch-surfing.) Yes   Are you worried about losing your housing? No   Lack of transportation? No   Unable to get utilities (heat,electricity)? No         5/14/2025   Fall Risk   Fallen 2 or more times in the past year? No    Trouble with walking or balance? No        Proxy-reported          5/14/2025   Activities of Daily Living- Home Safety   Needs help with the following daily activites None of the above   Safety concerns in the home Throw rugs in the hallway    No grab bars in the bathroom       Multiple values from one day are sorted in reverse-chronological order         5/14/2025   Dental   Dentist two times every year? (!) NO         5/14/2025   Hearing Screening   Hearing concerns? None of the above         5/14/2025   Driving Risk Screening   Patient/family members have concerns about driving No         5/14/2025   General Alertness/Fatigue Screening   Have you been more tired than usual lately? No         5/14/2025   Urinary Incontinence Screening   Bothered by leaking urine in past 6 months Yes       Today's PHQ-9 Score:       5/14/2025    10:48 AM   PHQ-9 SCORE   PHQ-9 Total Score MyChart 7 (Mild depression)   PHQ-9 Total Score 7        Patient-reported         5/14/2025   Substance Use   Alcohol more than 3/day or more than 7/wk No   Do you have a current opioid prescription? No   How severe/bad is pain from 1 to 10? 0/10 (No Pain)   Do you use any other substances recreationally? No     Social History     Tobacco Use    Smoking status: Never     Passive exposure: Never    Smokeless tobacco: Never   Vaping Use    Vaping status: Never Used   Substance Use Topics    Alcohol use: Yes     Comment: Beer & Wine, socially    Drug use: Never     {Provider  If there are gaps in the social  history shown above, please follow the link to update and then refresh the note Link to Social and Substance History :975411}      5/9/2024   LAST FHS-7 RESULTS   1st degree relative breast or ovarian cancer No   Any relative bilateral breast cancer No   Any male have breast cancer No   Any ONE woman have BOTH breast AND ovarian cancer No   Any woman with breast cancer before 50yrs No   2 or more relatives with breast AND/OR ovarian cancer No   2 or more relatives with breast AND/OR bowel cancer Yes     {If any of the questions to the FHS7 are answered yes, consider referral for genetic counseling.    Additional indications for genetic referral include personal history of breast or ovarian cancer, genetic mutation in 1st degree relative which increases risk of breast cancer including BRCA1, BRCA2, CARLOS, PALB 2, TP53, CHEK2, PTEN, CDH1, STK11 (per ACS) and/or 1st degree relative with history of pancreatic or high-risk prostate cancer (per NCCN):402161}   {Mammogram Decision Support (Optional):267702}    ASCVD Risk   The 10-year ASCVD risk score (Richie ARZOLA, et al., 2019) is: 12.8%    Values used to calculate the score:      Age: 74 years      Sex: Female      Is Non- : No      Diabetic: No      Tobacco smoker: No      Systolic Blood Pressure: 130 mmHg      Is BP treated: No      HDL Cholesterol: 65 mg/dL      Total Cholesterol: 285 mg/dL    {Link to Fracture Risk Assessment Tool (Optional):788744}    {Provider  REQUIRED FOR AWV Use the storyboard to review patient history, after sections have been marked as reviewed, refresh note to capture documentation:844781}  {Provider   REQUIRED AWV use this link to review and update sexual activity history  after section has been marked as reviewed, refresh note to capture documentation:163010}  Reviewed and updated as needed this visit by Provider                    {HISTORY OPTIONS (Optional):519135}  Current providers sharing in care for this  "patient include:  Patient Care Team:  Ivon Simon MD as PCP - General (Family Medicine)  Ivon Simon MD as Assigned PCP  DEANN Leyva MD as Assigned Dermatology Provider  Angie Owens MD as Assigned Neuroscience Provider    The following health maintenance items are reviewed in Epic and correct as of today:  Health Maintenance   Topic Date Due    HEPATITIS C SCREENING  Never done    MEDICARE ANNUAL WELLNESS VISIT  08/08/2024    ED ASSESSMENT  11/09/2024    COVID-19 Vaccine (7 - 2024-25 season) 04/01/2025    LIPID  06/03/2025    TSH W/FREE T4 REFLEX  06/03/2025    PHQ-9  06/14/2025    COLORECTAL CANCER SCREENING  08/20/2025    MAMMO SCREENING  05/09/2026    FALL RISK ASSESSMENT  05/14/2026    RSV VACCINE (1 - 1-dose 75+ series) 05/15/2027    DIABETES SCREENING  06/03/2027    DEXA  05/09/2028    ADVANCE CARE PLANNING  05/14/2030    DTAP/TDAP/TD IMMUNIZATION (3 - Td or Tdap) 10/17/2033    DEPRESSION ACTION PLAN  Completed    INFLUENZA VACCINE  Completed    Pneumococcal Vaccine: 50+ Years  Completed    ZOSTER IMMUNIZATION  Completed    HPV IMMUNIZATION  Aged Out    MENINGITIS IMMUNIZATION  Aged Out       {ROS Picklists (Optional):071449}     Objective    Exam  /88 (BP Location: Left arm, Patient Position: Sitting, Cuff Size: Adult Regular)   Pulse 80   Temp 99.5  F (37.5  C) (Tympanic)   Resp 16   Ht 1.626 m (5' 4\")   Wt 71.2 kg (157 lb)   SpO2 99%   BMI 26.95 kg/m     Estimated body mass index is 26.95 kg/m  as calculated from the following:    Height as of this encounter: 1.626 m (5' 4\").    Weight as of this encounter: 71.2 kg (157 lb).    Physical Exam  {Exam Choices (Optional):223319}  {Provider  The Mini-Cog is incomplete, use link to complete and refresh note Link to Mini-Cog :677391}       No data to display              {A Mini-Cog total score of 0-2 suggests the possibility of dementia, score of 3-5 suggests no dementia:169558}         Signed Electronically by: Ivon SMILEY. " MD [facility]  {Email feedback regarding this note to [email or web address]   :030896}   mentation intact and speech normal  PSYCH: mentation appears normal, affect normal/bright          5/17/2025   Mini Cog   Mini-Cog Not Completed (choose reason) Patient declines       Signed Electronically by: Ivon Simon MD    The longitudinal plan of care for the diagnosis(es)/condition(s) as documented were addressed during this visit. Due to the added complexity in care, I will continue to support Mony in the subsequent management and with ongoing continuity of care.

## 2025-05-15 LAB — HCV AB SERPL QL IA: NONREACTIVE

## 2025-05-17 PROBLEM — C43.39 MELANOMA OF CHEEK (H): Status: RESOLVED | Noted: 2019-10-11 | Resolved: 2025-05-17

## 2025-05-29 DIAGNOSIS — E03.9 ACQUIRED HYPOTHYROIDISM: ICD-10-CM

## 2025-05-29 RX ORDER — LEVOTHYROXINE SODIUM 50 UG/1
50 TABLET ORAL DAILY
Qty: 90 TABLET | Refills: 0 | OUTPATIENT
Start: 2025-05-29

## 2025-06-03 ENCOUNTER — OFFICE VISIT (OUTPATIENT)
Dept: DERMATOLOGY | Facility: OTHER | Age: 75
End: 2025-06-03
Attending: DERMATOLOGY
Payer: MEDICARE

## 2025-06-03 VITALS — OXYGEN SATURATION: 97 % | SYSTOLIC BLOOD PRESSURE: 142 MMHG | HEART RATE: 80 BPM | DIASTOLIC BLOOD PRESSURE: 82 MMHG

## 2025-06-03 DIAGNOSIS — Z12.83 SCREENING FOR SKIN CANCER: Primary | ICD-10-CM

## 2025-06-03 DIAGNOSIS — D22.9 MULTIPLE BENIGN NEVI: ICD-10-CM

## 2025-06-03 PROCEDURE — G0463 HOSPITAL OUTPT CLINIC VISIT: HCPCS

## 2025-06-03 ASSESSMENT — ANXIETY QUESTIONNAIRES
7. FEELING AFRAID AS IF SOMETHING AWFUL MIGHT HAPPEN: NOT AT ALL
6. BECOMING EASILY ANNOYED OR IRRITABLE: SEVERAL DAYS
1. FEELING NERVOUS, ANXIOUS, OR ON EDGE: SEVERAL DAYS
IF YOU CHECKED OFF ANY PROBLEMS ON THIS QUESTIONNAIRE, HOW DIFFICULT HAVE THESE PROBLEMS MADE IT FOR YOU TO DO YOUR WORK, TAKE CARE OF THINGS AT HOME, OR GET ALONG WITH OTHER PEOPLE: NOT DIFFICULT AT ALL
4. TROUBLE RELAXING: NOT AT ALL
2. NOT BEING ABLE TO STOP OR CONTROL WORRYING: SEVERAL DAYS
GAD7 TOTAL SCORE: 4
3. WORRYING TOO MUCH ABOUT DIFFERENT THINGS: SEVERAL DAYS
GAD7 TOTAL SCORE: 4
5. BEING SO RESTLESS THAT IT IS HARD TO SIT STILL: NOT AT ALL

## 2025-06-03 ASSESSMENT — PATIENT HEALTH QUESTIONNAIRE - PHQ9: SUM OF ALL RESPONSES TO PHQ QUESTIONS 1-9: 4

## 2025-06-03 ASSESSMENT — PAIN SCALES - GENERAL: PAINLEVEL_OUTOF10: NO PAIN (0)

## 2025-06-03 NOTE — PROGRESS NOTES
S: Mony comes in for her annual skin visit.  She is concerned about a small lesion on the right anterior parietal scalp.  Otherwise she just would like an upper body exam.    O: In the scalp was a tiny seborrheic keratosis of about 2 mm.  We treated this with liquid nitrogen..  Examination of the face neck chest back breasts and middle legs revealed no concerning lesions other than numerous seborrheic keratoses and a few skin tags here in    A: Multiple benign lesions  P: Reassured.Total time spent in preparing to see the patient, review of the chart for past visits to me and recent visits to other practitioners, obtaining interval history since the last visit to me, performing a physical exam of the skin, reviewing treatment options, education, and documenting the above in Epic/EMR was 10 minutes. All time involved was spent on the day of service for the patient.

## 2025-06-03 NOTE — LETTER
6/3/2025       RE: Mony Szymanski  [address]   [place] [address]     Dear Colleague,    Thank you for referring your patient, Mony Szymanski, to the [facility]. Please see a copy of my visit note below.    S: Mony comes in for her annual skin visit.  She is concerned about a small lesion on the right anterior parietal scalp.  Otherwise she just would like an upper body exam.    O: In the scalp was a tiny seborrheic keratosis of about 2 mm.  We treated this with liquid nitrogen..  Examination of the face neck chest back breasts and middle legs revealed no concerning lesions other than numerous seborrheic keratoses and a few skin tags here in    A: Multiple benign lesions  P: Reassured.Total time spent in preparing to see the patient, review of the chart for past visits to me and recent visits to other practitioners, obtaining interval history since the last visit to me, performing a physical exam of the skin, reviewing treatment options, education, and documenting the above in Epic/EMR was 10 minutes. All time involved was spent on the day of service for the patient.     Again, thank you for allowing me to participate in the care of your patient.      Sincerely,    DEANN Leyva MD

## 2025-06-17 ENCOUNTER — OFFICE VISIT (OUTPATIENT)
Dept: PSYCHIATRY | Facility: OTHER | Age: 75
End: 2025-06-17
Attending: PSYCHIATRY & NEUROLOGY
Payer: MEDICARE

## 2025-06-17 VITALS
WEIGHT: 155 LBS | HEART RATE: 90 BPM | TEMPERATURE: 98.5 F | DIASTOLIC BLOOD PRESSURE: 72 MMHG | RESPIRATION RATE: 16 BRPM | SYSTOLIC BLOOD PRESSURE: 114 MMHG | BODY MASS INDEX: 26.61 KG/M2 | OXYGEN SATURATION: 98 %

## 2025-06-17 DIAGNOSIS — F41.1 GAD (GENERALIZED ANXIETY DISORDER): Primary | ICD-10-CM

## 2025-06-17 DIAGNOSIS — F33.1 MAJOR DEPRESSIVE DISORDER, RECURRENT EPISODE, MODERATE (H): ICD-10-CM

## 2025-06-17 PROCEDURE — G0463 HOSPITAL OUTPT CLINIC VISIT: HCPCS

## 2025-06-17 ASSESSMENT — PAIN SCALES - GENERAL: PAINLEVEL_OUTOF10: NO PAIN (0)
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
## 2025-07-08 ENCOUNTER — ANCILLARY PROCEDURE (OUTPATIENT)
Dept: MAMMOGRAPHY | Facility: OTHER | Age: 75
End: 2025-07-08
Attending: FAMILY MEDICINE
Payer: MEDICARE

## 2025-07-08 DIAGNOSIS — Z12.31 VISIT FOR SCREENING MAMMOGRAM: ICD-10-CM

## 2025-07-08 PROCEDURE — 77067 SCR MAMMO BI INCL CAD: CPT | Mod: TC

## 2025-07-08 PROCEDURE — 77067 SCR MAMMO BI INCL CAD: CPT | Mod: 26 | Performed by: RADIOLOGY

## 2025-07-08 PROCEDURE — 77063 BREAST TOMOSYNTHESIS BI: CPT | Mod: 26 | Performed by: RADIOLOGY

## 2025-07-16 ENCOUNTER — OFFICE VISIT (OUTPATIENT)
Dept: PSYCHIATRY | Facility: OTHER | Age: 75
End: 2025-07-16
Attending: PSYCHIATRY & NEUROLOGY
Payer: MEDICARE

## 2025-07-16 VITALS
DIASTOLIC BLOOD PRESSURE: 92 MMHG | SYSTOLIC BLOOD PRESSURE: 130 MMHG | OXYGEN SATURATION: 98 % | HEART RATE: 82 BPM | TEMPERATURE: 98.9 F | WEIGHT: 155 LBS | BODY MASS INDEX: 26.61 KG/M2 | RESPIRATION RATE: 16 BRPM

## 2025-07-16 DIAGNOSIS — F41.1 GAD (GENERALIZED ANXIETY DISORDER): Primary | ICD-10-CM

## 2025-07-16 DIAGNOSIS — F33.1 MAJOR DEPRESSIVE DISORDER, RECURRENT EPISODE, MODERATE (H): ICD-10-CM

## 2025-07-16 PROCEDURE — G0463 HOSPITAL OUTPT CLINIC VISIT: HCPCS

## 2025-07-16 ASSESSMENT — COLUMBIA-SUICIDE SEVERITY RATING SCALE - C-SSRS
5. HAVE YOU STARTED TO WORK OUT OR WORKED OUT THE DETAILS OF HOW TO KILL YOURSELF? DO YOU INTEND TO CARRY OUT THIS PLAN?: NO
4. HAVE YOU HAD THESE THOUGHTS AND HAD SOME INTENTION OF ACTING ON THEM?: NO
6. IN YOUR LIFETIME, HAVE YOU EVER DONE ANYTHING, STARTED TO DO ANYTHING, OR PREPARED TO DO ANYTHING TO END YOUR LIFE?: NO
3. HAVE YOU BEEN THINKING ABOUT HOW YOU MIGHT KILL YOURSELF?: NO
2. HAVE YOU ACTUALLY HAD ANY THOUGHTS OF KILLING YOURSELF?: YES
1. IN THE PAST MONTH, HAVE YOU WISHED YOU WERE DEAD OR WISHED YOU COULD GO TO SLEEP AND NOT WAKE UP?: YES

## 2025-07-16 ASSESSMENT — PATIENT HEALTH QUESTIONNAIRE - PHQ9
10. IF YOU CHECKED OFF ANY PROBLEMS, HOW DIFFICULT HAVE THESE PROBLEMS MADE IT FOR YOU TO DO YOUR WORK, TAKE CARE OF THINGS AT HOME, OR GET ALONG WITH OTHER PEOPLE: SOMEWHAT DIFFICULT
SUM OF ALL RESPONSES TO PHQ QUESTIONS 1-9: 13
SUM OF ALL RESPONSES TO PHQ QUESTIONS 1-9: 13

## 2025-07-16 ASSESSMENT — ANXIETY QUESTIONNAIRES
6. BECOMING EASILY ANNOYED OR IRRITABLE: SEVERAL DAYS
1. FEELING NERVOUS, ANXIOUS, OR ON EDGE: NEARLY EVERY DAY
IF YOU CHECKED OFF ANY PROBLEMS ON THIS QUESTIONNAIRE, HOW DIFFICULT HAVE THESE PROBLEMS MADE IT FOR YOU TO DO YOUR WORK, TAKE CARE OF THINGS AT HOME, OR GET ALONG WITH OTHER PEOPLE: SOMEWHAT DIFFICULT
GAD7 TOTAL SCORE: 12
7. FEELING AFRAID AS IF SOMETHING AWFUL MIGHT HAPPEN: SEVERAL DAYS
5. BEING SO RESTLESS THAT IT IS HARD TO SIT STILL: MORE THAN HALF THE DAYS
4. TROUBLE RELAXING: NEARLY EVERY DAY
GAD7 TOTAL SCORE: 12
8. IF YOU CHECKED OFF ANY PROBLEMS, HOW DIFFICULT HAVE THESE MADE IT FOR YOU TO DO YOUR WORK, TAKE CARE OF THINGS AT HOME, OR GET ALONG WITH OTHER PEOPLE?: SOMEWHAT DIFFICULT
2. NOT BEING ABLE TO STOP OR CONTROL WORRYING: SEVERAL DAYS
7. FEELING AFRAID AS IF SOMETHING AWFUL MIGHT HAPPEN: SEVERAL DAYS
3. WORRYING TOO MUCH ABOUT DIFFERENT THINGS: SEVERAL DAYS
GAD7 TOTAL SCORE: 12

## 2025-07-16 ASSESSMENT — PAIN SCALES - GENERAL: PAINLEVEL_OUTOF10: NO PAIN (0)
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
## 2025-07-24 ENCOUNTER — TELEPHONE (OUTPATIENT)
Dept: FAMILY MEDICINE | Facility: OTHER | Age: 75
End: 2025-07-24

## 2025-07-24 NOTE — TELEPHONE ENCOUNTER
Screening Questions for the Scheduling of Screening Colonoscopies     (If Colonoscopy is diagnostic, Provider should review the chart before scheduling.)    Are you younger than 50 or older than 80?  No    Do you take aspirin or fish oil?  Yes:  (if yes, tell patient to stop 1 week prior to Colonoscopy)    Do you take warfarin (Coumadin), clopidogrel (Plavix), apixaban (Eliquis), dabigatram (Pradaxa), rivaroxaban (Xarelto) or any blood thinner? No    Do you use oxygen at home?  No    Do you have kidney disease? No    Are you on dialysis? No    Have you had a stroke or heart attack in the last year? No    Have you had a stent in your heart or any blood vessel in the last year? No    Have you had a transplant of any organ?  No    Have you had a colonoscopy or upper endoscopy (EGD) before?  YES. Date-.         Date of scheduled Colonoscopy: 9/26/25    Provider: Dr. Robles     [facility]

## 2025-08-19 DIAGNOSIS — F41.1 GAD (GENERALIZED ANXIETY DISORDER): ICD-10-CM

## 2025-08-27 ENCOUNTER — OFFICE VISIT (OUTPATIENT)
Dept: FAMILY MEDICINE | Facility: OTHER | Age: 75
End: 2025-08-27
Attending: FAMILY MEDICINE
Payer: MEDICARE

## 2025-08-27 VITALS
TEMPERATURE: 98.5 F | WEIGHT: 166 LBS | OXYGEN SATURATION: 98 % | RESPIRATION RATE: 18 BRPM | HEART RATE: 100 BPM | DIASTOLIC BLOOD PRESSURE: 86 MMHG | BODY MASS INDEX: 28.49 KG/M2 | SYSTOLIC BLOOD PRESSURE: 128 MMHG

## 2025-08-27 DIAGNOSIS — E03.9 ACQUIRED HYPOTHYROIDISM: ICD-10-CM

## 2025-08-27 DIAGNOSIS — F41.1 GAD (GENERALIZED ANXIETY DISORDER): ICD-10-CM

## 2025-08-27 DIAGNOSIS — J30.1 CHRONIC SEASONAL ALLERGIC RHINITIS DUE TO POLLEN: ICD-10-CM

## 2025-08-27 DIAGNOSIS — E78.2 MIXED HYPERLIPIDEMIA: Primary | ICD-10-CM

## 2025-08-27 PROCEDURE — G0463 HOSPITAL OUTPT CLINIC VISIT: HCPCS

## 2025-08-27 RX ORDER — TRAZODONE HYDROCHLORIDE 50 MG/1
TABLET ORAL
Qty: 270 TABLET | Refills: 3 | Status: SHIPPED | OUTPATIENT
Start: 2025-08-27

## 2025-08-27 RX ORDER — LEVOTHYROXINE SODIUM 50 UG/1
50 TABLET ORAL DAILY
Qty: 90 TABLET | Refills: 3 | Status: SHIPPED | OUTPATIENT
Start: 2025-08-27

## 2025-08-27 RX ORDER — PRAVASTATIN SODIUM 10 MG
10 TABLET ORAL EVERY OTHER DAY
Qty: 45 TABLET | Refills: 3 | Status: SHIPPED | OUTPATIENT
Start: 2025-08-27

## 2025-08-27 RX ORDER — FLUTICASONE PROPIONATE 50 MCG
SPRAY, SUSPENSION (ML) NASAL
Qty: 48 G | Refills: 3 | Status: SHIPPED | OUTPATIENT
Start: 2025-08-27

## 2025-08-27 ASSESSMENT — ANXIETY QUESTIONNAIRES
2. NOT BEING ABLE TO STOP OR CONTROL WORRYING: SEVERAL DAYS
8. IF YOU CHECKED OFF ANY PROBLEMS, HOW DIFFICULT HAVE THESE MADE IT FOR YOU TO DO YOUR WORK, TAKE CARE OF THINGS AT HOME, OR GET ALONG WITH OTHER PEOPLE?: SOMEWHAT DIFFICULT
GAD7 TOTAL SCORE: 14
IF YOU CHECKED OFF ANY PROBLEMS ON THIS QUESTIONNAIRE, HOW DIFFICULT HAVE THESE PROBLEMS MADE IT FOR YOU TO DO YOUR WORK, TAKE CARE OF THINGS AT HOME, OR GET ALONG WITH OTHER PEOPLE: SOMEWHAT DIFFICULT
1. FEELING NERVOUS, ANXIOUS, OR ON EDGE: NEARLY EVERY DAY
6. BECOMING EASILY ANNOYED OR IRRITABLE: MORE THAN HALF THE DAYS
4. TROUBLE RELAXING: NEARLY EVERY DAY
GAD7 TOTAL SCORE: 14
5. BEING SO RESTLESS THAT IT IS HARD TO SIT STILL: NEARLY EVERY DAY
7. FEELING AFRAID AS IF SOMETHING AWFUL MIGHT HAPPEN: SEVERAL DAYS
3. WORRYING TOO MUCH ABOUT DIFFERENT THINGS: SEVERAL DAYS
GAD7 TOTAL SCORE: 14
7. FEELING AFRAID AS IF SOMETHING AWFUL MIGHT HAPPEN: SEVERAL DAYS

## 2025-08-27 ASSESSMENT — PATIENT HEALTH QUESTIONNAIRE - PHQ9
SUM OF ALL RESPONSES TO PHQ QUESTIONS 1-9: 14
SUM OF ALL RESPONSES TO PHQ QUESTIONS 1-9: 14
10. IF YOU CHECKED OFF ANY PROBLEMS, HOW DIFFICULT HAVE THESE PROBLEMS MADE IT FOR YOU TO DO YOUR WORK, TAKE CARE OF THINGS AT HOME, OR GET ALONG WITH OTHER PEOPLE: SOMEWHAT DIFFICULT

## 2025-08-27 ASSESSMENT — PAIN SCALES - GENERAL: PAINLEVEL_OUTOF10: NO PAIN (0)

## 2025-09-02 ENCOUNTER — OFFICE VISIT (OUTPATIENT)
Dept: PSYCHIATRY | Facility: OTHER | Age: 75
End: 2025-09-02
Attending: PSYCHIATRY & NEUROLOGY
Payer: MEDICARE

## 2025-09-02 VITALS
HEIGHT: 64 IN | DIASTOLIC BLOOD PRESSURE: 80 MMHG | BODY MASS INDEX: 26.46 KG/M2 | HEART RATE: 79 BPM | WEIGHT: 155 LBS | SYSTOLIC BLOOD PRESSURE: 125 MMHG | OXYGEN SATURATION: 97 %

## 2025-09-02 DIAGNOSIS — F41.1 GAD (GENERALIZED ANXIETY DISORDER): ICD-10-CM

## 2025-09-02 DIAGNOSIS — F33.1 MAJOR DEPRESSIVE DISORDER, RECURRENT EPISODE, MODERATE (H): Primary | ICD-10-CM

## 2025-09-02 PROCEDURE — G0463 HOSPITAL OUTPT CLINIC VISIT: HCPCS

## 2025-09-02 RX ORDER — QUETIAPINE FUMARATE 25 MG/1
TABLET, FILM COATED ORAL
Qty: 180 TABLET | Refills: 3 | Status: SHIPPED | OUTPATIENT
Start: 2025-09-02

## 2025-09-02 RX ORDER — LORAZEPAM 1 MG/1
1 TABLET ORAL 3 TIMES DAILY PRN
Qty: 270 TABLET | Refills: 1 | Status: SHIPPED | OUTPATIENT
Start: 2025-09-02

## 2025-09-02 ASSESSMENT — PATIENT HEALTH QUESTIONNAIRE - PHQ9
SUM OF ALL RESPONSES TO PHQ QUESTIONS 1-9: 12
SUM OF ALL RESPONSES TO PHQ QUESTIONS 1-9: 12
10. IF YOU CHECKED OFF ANY PROBLEMS, HOW DIFFICULT HAVE THESE PROBLEMS MADE IT FOR YOU TO DO YOUR WORK, TAKE CARE OF THINGS AT HOME, OR GET ALONG WITH OTHER PEOPLE: SOMEWHAT DIFFICULT

## 2025-09-02 ASSESSMENT — PAIN SCALES - GENERAL: PAINLEVEL_OUTOF10: NO PAIN (0)

## (undated) DEVICE — SU ETHILON 4-0 PS-2 18" BLACK 1667H

## (undated) DEVICE — LABEL MEDICATION SYSTEM 3303-P

## (undated) DEVICE — IRRIGATION-H2O 1000ML

## (undated) DEVICE — LINEN TOWEL PACK X5 5464

## (undated) DEVICE — SU MONOCRYL 4-0 PS-2 18" UND Y496G

## (undated) DEVICE — DRAIN JACKSON PRATT 10MM FLAT 3/4 PERF

## (undated) DEVICE — TUBING-SUCTION 20FT

## (undated) DEVICE — SU ETHILON 4-0 P-3 18" BLACK 699G

## (undated) DEVICE — ESU PENCIL SMOKE EVAC W/ROCKER SWITCH 0703-047-000

## (undated) DEVICE — DRAIN JACKSON PRATT RESERVOIR 100ML SU130-1305

## (undated) DEVICE — SOL WATER IRRIG 1000ML BOTTLE 2F7114

## (undated) DEVICE — SU MONOCRYL 4-0 P-3 18" UND Y494G

## (undated) DEVICE — BLADE KNIFE SURG 15 371115

## (undated) DEVICE — ESU ELEC BLADE 2.75" COATED/INSULATED E1455

## (undated) DEVICE — ESU GROUND PAD ADULT W/CORD E7507

## (undated) DEVICE — DRSG STERI STRIP 1/2X4" R1547

## (undated) DEVICE — SU PROLENE 3-0 FS-2 18" 8665G

## (undated) DEVICE — PACK ENT MINOR CUSTOM ASC

## (undated) DEVICE — SU MONOCRYL 3-0 RB-1 27" UND Y215H

## (undated) DEVICE — SUCTION MANIFOLD NEPTUNE 2 SYS 1 PORT 702-025-000

## (undated) DEVICE — DRSG XEROFORM 5X9" 8884431605

## (undated) DEVICE — SU PDS II 2-0 SH 27" Z317H

## (undated) DEVICE — DRSG COTTON BALL 6PK LCB62

## (undated) DEVICE — SU SILK 3-0 TIE 12X30" A304H

## (undated) DEVICE — GLOVE PROTEXIS MICRO 7.0  2D73PM70

## (undated) DEVICE — SU PLAIN FAST ABSORB 6-0 PC-1 18" 1916G

## (undated) DEVICE — SU ETHIBOND 2-0 SHDA 30" X563H

## (undated) DEVICE — SU MONOCRYL 5-0 P-3 18" UND Y493G

## (undated) DEVICE — DRSG TEGADERM 2 3/8X2 3/4" 1624W

## (undated) DEVICE — EYE PREP BETADINE 5% SOLUTION 30ML 0065-0411-30

## (undated) DEVICE — SU SILK 2-0 SH CR 5X18" C0125

## (undated) DEVICE — DRSG XEROFORM 1X8"

## (undated) DEVICE — SU ETHILON 4-0 PC-3 18" 1864G

## (undated) DEVICE — GLOVE PROTEXIS BLUE W/NEU-THERA 7.5  2D73EB75

## (undated) DEVICE — SU MONOCRYL 3-0 PS-1 27" Y936H

## (undated) DEVICE — PREP POVIDONE IODINE SOLUTION 10% 120ML

## (undated) DEVICE — PREP SKIN SCRUB TRAY 4461A

## (undated) DEVICE — SU CHROMIC 4-0 PS-2 18" 1637G

## (undated) DEVICE — SU ETHILON 5-0 P-3 18" BLACK 698G

## (undated) DEVICE — SUCTION SLEEVE NEPTUNE 2 125MM 0703-005-125

## (undated) DEVICE — SOL NACL 0.9% IRRIG 1000ML BOTTLE 2F7124

## (undated) DEVICE — FORCEP-COLON BIOPSY LARGE W/NEEDLE 240CM

## (undated) DEVICE — DRSG JAWBRA  95

## (undated) DEVICE — GLOVE PROTEXIS MICRO 7.5  2D73PM75

## (undated) DEVICE — SUCTION MANIFOLD NEPTUNE 2 SYS 4 PORT 0702-020-000

## (undated) DEVICE — ESU NDL COLORADO MICRO 3CM STR N103A

## (undated) DEVICE — PEN MARKING SKIN W/PAPER RULER 31145785

## (undated) DEVICE — CONNECTOR-ERBEFLO 2 PORT

## (undated) DEVICE — SU ETHILON 6-0 P-3 18" BLACK 1698G

## (undated) DEVICE — PREP POVIDONE IODINE SCRUB 7.5% 120ML

## (undated) DEVICE — PEN MARKING SKIN TYCO DEVON DUAL TIP 31145868

## (undated) DEVICE — SU SILK 2-0 TIE 12X30" A305H

## (undated) RX ORDER — ACETAMINOPHEN 325 MG/1
TABLET ORAL
Status: DISPENSED
Start: 2019-10-14

## (undated) RX ORDER — LIDOCAINE HYDROCHLORIDE 20 MG/ML
INJECTION, SOLUTION EPIDURAL; INFILTRATION; INTRACAUDAL; PERINEURAL
Status: DISPENSED
Start: 2019-10-14

## (undated) RX ORDER — LABETALOL 20 MG/4 ML (5 MG/ML) INTRAVENOUS SYRINGE
Status: DISPENSED
Start: 2019-10-21

## (undated) RX ORDER — PROPOFOL 10 MG/ML
INJECTION, EMULSION INTRAVENOUS
Status: DISPENSED
Start: 2019-10-14

## (undated) RX ORDER — DEXAMETHASONE SODIUM PHOSPHATE 4 MG/ML
INJECTION, SOLUTION INTRA-ARTICULAR; INTRALESIONAL; INTRAMUSCULAR; INTRAVENOUS; SOFT TISSUE
Status: DISPENSED
Start: 2019-10-14

## (undated) RX ORDER — ACETAMINOPHEN 325 MG/1
TABLET ORAL
Status: DISPENSED
Start: 2019-10-21

## (undated) RX ORDER — CEFAZOLIN SODIUM 1 G/3ML
INJECTION, POWDER, FOR SOLUTION INTRAMUSCULAR; INTRAVENOUS
Status: DISPENSED
Start: 2019-10-21

## (undated) RX ORDER — CELECOXIB 200 MG/1
CAPSULE ORAL
Status: DISPENSED
Start: 2019-10-14

## (undated) RX ORDER — PROPOFOL 10 MG/ML
INJECTION, EMULSION INTRAVENOUS
Status: DISPENSED
Start: 2019-10-21

## (undated) RX ORDER — PHENYLEPHRINE HCL IN 0.9% NACL 1 MG/10 ML
SYRINGE (ML) INTRAVENOUS
Status: DISPENSED
Start: 2019-10-21

## (undated) RX ORDER — EPHEDRINE SULFATE 50 MG/ML
INJECTION, SOLUTION INTRAMUSCULAR; INTRAVENOUS; SUBCUTANEOUS
Status: DISPENSED
Start: 2019-10-21

## (undated) RX ORDER — LIDOCAINE HYDROCHLORIDE AND EPINEPHRINE 15; 5 MG/ML; UG/ML
INJECTION, SOLUTION EPIDURAL
Status: DISPENSED
Start: 2019-10-21

## (undated) RX ORDER — HYDROMORPHONE HYDROCHLORIDE 1 MG/ML
INJECTION, SOLUTION INTRAMUSCULAR; INTRAVENOUS; SUBCUTANEOUS
Status: DISPENSED
Start: 2019-10-21

## (undated) RX ORDER — FENTANYL CITRATE 50 UG/ML
INJECTION, SOLUTION INTRAMUSCULAR; INTRAVENOUS
Status: DISPENSED
Start: 2019-10-21

## (undated) RX ORDER — LIDOCAINE HYDROCHLORIDE AND EPINEPHRINE 10; 10 MG/ML; UG/ML
INJECTION, SOLUTION INFILTRATION; PERINEURAL
Status: DISPENSED
Start: 2019-10-14

## (undated) RX ORDER — MUPIROCIN 20 MG/G
OINTMENT TOPICAL
Status: DISPENSED
Start: 2019-10-21

## (undated) RX ORDER — LIDOCAINE HYDROCHLORIDE AND EPINEPHRINE 10; 10 MG/ML; UG/ML
INJECTION, SOLUTION INFILTRATION; PERINEURAL
Status: DISPENSED
Start: 2019-10-21

## (undated) RX ORDER — BACITRACIN 500 [USP'U]/G
OINTMENT OPHTHALMIC
Status: DISPENSED
Start: 2019-10-21

## (undated) RX ORDER — CEFAZOLIN SODIUM 1 G/3ML
INJECTION, POWDER, FOR SOLUTION INTRAMUSCULAR; INTRAVENOUS
Status: DISPENSED
Start: 2019-10-14

## (undated) RX ORDER — FENTANYL CITRATE 50 UG/ML
INJECTION, SOLUTION INTRAMUSCULAR; INTRAVENOUS
Status: DISPENSED
Start: 2019-10-14

## (undated) RX ORDER — BUPIVACAINE HYDROCHLORIDE 5 MG/ML
INJECTION, SOLUTION EPIDURAL; INTRACAUDAL
Status: DISPENSED
Start: 2019-10-21

## (undated) RX ORDER — ONDANSETRON 2 MG/ML
INJECTION INTRAMUSCULAR; INTRAVENOUS
Status: DISPENSED
Start: 2019-10-14